# Patient Record
Sex: FEMALE | Race: BLACK OR AFRICAN AMERICAN | NOT HISPANIC OR LATINO | Employment: PART TIME | URBAN - METROPOLITAN AREA
[De-identification: names, ages, dates, MRNs, and addresses within clinical notes are randomized per-mention and may not be internally consistent; named-entity substitution may affect disease eponyms.]

---

## 2021-08-22 ENCOUNTER — HOSPITAL ENCOUNTER (INPATIENT)
Facility: HOSPITAL | Age: 31
LOS: 1 days | Discharge: HOME | End: 2021-08-23
Attending: EMERGENCY MEDICINE | Admitting: INTERNAL MEDICINE
Payer: COMMERCIAL

## 2021-08-22 ENCOUNTER — APPOINTMENT (OUTPATIENT)
Dept: CT IMAGING | Facility: HOSPITAL | Age: 31
End: 2021-08-22
Attending: EMERGENCY MEDICINE
Payer: COMMERCIAL

## 2021-08-22 ENCOUNTER — APPOINTMENT (OUTPATIENT)
Dept: RADIOLOGY | Facility: HOSPITAL | Age: 31
End: 2021-08-22
Attending: EMERGENCY MEDICINE
Payer: COMMERCIAL

## 2021-08-22 DIAGNOSIS — J45.901 SEVERE ASTHMA WITH EXACERBATION, UNSPECIFIED WHETHER PERSISTENT: ICD-10-CM

## 2021-08-22 DIAGNOSIS — R00.0 SINUS TACHYCARDIA: ICD-10-CM

## 2021-08-22 DIAGNOSIS — J96.01 ACUTE RESPIRATORY FAILURE WITH HYPOXIA (H): ICD-10-CM

## 2021-08-22 DIAGNOSIS — Z20.822 SUSPECTED COVID-19 VIRUS INFECTION: ICD-10-CM

## 2021-08-22 DIAGNOSIS — J45.51 SEVERE PERSISTENT ASTHMA WITH ACUTE EXACERBATION (H): Primary | ICD-10-CM

## 2021-08-22 DIAGNOSIS — M89.9 BONE LESION: ICD-10-CM

## 2021-08-22 LAB
ANION GAP SERPL CALCULATED.3IONS-SCNC: 11 MMOL/L (ref 5–18)
BASE EXCESS BLDA CALC-SCNC: -4.8 MMOL/L
BASE EXCESS BLDV CALC-SCNC: -2.3 MMOL/L
BASOPHILS # BLD AUTO: 0.1 10E3/UL (ref 0–0.2)
BASOPHILS NFR BLD AUTO: 1 %
BNP SERPL-MCNC: 24 PG/ML (ref 0–64)
BUN SERPL-MCNC: 9 MG/DL (ref 8–22)
CALCIUM SERPL-MCNC: 9.5 MG/DL (ref 8.5–10.5)
CHLORIDE BLD-SCNC: 108 MMOL/L (ref 98–107)
CO2 SERPL-SCNC: 22 MMOL/L (ref 22–31)
COHGB MFR BLD: 96.3 % (ref 96–97)
CREAT SERPL-MCNC: 1.03 MG/DL (ref 0.6–1.1)
D DIMER PPP FEU-MCNC: 1.66 UG/ML FEU (ref 0–0.5)
EOSINOPHIL # BLD AUTO: 0.7 10E3/UL (ref 0–0.7)
EOSINOPHIL NFR BLD AUTO: 8 %
ERYTHROCYTE [DISTWIDTH] IN BLOOD BY AUTOMATED COUNT: 12.8 % (ref 10–15)
GFR SERPL CREATININE-BSD FRML MDRD: 73 ML/MIN/1.73M2
GLUCOSE BLD-MCNC: 95 MG/DL (ref 70–125)
HCG SERPL QL: NEGATIVE
HCO3 BLD-SCNC: 21 MMOL/L (ref 23–29)
HCO3 BLDV-SCNC: 22 MMOL/L (ref 24–30)
HCT VFR BLD AUTO: 47.4 % (ref 35–47)
HGB BLD-MCNC: 15.7 G/DL (ref 11.7–15.7)
HOLD SPECIMEN: NORMAL
IMM GRANULOCYTES # BLD: 0 10E3/UL
IMM GRANULOCYTES NFR BLD: 0 %
LACTATE SERPL-SCNC: 0.7 MMOL/L (ref 0.7–2)
LYMPHOCYTES # BLD AUTO: 3.4 10E3/UL (ref 0.8–5.3)
LYMPHOCYTES NFR BLD AUTO: 35 %
MCH RBC QN AUTO: 28.2 PG (ref 26.5–33)
MCHC RBC AUTO-ENTMCNC: 33.1 G/DL (ref 31.5–36.5)
MCV RBC AUTO: 85 FL (ref 78–100)
MONOCYTES # BLD AUTO: 0.7 10E3/UL (ref 0–1.3)
MONOCYTES NFR BLD AUTO: 7 %
NEUTROPHILS # BLD AUTO: 4.8 10E3/UL (ref 1.6–8.3)
NEUTROPHILS NFR BLD AUTO: 49 %
NRBC # BLD AUTO: 0 10E3/UL
NRBC BLD AUTO-RTO: 0 /100
O2/TOTAL GAS SETTING VFR VENT: 0.25 %
OXYHGB MFR BLD: 94.4 % (ref 96–97)
OXYHGB MFR BLDV: 96.3 % (ref 70–75)
PCO2 BLD: 44 MM HG (ref 35–45)
PCO2 BLDV: 51 MM HG (ref 35–50)
PEEP: 5 CM H2O
PH BLD: 7.3 [PH] (ref 7.37–7.44)
PH BLDV: 7.29 [PH] (ref 7.35–7.45)
PLATELET # BLD AUTO: 293 10E3/UL (ref 150–450)
PO2 BLD: 87 MM HG (ref 80–90)
PO2 BLDV: 110 MM HG (ref 25–47)
POTASSIUM BLD-SCNC: 3.9 MMOL/L (ref 3.5–5)
PSV (LAB BLOOD GAS): 12 CM H2O
RATE: 20 RR/MIN
RBC # BLD AUTO: 5.56 10E6/UL (ref 3.8–5.2)
SAO2 % BLDV: 98.2 % (ref 70–75)
SARS-COV-2 RNA RESP QL NAA+PROBE: NEGATIVE
SODIUM SERPL-SCNC: 141 MMOL/L (ref 136–145)
TEMPERATURE: 37 DEGREES C
TROPONIN I SERPL-MCNC: <0.01 NG/ML (ref 0–0.29)
VENTILATION MODE: ABNORMAL
WBC # BLD AUTO: 9.6 10E3/UL (ref 4–11)

## 2021-08-22 PROCEDURE — 80048 BASIC METABOLIC PNL TOTAL CA: CPT | Performed by: EMERGENCY MEDICINE

## 2021-08-22 PROCEDURE — 84484 ASSAY OF TROPONIN QUANT: CPT | Performed by: EMERGENCY MEDICINE

## 2021-08-22 PROCEDURE — 999N000157 HC STATISTIC RCP TIME EA 10 MIN

## 2021-08-22 PROCEDURE — 83605 ASSAY OF LACTIC ACID: CPT | Performed by: EMERGENCY MEDICINE

## 2021-08-22 PROCEDURE — 87040 BLOOD CULTURE FOR BACTERIA: CPT | Performed by: EMERGENCY MEDICINE

## 2021-08-22 PROCEDURE — 250N000013 HC RX MED GY IP 250 OP 250 PS 637: Performed by: INTERNAL MEDICINE

## 2021-08-22 PROCEDURE — 5A09357 ASSISTANCE WITH RESPIRATORY VENTILATION, LESS THAN 24 CONSECUTIVE HOURS, CONTINUOUS POSITIVE AIRWAY PRESSURE: ICD-10-PCS | Performed by: EMERGENCY MEDICINE

## 2021-08-22 PROCEDURE — 85025 COMPLETE CBC W/AUTO DIFF WBC: CPT | Performed by: EMERGENCY MEDICINE

## 2021-08-22 PROCEDURE — 71275 CT ANGIOGRAPHY CHEST: CPT

## 2021-08-22 PROCEDURE — 36415 COLL VENOUS BLD VENIPUNCTURE: CPT | Performed by: EMERGENCY MEDICINE

## 2021-08-22 PROCEDURE — 71045 X-RAY EXAM CHEST 1 VIEW: CPT

## 2021-08-22 PROCEDURE — 250N000011 HC RX IP 250 OP 636: Performed by: INTERNAL MEDICINE

## 2021-08-22 PROCEDURE — 94640 AIRWAY INHALATION TREATMENT: CPT

## 2021-08-22 PROCEDURE — 96365 THER/PROPH/DIAG IV INF INIT: CPT | Mod: 59

## 2021-08-22 PROCEDURE — 96375 TX/PRO/DX INJ NEW DRUG ADDON: CPT

## 2021-08-22 PROCEDURE — 84703 CHORIONIC GONADOTROPIN ASSAY: CPT | Performed by: EMERGENCY MEDICINE

## 2021-08-22 PROCEDURE — 82805 BLOOD GASES W/O2 SATURATION: CPT | Performed by: EMERGENCY MEDICINE

## 2021-08-22 PROCEDURE — C9803 HOPD COVID-19 SPEC COLLECT: HCPCS

## 2021-08-22 PROCEDURE — 93005 ELECTROCARDIOGRAM TRACING: CPT | Performed by: EMERGENCY MEDICINE

## 2021-08-22 PROCEDURE — 83880 ASSAY OF NATRIURETIC PEPTIDE: CPT | Performed by: EMERGENCY MEDICINE

## 2021-08-22 PROCEDURE — 250N000009 HC RX 250: Performed by: EMERGENCY MEDICINE

## 2021-08-22 PROCEDURE — 87635 SARS-COV-2 COVID-19 AMP PRB: CPT | Performed by: EMERGENCY MEDICINE

## 2021-08-22 PROCEDURE — 36600 WITHDRAWAL OF ARTERIAL BLOOD: CPT

## 2021-08-22 PROCEDURE — 96366 THER/PROPH/DIAG IV INF ADDON: CPT

## 2021-08-22 PROCEDURE — 99222 1ST HOSP IP/OBS MODERATE 55: CPT | Performed by: INTERNAL MEDICINE

## 2021-08-22 PROCEDURE — 120N000001 HC R&B MED SURG/OB

## 2021-08-22 PROCEDURE — 250N000011 HC RX IP 250 OP 636: Performed by: EMERGENCY MEDICINE

## 2021-08-22 PROCEDURE — 94660 CPAP INITIATION&MGMT: CPT

## 2021-08-22 PROCEDURE — 99285 EMERGENCY DEPT VISIT HI MDM: CPT | Mod: 25

## 2021-08-22 PROCEDURE — 85379 FIBRIN DEGRADATION QUANT: CPT | Performed by: EMERGENCY MEDICINE

## 2021-08-22 RX ORDER — BISACODYL 10 MG
10 SUPPOSITORY, RECTAL RECTAL DAILY PRN
Status: DISCONTINUED | OUTPATIENT
Start: 2021-08-22 | End: 2021-08-23 | Stop reason: HOSPADM

## 2021-08-22 RX ORDER — DIPHENHYDRAMINE HCL 25 MG
25 TABLET ORAL EVERY 6 HOURS PRN
Status: DISCONTINUED | OUTPATIENT
Start: 2021-08-22 | End: 2021-08-23 | Stop reason: HOSPADM

## 2021-08-22 RX ORDER — POLYETHYLENE GLYCOL 3350 17 G/17G
17 POWDER, FOR SOLUTION ORAL DAILY
Status: DISCONTINUED | OUTPATIENT
Start: 2021-08-22 | End: 2021-08-23 | Stop reason: HOSPADM

## 2021-08-22 RX ORDER — ACETAMINOPHEN 325 MG/1
650 TABLET ORAL EVERY 4 HOURS PRN
Status: DISCONTINUED | OUTPATIENT
Start: 2021-08-22 | End: 2021-08-23 | Stop reason: HOSPADM

## 2021-08-22 RX ORDER — MAGNESIUM HYDROXIDE/ALUMINUM HYDROXICE/SIMETHICONE 120; 1200; 1200 MG/30ML; MG/30ML; MG/30ML
30 SUSPENSION ORAL EVERY 4 HOURS PRN
Status: DISCONTINUED | OUTPATIENT
Start: 2021-08-22 | End: 2021-08-23 | Stop reason: HOSPADM

## 2021-08-22 RX ORDER — AMOXICILLIN 250 MG
1 CAPSULE ORAL 2 TIMES DAILY PRN
Status: DISCONTINUED | OUTPATIENT
Start: 2021-08-22 | End: 2021-08-23 | Stop reason: HOSPADM

## 2021-08-22 RX ORDER — MAGNESIUM SULFATE HEPTAHYDRATE 40 MG/ML
2 INJECTION, SOLUTION INTRAVENOUS ONCE
Status: COMPLETED | OUTPATIENT
Start: 2021-08-22 | End: 2021-08-22

## 2021-08-22 RX ORDER — ALBUTEROL SULFATE 90 UG/1
2 AEROSOL, METERED RESPIRATORY (INHALATION) EVERY 4 HOURS PRN
Status: ON HOLD | COMMUNITY
End: 2021-08-23

## 2021-08-22 RX ORDER — ALBUTEROL SULFATE 5 MG/ML
2.5 SOLUTION RESPIRATORY (INHALATION) ONCE
Status: COMPLETED | OUTPATIENT
Start: 2021-08-22 | End: 2021-08-22

## 2021-08-22 RX ORDER — METHYLPREDNISOLONE SODIUM SUCCINATE 40 MG/ML
40 INJECTION, POWDER, LYOPHILIZED, FOR SOLUTION INTRAMUSCULAR; INTRAVENOUS EVERY 8 HOURS
Status: DISCONTINUED | OUTPATIENT
Start: 2021-08-22 | End: 2021-08-23 | Stop reason: HOSPADM

## 2021-08-22 RX ORDER — ALBUTEROL SULFATE 0.83 MG/ML
2.5 SOLUTION RESPIRATORY (INHALATION) ONCE
Status: COMPLETED | OUTPATIENT
Start: 2021-08-22 | End: 2021-08-22

## 2021-08-22 RX ORDER — LANOLIN ALCOHOL/MO/W.PET/CERES
3 CREAM (GRAM) TOPICAL
Status: DISCONTINUED | OUTPATIENT
Start: 2021-08-22 | End: 2021-08-23 | Stop reason: HOSPADM

## 2021-08-22 RX ORDER — IPRATROPIUM BROMIDE AND ALBUTEROL SULFATE 2.5; .5 MG/3ML; MG/3ML
3 SOLUTION RESPIRATORY (INHALATION) ONCE
Status: COMPLETED | OUTPATIENT
Start: 2021-08-22 | End: 2021-08-22

## 2021-08-22 RX ORDER — METHYLPREDNISOLONE SODIUM SUCCINATE 125 MG/2ML
125 INJECTION, POWDER, LYOPHILIZED, FOR SOLUTION INTRAMUSCULAR; INTRAVENOUS ONCE
Status: COMPLETED | OUTPATIENT
Start: 2021-08-22 | End: 2021-08-22

## 2021-08-22 RX ORDER — KETAMINE HCL 50MG/ML(1)
40 SYRINGE (ML) INTRAVENOUS ONCE
Status: COMPLETED | OUTPATIENT
Start: 2021-08-22 | End: 2021-08-22

## 2021-08-22 RX ORDER — ALBUTEROL SULFATE 90 UG/1
2 AEROSOL, METERED RESPIRATORY (INHALATION) EVERY 4 HOURS PRN
Status: DISCONTINUED | OUTPATIENT
Start: 2021-08-22 | End: 2021-08-23 | Stop reason: HOSPADM

## 2021-08-22 RX ORDER — PREDNISONE 20 MG/1
40 TABLET ORAL
Status: DISCONTINUED | OUTPATIENT
Start: 2021-08-24 | End: 2021-08-23 | Stop reason: HOSPADM

## 2021-08-22 RX ORDER — ALBUTEROL SULFATE 0.83 MG/ML
2.5 SOLUTION RESPIRATORY (INHALATION) EVERY 4 HOURS PRN
Status: ON HOLD | COMMUNITY
End: 2021-08-23

## 2021-08-22 RX ORDER — IOPAMIDOL 755 MG/ML
100 INJECTION, SOLUTION INTRAVASCULAR ONCE
Status: COMPLETED | OUTPATIENT
Start: 2021-08-22 | End: 2021-08-22

## 2021-08-22 RX ORDER — IBUPROFEN 200 MG
400-600 TABLET ORAL EVERY 4 HOURS PRN
COMMUNITY
End: 2022-01-22

## 2021-08-22 RX ORDER — BENZONATATE 100 MG/1
100 CAPSULE ORAL 3 TIMES DAILY PRN
Status: DISCONTINUED | OUTPATIENT
Start: 2021-08-22 | End: 2021-08-23 | Stop reason: HOSPADM

## 2021-08-22 RX ORDER — ONDANSETRON 2 MG/ML
4 INJECTION INTRAMUSCULAR; INTRAVENOUS EVERY 6 HOURS PRN
Status: DISCONTINUED | OUTPATIENT
Start: 2021-08-22 | End: 2021-08-23 | Stop reason: HOSPADM

## 2021-08-22 RX ADMIN — MOMETASONE FUROATE AND FORMOTEROL FUMARATE DIHYDRATE 2 PUFF: 100; 5 AEROSOL RESPIRATORY (INHALATION) at 22:38

## 2021-08-22 RX ADMIN — Medication 40 MG: at 15:41

## 2021-08-22 RX ADMIN — ENOXAPARIN SODIUM 40 MG: 100 INJECTION SUBCUTANEOUS at 22:03

## 2021-08-22 RX ADMIN — METHYLPREDNISOLONE SODIUM SUCCINATE 40 MG: 40 INJECTION, POWDER, FOR SOLUTION INTRAMUSCULAR; INTRAVENOUS at 21:28

## 2021-08-22 RX ADMIN — IOPAMIDOL 100 ML: 755 INJECTION, SOLUTION INTRAVENOUS at 17:25

## 2021-08-22 RX ADMIN — MAGNESIUM SULFATE HEPTAHYDRATE 2 G: 40 INJECTION, SOLUTION INTRAVENOUS at 15:51

## 2021-08-22 RX ADMIN — IPRATROPIUM BROMIDE AND ALBUTEROL SULFATE 3 ML: 2.5; .5 SOLUTION RESPIRATORY (INHALATION) at 16:39

## 2021-08-22 RX ADMIN — ALBUTEROL SULFATE 2.5 MG: 2.5 SOLUTION RESPIRATORY (INHALATION) at 16:40

## 2021-08-22 RX ADMIN — METHYLPREDNISOLONE SODIUM SUCCINATE 125 MG: 125 INJECTION, POWDER, FOR SOLUTION INTRAMUSCULAR; INTRAVENOUS at 15:47

## 2021-08-22 RX ADMIN — BENZOCAINE AND MENTHOL 1 LOZENGE: 15; 3.6 LOZENGE ORAL at 22:03

## 2021-08-22 ASSESSMENT — ACTIVITIES OF DAILY LIVING (ADL)
DOING_ERRANDS_INDEPENDENTLY_DIFFICULTY: NO
DIFFICULTY_COMMUNICATING: NO
FALL_HISTORY_WITHIN_LAST_SIX_MONTHS: NO
WALKING_OR_CLIMBING_STAIRS_DIFFICULTY: NO
HEARING_DIFFICULTY_OR_DEAF: NO
CONCENTRATING,_REMEMBERING_OR_MAKING_DECISIONS_DIFFICULTY: NO
WEAR_GLASSES_OR_BLIND: NO
DRESSING/BATHING_DIFFICULTY: NO
TOILETING_ISSUES: NO
DIFFICULTY_EATING/SWALLOWING: NO

## 2021-08-22 NOTE — ED NOTES
Events: 1540 pt arrived to room from ems severe resp distress.  Arrived with sats mid 80's restless and anxious, MD, RT, RN and ERT in room. CPAP place, ketimine given, solumedrol       Neuro: Oriented x4, anxious and restgless  IV/drain: PIV placed in left arm   VSS: resp 38 (currently 20), heart rate 150's (currently 120)  Resp: constricted wheezed, min air movement (currently exp wheezes, good air movement)  Cardio: STach  GI/: Voiding adequately ,   Pain/Discomfort: currently resting comfortable  Activity: Up independently   Skin: CDI .

## 2021-08-22 NOTE — ED TRIAGE NOTES
The pt presents via EMS for evaluation of SOB. Hx of Asthma. Medics found her to be 60% on RA on scene with bilateral wheezes. They began administering a duo

## 2021-08-22 NOTE — ED NOTES
Have room for Pt on P2, pt very concerned with  issues. She is calling friends and family. She is unsure if she will let us admit her.

## 2021-08-22 NOTE — PROGRESS NOTES
Patient arrived via EMS into ED #3. Patient placed on BiPAP after Ketamine given. BiPAP settings of 10/5, 20, 25# FiO2 25%. Patient tolerating well at this time. Duoneb given x 1, Albuterol x 2. BS tight expiratory wheezes throughout before and after. WOB decreased patient resting. Continue to follow closely.

## 2021-08-22 NOTE — PHARMACY-ADMISSION MEDICATION HISTORY
Pharmacy Note - Admission Medication History    Pertinent Provider Information: none     ______________________________________________________________________    Prior To Admission (PTA) med list completed and updated in EMR.       PTA Med List   Medication Sig Last Dose     albuterol (PROAIR HFA/PROVENTIL HFA/VENTOLIN HFA) 108 (90 Base) MCG/ACT inhaler Inhale 2 puffs into the lungs every 4 hours as needed for shortness of breath / dyspnea or wheezing      albuterol (PROVENTIL) (2.5 MG/3ML) 0.083% neb solution Take 2.5 mg by nebulization every 4 hours as needed for shortness of breath / dyspnea or wheezing      ibuprofen (ADVIL/MOTRIN) 200 MG tablet Take 400-600 mg by mouth every 4 hours as needed for mild pain      mometasone-formoterol (DULERA) 100-5 MCG/ACT inhaler Inhale 2 puffs into the lungs 2 times daily 8/21/2021 at Unknown time       Information source(s): Patient  Method of interview communication: phone    Summary of Changes to PTA Med List  New: none  Discontinued: none  Changed: none    Patient was asked about OTC/herbal products specifically.  PTA med list reflects this.    In the past week, patient estimated taking medication this percent of the time:  greater than 90%.    Allergies were reviewed, assessed, and updated with the patient.      Patient did not bring any medications to the hospital and can't retrieve from home. No multi-dose medications are available for use during hospital stay.     The information provided in this note is only as accurate as the sources available at the time of the update(s).    Thank you for the opportunity to participate in the care of this patient.    Luiz Mckenzie RPH  8/22/2021 6:12 PM

## 2021-08-22 NOTE — ED PROVIDER NOTES
EMERGENCY DEPARTMENT ENCOUNTER      NAME: Soren Freitas  AGE: 30 year old female  YOB: 1990  MRN: [id number]  EVALUATION DATE & TIME: 8/22/2021  3:34 PM    PCP: No primary care provider on file.    ED PROVIDER: Fay Jama MD    Chief Complaint   Patient presents with     Shortness of Breath         FINAL IMPRESSION:  1. Severe asthma with exacerbation, unspecified whether persistent    2. Acute respiratory failure with hypoxia (H)    3. Sinus tachycardia    4. Suspected COVID-19 virus infection    5. Bone lesion          ED COURSE & MEDICAL DECISION MAKING:    Pertinent Labs & Imaging studies reviewed. (See chart for details)  30 year old female with history of asthma, obesity, Covid vaccinated who was well yesterday who presents to the Emergency Department for evaluation of woke up feeling ill with cough cold congestion today and then became acutely worse this afternoon not responding to nebs.  Hypoxic in the 60s and respiratory distress with diffuse wheezing upon EMS arrival.  Differential includes viral syndrome, influenza, COVID-19, pneumonia, asthma exacerbation, acute bronchospasm, pneumothorax, acute hypoxic versus hypercapnic respiratory failure, PE.    Patient met by myself upon emergency department arrival, placed on monitor, supplemental oxygen, IV established and blood obtained.  Patient was given dose of 40 mg ketamine for anxiolysis and placed on BiPAP for work of breathing with inline DuoNeb and albuterol neb x2.  Given magnesium, Solu-Medrol.  With the above, patient's work of breathing markedly improved.  Twelve-lead EKG shows sinus tachycardia.  CBC, BMP, BNP, troponin, VBG, ABG, lactate notable for mild respiratory acidosis.  D-dimer elevated at 1.66.  Chest x-ray obtained bibasilar infiltrate suspicious for Covid pneumonia.  With the above again her work of breathing improved and she was able to be taken off of the BiPAP.  She went to CT where CT PE study shows no  evidence of PE, but does show changes of the lungs concerning for pneumonia Covid pneumonia.  She does also have some sclerotic lesions of the bones of the spine suspicious for metastatic bony lesions.  At this time patient is not in any respiratory distress, her lungs are clear, and she is actually on room air oxygen satting at 98 to 100%.  Conversational, making her own appropriate decisions.  Covid swab negative but I am not sure that I believe this test result that she is only been ill since today.  In lieu of her imaging findings we will keep her on isolation precautions.        ED Course as of Aug 22 1855   Sun Aug 22, 2021   1606 Elev dimer   D-Dimer Quantitative(!): 1.66   1606 Mild resp acidosis    Ph Venous(!): 7.29     3:29 PM  I met with the patient for the initial interview and physical examination. Discussed plan for treatment and workup in the ED.    3:40 PM Patient was administered ketamine and is now more relaxed and dissociated. Will begin bipap.   4:56 PM I rechecked and updated the patient, who is no longer in respiratory distress and endorses improvement in her symptoms.   5:44 PM Radiology called with results which were negative for PE but notable for COVID-19 like lungs and bleeding in the spine.   5:49 PM I discussed the patient with respiratory therapist who will come to examine the patient.   6:55 PM admitted to Dr. Ilana Hernandez    At the conclusion of the encounter I discussed the results of all of the tests and the disposition. The questions were answered. The patient or family acknowledged understanding and was agreeable with the care plan.  PPE: Provider wore gloves, N95 mask, eye protection, and paper mask.       CRITICAL CARE:  Critical Care  Performed by: Fay Jama MD  Authorized by: Fay Jama MD     Total critical care time: 53 minutes  Criticalcare time was exclusive of separately billable procedures and treating other patients.  Critical care was necessary to treat or  prevent imminent or life-threatening deterioration of the following conditions: Cardiopulmonary decompensation, death, disability  Critical care was time spent personally by me on the following activities: development of treatment plan with patient or surrogate, discussions with consultants, examination of patient, evaluation of patient's response totreatment, obtaining history from patient or surrogate, ordering and performing treatments and interventions, ordering and review of laboratory studies, ordering and review of radiographic studies and re-evaluation ofpatient's condition, this excludes any separately billable procedures.      MEDICATIONS GIVEN IN THE EMERGENCY:  Medications   ipratropium - albuterol 0.5 mg/2.5 mg/3 mL (DUONEB) neb solution 3 mL (3 mLs Nebulization Given by Other 8/22/21 1639)   albuterol (PROVENTIL) neb solution 2.5 mg (2.5 mg Nebulization Given by Other 8/22/21 1640)   albuterol (PROVENTIL) neb solution 2.5 mg (2.5 mg Nebulization Given by Other 8/22/21 1640)   magnesium sulfate 2 g in water intermittent infusion (0 g Intravenous Stopped 8/22/21 1801)   methylPREDNISolone sodium succinate (solu-MEDROL) injection 125 mg (125 mg Intravenous Given 8/22/21 1547)   ketamine (KETALAR) injection 40 mg (40 mg Intravenous Given 8/22/21 1541)   iopamidol (ISOVUE-370) solution 100 mL (100 mLs Intravenous Given 8/22/21 1725)       NEW PRESCRIPTIONS STARTED AT TODAY'S ER VISIT  New Prescriptions    No medications on file          =================================================================    HPI    Patient information was obtained from: self    Use of Intrepreter: N/A        Soren GRAY Fortino is a 30 year old female with pertinent medical history of asthma who presents via EMS for evaluation of shortness of breath.    Patient reports that she has a history of asthma and developed a cough, cold, and chest congestion this morning. This afternoon her symptoms acutely worsened and reports  wheezing and using her nebulizer with no relief, so she called 911. Patient denies any other medical history or home medications besides inhalers and nebulizer. She does endorse a history of intubation in the ICU due to asthma exacerbation and a history of multiple hospitalizations. Patient denies any chance of pregnancy or known ill contacts. Patient denies any known exposure to COVID-19 and is vaccinated against COVID-19. Patient denies any leg pain, leg swelling, fever, chest pain, and any other symptoms or complaints at this time.     Per EMS, en route patient had oxygen sats in the 60s so they put her on oxygen. They started CPAP but patient became anxious so they put her back on the monitor breather. Highest sat measured was 94%.       REVIEW OF SYSTEMS  Constitutional:  Denies fever, chills, weight loss or weakness  HENT:  Denies sore throat, ear pain, congestion  Respiratory: Positive for SOB, wheeze and cough  Cardiovascular:  No CP, palpitations, leg swelling  GI:  Denies abdominal pain, nausea, vomiting, diarrhea  Musculoskeletal:  Denies any new muscle/joint pain, swelling or loss of function.  Neurologic: Mild headache  All other systems negative unless noted in HPI.      PAST MEDICAL HISTORY:  Past Medical History:   Diagnosis Date     Obesity      Uncomplicated asthma        PAST SURGICAL HISTORY:  History reviewed. No pertinent surgical history.    CURRENT MEDICATIONS:    Prior to Admission Medications   Prescriptions Last Dose Informant Patient Reported? Taking?   albuterol (PROAIR HFA/PROVENTIL HFA/VENTOLIN HFA) 108 (90 Base) MCG/ACT inhaler   Yes Yes   Sig: Inhale 2 puffs into the lungs every 4 hours as needed for shortness of breath / dyspnea or wheezing   albuterol (PROVENTIL) (2.5 MG/3ML) 0.083% neb solution   Yes Yes   Sig: Take 2.5 mg by nebulization every 4 hours as needed for shortness of breath / dyspnea or wheezing   ibuprofen (ADVIL/MOTRIN) 200 MG tablet   Yes Yes   Sig: Take 400-600 mg  by mouth every 4 hours as needed for mild pain   mometasone-formoterol (DULERA) 100-5 MCG/ACT inhaler 8/21/2021 at Unknown time  Yes Yes   Sig: Inhale 2 puffs into the lungs 2 times daily      Facility-Administered Medications: None       ALLERGIES:  No Known Allergies    FAMILY HISTORY:  History reviewed. No pertinent family history.    SOCIAL HISTORY:  Social History     Tobacco Use     Smoking status: None   Substance Use Topics     Alcohol use: None     Drug use: None        VITALS:  Patient Vitals for the past 24 hrs:   BP Temp Temp src Pulse Resp SpO2   08/22/21 1745 -- 98.5  F (36.9  C) Oral -- -- --   08/22/21 1730 (!) 146/86 -- -- 117 24 100 %   08/22/21 1700 (!) 144/89 -- -- (!) 128 25 99 %   08/22/21 1645 -- -- -- 120 26 100 %   08/22/21 1630 136/71 -- -- (!) 121 (!) 33 97 %   08/22/21 1615 (!) 147/86 -- -- (!) 124 25 98 %   08/22/21 1600 (!) 164/79 -- -- (!) 128 22 100 %   08/22/21 1545 (!) 187/104 -- -- (!) 143 24 100 %       PHYSICAL EXAM    General Appearance:  Young female in moderate to severe respiratory distress sitting in the tripod position.   Head:  Normocephalic  Eyes:   conjunctiva/corneas clear  ENT:   membranes are moist without pallor  Neck:  Supple  Cardio:  Regular rhythm, no murmur/gallop/rub, 2+ pulses symmetric in all extremities Tachycardic in the 130s.   Pulm:   In moderate to severe respiratory distress. Diffuse audible wheezing and tachypnea on 15 L. Oxygen saturation at 84%. Speaks in 2-3 word sentences.   Back:  No CVA tenderness, normal ROM  Abdomen:  Soft, non-tender, obese  Extremities: Extremities atraumatic.  No notable peripheral edema  Skin:  Skin warm, dry, no rashes  Neuro:  Alert and oriented ×3, moving all extremities, no gross sensory defects     RADIOLOGY/LABS:  Reviewed all pertinent imaging. Please see official radiology report. All pertinent labs reviewed and interpreted.    Results for orders placed or performed during the hospital encounter of 08/22/21   XR  Chest Port 1 View    Impression    IMPRESSION: Faint interstitial infiltrates in the mid to lower lungs. This is concerning for possible mild COVID pneumonia.   CT Chest Pulmonary Embolism w Contrast    Impression    IMPRESSION:  1.  No pulmonary embolism.    2.  Scattered regions of subpleural groundglass infiltrates in the lungs with regions of intralobular thickening consistent with COVID 19 pneumonia.    3.  There are multiple sclerotic lesions within the visualized spine suspicious for metastatic bone disease.    4.  Results was discussed with Dr Chanell Jama   Basic metabolic panel   Result Value Ref Range    Sodium 141 136 - 145 mmol/L    Potassium 3.9 3.5 - 5.0 mmol/L    Chloride 108 (H) 98 - 107 mmol/L    Carbon Dioxide (CO2) 22 22 - 31 mmol/L    Anion Gap 11 5 - 18 mmol/L    Urea Nitrogen 9 8 - 22 mg/dL    Creatinine 1.03 0.60 - 1.10 mg/dL    Calcium 9.5 8.5 - 10.5 mg/dL    Glucose 95 70 - 125 mg/dL    GFR Estimate 73 >60 mL/min/1.73m2   Blood gas venous   Result Value Ref Range    pH Venous 7.29 (L) 7.35 - 7.45    pCO2 Venous 51 (H) 35 - 50 mm Hg    pO2 Venous 110 (H) 25 - 47 mm Hg    Bicarbonate Venous 22 (L) 24 - 30 mmol/L    Base Excess/Deficit (+/-) -2.3   mmol/L    Oxyhemoglobin Venous 96.3 (H) 70.0 - 75.0 %    O2 Sat, Venous 98.2 (H) 70.0 - 75.0 %   Symptomatic COVID-19 Virus (Coronavirus) by PCR Nasopharyngeal    Specimen: Nasopharyngeal; Swab   Result Value Ref Range    SARS CoV2 PCR Negative Negative   hCG Qualitative Pregnancy   Result Value Ref Range    hCG Serum Qualitative Negative Negative   Troponin I (now)   Result Value Ref Range    Troponin I <0.01 0.00 - 0.29 ng/mL   B-Type Natriuretic Peptide ([facility] Only)   Result Value Ref Range    BNP 24 0 - 64 pg/mL   D dimer quantitative   Result Value Ref Range    D-Dimer Quantitative 1.66 (H) 0.00 - 0.50 ug/mL FEU   Extra Green Top (Lithium Heparin) Tube   Result Value Ref Range    Hold Specimen JIC    Extra Purple Top Tube   Result Value Ref  Range    Hold Specimen JIC    Extra Green Top (Lithium Heparin) ON ICE   Result Value Ref Range    Hold Specimen JIC    CBC with platelets and differential   Result Value Ref Range    WBC Count 9.6 4.0 - 11.0 10e3/uL    RBC Count 5.56 (H) 3.80 - 5.20 10e6/uL    Hemoglobin 15.7 11.7 - 15.7 g/dL    Hematocrit 47.4 (H) 35.0 - 47.0 %    MCV 85 78 - 100 fL    MCH 28.2 26.5 - 33.0 pg    MCHC 33.1 31.5 - 36.5 g/dL    RDW 12.8 10.0 - 15.0 %    Platelet Count 293 150 - 450 10e3/uL    % Neutrophils 49 %    % Lymphocytes 35 %    % Monocytes 7 %    % Eosinophils 8 %    % Basophils 1 %    % Immature Granulocytes 0 %    NRBCs per 100 WBC 0 <1 /100    Absolute Neutrophils 4.8 1.6 - 8.3 10e3/uL    Absolute Lymphocytes 3.4 0.8 - 5.3 10e3/uL    Absolute Monocytes 0.7 0.0 - 1.3 10e3/uL    Absolute Eosinophils 0.7 0.0 - 0.7 10e3/uL    Absolute Basophils 0.1 0.0 - 0.2 10e3/uL    Absolute Immature Granulocytes 0.0 <=0.0 10e3/uL    Absolute NRBCs 0.0 10e3/uL   Blood gas arterial   Result Value Ref Range    pH Arterial 7.30 (L) 7.37 - 7.44    pCO2 Arterial 44 35 - 45 mm Hg    pO2 Arterial 87 80 - 90 mm Hg    Bicarbonate Arterial 21 (L) 23 - 29 mmol/L    O2 Sat, Arterial 96.3 96.0 - 97.0 %    Oxyhemoglobin 94.4 (L) 96.0 - 97.0 %    Base Excess/Deficit (+/-) -4.8   mmol/L    Ventilation Mode BiPap     Rate 20 rr/min    FIO2 0.25     PSV 12 cm H2O    Peep 5 cm H2O    Sample Stabilized Temperature 37.0 degrees C   Lactic acid whole blood   Result Value Ref Range    Lactic Acid 0.7 0.7 - 2.0 mmol/L       EKG:  Performed at: 15:35:29    Impression: Poor data quality, interpretation may be adversely affected. Sinus tachycardia.     Rate: 140 bpm   Rhythm: Sinus tachycardia   Axis: 55  RI Interval: 122 ms   QRS Interval: 76 ms  QTc Interval: 439 ms   ST Changes: None   Comparison: None available.   I have independently reviewed and interpreted the EKG(s) documented above.      The creation of this record is based on the scribe s observations of  the work being performed by Fay Jama MD and the provider s statements to them. It was created on his behalf by Jaylin Waldron, a trained medical scribe. This document has been checked and approved by the attending provider.    Fay Jama MD  Emergency Medicine  [facility] EMERGENCY DEPARTMENT  [address] [place] [id number]  [phone]  Dept: [phone]       Fay Jama MD  08/22/21 7332

## 2021-08-22 NOTE — ED NOTES
Bed: JNED-03  Expected date: 8/22/21  Expected time: 3:23 PM  Means of arrival: Ambulance  Comments:  [facility]

## 2021-08-22 NOTE — ED NOTES
[facility] ED Handoff Report    ED Chief Complaint:  chief complaint    ED Diagnosis:  (J45.901) Severe asthma with exacerbation, unspecified whether persistent  Comment:  On arrival to ED, placed on monitor, supplemental oxygen, IV established and blood obtained.  Patient was given dose of 40 mg ketamine for anxiolysis and placed on BiPAP for work of breathing with inline DuoNeb and albuterol neb x2.  Given magnesium, Solu-Medrol.  With the above, patient's work of breathing markedly improved.  Twelve-lead EKG shows sinus tachycardia.  CBC, BMP, BNP, troponin, VBG, ABG, lactate notable for mild respiratory acidosis.  D-dimer elevated at 1.66.  Chest x-ray obtained bibasilar infiltrate suspicious for Covid pneumonia.  With the above again her work of breathing improved and she was able to be taken off of the BiPAP.  She went to CT where CT PE study shows no evidence of PE, but does show changes of the lungs concerning for pneumonia Covid pneumonia.  She does also have some sclerotic lesions of the bones of the spine suspicious for metastatic bony lesions.  At this time patient is not in any respiratory distress, her lungs are clear, and she is actually on room air oxygen satting at 98 to 100%.  Covid swab pending       (J96.01) Acute respiratory failure with hypoxia (H)  Comment: see above      (R00.0) Sinus tachycardia  Comment:   Plan:     (Z20.822) Suspected COVID-19 virus infection  Swab pending       PMH:    Past Medical History:   Diagnosis Date     Obesity      Uncomplicated asthma         Code Status:  No Order     Falls Risk: No    Current Living Situation/Residence: home    Elimination Status:  indep    Activity Level: Independent    Patients Preferred Language:  Data Unavailable     Needed: No    Infection:  [unfilled]     Vital Signs:  BP (!) 146/86   Pulse 117   Temp 98.5  F (36.9  C) (Oral)   Resp 24   SpO2 100%      Cardiac Rhythm: Sinus Tach    Pain Score:  2/10    Is the  Patient Confused:  No    Last Food or Drink: this am    Focused Assessment:  See chart and not above    Tests Performed:  See labs    Abnormal Results:    Abnormal Labs Reviewed   BASIC METABOLIC PANEL - Abnormal; Notable for the following components:       Result Value    Chloride 108 (*)     All other components within normal limits   BLOOD GAS VENOUS - Abnormal; Notable for the following components:    pH Venous 7.29 (*)     pCO2 Venous 51 (*)     pO2 Venous 110 (*)     Bicarbonate Venous 22 (*)     Oxyhemoglobin Venous 96.3 (*)     O2 Sat, Venous 98.2 (*)     All other components within normal limits   D DIMER QUANTITATIVE - Abnormal; Notable for the following components:    D-Dimer Quantitative 1.66 (*)     All other components within normal limits    Narrative:     This D-dimer assay is intended for use in conjunction with a clinical pretest probability assessment model to exclude pulmonary embolism (PE) and deep venous thrombosis (DVT) in outpatients suspected of PE or DVT. The cut-off value is 0.50 ug/mL FEU.   CBC WITH PLATELETS AND DIFFERENTIAL - Abnormal; Notable for the following components:    RBC Count 5.56 (*)     Hematocrit 47.4 (*)     All other components within normal limits   BLOOD GAS ARTERIAL - Abnormal; Notable for the following components:    pH Arterial 7.30 (*)     Bicarbonate Arterial 21 (*)     Oxyhemoglobin 94.4 (*)     All other components within normal limits       CT Chest Pulmonary Embolism w Contrast   Final Result   IMPRESSION:   1.  No pulmonary embolism.      2.  Scattered regions of subpleural groundglass infiltrates in the lungs with regions of intralobular thickening consistent with COVID 19 pneumonia.      3.  There are multiple sclerotic lesions within the visualized spine suspicious for metastatic bone disease.      4.  Results was discussed with Dr Chanell Jama      XR Chest Port 1 View   Final Result   IMPRESSION: Faint interstitial infiltrates in the mid to lower lungs.  This is concerning for possible mild COVID pneumonia.           Treatments Provided:      Family Dynamics/Concerns: no    Family Updated On Visitor Policy: Yes     Plan of Care Communicated to Family: Yes     friend    Belongings Checklist Done and Signed by Patient: Yes      pending  ED Medications:    Medications   ipratropium - albuterol 0.5 mg/2.5 mg/3 mL (DUONEB) neb solution 3 mL (3 mLs Nebulization Given by Other 8/22/21 1639)   albuterol (PROVENTIL) neb solution 2.5 mg (2.5 mg Nebulization Given by Other 8/22/21 1640)   albuterol (PROVENTIL) neb solution 2.5 mg (2.5 mg Nebulization Given by Other 8/22/21 1640)   magnesium sulfate 2 g in water intermittent infusion (0 g Intravenous Stopped 8/22/21 1801)   methylPREDNISolone sodium succinate (solu-MEDROL) injection 125 mg (125 mg Intravenous Given 8/22/21 1547)   ketamine (KETALAR) injection 40 mg (40 mg Intravenous Given 8/22/21 1541)   iopamidol (ISOVUE-370) solution 100 mL (100 mLs Intravenous Given 8/22/21 1725)        Additional Information:     @ME2@ 8/22/2021 6:14 PM

## 2021-08-22 NOTE — ED NOTES
Mother okay with Vandana Ga taking all five children, including niece ronnie bangura and mother Aga bangura was contacted and agreed with this plan as RN Kevin GROVE heard the conversation. Contact info for vandana ga is [phone] Dr. Jama agrees that patient is able to make her own decisions at this time.

## 2021-08-23 VITALS
TEMPERATURE: 97.9 F | BODY MASS INDEX: 28.56 KG/M2 | WEIGHT: 182 LBS | RESPIRATION RATE: 18 BRPM | SYSTOLIC BLOOD PRESSURE: 103 MMHG | HEIGHT: 67 IN | HEART RATE: 98 BPM | OXYGEN SATURATION: 96 % | DIASTOLIC BLOOD PRESSURE: 64 MMHG

## 2021-08-23 LAB
ANION GAP SERPL CALCULATED.3IONS-SCNC: 9 MMOL/L (ref 5–18)
ATRIAL RATE - MUSE: 140 BPM
BUN SERPL-MCNC: 11 MG/DL (ref 8–22)
C REACTIVE PROTEIN LHE: 4.6 MG/DL (ref 0–0.8)
CALCIUM SERPL-MCNC: 9.3 MG/DL (ref 8.5–10.5)
CHLORIDE BLD-SCNC: 108 MMOL/L (ref 98–107)
CO2 SERPL-SCNC: 20 MMOL/L (ref 22–31)
CREAT SERPL-MCNC: 0.83 MG/DL (ref 0.6–1.1)
DIASTOLIC BLOOD PRESSURE - MUSE: NORMAL MMHG
GFR SERPL CREATININE-BSD FRML MDRD: >90 ML/MIN/1.73M2
GLUCOSE BLD-MCNC: 131 MG/DL (ref 70–125)
HOLD SPECIMEN: NORMAL
INTERPRETATION ECG - MUSE: NORMAL
P AXIS - MUSE: 69 DEGREES
POTASSIUM BLD-SCNC: 4.6 MMOL/L (ref 3.5–5)
PR INTERVAL - MUSE: 122 MS
QRS DURATION - MUSE: 76 MS
QT - MUSE: 288 MS
QTC - MUSE: 439 MS
R AXIS - MUSE: 55 DEGREES
SODIUM SERPL-SCNC: 137 MMOL/L (ref 136–145)
SYSTOLIC BLOOD PRESSURE - MUSE: NORMAL MMHG
T AXIS - MUSE: -28 DEGREES
VENTRICULAR RATE- MUSE: 140 BPM

## 2021-08-23 PROCEDURE — 250N000011 HC RX IP 250 OP 636: Performed by: INTERNAL MEDICINE

## 2021-08-23 PROCEDURE — 80048 BASIC METABOLIC PNL TOTAL CA: CPT | Performed by: INTERNAL MEDICINE

## 2021-08-23 PROCEDURE — 84165 PROTEIN E-PHORESIS SERUM: CPT | Mod: TC | Performed by: INTERNAL MEDICINE

## 2021-08-23 PROCEDURE — 84166 PROTEIN E-PHORESIS/URINE/CSF: CPT | Mod: TC | Performed by: INTERNAL MEDICINE

## 2021-08-23 PROCEDURE — 99222 1ST HOSP IP/OBS MODERATE 55: CPT | Performed by: STUDENT IN AN ORGANIZED HEALTH CARE EDUCATION/TRAINING PROGRAM

## 2021-08-23 PROCEDURE — 999N000126 HC STATISTIC PEAK FLOW MEASUREMENT

## 2021-08-23 PROCEDURE — 86141 C-REACTIVE PROTEIN HS: CPT | Performed by: INTERNAL MEDICINE

## 2021-08-23 PROCEDURE — 36415 COLL VENOUS BLD VENIPUNCTURE: CPT | Performed by: INTERNAL MEDICINE

## 2021-08-23 PROCEDURE — 250N000013 HC RX MED GY IP 250 OP 250 PS 637: Performed by: INTERNAL MEDICINE

## 2021-08-23 RX ORDER — ALBUTEROL SULFATE 0.83 MG/ML
2.5 SOLUTION RESPIRATORY (INHALATION) EVERY 4 HOURS PRN
Qty: 3 ML | Refills: 0 | Status: SHIPPED | OUTPATIENT
Start: 2021-08-23 | End: 2022-05-25

## 2021-08-23 RX ORDER — ALBUTEROL SULFATE 90 UG/1
2 AEROSOL, METERED RESPIRATORY (INHALATION) EVERY 4 HOURS PRN
Qty: 18 G | Refills: 0 | Status: SHIPPED | OUTPATIENT
Start: 2021-08-23 | End: 2022-05-25

## 2021-08-23 RX ORDER — METHYLPREDNISOLONE 4 MG
TABLET, DOSE PACK ORAL
Qty: 21 TABLET | Refills: 0 | Status: SHIPPED | OUTPATIENT
Start: 2021-08-23 | End: 2022-01-22

## 2021-08-23 RX ADMIN — METHYLPREDNISOLONE SODIUM SUCCINATE 40 MG: 40 INJECTION, POWDER, FOR SOLUTION INTRAMUSCULAR; INTRAVENOUS at 04:53

## 2021-08-23 RX ADMIN — METHYLPREDNISOLONE SODIUM SUCCINATE 40 MG: 40 INJECTION, POWDER, FOR SOLUTION INTRAMUSCULAR; INTRAVENOUS at 13:49

## 2021-08-23 RX ADMIN — OMEPRAZOLE 20 MG: 20 CAPSULE, DELAYED RELEASE ORAL at 06:35

## 2021-08-23 ASSESSMENT — MIFFLIN-ST. JEOR: SCORE: 1578.18

## 2021-08-23 NOTE — H&P
Admission History and Physical   Soren Freitas, 1990, [id number]  [facility]  Acute respiratory failure with hypoxia (H) [J96.01]  PCP:Elvia Bahena, None   Admitting provider: Saloni Putnam MD.    Code status:  Full Code          Extended Emergency Contact Information  Primary Emergency Contact: LUBNA MARTINEZ  Mobile Phone: [phone]  Relation: Sister       Assessment and Plan  Active Problems:    Acute respiratory failure with hypoxia (H)    Severe asthma with exacerbation, unspecified whether persistent    Bone lesion    Suspected COVID-19 virus infection    Soren Freitas is a 30 year old old female presenting with SOB     Scattered regions of subpleural groundglass infiltrates in the lungs with regions of intralobular thickening consistent with COVID 19 pneumonia.     3.  There are multiple sclerotic lesions within the visualized spine suspicious for metastatic bone disease.    Acute respiratory Hypoxia   - was on high Oxygen need in Ed and bipap and now is completely off oxygen   - continuous pulse ox  - treating asthma   - question of possible COVID PNA even though covid test negative CTA findigns suspicious for covid and keeping on isolation, performed with elevated ddimer    - Supp O2     Severe asthma with exacerbation   - IV soluemdrol   - alb MDI  - RT to assess   - avoid further nebs at this time     Suspected COVID PNA   - keeping on isolation for now   - ID to see and assist in further management   - getting IV solumedrol holding on dex and remdesivir     Bone lesions   - checking SPEP and UPEP for now pending these results for further work-up and imaging   - calcium normal     COVID STATUS: negative but CT findings consistent with COVID keeping on isolation for now     VTE prophylaxis:  Enoxaparin (Lovenox) SQ  DIET: Orders Placed This Encounter      Combination Diet Regular Diet Adult    Drains/Lines: none  Weight bearing status: WBAT  Disposition/Barriers  to discharge: pending improvement in breathing and ID recs   Code Status:Full Code    HPI: Soren Freitas is a 30 year old old female with h/o asthma who presents via EMS for evaluation of shortness of breath.     Patient reports that she has a history of asthma and developed a cough, cold, and chest congestion this morning. This afternoon her symptoms acutely worsened and reports wheezing and using her nebulizer with no relief, so she called 911. Patient denies any other medical history or home medications besides inhalers and nebulizer. She does endorse a history of intubation in the ICU due to asthma exacerbation and a history of multiple hospitalizations. Patient denies any chance of pregnancy or known ill contacts. Patient denies any known exposure to COVID-19 and is vaccinated against COVID-19. Patient denies any leg pain, leg swelling, fever, chest pain, and any other symptoms or complaints at this time.      Per EMS, en route patient had oxygen sats in the 60s so they put her on oxygen. They started CPAP but patient became anxious so they put her back on the monitor breather. Highest sat measured was 94%.     Past Medical History:   Diagnosis Date     Obesity      Severe persistent asthma with acute exacerbation 1/1/2015    Formatting of this note might be different from the original. 12/2014 Admit for exacerbation: Spirometry FEV1/FVC 68%, D/C w/dulera, albuterol, Spiriva.  Started singulair 10mg QD, Given NRT.  Dx as infant, has been on albuterol lifelong.    Last Assessment & Plan:  Formatting of this note might be different from the original. Stable on Singulair, cetirizine, albuterol, and dulera. Discussed COVID     Uncomplicated asthma      History reviewed. No pertinent surgical history.  History reviewed. No pertinent family history.  Social History     Socioeconomic History     Marital status: Single     Spouse name: Not on file     Number of children: Not on file     Years of education: Not on  file     Highest education level: Not on file   Occupational History     Not on file   Tobacco Use     Smoking status: Not on file   Substance and Sexual Activity     Alcohol use: Not on file     Drug use: Not on file     Sexual activity: Not on file   Other Topics Concern     Not on file   Social History Narrative     Not on file     Social Determinants of Health     Financial Resource Strain:      Difficulty of Paying Living Expenses:    Food Insecurity:      Worried About Running Out of Food in the Last Year:      Ran Out of Food in the Last Year:    Transportation Needs:      Lack of Transportation (Medical):      Lack of Transportation (Non-Medical):    Physical Activity:      Days of Exercise per Week:      Minutes of Exercise per Session:    Stress:      Feeling of Stress :    Social Connections:      Frequency of Communication with Friends and Family:      Frequency of Social Gatherings with Friends and Family:      Attends [employer] Services:      Active Member of Clubs or Organizations:      Attends Club or Organization Meetings:      Marital Status:    Intimate Partner Violence:      Fear of Current or Ex-Partner:      Emotionally Abused:      Physically Abused:      Sexually Abused:      No Known Allergies    PRIOR TO ADMISSION MEDICATIONS   Medications Prior to Admission   Medication Sig Dispense Refill Last Dose     albuterol (PROAIR HFA/PROVENTIL HFA/VENTOLIN HFA) 108 (90 Base) MCG/ACT inhaler Inhale 2 puffs into the lungs every 4 hours as needed for shortness of breath / dyspnea or wheezing        albuterol (PROVENTIL) (2.5 MG/3ML) 0.083% neb solution Take 2.5 mg by nebulization every 4 hours as needed for shortness of breath / dyspnea or wheezing        ibuprofen (ADVIL/MOTRIN) 200 MG tablet Take 400-600 mg by mouth every 4 hours as needed for mild pain        mometasone-formoterol (DULERA) 100-5 MCG/ACT inhaler Inhale 2 puffs into the lungs 2 times daily   8/21/2021 at Unknown time        REVIEW OF  SYSTEMS:  12 point reviewed pertinent negatives and positives in HPI all others negative     PHYSICAL EXAM  B/P:146/86 T:98.5 P:117 R: 24     Phone patient: nonlabored breathing able to complete full sentences, harsh raspy voice   ED provider exam     General Appearance:  Young female in moderate to severe respiratory distress sitting in the tripod position.   Head:  Normocephalic  Eyes:   conjunctiva/corneas clear  ENT:   membranes are moist without pallor  Neck:  Supple  Cardio:  Regular rhythm, no murmur/gallop/rub, 2+ pulses symmetric in all extremities Tachycardic in the 130s.   Pulm:   In moderate to severe respiratory distress. Diffuse audible wheezing and tachypnea on 15 L. Oxygen saturation at 84%. Speaks in 2-3 word sentences.   Back:  No CVA tenderness, normal ROM  Abdomen:  Soft, non-tender, obese  Extremities: Extremities atraumatic.  No notable peripheral edema  Skin:  Skin warm, dry, no rashes  Neuro:  Alert and oriented ×3, moving all extremities, no gross sensory defects  PERTINENT LABS and RADIOLOGY   Results for orders placed or performed during the hospital encounter of 08/22/21   XR Chest Port 1 View    Impression    IMPRESSION: Faint interstitial infiltrates in the mid to lower lungs. This is concerning for possible mild COVID pneumonia.   CT Chest Pulmonary Embolism w Contrast    Impression    IMPRESSION:  1.  No pulmonary embolism.    2.  Scattered regions of subpleural groundglass infiltrates in the lungs with regions of intralobular thickening consistent with COVID 19 pneumonia.    3.  There are multiple sclerotic lesions within the visualized spine suspicious for metastatic bone disease.    4.  Results was discussed with Dr Chanell Jama     Most Recent 3 CBC's:Recent Labs   Lab Test 08/22/21  1547   WBC 9.6   HGB 15.7   MCV 85        Most Recent 3 BMP's:Recent Labs   Lab Test 08/22/21  1547      POTASSIUM 3.9   CHLORIDE 108*   CO2 22   BUN 9   CR 1.03   ANIONGAP 11   CARYN 9.5    GLC 95     Most Recent 2 LFT's:No lab results found.  Most Recent 3 INR's:No lab results found.  Most Recent 3 BNP's:No lab results found.  Most Recent D-dimer:Recent Labs   Lab Test 08/22/21  1547   DD 1.66*     Most Recent ESR & CRP:No lab results found.  EKG:Sinus tachycardia       Saloni Putnam MD  [facility] Medicine Service  [phone]

## 2021-08-23 NOTE — PLAN OF CARE
Problem: Adult Inpatient Plan of Care  Goal: Optimal Comfort and Wellbeing  Outcome: Improving     Problem: Adult Inpatient Plan of Care  Goal: Readiness for Transition of Care  Outcome: Improving  Intervention: Mutually Develop Transition Plan  Recent Flowsheet Documentation  Taken 8/22/2021 1958 by Molina Song, RN  Equipment Currently Used at Home: none       Pt denies pain.  Pt is on RA with sats in mid 90's.   Pt has inspiratory and expiratory wheezing.   Pt denies nausea and is independent in room.   Pt is on COVID precautions with HEPA air filter in room.   Pt states she has 5 kids at home and has no childcare.  Pt states she has to take care of them alone.

## 2021-08-23 NOTE — DISCHARGE SUMMARY
[facility]  Hospitalist Discharge Summary      Date of Admission:  8/22/2021  Date of Discharge:  8/23/2021  5:09 PM  Discharging Provider: Samreen Garcia MD     Discharge Diagnoses     Acute respiratory failure with hypoxia  Severe persistent asthma with exacerbation  Multiple sclerotic lesions in the visualized spine suspicious for metastatic lesions  Over 19 was ruled out    Follow-ups Needed After Discharge   Follow-up Appointments     Follow-up and recommended labs and tests       Follow up with primary care provider, DEZ Potter, CNP, within   7 days for hospital follow- up.  No follow up labs or test are needed.             Unresulted Labs Ordered in the Past 30 Days of this Admission     Date and Time Order Name Status Description    8/23/2021  6:48 AM Protein Electrophoresis, Serum In process     8/22/2021  9:50 PM Protein electrophoresis random urine In process     8/22/2021  4:06 PM Blood Culture Peripheral Blood Preliminary     8/22/2021  4:06 PM Blood Culture Peripheral Blood Preliminary       These results will be followed up by PCP    Discharge Disposition   Discharged to home  Condition at discharge: Stable    Hospital Course   Soren Freitas is a 30 year old female with a history of PMH of severe persistent asthma with frequent exacerbations in the past.  She is vaccinated for COVID-19 in June 2021.  She has no sick contact.  She was doing well up until the day of her admission when in the setting of preparation for her daughter's birthday, she became progressively short of breath and started to wheeze a lot without fever, chills, night sweats.  She was severely hypoxic when she was brought to the ER and has received aggressive asthma exacerbation therapy and is currently on room air. She feels back to her normal self.  She tested negative for COVID-19 on 8/22/2021.  CTA chest was negative for PE, showed scattered regions of subpleural groundglass  infiltrates with intralobular thickening, consistent with COVID-19 pneumonia.  With treatment of asthma exacerbation, hypoxia rapidly resolved.  Patient was seen by ID, including no evidence of Covid infection.     Consultations This Hospital Stay   RESPIRATORY CARE IP CONSULT  INFECTIOUS DISEASES IP CONSULT    Code Status   Full Code    Time Spent on this Encounter   I, Samreen Garcia MD, personally saw the patient today and spent greater than 30 minutes discharging this patient.       Samreen Garcia MD  [address] [address]  Phone: [phone]  Fax: [phone]  ______________________________________________________________________    Physical Exam   Vital Signs: Temp: 97.9  F (36.6  C) Temp src: Axillary BP: 103/64 Pulse: 98   Resp: 18 SpO2: 96 % O2 Device: None (Room air)    Weight: 182 lbs 0 oz  Gen: Pleasant in no acute distress.  HEENT: NCAT. EOMI. PERRL.  Neck: No bruit, JVD or thyromegaly.  Lungs: Clear to ascultation bilat with no crackles or wheezes.  Card: RRR. NSR. No RMG. Peripheral pulses present and symmetric. No edema.  Abd: Soft NT ND. No mass. Normal bowel sounds.  Skin: No rash.  Extr: No edema.  Neuro: Alert and oriented to place time and person. Cranial nerves II to XII intact. Motor and sensory intact. Normal gait.       Primary Care Physician   DEZ Potter, CNP    Discharge Orders      Reason for your hospital stay    Asthma exacerbation     Follow-up and recommended labs and tests     Follow up with primary care provider, DEZ Potter CNP, within 7 days for hospital follow- up.  No follow up labs or test are needed.     Activity    Your activity upon discharge: activity as tolerated     Diet    Follow this diet upon discharge: Orders Placed This Encounter      Combination Diet Regular Diet Adult     Significant Results and Procedures   Results for orders placed or performed during the hospital encounter  of 08/22/21   XR Chest Port 1 View    Narrative    EXAM: XR CHEST PORT 1 VIEW  LOCATION: [facility]  DATE/TIME: 8/22/2021 3:47 PM    INDICATION: sob, Covid suspect.  COMPARISON: None.      Impression    IMPRESSION: Faint interstitial infiltrates in the mid to lower lungs. This is concerning for possible mild COVID pneumonia.   CT Chest Pulmonary Embolism w Contrast    Narrative    EXAM: CT CHEST PULMONARY EMBOLISM W CONTRAST  LOCATION: [facility]  DATE/TIME: 8/22/2021 5:25 PM    INDICATION: Shortness of breath  COMPARISON: None.  TECHNIQUE: CT chest pulmonary angiogram during arterial phase injection of IV contrast. Multiplanar reformats and MIP reconstructions were performed. Dose reduction techniques were used.   CONTRAST: Isovue 370 100ml    FINDINGS:  ANGIOGRAM CHEST: Pulmonary arteries are normal caliber and negative for pulmonary emboli. Thoracic aorta is negative for dissection. No CT evidence of right heart strain.    LUNGS AND PLEURA: There are scattered regions of peripheral groundglass infiltrates with regions of intralobular thickening typical of COVID 19 pneumonia.    MEDIASTINUM/AXILLAE: Normal.    CORONARY ARTERY CALCIFICATION: None.    UPPER ABDOMEN: Normal.    MUSCULOSKELETAL: Multiple sclerotic lesions in the visualized spine suspicious for sclerotic metastatic lesions      Impression    IMPRESSION:  1.  No pulmonary embolism.    2.  Scattered regions of subpleural groundglass infiltrates in the lungs with regions of intralobular thickening consistent with COVID 19 pneumonia.    3.  There are multiple sclerotic lesions within the visualized spine suspicious for metastatic bone disease.    4.  Results was discussed with Dr Chanell Jama       Discharge Medications   Current Discharge Medication List      START taking these medications    Details   methylPREDNISolone (MEDROL DOSEPAK) 4 MG tablet therapy pack Follow Package Directions  Qty: 21 tablet,  Refills: 0    Associated Diagnoses: Severe persistent asthma with acute exacerbation         CONTINUE these medications which have NOT CHANGED    Details   albuterol (PROAIR HFA/PROVENTIL HFA/VENTOLIN HFA) 108 (90 Base) MCG/ACT inhaler Inhale 2 puffs into the lungs every 4 hours as needed for shortness of breath / dyspnea or wheezing      albuterol (PROVENTIL) (2.5 MG/3ML) 0.083% neb solution Take 2.5 mg by nebulization every 4 hours as needed for shortness of breath / dyspnea or wheezing      ibuprofen (ADVIL/MOTRIN) 200 MG tablet Take 400-600 mg by mouth every 4 hours as needed for mild pain      mometasone-formoterol (DULERA) 100-5 MCG/ACT inhaler Inhale 2 puffs into the lungs 2 times daily           Allergies   No Known Allergies

## 2021-08-23 NOTE — PLAN OF CARE
RN discussed discharge instructions with patient.  Patient provided AVS and work note.  Waiting medications to be filled through [place] pharmacy.  No significant changes in presentation.  Patient verbalized understanding and voiced no concerns at this time.  Patient to discharge per cab when able.

## 2021-08-23 NOTE — CONSULTS
[facility] Initial ID Consult Note    Soren Freitas                                                                                    MRN: [id number]   : 1990                                                                                       30 year old female                                                                                                                                                                                Primary care provider:Elvia Bahena     Reason for consult     Concern for COVID-19     HPI  Soren Freitas is a 30 year old female with a history of PMH of severe persistent asthma with frequent exacerbations in the past.  She is vaccinated for COVID-19 in 2021.  She has no sick contact.  She was doing well up until the day of her admission when in the setting of preparation for her daughter's birthday, she became progressively short of breath and started to wheeze a lot without fever, chills, night sweats.  She told me the current episode felt similar to her episodes of asthma exacerbations in the past.  She was severely hypoxic when she was brought to the ER and has received aggressive asthma exacerbation therapy and is currently on room air.  She feels back to her normal self.  She tested negative for COVID-19 on 2021.         Past Medical History:     Past Medical History:   Diagnosis Date     Obesity      Severe persistent asthma with acute exacerbation 2015    Formatting of this note might be different from the original. 2014 Admit for exacerbation: Spirometry FEV1/FVC 68%, D/C w/dulera, albuterol, Spiriva.  Started singulair 10mg QD, Given NRT.  Dx as infant, has been on albuterol lifelong.    Last Assessment & Plan:  Formatting of this note might be different from the original. Stable on Singulair, cetirizine, albuterol, and dulera. Discussed COVID     Uncomplicated asthma        Past Surgical History:   History  reviewed. No pertinent surgical history.    Social History:     Social History     Tobacco Use     Smoking status: None   Substance Use Topics     Alcohol use: None     Drug use: None       Family History:   History reviewed. No pertinent family history.    Allergies:   No Known Allergies    Medications:     No current outpatient medications on file.       Review of Systems:     Negative except for findings in the HPI.     Exam:  Gen: Pleasant in no acute distress.  HEENT: NCAT. EOMI. PERRL.  Neck: No bruit, JVD or thyromegaly.  Lungs: Clear to ascultation bilat with no crackles or wheezes.  Card: RRR. NSR. No RMG. Peripheral pulses present and symmetric. No edema.  Abd: Soft NT ND. No mass. Normal bowel sounds.  Skin: No rash.  Extr: No edema.  Neuro: Alert and oriented to place time and person. Cranial nerves II to XII intact. Motor and sensory intact. Normal gait.    Data:   Labs:  ROUTINE ICU LABS (Last four results)  CMP  Recent Labs   Lab 08/23/21  0650 08/22/21  1547    141   POTASSIUM 4.6 3.9   CHLORIDE 108* 108*   CO2 20* 22   ANIONGAP 9 11   * 95   BUN 11 9   CR 0.83 1.03   GFRESTIMATED >90 73   CARYN 9.3 9.5     CBC  Recent Labs   Lab 08/22/21  1547   WBC 9.6   RBC 5.56*   HGB 15.7   HCT 47.4*   MCV 85   MCH 28.2   MCHC 33.1   RDW 12.8        INRNo lab results found in last 7 days.  Arterial Blood Gas  Recent Labs   Lab 08/22/21  1614   PH 7.30*   PCO2 44   PO2 87   HCO3 21*   O2PER 0.25       ID specific labs  WBC Count   Date Value Ref Range Status   08/22/2021 9.6 4.0 - 11.0 10e3/uL Final   ] with %PMN  No components found for: URINE      Imaging:    Study Result    Narrative & Impression   EXAM: CT CHEST PULMONARY EMBOLISM W CONTRAST  LOCATION: [facility]  DATE/TIME: 8/22/2021 5:25 PM     INDICATION: Shortness of breath  COMPARISON: None.  TECHNIQUE: CT chest pulmonary angiogram during arterial phase injection of IV contrast. Multiplanar reformats and MIP  reconstructions were performed. Dose reduction techniques were used.   CONTRAST: Isovue 370 100ml     FINDINGS:  ANGIOGRAM CHEST: Pulmonary arteries are normal caliber and negative for pulmonary emboli. Thoracic aorta is negative for dissection. No CT evidence of right heart strain.     LUNGS AND PLEURA: There are scattered regions of peripheral groundglass infiltrates with regions of intralobular thickening typical of COVID 19 pneumonia.     MEDIASTINUM/AXILLAE: Normal.     CORONARY ARTERY CALCIFICATION: None.     UPPER ABDOMEN: Normal.     MUSCULOSKELETAL: Multiple sclerotic lesions in the visualized spine suspicious for sclerotic metastatic lesions                                                                      IMPRESSION:  1.  No pulmonary embolism.     2.  Scattered regions of subpleural groundglass infiltrates in the lungs with regions of intralobular thickening consistent with COVID 19 pneumonia.     3.  There are multiple sclerotic lesions within the visualized spine suspicious for metastatic bone disease.     4.  Results was discussed with Dr Chanell Jama          ASSESSMENT/PLAN:  Soren Freitas is a 30 year old female admitted with numbness of breath in a patient with asthma. ID is consulted for concern for COVID-19 given the CT scan finding.  The patient is fully vaccinated for COVID-19.  She has no other signs of Covid infection.  Severe hypoxic respiratory failure on admission with rapid response to asthma exacerbation treatment.  Now on room air.  Tested negative for COVID-19.  I see no evidence of Covid infection in this patient.  We will treat asthma exacerbation as you are doing and discontinue enhanced respiratory isolation precautions.  Okay to discharge from the ID perspective.    Recommendations:  No concern for COVID-19 infection.  Most likely asthma exacerbation.  Treat asthma exacerbation.  Discontinue enhanced respiratory isolation precautions.  Okay to discharge once medically  stable.    Thank you for the very interesting consult. Will continue following with you. Please do not hesitate to call with questions.      Discussed with the patient, and nursing staff.    ID will sign off.    Samra Charles MD.  [facility].   [facility]  Office Telephone [phone].  Fax [phone]  [facility] paging

## 2021-08-26 LAB
ALBUMIN PERCENT: 58 % (ref 51–67)
ALBUMIN SERPL ELPH-MCNC: 4.4 G/DL (ref 3.2–4.7)
ALPHA 1 PERCENT: 2.5 % (ref 2–4)
ALPHA 2 PERCENT: 9.6 % (ref 5–13)
ALPHA1 GLOB SERPL ELPH-MCNC: 0.2 G/DL (ref 0.1–0.3)
ALPHA2 GLOB SERPL ELPH-MCNC: 0.7 G/DL (ref 0.4–0.9)
B-GLOBULIN SERPL ELPH-MCNC: 0.9 G/DL (ref 0.7–1.2)
BETA PERCENT: 11.6 % (ref 10–17)
GAMMA GLOB SERPL ELPH-MCNC: 1.4 G/DL (ref 0.6–1.4)
GAMMA GLOBULIN PERCENT: 18.3 % (ref 9–20)
PATH ICD:: NORMAL
PATH ICD:: NORMAL
PROT PATTERN SERPL ELPH-IMP: NORMAL
PROT PATTERN UR ELPH-IMP: NORMAL
REVIEWING PATHOLOGIST: NORMAL
REVIEWING PATHOLOGIST: NORMAL
TOTAL PROTEIN RANDOM URINE MG/DL: 10 MG/DL
TOTAL PROTEIN SERUM FOR ELP: 7.5 G/DL (ref 6–8)

## 2021-08-27 LAB
BACTERIA BLD CULT: NO GROWTH
BACTERIA BLD CULT: NO GROWTH

## 2022-01-22 ENCOUNTER — APPOINTMENT (OUTPATIENT)
Dept: RADIOLOGY | Facility: HOSPITAL | Age: 32
End: 2022-01-22
Attending: EMERGENCY MEDICINE
Payer: COMMERCIAL

## 2022-01-22 ENCOUNTER — HOSPITAL ENCOUNTER (INPATIENT)
Facility: HOSPITAL | Age: 32
LOS: 1 days | Discharge: HOME | End: 2022-01-24
Attending: EMERGENCY MEDICINE | Admitting: STUDENT IN AN ORGANIZED HEALTH CARE EDUCATION/TRAINING PROGRAM
Payer: COMMERCIAL

## 2022-01-22 DIAGNOSIS — J96.01 ACUTE RESPIRATORY FAILURE WITH HYPOXIA AND HYPERCAPNIA (H): ICD-10-CM

## 2022-01-22 DIAGNOSIS — J45.901 SEVERE ASTHMA WITH ACUTE EXACERBATION, UNSPECIFIED WHETHER PERSISTENT: ICD-10-CM

## 2022-01-22 DIAGNOSIS — J96.02 ACUTE RESPIRATORY FAILURE WITH HYPOXIA AND HYPERCAPNIA (H): ICD-10-CM

## 2022-01-22 LAB
ANION GAP SERPL CALCULATED.3IONS-SCNC: 11 MMOL/L (ref 5–18)
BASE EXCESS BLDV CALC-SCNC: -3.7 MMOL/L
BASE EXCESS BLDV CALC-SCNC: -5.8 MMOL/L
BASOPHILS # BLD AUTO: 0 10E3/UL (ref 0–0.2)
BASOPHILS NFR BLD AUTO: 0 %
BUN SERPL-MCNC: 8 MG/DL (ref 8–22)
CALCIUM SERPL-MCNC: 9.5 MG/DL (ref 8.5–10.5)
CHLORIDE BLD-SCNC: 107 MMOL/L (ref 98–107)
CO2 SERPL-SCNC: 21 MMOL/L (ref 22–31)
CREAT SERPL-MCNC: 0.88 MG/DL (ref 0.6–1.1)
EOSINOPHIL # BLD AUTO: 0 10E3/UL (ref 0–0.7)
EOSINOPHIL NFR BLD AUTO: 0 %
ERYTHROCYTE [DISTWIDTH] IN BLOOD BY AUTOMATED COUNT: 12.4 % (ref 10–15)
GFR SERPL CREATININE-BSD FRML MDRD: 90 ML/MIN/1.73M2
GLUCOSE BLD-MCNC: 178 MG/DL (ref 70–125)
HCO3 BLDV-SCNC: 19 MMOL/L (ref 24–30)
HCO3 BLDV-SCNC: 19 MMOL/L (ref 24–30)
HCT VFR BLD AUTO: 44.9 % (ref 35–47)
HGB BLD-MCNC: 15.1 G/DL (ref 11.7–15.7)
HOLD SPECIMEN: NORMAL
IMM GRANULOCYTES # BLD: 0.1 10E3/UL
IMM GRANULOCYTES NFR BLD: 1 %
LYMPHOCYTES # BLD AUTO: 0.9 10E3/UL (ref 0.8–5.3)
LYMPHOCYTES NFR BLD AUTO: 8 %
MCH RBC QN AUTO: 28.8 PG (ref 26.5–33)
MCHC RBC AUTO-ENTMCNC: 33.6 G/DL (ref 31.5–36.5)
MCV RBC AUTO: 86 FL (ref 78–100)
MONOCYTES # BLD AUTO: 0.3 10E3/UL (ref 0–1.3)
MONOCYTES NFR BLD AUTO: 3 %
NEUTROPHILS # BLD AUTO: 10.4 10E3/UL (ref 1.6–8.3)
NEUTROPHILS NFR BLD AUTO: 88 %
NRBC # BLD AUTO: 0 10E3/UL
NRBC BLD AUTO-RTO: 0 /100
OXYHGB MFR BLDV: 50.6 % (ref 70–75)
OXYHGB MFR BLDV: 58.9 % (ref 70–75)
PCO2 BLDV: 47 MM HG (ref 35–50)
PCO2 BLDV: 71 MM HG (ref 35–50)
PH BLDV: 7.15 [PH] (ref 7.35–7.45)
PH BLDV: 7.26 [PH] (ref 7.35–7.45)
PLATELET # BLD AUTO: 260 10E3/UL (ref 150–450)
PO2 BLDV: 31 MM HG (ref 25–47)
PO2 BLDV: 41 MM HG (ref 25–47)
POTASSIUM BLD-SCNC: 4.3 MMOL/L (ref 3.5–5)
RBC # BLD AUTO: 5.25 10E6/UL (ref 3.8–5.2)
SAO2 % BLDV: 51.2 % (ref 70–75)
SAO2 % BLDV: 59.4 % (ref 70–75)
SODIUM SERPL-SCNC: 139 MMOL/L (ref 136–145)
WBC # BLD AUTO: 11.6 10E3/UL (ref 4–11)

## 2022-01-22 PROCEDURE — 82805 BLOOD GASES W/O2 SATURATION: CPT | Performed by: EMERGENCY MEDICINE

## 2022-01-22 PROCEDURE — 85025 COMPLETE CBC W/AUTO DIFF WBC: CPT | Performed by: EMERGENCY MEDICINE

## 2022-01-22 PROCEDURE — 94660 CPAP INITIATION&MGMT: CPT

## 2022-01-22 PROCEDURE — 94640 AIRWAY INHALATION TREATMENT: CPT

## 2022-01-22 PROCEDURE — 99285 EMERGENCY DEPT VISIT HI MDM: CPT | Mod: 25

## 2022-01-22 PROCEDURE — 36415 COLL VENOUS BLD VENIPUNCTURE: CPT | Performed by: EMERGENCY MEDICINE

## 2022-01-22 PROCEDURE — 96365 THER/PROPH/DIAG IV INF INIT: CPT

## 2022-01-22 PROCEDURE — 5A09357 ASSISTANCE WITH RESPIRATORY VENTILATION, LESS THAN 24 CONSECUTIVE HOURS, CONTINUOUS POSITIVE AIRWAY PRESSURE: ICD-10-PCS | Performed by: EMERGENCY MEDICINE

## 2022-01-22 PROCEDURE — 96375 TX/PRO/DX INJ NEW DRUG ADDON: CPT

## 2022-01-22 PROCEDURE — 250N000011 HC RX IP 250 OP 636: Performed by: EMERGENCY MEDICINE

## 2022-01-22 PROCEDURE — 82310 ASSAY OF CALCIUM: CPT | Performed by: EMERGENCY MEDICINE

## 2022-01-22 PROCEDURE — 250N000009 HC RX 250

## 2022-01-22 PROCEDURE — 999N000157 HC STATISTIC RCP TIME EA 10 MIN

## 2022-01-22 PROCEDURE — 83735 ASSAY OF MAGNESIUM: CPT | Performed by: INTERNAL MEDICINE

## 2022-01-22 PROCEDURE — 99222 1ST HOSP IP/OBS MODERATE 55: CPT | Mod: AI | Performed by: STUDENT IN AN ORGANIZED HEALTH CARE EDUCATION/TRAINING PROGRAM

## 2022-01-22 PROCEDURE — 71045 X-RAY EXAM CHEST 1 VIEW: CPT

## 2022-01-22 PROCEDURE — 93005 ELECTROCARDIOGRAM TRACING: CPT | Performed by: EMERGENCY MEDICINE

## 2022-01-22 PROCEDURE — 250N000009 HC RX 250: Performed by: EMERGENCY MEDICINE

## 2022-01-22 RX ORDER — MAGNESIUM SULFATE HEPTAHYDRATE 500 MG/ML
2 INJECTION, SOLUTION INTRAMUSCULAR; INTRAVENOUS ONCE
Status: DISCONTINUED | OUTPATIENT
Start: 2022-01-22 | End: 2022-01-22

## 2022-01-22 RX ORDER — PRENATAL VIT/IRON FUM/FOLIC AC 27MG-0.8MG
1 TABLET ORAL DAILY
COMMUNITY
End: 2024-03-19

## 2022-01-22 RX ORDER — PREDNISONE 50 MG/1
50 TABLET ORAL DAILY
Status: ON HOLD | COMMUNITY
Start: 2022-01-22 | End: 2022-01-24

## 2022-01-22 RX ORDER — ALBUTEROL SULFATE 5 MG/ML
2.5 SOLUTION RESPIRATORY (INHALATION) EVERY 6 HOURS PRN
Status: DISCONTINUED | OUTPATIENT
Start: 2022-01-22 | End: 2022-01-23

## 2022-01-22 RX ORDER — IPRATROPIUM BROMIDE AND ALBUTEROL SULFATE 2.5; .5 MG/3ML; MG/3ML
3 SOLUTION RESPIRATORY (INHALATION) ONCE
Status: COMPLETED | OUTPATIENT
Start: 2022-01-22 | End: 2022-01-22

## 2022-01-22 RX ORDER — MONTELUKAST SODIUM 10 MG/1
10 TABLET ORAL EVERY MORNING
COMMUNITY
End: 2023-02-03

## 2022-01-22 RX ORDER — MAGNESIUM SULFATE HEPTAHYDRATE 40 MG/ML
2 INJECTION, SOLUTION INTRAVENOUS ONCE
Status: COMPLETED | OUTPATIENT
Start: 2022-01-22 | End: 2022-01-23

## 2022-01-22 RX ORDER — METHYLPREDNISOLONE SODIUM SUCCINATE 125 MG/2ML
125 INJECTION, POWDER, LYOPHILIZED, FOR SOLUTION INTRAMUSCULAR; INTRAVENOUS ONCE
Status: COMPLETED | OUTPATIENT
Start: 2022-01-22 | End: 2022-01-22

## 2022-01-22 RX ORDER — IPRATROPIUM BROMIDE AND ALBUTEROL SULFATE 2.5; .5 MG/3ML; MG/3ML
SOLUTION RESPIRATORY (INHALATION)
Status: DISCONTINUED
Start: 2022-01-22 | End: 2022-01-22 | Stop reason: HOSPADM

## 2022-01-22 RX ADMIN — MAGNESIUM SULFATE HEPTAHYDRATE 2 G: 40 INJECTION, SOLUTION INTRAVENOUS at 23:22

## 2022-01-22 RX ADMIN — IPRATROPIUM BROMIDE AND ALBUTEROL SULFATE 3 ML: 2.5; .5 SOLUTION RESPIRATORY (INHALATION) at 22:00

## 2022-01-22 RX ADMIN — IPRATROPIUM BROMIDE AND ALBUTEROL SULFATE 3 ML: .5; 3 SOLUTION RESPIRATORY (INHALATION) at 22:00

## 2022-01-22 RX ADMIN — METHYLPREDNISOLONE SODIUM SUCCINATE 125 MG: 125 INJECTION, POWDER, FOR SOLUTION INTRAMUSCULAR; INTRAVENOUS at 22:07

## 2022-01-22 RX ADMIN — ALBUTEROL SULFATE 2.5 MG: 2.5 SOLUTION RESPIRATORY (INHALATION) at 22:21

## 2022-01-22 NOTE — LETTER
[facility]  [address] [place] [address]  Phone: [phone]  Fax: [phone]    January 24, 2022        Narcisaasdenia Freitas  [address] [place] [address]          To whom it may concern:    RE: Soren Freitas    Patient was hospitalized at [facility] from 1/23 through 1/24.  She may return to work if she is feeling better on 1/27      Sincerely,    Dr. Ruben Rodriguez

## 2022-01-23 PROBLEM — J45.901 SEVERE ASTHMA WITH ACUTE EXACERBATION, UNSPECIFIED WHETHER PERSISTENT: Status: ACTIVE | Noted: 2022-01-23

## 2022-01-23 PROBLEM — J96.02 ACUTE RESPIRATORY FAILURE WITH HYPOXIA AND HYPERCAPNIA (H): Status: ACTIVE | Noted: 2022-01-23

## 2022-01-23 PROBLEM — J96.01 ACUTE RESPIRATORY FAILURE WITH HYPOXIA AND HYPERCAPNIA (H): Status: ACTIVE | Noted: 2022-01-23

## 2022-01-23 LAB
ATRIAL RATE - MUSE: 130 BPM
BASE EXCESS BLDV CALC-SCNC: -5.9 MMOL/L
BASOPHILS # BLD AUTO: 0 10E3/UL (ref 0–0.2)
BASOPHILS NFR BLD AUTO: 0 %
DIASTOLIC BLOOD PRESSURE - MUSE: 87 MMHG
EOSINOPHIL # BLD AUTO: 0 10E3/UL (ref 0–0.7)
EOSINOPHIL NFR BLD AUTO: 0 %
ERYTHROCYTE [DISTWIDTH] IN BLOOD BY AUTOMATED COUNT: 12.3 % (ref 10–15)
HCO3 BLDV-SCNC: 21 MMOL/L (ref 24–30)
HCT VFR BLD AUTO: 39.6 % (ref 35–47)
HGB BLD-MCNC: 13.7 G/DL (ref 11.7–15.7)
IMM GRANULOCYTES # BLD: 0.1 10E3/UL
IMM GRANULOCYTES NFR BLD: 1 %
INTERPRETATION ECG - MUSE: NORMAL
LYMPHOCYTES # BLD AUTO: 1.1 10E3/UL (ref 0.8–5.3)
LYMPHOCYTES NFR BLD AUTO: 6 %
MAGNESIUM SERPL-MCNC: 2 MG/DL (ref 1.8–2.6)
MCH RBC QN AUTO: 28.8 PG (ref 26.5–33)
MCHC RBC AUTO-ENTMCNC: 34.6 G/DL (ref 31.5–36.5)
MCV RBC AUTO: 83 FL (ref 78–100)
MONOCYTES # BLD AUTO: 0.5 10E3/UL (ref 0–1.3)
MONOCYTES NFR BLD AUTO: 3 %
NEUTROPHILS # BLD AUTO: 15.2 10E3/UL (ref 1.6–8.3)
NEUTROPHILS NFR BLD AUTO: 90 %
NRBC # BLD AUTO: 0 10E3/UL
NRBC BLD AUTO-RTO: 0 /100
OXYHGB MFR BLDV: 93.3 % (ref 70–75)
P AXIS - MUSE: 70 DEGREES
PCO2 BLDV: 30 MM HG (ref 35–50)
PH BLDV: 7.4 [PH] (ref 7.35–7.45)
PLATELET # BLD AUTO: 230 10E3/UL (ref 150–450)
PO2 BLDV: 67 MM HG (ref 25–47)
PR INTERVAL - MUSE: 122 MS
QRS DURATION - MUSE: 72 MS
QT - MUSE: 308 MS
QTC - MUSE: 453 MS
R AXIS - MUSE: 86 DEGREES
RBC # BLD AUTO: 4.75 10E6/UL (ref 3.8–5.2)
SAO2 % BLDV: 94.1 % (ref 70–75)
SYSTOLIC BLOOD PRESSURE - MUSE: 142 MMHG
T AXIS - MUSE: 56 DEGREES
VENTRICULAR RATE- MUSE: 130 BPM
WBC # BLD AUTO: 16.8 10E3/UL (ref 4–11)

## 2022-01-23 PROCEDURE — 99207 PR CDG-CODE INCORRECT PER BILLING BASED ON TIME: CPT | Performed by: FAMILY MEDICINE

## 2022-01-23 PROCEDURE — 250N000013 HC RX MED GY IP 250 OP 250 PS 637: Performed by: STUDENT IN AN ORGANIZED HEALTH CARE EDUCATION/TRAINING PROGRAM

## 2022-01-23 PROCEDURE — 82805 BLOOD GASES W/O2 SATURATION: CPT | Performed by: STUDENT IN AN ORGANIZED HEALTH CARE EDUCATION/TRAINING PROGRAM

## 2022-01-23 PROCEDURE — 250N000009 HC RX 250: Performed by: STUDENT IN AN ORGANIZED HEALTH CARE EDUCATION/TRAINING PROGRAM

## 2022-01-23 PROCEDURE — 99232 SBSQ HOSP IP/OBS MODERATE 35: CPT | Performed by: FAMILY MEDICINE

## 2022-01-23 PROCEDURE — 36415 COLL VENOUS BLD VENIPUNCTURE: CPT | Performed by: STUDENT IN AN ORGANIZED HEALTH CARE EDUCATION/TRAINING PROGRAM

## 2022-01-23 PROCEDURE — 94660 CPAP INITIATION&MGMT: CPT

## 2022-01-23 PROCEDURE — 85014 HEMATOCRIT: CPT | Performed by: STUDENT IN AN ORGANIZED HEALTH CARE EDUCATION/TRAINING PROGRAM

## 2022-01-23 PROCEDURE — 96376 TX/PRO/DX INJ SAME DRUG ADON: CPT

## 2022-01-23 PROCEDURE — 99223 1ST HOSP IP/OBS HIGH 75: CPT | Performed by: INTERNAL MEDICINE

## 2022-01-23 PROCEDURE — 99207 PR CONSULT E&M CHANGED TO INITIAL LEVEL: CPT | Performed by: INTERNAL MEDICINE

## 2022-01-23 PROCEDURE — 210N000001 HC R&B IMCU HEART CARE

## 2022-01-23 PROCEDURE — 250N000011 HC RX IP 250 OP 636: Performed by: STUDENT IN AN ORGANIZED HEALTH CARE EDUCATION/TRAINING PROGRAM

## 2022-01-23 RX ORDER — LIDOCAINE 40 MG/G
CREAM TOPICAL
Status: DISCONTINUED | OUTPATIENT
Start: 2022-01-23 | End: 2022-01-24 | Stop reason: HOSPADM

## 2022-01-23 RX ORDER — CALCIUM CARBONATE 500 MG/1
1000 TABLET, CHEWABLE ORAL 3 TIMES DAILY PRN
Status: DISCONTINUED | OUTPATIENT
Start: 2022-01-23 | End: 2022-01-24 | Stop reason: HOSPADM

## 2022-01-23 RX ORDER — ACETAMINOPHEN 325 MG/1
650 TABLET ORAL EVERY 6 HOURS PRN
Status: DISCONTINUED | OUTPATIENT
Start: 2022-01-23 | End: 2022-01-24 | Stop reason: HOSPADM

## 2022-01-23 RX ORDER — IPRATROPIUM BROMIDE AND ALBUTEROL SULFATE 2.5; .5 MG/3ML; MG/3ML
3 SOLUTION RESPIRATORY (INHALATION) EVERY 4 HOURS
Status: DISCONTINUED | OUTPATIENT
Start: 2022-01-23 | End: 2022-01-24 | Stop reason: HOSPADM

## 2022-01-23 RX ORDER — NITROGLYCERIN 0.4 MG/1
0.4 TABLET SUBLINGUAL EVERY 5 MIN PRN
Status: DISCONTINUED | OUTPATIENT
Start: 2022-01-23 | End: 2022-01-23

## 2022-01-23 RX ORDER — METHYLPREDNISOLONE SODIUM SUCCINATE 40 MG/ML
40 INJECTION, POWDER, LYOPHILIZED, FOR SOLUTION INTRAMUSCULAR; INTRAVENOUS EVERY 8 HOURS
Status: DISCONTINUED | OUTPATIENT
Start: 2022-01-23 | End: 2022-01-24

## 2022-01-23 RX ORDER — MONTELUKAST SODIUM 10 MG/1
10 TABLET ORAL EVERY MORNING
Status: DISCONTINUED | OUTPATIENT
Start: 2022-01-23 | End: 2022-01-24 | Stop reason: HOSPADM

## 2022-01-23 RX ORDER — ACETAMINOPHEN 650 MG/1
650 SUPPOSITORY RECTAL EVERY 6 HOURS PRN
Status: DISCONTINUED | OUTPATIENT
Start: 2022-01-23 | End: 2022-01-24 | Stop reason: HOSPADM

## 2022-01-23 RX ORDER — PRENATAL VIT/IRON FUM/FOLIC AC 27MG-0.8MG
1 TABLET ORAL DAILY
Status: DISCONTINUED | OUTPATIENT
Start: 2022-01-23 | End: 2022-01-24 | Stop reason: HOSPADM

## 2022-01-23 RX ORDER — BISACODYL 5 MG
10 TABLET, DELAYED RELEASE (ENTERIC COATED) ORAL DAILY PRN
Status: DISCONTINUED | OUTPATIENT
Start: 2022-01-23 | End: 2022-01-24 | Stop reason: HOSPADM

## 2022-01-23 RX ORDER — BISACODYL 5 MG
5 TABLET, DELAYED RELEASE (ENTERIC COATED) ORAL DAILY PRN
Status: DISCONTINUED | OUTPATIENT
Start: 2022-01-23 | End: 2022-01-24 | Stop reason: HOSPADM

## 2022-01-23 RX ADMIN — IPRATROPIUM BROMIDE AND ALBUTEROL SULFATE 3 ML: .5; 2.5 SOLUTION RESPIRATORY (INHALATION) at 09:06

## 2022-01-23 RX ADMIN — IPRATROPIUM BROMIDE AND ALBUTEROL SULFATE 3 ML: .5; 2.5 SOLUTION RESPIRATORY (INHALATION) at 02:34

## 2022-01-23 RX ADMIN — IPRATROPIUM BROMIDE AND ALBUTEROL SULFATE 3 ML: .5; 2.5 SOLUTION RESPIRATORY (INHALATION) at 13:51

## 2022-01-23 RX ADMIN — PRENATAL VIT W/ FE FUMARATE-FA TAB 27-0.8 MG 1 TABLET: 27-0.8 TAB at 08:07

## 2022-01-23 RX ADMIN — IPRATROPIUM BROMIDE AND ALBUTEROL SULFATE 3 ML: .5; 2.5 SOLUTION RESPIRATORY (INHALATION) at 06:04

## 2022-01-23 RX ADMIN — METHYLPREDNISOLONE SODIUM SUCCINATE 40 MG: 40 INJECTION, POWDER, FOR SOLUTION INTRAMUSCULAR; INTRAVENOUS at 13:54

## 2022-01-23 RX ADMIN — MONTELUKAST 10 MG: 10 TABLET, FILM COATED ORAL at 08:07

## 2022-01-23 RX ADMIN — FLUTICASONE FUROATE 1 PUFF: 100 POWDER RESPIRATORY (INHALATION) at 08:07

## 2022-01-23 RX ADMIN — METHYLPREDNISOLONE SODIUM SUCCINATE 40 MG: 40 INJECTION, POWDER, FOR SOLUTION INTRAMUSCULAR; INTRAVENOUS at 06:05

## 2022-01-23 RX ADMIN — IPRATROPIUM BROMIDE AND ALBUTEROL SULFATE 3 ML: .5; 2.5 SOLUTION RESPIRATORY (INHALATION) at 22:17

## 2022-01-23 RX ADMIN — IPRATROPIUM BROMIDE AND ALBUTEROL SULFATE 3 ML: .5; 2.5 SOLUTION RESPIRATORY (INHALATION) at 18:15

## 2022-01-23 RX ADMIN — METHYLPREDNISOLONE SODIUM SUCCINATE 40 MG: 40 INJECTION, POWDER, FOR SOLUTION INTRAMUSCULAR; INTRAVENOUS at 22:17

## 2022-01-23 ASSESSMENT — ACTIVITIES OF DAILY LIVING (ADL)
WEAR_GLASSES_OR_BLIND: NO
ADLS_ACUITY_SCORE: 7
ADLS_ACUITY_SCORE: 3
DIFFICULTY_EATING/SWALLOWING: NO
FALL_HISTORY_WITHIN_LAST_SIX_MONTHS: NO
ADLS_ACUITY_SCORE: 7
HEARING_DIFFICULTY_OR_DEAF: NO
DRESSING/BATHING_DIFFICULTY: NO
CONCENTRATING,_REMEMBERING_OR_MAKING_DECISIONS_DIFFICULTY: NO
DOING_ERRANDS_INDEPENDENTLY_DIFFICULTY: NO
TOILETING_ISSUES: NO
DIFFICULTY_COMMUNICATING: NO
WALKING_OR_CLIMBING_STAIRS_DIFFICULTY: NO

## 2022-01-23 ASSESSMENT — MIFFLIN-ST. JEOR: SCORE: 1564.1

## 2022-01-23 NOTE — CONSULTS
[facility]:  PULMONARY CONSULT NOTE     Date / Time of Admission:  2022  9:48 PM    ID: Soren Freitas is a 31 year old female, vaccinated against COVID19,  (currently 13-weeks pregnant), with longstanding history of moderate persistent asthma who presented to [facility] ED on 2022 with a severe asthma exacerbation and acute respiratory failure with hypoxia/hypercapnia requiring NIPPV, now being admitted for asthma exacerbation.       Reason for Consult:  Asthma exacerbation         Assessment: 1.   Acute respiratory failure with hypoxia/hypercapnia, improved.  PH 7.15, improved to 7.26 and then 7.40 with NIPPV therapy and nebulizer/Mg for asthma.  2. Moderate persistent asthma with acute exacerbation.  Patient with longstanding asthma, reports sudden onset of sx in evening of  and then progressive worsening on  despite ED visit during the day.  Unknown trigger event, no prior sx.  Pt denies any changes in medications and reports compliance with inhl therapy.  Unclear if this trigger related to pregnancy, but patient reports no issues with prior pregnancies.  No viral prodrome or sick contacts, COVID-19 PCR was negative.  No evidence of eosinophilia at time of presentation.  3. Pregnancy at 13-weeks.    4. Vaccinated against COVID-19.         Plan:     Wean supplemental O2 as tolerates, goal O2 sat > 90%    NIPPV on stand-by.  Hopefully will not require again.      Continue Solumedrol 40 mg IV Q8H    Anticipate transition to prednisone tomorrow pending improvement in wheezing severity    Holding home Dulera (mometasone/formoterol)    Continue fluticasone per primary team - can d/c this on discharge and use home inhaler.    DuoNebs Q4H around-the-clock.      Continue singulair 10 mg daily    Patient already received Mg IV x 2 (once in [facility] ED, once in [facility] ED).  Mg level 2.0 last night.  I will hold off on any additional Mg therapy.      Patient  and/or family were educated on the above recommendations.   Thank you for allowing our service to participate in the care of this patient.  Our service will follow-up tomorrow.  In the interim, please call with any questions/concerns.    Total patient care time: 70 minutes, with over 50% spent in counseling/coordination of care.      Antionette Olivier MD, MPH  Pulmonary/Critical Care Medicine  2022  12:41 PM       Chief Complaint Chief Complaint   Patient presents with     Shortness of Breath     Asthma               HPI:   Soren Freitas is a 31 year old female, vaccinated against COVID19,  (currently 13-weeks pregnant), with longstanding history of moderate persistent asthma who presented to [facility] ED on 2022 with a severe asthma exacerbation and acute respiratory failure with hypoxia/hypercapnia requiring NIPPV, now being admitted for asthma exacerbation.  History is provided by Dr. Rodriguez (Hospitalist), patient, and review of medical records.    Patient states that she was in her normal state of health all week.  On evening of , she developed sudden onset of wheezing and mild shortness of breath.  She used her home nebulizer with some improvement, able to sleep.  But on morning of , she had worsening symptoms that were not relieved by multiple neb treatments and prednisone tablet; went to [facility] ED for evaluation.  Received Mg therapy, duoneb, and decadron.  COVID PCR negative.  Her wheezing significantly improved, discharged home.  VBG initially pH 7.15, pCO2 71 => 7.26/47 => 7.40/30.    States her sx were back when she was at home.  Then struggling and getting worse, though she was going to die, called 911 multiple times.  Unable to ambulate.  O2 sat 73% on EMS arrival.  She was given neb txt in her home, brought to [facility] by EMS.  Severe respiratory distress on arrival, prolonged expiratory phase, diffuse inspiratory and expiratory wheezes; tachycardia;  diaphoretic.  Placed on NIPPV.  CXR clear.  Received DuoNeb, Mg 2 gm, Solumedrol 125 mg IV x 1.  Ultimately placed on 4 L/min NC.      Pt reports is about 50% back to baseline.      Non-smoker.  Unknown trigger event.  Has been compliant with home meds.  Has not run out of inhalers recently.  Has been intubated once before - in 2014.  Visits the ED about 2-3 times in the last year, but can also go years without any issues.  Feels her asthma was worse as a child than as an adult, but also feels that her lungs have harder times with recovery.  States she follows with a provider already for her asthma management.  No viral sx.  COVID19 PCR negative.  Lives with children (2 daughters), not ill.          Review of Systems:   Review of Systems:  Pertinent items are noted in HPI.  The Review of Systems is negative other than noted in the HPI        Medical/Surgical History:     Past Medical History:   Diagnosis Date     Obesity      Severe persistent asthma with acute exacerbation 1/1/2015    Formatting of this note might be different from the original. 12/2014 Admit for exacerbation: Spirometry FEV1/FVC 68%, D/C w/dulera, albuterol, Spiriva.  Started singulair 10mg QD, Given NRT.  Dx as infant, has been on albuterol lifelong.    Last Assessment & Plan:  Formatting of this note might be different from the original. Stable on Singulair, cetirizine, albuterol, and dulera. Discussed COVID     Uncomplicated asthma       History reviewed. No pertinent surgical history.         Allergies/Medications:   Allergies:   No Known Allergies    OUTPATIENT Medications:  (Not in a hospital admission)      INPATIENT Medications: Continuous Infusions:    INPATIENT Medications: Scheduled Medications:    fluticasone  1 puff Inhalation Daily     ipratropium - albuterol 0.5 mg/2.5 mg/3 mL  3 mL Nebulization Q4H     methylPREDNISolone  40 mg Intravenous Q8H     montelukast  10 mg Oral QAM     prenatal multivitamin w/iron  1 tablet Oral Daily      sodium chloride (PF)  3 mL Intracatheter Q8H             Family History:        Social History:      Reviewed:    Other: Asthma in her family.  Children don't have asthma.  History reviewed. No pertinent family history. Reviewed:    Tobacco: Not smoker.  Social History     Socioeconomic History     Marital status: Single     Spouse name: Not on file     Number of children: Not on file     Years of education: Not on file     Highest education level: Not on file   Occupational History     Not on file   Tobacco Use     Smoking status: Not on file     Smokeless tobacco: Not on file   Substance and Sexual Activity     Alcohol use: Not on file     Drug use: Not on file     Sexual activity: Not on file   Other Topics Concern     Not on file   Social History Narrative     Not on file     Social Determinants of Health     Financial Resource Strain: Not on file   Food Insecurity: Not on file   Transportation Needs: Not on file   Physical Activity: Not on file   Stress: Not on file   Social Connections: Not on file   Intimate Partner Violence: Not on file   Housing Stability: Not on file              Objective:   Vitals:  /72   Pulse 107   Temp 97.3  F (36.3  C) (Axillary)   Resp 23   Wt 82.6 kg (182 lb)   SpO2 98%   BMI 28.51 kg/m    GEN: Pleasant female, HOB up in the bed, no acute distress, slightly anxious  HEENT: Normocephalic, atraumatic.  Extraoccular eye movements intact, anicteric sclera. No sinus tenderness to palpation.  Moist mucous membranes.   NECK: Supple.    PULM: Non-labored breathing.  No use of accessory muscles.  Expiratory wheezing ausculated throughout all lung fields.   CVS: Regular rate and rhythm.  Normal S1, S2.  No rubs, murmurs, or gallops.    ABDOMEN: Normoactive bowel sounds.  Non-tender to palpation.  Non-distended.    EXTREMITES:  No clubbing, cyanosis, or edema.    NEURO:  Awake.  Oriented to person, place, time and situation.  Cranial nerves 2-12 grossly intact.  Moving all  extremities.      Intake/Output:  I/O last 3 completed shifts:  In: 50 [IV Piggyback:50]  Out: -         Pertinent Studies:   All laboratory data reviewed  Serum Glucose range:   Recent Labs   Lab 01/22/22 2159   *     ABG: No lab results found in last 7 days.  CBC:   Recent Labs   Lab 01/23/22  0515 01/22/22 2159   WBC 16.8* 11.6*   HGB 13.7 15.1   HCT 39.6 44.9   MCV 83 86    260   NEUTROPHIL 90 88   LYMPH 6 8   MONOCYTE 3 3   EOSINOPHIL 0 0     Chemistry:   Recent Labs   Lab 01/22/22 2159      POTASSIUM 4.3   CHLORIDE 107   CO2 21*   BUN 8   CR 0.88   GFRESTIMATED 90   CARYN 9.5     Coags:  No results for input(s): INR, PROTIME, PTT in the last 168 hours.    Invalid input(s): APTT  Cardiac Markers:  No results for input(s): CKTOTAL, TROPONINI in the last 168 hours.     Microbiology:  COVID PCR pending.         Cardiology/Radiology:   Cardiac: All cardiac studies reviewed by me.    EKG:  Reviewed    TTE:  None    Radiology: All imaging studies reviewed by me.    Chest X-Ray, 1/22/22:  Heart size and pulmonary vascularity normal. The lungs are clear.

## 2022-01-23 NOTE — ED NOTES
Pt oxygen 100% on BIPAP, given duoneb via neb setup without BIPAP.  Pt tolerated well, feeling breathing is better.  Pt sats staying 97% during neb.  Pt placed on oxygen 4L via NC with sats of 98%.  Pt tolerating this well.

## 2022-01-23 NOTE — ED TRIAGE NOTES
Pt arrives via [facility] for asthma.  [facility], did not get relief.  Has been using nebs all day.  Pt very sob.  O2 sats at home 73%.  Given duoneb and albuterol neb enroute.  Pt placed on bipap upon arrival.  Dr. Mercado and Dr. Clifford in room

## 2022-01-23 NOTE — CONSULTS
"SW consult to assist with care for children with pt.   Pt has 2 kids with her in room 8,9 yr old. Pt stated she has no one to take care of them, she has tried calling. SW contacted [facility] and spoke with Chanelle in intake. Chanelle stated she will have someone pick kids up within 40 minutes- this was was 20 minutes ago. CM updated RAMESH AMARO in ER of plan, hospitalist and then called pt back. SW discussed plan with pt, who is agreeable but appropriately concerned. Pt was tearful and stated \"it's just me and my girls, we've never been apart.\" SW offered support and empathetic listening. Pt understands kids cannot stay here. SW advised pt to call me with any questions or concerns. Pt agreed to reach out.   SW also reiterated this was not a child protection referral. Pt understood.      DANIEL Payne  1/23/2022  11:45 AM      CPS, hospitalist and SW met to discuss plan for respite care for pt's children. Pt became very upset after speaking with CPS on the phone. When CPS arrived at the hospital, CPS and hospitalist talked to pt about plan and pt stated her mom will be picking the kids up today. Updated ER RN and advised if children are still here late this evening, CPS will come out again and work on placement.       DANIEL Payne  1/23/2022  2:20 PM    "

## 2022-01-23 NOTE — ED NOTES
Bed: JNED-15  Expected date:   Expected time:   Means of arrival:   Comments:  [place] - 31 year F Asthma

## 2022-01-23 NOTE — ED PROVIDER NOTES
EMERGENCY DEPARTMENT ENCOUNTER      NAME: Soren Freitas  AGE: 31 year old female  YOB: 1990  MRN: [id number]  EVALUATION DATE & TIME: 2022  9:48 PM    PCP: Elvia Bahena    ED PROVIDER: Gabriela Mercado      Chief Complaint   Patient presents with     Shortness of Breath     Asthma         FINAL IMPRESSION:  1. Acute respiratory failure with hypoxia and hypercapnia (H)    2. Severe asthma with acute exacerbation, unspecified whether persistent          ED COURSE & MEDICAL DECISION MAKING:    Pertinent Labs & Imaging studies reviewed. (See chart for details)    31 year old female,  at approximately 13 weeks gestation with history of severe asthma (one previous intubation), who presents for evaluation of respiratory distress.     Patient felt ok yesterday and then awoke in the middle of the night last night with shortness of breath and wheezing, which was constant all day and worse over the past few hours. She was seen at [facility] earlier today and treated with nebs, given 6mg IV Decadron and discharged. She has been using her MDI and nebs tonight without improvement. Denies recent cough, fevers or other URI illness.    On presentation, patient with severe respiratory distress with increased work of breathing, prolonged expiratory phase and diffuse inspiratory and expiratory wheezes. She is diaphoretic with tachycardic rate.    Patient placed on BiPAP.  RT paged and a DuoNeb and Albuterol neb were administered.    Patient placed on cardiac monitor, IV access established and blood sent for labs.    Patient given 125 mg IV Solumedrol.    Labs remarkable for respiratory acidosis with pH 7.15 and pCO2 71.    Per chart review, her COVID, influenza and RSV tests performed at [facility] today were negative.     EKG with sinus tachycardia and no ST-T wave changes consistent with ACS or pericarditis.    She has hyperglycemia (glucose 178) with normal renal function and no significant electrolyte  derangements.    CXR ordered, however not yet performed.  I do not suspect PE; patient has wheezes with clinical history and VBG consistent with asthma exacerbation.  Risks of CTA chest felt to outweigh benefit.     Patient admitted to Hospitalist; Intensivist aware.  Unfortunately there are no beds available at this time and patient will be boarding in the ED. Patient hemodynamically stable thus far in ED course and in improved condition now.       9:44 PM I met with the patient, obtained an initial history, performed an examination and discussed the plan. PPE worn throughout all interactions with the patient, including surgical cap, N95 mask, safety glasses, gown, and gloves.  10:09 PM I rechecked the patient.  10:34 PM I rechecked the patient - she is doing better  10:51 PM I rechecked the patient, she is breathing more comfortably, but still very wheezy - another Albuterol neb ordered as well as repeat VBG  11:01 PM I spoke with the Dr. Wong (intensivist) - ok for tele and they can consult  11:14 PM I spoke with Dr. Smith (hospitalist)        At the conclusion of the encounter I discussed the results of all of the tests and the disposition. The questions were answered. The patient or family acknowledged understanding and was agreeable with the care plan.     45 minutes of critical care time     MEDICATIONS GIVEN IN THE EMERGENCY:  Medications   albuterol (PROVENTIL) neb solution 2.5 mg (2.5 mg Nebulization Given 1/22/22 2221)   albuterol (PROVENTIL) neb solution 2.5 mg (has no administration in time range)   magnesium sulfate 2 g in water intermittent infusion (has no administration in time range)   ipratropium - albuterol 0.5 mg/2.5 mg/3 mL (DUONEB) neb solution 3 mL (3 mLs Nebulization Given 1/22/22 2200)   methylPREDNISolone sodium succinate (solu-MEDROL) injection 125 mg (125 mg Intravenous Given 1/22/22 2207)       NEW PRESCRIPTIONS STARTED AT TODAY'S ER VISIT  New Prescriptions    No medications on file  "         =================================================================    HPI    Patient information was obtained from: patient and EMS    Use of : N/A         Soren ISAAC Freitas is a 31 year old female, approximately 13 weeks pregnant with a pertinent history of severe asthma, who presents to this ED via EMS from home for evaluation of shortness of breath.    Patient felt ok yesterday and then awoke in the middle of the night last night with shortness of breath and wheezing. This has been constant all day and worsening in the last hours prompting her to call EMS. She states that \"it felt like I was going to die\". She has had no recent cough or illness. The patient was seen at [facility] earlier today for these symptoms, given steroids there and discharged home around 1 pm. She does not feel like her symptoms improved after returning home from [facility]. Patient has an at home nebulizer which she has used at least 4 times along with an inhaler with no improvement to her breathing.     Patient is 13 weeks pregnant. She has had no recent covid contacts and was tested for covid at [facility] today, but has not gotten the results back yet. Patient has had a similar episode previously and was intubated once.     Per EMS, patient was given 1 DuoNeb treatment and 1 Albuterol neb en route.     Per chart review, patient was seen at [facility] ED today (1/22/22) for evaluation of shortness of breath. Patient with audible wheezing on physical exam, maintaining oxygen saturations greater than 96% on room air. Given no complaints of fever, productive cough or known sick contacts in the setting of 2nd trimester pregnancy with physical exam consistent with asthma exacerbation x-ray not pursued given risks vs benefits. Workup initiated with COVID test, DuoNeb, IV magnesium and p.o. Decadron given symptoms refractory to maintenance medications and short prednisone burst. Covid-19 resulted as negative. Ultrasound showed " IUP- Fetus, Cardiac Activity and FHR: 167.       REVIEW OF SYSTEMS   Unable to obtain a full ROS secondary to medical condition.    PAST MEDICAL HISTORY:  Past Medical History:   Diagnosis Date     Obesity      Severe persistent asthma with acute exacerbation 1/1/2015    Formatting of this note might be different from the original. 12/2014 Admit for exacerbation: Spirometry FEV1/FVC 68%, D/C w/dulera, albuterol, Spiriva.  Started singulair 10mg QD, Given NRT.  Dx as infant, has been on albuterol lifelong.    Last Assessment & Plan:  Formatting of this note might be different from the original. Stable on Singulair, cetirizine, albuterol, and dulera. Discussed COVID     Uncomplicated asthma        PAST SURGICAL HISTORY:  History reviewed. No pertinent surgical history.        CURRENT MEDICATIONS:    albuterol (PROAIR HFA/PROVENTIL HFA/VENTOLIN HFA) 108 (90 Base) MCG/ACT inhaler  albuterol (PROVENTIL) (2.5 MG/3ML) 0.083% neb solution  ibuprofen (ADVIL/MOTRIN) 200 MG tablet  methylPREDNISolone (MEDROL DOSEPAK) 4 MG tablet therapy pack  mometasone-formoterol (DULERA) 100-5 MCG/ACT inhaler        ALLERGIES:  No Known Allergies    FAMILY HISTORY:  History reviewed. No pertinent family history.    SOCIAL HISTORY:   Social History     Socioeconomic History     Marital status: Single     Spouse name: Not on file     Number of children: Not on file     Years of education: Not on file     Highest education level: Not on file   Occupational History     Not on file   Tobacco Use     Smoking status: Not on file     Smokeless tobacco: Not on file   Substance and Sexual Activity     Alcohol use: Not on file     Drug use: Not on file     Sexual activity: Not on file   Other Topics Concern     Not on file   Social History Narrative     Not on file     Social Determinants of Health     Financial Resource Strain: Not on file   Food Insecurity: Not on file   Transportation Needs: Not on file   Physical Activity: Not on file   Stress: Not  on file   Social Connections: Not on file   Intimate Partner Violence: Not on file   Housing Stability: Not on file       VITALS:  /82   Pulse (!) 125   Temp 97.3  F (36.3  C) (Axillary)   Resp 27   Wt 82.6 kg (182 lb)   SpO2 100%   BMI 28.51 kg/m      PHYSICAL EXAM    GENERAL: Awake, alert.  In severe acute distress. Patient is diaphoretic, increased work of breathing with prolonged expiratory phase.   HEENT: Normocephalic, atraumatic. Pupils equal, round and reactive. Conjunctiva normal.   NECK: No stridor.  PULMONARY: Increased work of breathing with severe respiratory distress. She has prolonged expiratory phase.  There are diffuse inspiratory and expiratory wheezes; no audible rhonchi or rales.   CARDIO: Tachycardic rate with regular rhythm.  No significant murmur, rub or gallop.  Radial pulses strong and symmetrical.  ABDOMINAL: Abdomen soft, non-distended and non-tender to palpation.    EXTREMITIES: No lower extremity swelling or edema.      NEURO: Alert and oriented to person, place and time.  Cranial nerves grossly intact.  No focal motor deficit.  PSYCH: Normal mood and affect.  SKIN: Diaphoretic.     LAB:  All pertinent labs reviewed and interpreted.  Results for orders placed or performed during the hospital encounter of 01/22/22   Basic metabolic panel   Result Value Ref Range    Sodium 139 136 - 145 mmol/L    Potassium 4.3 3.5 - 5.0 mmol/L    Chloride 107 98 - 107 mmol/L    Carbon Dioxide (CO2) 21 (L) 22 - 31 mmol/L    Anion Gap 11 5 - 18 mmol/L    Urea Nitrogen 8 8 - 22 mg/dL    Creatinine 0.88 0.60 - 1.10 mg/dL    Calcium 9.5 8.5 - 10.5 mg/dL    Glucose 178 (H) 70 - 125 mg/dL    GFR Estimate 90 >60 mL/min/1.73m2   Blood gas venous   Result Value Ref Range    pH Venous 7.15 (LL) 7.35 - 7.45    pCO2 Venous 71 (H) 35 - 50 mm Hg    pO2 Venous 41 25 - 47 mm Hg    Bicarbonate Venous 19 (L) 24 - 30 mmol/L    Base Excess/Deficit (+/-) -3.7   mmol/L    Oxyhemoglobin Venous 58.9 (L) 70.0 - 75.0 %     O2 Sat, Venous 59.4 (L) 70.0 - 75.0 %   CBC with platelets and differential   Result Value Ref Range    WBC Count 11.6 (H) 4.0 - 11.0 10e3/uL    RBC Count 5.25 (H) 3.80 - 5.20 10e6/uL    Hemoglobin 15.1 11.7 - 15.7 g/dL    Hematocrit 44.9 35.0 - 47.0 %    MCV 86 78 - 100 fL    MCH 28.8 26.5 - 33.0 pg    MCHC 33.6 31.5 - 36.5 g/dL    RDW 12.4 10.0 - 15.0 %    Platelet Count 260 150 - 450 10e3/uL    % Neutrophils 88 %    % Lymphocytes 8 %    % Monocytes 3 %    % Eosinophils 0 %    % Basophils 0 %    % Immature Granulocytes 1 %    NRBCs per 100 WBC 0 <1 /100    Absolute Neutrophils 10.4 (H) 1.6 - 8.3 10e3/uL    Absolute Lymphocytes 0.9 0.8 - 5.3 10e3/uL    Absolute Monocytes 0.3 0.0 - 1.3 10e3/uL    Absolute Eosinophils 0.0 0.0 - 0.7 10e3/uL    Absolute Basophils 0.0 0.0 - 0.2 10e3/uL    Absolute Immature Granulocytes 0.1 <=0.4 10e3/uL    Absolute NRBCs 0.0 10e3/uL       RADIOLOGY:  Reviewed all pertinent imaging. Please see official radiology report.  XR Chest Port 1 View    (Results Pending)       EK2022, 22:32; sinus tachycardia with rate of 130 bpm; normal intervals; normal conduction; no ST-T wave elevation consistent with ACS or pericarditis; compared to previous EKG dated 2021, inferior T waves no longer inverted    I have independently reviewed and interpreted the EKG(s) documented above.    PROCEDURES:     Critical Care     Performed by: Dr Gabriela Mercado  Authorized by: Dr Gabriela Mercado  Total critical care time: 45 minutes  Critical care was necessary to treat or prevent imminent or life-threatening deterioration of the following conditions: acute respiratory failure; severe asthma exacerbation    Critical care was time spent personally by me on the following activities: development of treatment plan with patient or surrogate, discussions with consultants, examination of patient, evaluation of patient's response to treatment, obtaining history from patient or surrogate, ordering and  performing treatments and interventions, ordering and review of laboratory studies, ordering and review of radiographic studies, re-evaluation of patient's condition and monitoring for potential decompensation.  Critical care time was exclusive of separately billable procedures and treating other patients.      I, Dina Waldron, am serving as a scribe to document services personally performed by Gabriela Mercado MD, based on my observations and the provider's statements to me.  I, Gabriela Mercado MD, attest that Dina Waldron is acting in a scribe capacity, has observed my performance of the services and has documented them in accordance with my direction.       Gabriela Mercado MD  Emergency Medicine  [facility] EMERGENCY DEPARTMENT  [facility] [facility] [id number] [phone]       Gabriela Mercado MD  01/22/22 4605

## 2022-01-23 NOTE — ED NOTES
Pt states she can not get a hold of her friend now, she is not answering the phone.  Pt upset about children, stating she will have to leave if she can not find someone to care for children.  This nurse brought children into room with pt, brought in computer to watch movies, given food and drinks and warm blankets.  Pt will continue to attempt to find someone to care for children

## 2022-01-23 NOTE — PROGRESS NOTES
[facility]    Medicine Progress Note - Hospitalist Service       Date of Admission:  2022  Active Problems:    Acute respiratory failure with hypoxia and hypercapnia (H)    Severe asthma with acute exacerbation, unspecified whether persistent     Assessment & Plan          Soren GRAY Fortino is a 31 year old female -0-1-2 at 13 weeks gestational age with a past history of severe asthma admitted for acute hypoxic hypercarbic respiratory failure secondary to severe asthma exacerbation asthma admitted on 2022.      Acute hypoxic hypercarbic respiratory failure  Secondary to acute severe asthma exacerbation  Started on BiPAP, discontinued, now on 4 L, VBG's improved  Continue asthma treatment, start I-S    Severe asthma exacerbation  Started DuoNebs every 4 hours, Solu-Medrol 40 mg every 8 hours, magnesium sulfate given in ER  Continue montelukast, Pulmicort added though may not need as she is on Solu-Medrol  Improved but still wheezy, on oxygen  At I-S  No signs of pneumonia  Pulmonology to see    13-week pregnancy-  Patient has established care at [facility].  Magnesium sulfate given.  No role for fetal monitoring at this age, treat asthma and hypoxia.  Discussed with on-call OB.  Follow-up with her outpatient OB as planned    Sclerotic lesions on previous CT-suspicious of metastatic bone disease.  Needs follow-up with primary care for this on discharge.     Social: Patient has her 2 children here, patient's mother will take care of her her children.     Diet: Regular Diet Adult    DVT Prophylaxis: Pneumatic Compression Devices  Hare Catheter: Not present  Central Lines: None  Code Status: Full Code      Disposition Plan   Expected Discharge:  1 to 2 days depending on recovery  Anticipated discharge location:  Awaiting care coordination huddle  Delays:            The patient's care was discussed with the Bedside Nurse, Care Coordinator/, Patient and Pulmonology  Consultant. for total time 38 minutes with greater than 50% of total time spent in counseling and coordination of care.    DANITA PARRA MD  Hospitalist Service  [facility]  Securely message with the Vocera Web Console (learn more here)  Text page via [employer] Paging/Directory        Clinically Significant Risk Factors Present on Admission                    ______________________________________________________________________    Interval History   Remainder of 12 point review of systems negative except as noted below    Subjective:  Patient feeling better.  Has been off of BiPAP since 2 AM.  Wants to eat.  No chest pain or cough.  Still feels a little wheezy.  This is her third exacerbation since becoming pregnant.  No vaginal bleeding or discharge, no abdominal pain.  Non-smoker.    Data reviewed today: I reviewed all medications, new labs and imaging results over the last 24 hours.     Physical Exam   Vital Signs: Temp: 97.3  F (36.3  C) Temp src: Axillary BP: 130/72 Pulse: 107   Resp: 23 SpO2: 98 % O2 Device: Nasal cannula Oxygen Delivery: 4 LPM  Weight: 182 lbs 0 oz  Physical Exam:  Temp:  [97.3  F (36.3  C)] 97.3  F (36.3  C)  Pulse:  [] 107  Resp:  [18-47] 23  BP: (112-158)/(57-97) 130/72  SpO2:  [93 %-100 %] 98 %    /72   Pulse 107   Temp 97.3  F (36.3  C) (Axillary)   Resp 23   Wt 82.6 kg (182 lb)   SpO2 98%   BMI 28.51 kg/m    General appearance: alert, appears stated age and cooperative  Head: Normocephalic, without obvious abnormality, atraumatic  Eyes: Clear conjuctiva  Neck: no JVD and supple, symmetrical, trachea midline  Lungs: wheezes bibasilar  Heart: regular rate and rhythm, S1, S2 normal, no murmur, click, rub or gallop  Abdomen: soft, non-tender; bowel sounds normal; no masses,  no organomegaly  Extremities: Cj's sign is negative, no sign of DVT  Skin: Skin color, texture, turgor normal. No rashes or lesions  Neurologic: Grossly normal          Data    Recent Labs   Lab 01/23/22  0515 01/22/22  2159   WBC 16.8* 11.6*   HGB 13.7 15.1   MCV 83 86    260   NA  --  139   POTASSIUM  --  4.3   CHLORIDE  --  107   CO2  --  21*   BUN  --  8   CR  --  0.88   ANIONGAP  --  11   CARYN  --  9.5   GLC  --  178*     Recent Results (from the past 24 hour(s))   XR Chest Port 1 View    Narrative    EXAM: XR CHEST PORT 1 VIEW  LOCATION: [facility]  DATE/TIME: 1/22/2022 10:59 PM    INDICATION: sob  COMPARISON: 08/22/2021      Impression    IMPRESSION: Heart size and pulmonary vascularity normal. The lungs are clear.

## 2022-01-23 NOTE — PHARMACY-ADMISSION MEDICATION HISTORY
Pharmacy Note - Admission Medication History    Pertinent Provider Information: None     ______________________________________________________________________    Prior To Admission (PTA) med list completed and updated in EMR.       PTA Med List   Medication Sig Last Dose     albuterol (PROAIR HFA/PROVENTIL HFA/VENTOLIN HFA) 108 (90 Base) MCG/ACT inhaler Inhale 2 puffs into the lungs every 4 hours as needed for shortness of breath / dyspnea or wheezing 1/22/2022 at Unknown time     albuterol (PROVENTIL) (2.5 MG/3ML) 0.083% neb solution Take 1 vial (2.5 mg) by nebulization every 4 hours as needed for shortness of breath / dyspnea or wheezing 1/22/2022     mometasone-formoterol (DULERA) 200-5 MCG/ACT inhaler Inhale 2 puffs into the lungs 2 times daily 1/21/2022     montelukast (SINGULAIR) 10 MG tablet Take 10 mg by mouth every morning 1/22/2022     predniSONE (DELTASONE) 50 MG tablet Take 50 mg by mouth daily 4 days prescribed at [facility] ED 1/22/2022     Prenatal Vit-Fe Fumarate-FA (PRENATAL MULTIVITAMIN W/IRON) 27-0.8 MG tablet Take 1 tablet by mouth daily 1/22/2022       Information source(s): Patient and CareEverywhere/[facility]  Method of interview communication: in-person    Summary of Changes to PTA Med List  New: Montelukast, prednisone, prenatal vitamin  Discontinued: ibuprofen, Methylprednisolone  Changed: Dulera dose    Patient was asked about OTC/herbal products specifically.  PTA med list reflects this.    Allergies were reviewed, assessed, and updated with the patient.      Patient did not bring any medications to the hospital and can't retrieve from home. No multi-dose medications are available for use during hospital stay.     The information provided in this note is only as accurate as the sources available at the time of the update(s).    Thank you for the opportunity to participate in the care of this patient.    Brigid Mays [facility]  1/22/2022 11:22 PM

## 2022-01-23 NOTE — H&P
[facility]    History and Physical - Hospitalist Service       Date of Admission:  2022    Assessment & Plan      Soren Freitas is a 31 year old female admitted on 2022.     31-year-old female  3 para 2 at 13 weeks GA with history of Severe persistent asthma with frequent exacerbations in the past presented with worsening shortness of breath and admitted for      Acute hypoxic hypercapnic respiratory failure   Severe asthma exacerbation  -Initial VBG 7.1   respiratory acidosis, lungs are clear on chest x-ray   -Started onduo nebs, methylprednisolone, BiPAP now on Fio2 40 % sating 100%, also received magnesium sulfate at ED, VBG improved to 7.  -Will Continue methylprednisolone, DuoNebs, resume home   Montelukast, add pulmicort  -Consider pulmonary consult if no clinical improvt    Sclerotic lesions on previous CT-suspicious of metastatic bone disease.  Needs follow-up with primary care for this on discharge.       Diet:    DVT Prophylaxis: Pneumatic Compression Devices  Hare Catheter: Not present  Central Lines: None  Cardiac Monitoring: None  Code Status:  Full     Clinically Significant Risk Factors Present on Admission                    Disposition Plan   Expected Discharge: 2-3 days      Ayan Smith MD  Hospitalist Service  [facility]  Securely message with the Vocera Web Console (learn more here)  Text page via [employer] Paging/Directory         ______________________________________________________________________    Chief Complaint   SOB    History is obtained from the patient    History of Present Illness   Soren Freitas is a 31-year-old female  3 para 2 at 13 weeks GA with history of Severe persistent asthma with frequent exacerbations in the past presented with worsening shortness of breath.  She woke up from sleep with dyspnea on the night of , continued to have worsening shortness of breath the  whole day and [facility]  Per chart review she was satting 96% on room air, x-ray was not done due to pregnancy, other work-up including COVID test was negative.  She was discharged after receiving DuoNeb's, IV magnesium and oral Decadron.  After she went back home continued to have worsening symptoms and called EMS, when paramedics arrived O2 sat was noted to to be 73% on room air.  She was given DuoNeb and albuterol in route and brought to ED. denies any fever, chills, chest pain. Reports she has been compliant with her medications and fully vaccinated for COVID. Work-up here VBG 7.15/71/41, chest x-ray negative, WBC 11 K started on BiPAP and admitted for further evaluation.    Review of Systems    The 10 point Review of Systems is negative other than noted in the HPI or here.     Past Medical History    I have reviewed this patient's medical history and updated it with pertinent information if needed.   Past Medical History:   Diagnosis Date     Obesity      Severe persistent asthma with acute exacerbation 1/1/2015    Formatting of this note might be different from the original. 12/2014 Admit for exacerbation: Spirometry FEV1/FVC 68%, D/C w/dulera, albuterol, Spiriva.  Started singulair 10mg QD, Given NRT.  Dx as infant, has been on albuterol lifelong.    Last Assessment & Plan:  Formatting of this note might be different from the original. Stable on Singulair, cetirizine, albuterol, and dulera. Discussed COVID     Uncomplicated asthma        Past Surgical History   I have reviewed this patient's surgical history and updated it with pertinent information if needed.  History reviewed. No pertinent surgical history.    Social History   I have reviewed this patient's social history and updated it with pertinent information if needed.  Social History     Tobacco Use     Smoking status: None     Smokeless tobacco: None   Substance Use Topics     Alcohol use: None     Drug use: None        Family History   Family history noncontributory to current complaint    Prior to Admission Medications   Prior to Admission Medications   Prescriptions Last Dose Informant Patient Reported? Taking?   Prenatal Vit-Fe Fumarate-FA (PRENATAL MULTIVITAMIN W/IRON) 27-0.8 MG tablet 1/22/2022  Yes Yes   Sig: Take 1 tablet by mouth daily   albuterol (PROAIR HFA/PROVENTIL HFA/VENTOLIN HFA) 108 (90 Base) MCG/ACT inhaler 1/22/2022 at Unknown time  No Yes   Sig: Inhale 2 puffs into the lungs every 4 hours as needed for shortness of breath / dyspnea or wheezing   albuterol (PROVENTIL) (2.5 MG/3ML) 0.083% neb solution 1/22/2022  No Yes   Sig: Take 1 vial (2.5 mg) by nebulization every 4 hours as needed for shortness of breath / dyspnea or wheezing   mometasone-formoterol (DULERA) 200-5 MCG/ACT inhaler 1/21/2022  Yes Yes   Sig: Inhale 2 puffs into the lungs 2 times daily   montelukast (SINGULAIR) 10 MG tablet 1/22/2022  Yes Yes   Sig: Take 10 mg by mouth every morning   predniSONE (DELTASONE) 50 MG tablet 1/22/2022  Yes Yes   Sig: Take 50 mg by mouth daily 4 days prescribed at [facility]      Facility-Administered Medications: None     Allergies   No Known Allergies    Physical Exam   Vital Signs: Temp: 97.3  F (36.3  C) Temp src: Axillary BP: (!) 126/97 Pulse: 112   Resp: (!) 43 SpO2: 100 % O2 Device: BiPAP/CPAP    Weight: 182 lbs 0 oz    General Appearance: On BiPAP, tachypneic ,reports she feels better since arrival  Eyes: Pink conjunctive a, NIS  HEENT: PERRLA  Respiratory: Bilateral scattered wheezes, decreased air movement bilateral lung fields.  Cardiovascular: S1-S2, tachycardic, regular rhythm  GI: Soft nontender  Genitourinary: No SP tenderness  Skin: No rashes  Musculoskeletal: No peripheral edema  Neurologic: AOx3      Data   Data reviewed today: I reviewed all medications, new labs and imaging results over the last 24 hours. I personally reviewed               Recent Labs   Lab 01/22/22  2159   WBC 11.6*   HGB  15.1   MCV 86         POTASSIUM 4.3   CHLORIDE 107   CO2 21*   BUN 8   CR 0.88   ANIONGAP 11   CARYN 9.5   *     Recent Results (from the past 24 hour(s))   XR Chest Port 1 View    Narrative    EXAM: XR CHEST PORT 1 VIEW  LOCATION: [facility]  DATE/TIME: 1/22/2022 10:59 PM    INDICATION: sob  COMPARISON: 08/22/2021      Impression    IMPRESSION: Heart size and pulmonary vascularity normal. The lungs are clear.

## 2022-01-23 NOTE — ED NOTES
Pt has her 2 young children with her, attempting to find someone to come get them.  Jillian, a friend will pick them up

## 2022-01-24 VITALS
OXYGEN SATURATION: 94 % | WEIGHT: 181.66 LBS | BODY MASS INDEX: 28.51 KG/M2 | RESPIRATION RATE: 18 BRPM | HEIGHT: 67 IN | DIASTOLIC BLOOD PRESSURE: 53 MMHG | SYSTOLIC BLOOD PRESSURE: 113 MMHG | TEMPERATURE: 98.6 F | HEART RATE: 95 BPM

## 2022-01-24 LAB
ANION GAP SERPL CALCULATED.3IONS-SCNC: 8 MMOL/L (ref 5–18)
BUN SERPL-MCNC: 9 MG/DL (ref 8–22)
CALCIUM SERPL-MCNC: 9.1 MG/DL (ref 8.5–10.5)
CHLORIDE BLD-SCNC: 108 MMOL/L (ref 98–107)
CO2 SERPL-SCNC: 20 MMOL/L (ref 22–31)
CREAT SERPL-MCNC: 0.73 MG/DL (ref 0.6–1.1)
ERYTHROCYTE [DISTWIDTH] IN BLOOD BY AUTOMATED COUNT: 12.9 % (ref 10–15)
GFR SERPL CREATININE-BSD FRML MDRD: >90 ML/MIN/1.73M2
GLUCOSE BLD-MCNC: 120 MG/DL (ref 70–125)
HCT VFR BLD AUTO: 39.1 % (ref 35–47)
HGB BLD-MCNC: 13.2 G/DL (ref 11.7–15.7)
MCH RBC QN AUTO: 28.6 PG (ref 26.5–33)
MCHC RBC AUTO-ENTMCNC: 33.8 G/DL (ref 31.5–36.5)
MCV RBC AUTO: 85 FL (ref 78–100)
PLATELET # BLD AUTO: 248 10E3/UL (ref 150–450)
POTASSIUM BLD-SCNC: 4.4 MMOL/L (ref 3.5–5)
RBC # BLD AUTO: 4.61 10E6/UL (ref 3.8–5.2)
SODIUM SERPL-SCNC: 136 MMOL/L (ref 136–145)
WBC # BLD AUTO: 21.2 10E3/UL (ref 4–11)

## 2022-01-24 PROCEDURE — 94640 AIRWAY INHALATION TREATMENT: CPT

## 2022-01-24 PROCEDURE — 80048 BASIC METABOLIC PNL TOTAL CA: CPT | Performed by: STUDENT IN AN ORGANIZED HEALTH CARE EDUCATION/TRAINING PROGRAM

## 2022-01-24 PROCEDURE — 250N000011 HC RX IP 250 OP 636: Performed by: STUDENT IN AN ORGANIZED HEALTH CARE EDUCATION/TRAINING PROGRAM

## 2022-01-24 PROCEDURE — 85014 HEMATOCRIT: CPT | Performed by: FAMILY MEDICINE

## 2022-01-24 PROCEDURE — 250N000013 HC RX MED GY IP 250 OP 250 PS 637: Performed by: STUDENT IN AN ORGANIZED HEALTH CARE EDUCATION/TRAINING PROGRAM

## 2022-01-24 PROCEDURE — 36415 COLL VENOUS BLD VENIPUNCTURE: CPT | Performed by: STUDENT IN AN ORGANIZED HEALTH CARE EDUCATION/TRAINING PROGRAM

## 2022-01-24 PROCEDURE — 250N000009 HC RX 250: Performed by: STUDENT IN AN ORGANIZED HEALTH CARE EDUCATION/TRAINING PROGRAM

## 2022-01-24 PROCEDURE — 99232 SBSQ HOSP IP/OBS MODERATE 35: CPT | Performed by: INTERNAL MEDICINE

## 2022-01-24 PROCEDURE — 99239 HOSP IP/OBS DSCHRG MGMT >30: CPT | Performed by: FAMILY MEDICINE

## 2022-01-24 PROCEDURE — 94640 AIRWAY INHALATION TREATMENT: CPT | Mod: 76

## 2022-01-24 PROCEDURE — 999N000157 HC STATISTIC RCP TIME EA 10 MIN

## 2022-01-24 RX ORDER — IPRATROPIUM BROMIDE AND ALBUTEROL SULFATE 2.5; .5 MG/3ML; MG/3ML
3 SOLUTION RESPIRATORY (INHALATION) EVERY 4 HOURS
Qty: 220 ML | Refills: 0 | OUTPATIENT
Start: 2022-01-24 | End: 2022-05-26

## 2022-01-24 RX ORDER — PREDNISONE 20 MG/1
TABLET ORAL
Qty: 24 TABLET | Refills: 0 | Status: SHIPPED | OUTPATIENT
Start: 2022-01-25 | End: 2022-02-02

## 2022-01-24 RX ORDER — PREDNISONE 20 MG/1
20 TABLET ORAL DAILY
Status: DISCONTINUED | OUTPATIENT
Start: 2022-02-02 | End: 2022-01-24 | Stop reason: HOSPADM

## 2022-01-24 RX ORDER — PREDNISONE 20 MG/1
60 TABLET ORAL DAILY
Status: DISCONTINUED | OUTPATIENT
Start: 2022-01-25 | End: 2022-01-24 | Stop reason: HOSPADM

## 2022-01-24 RX ORDER — PREDNISONE 20 MG/1
40 TABLET ORAL DAILY
Status: DISCONTINUED | OUTPATIENT
Start: 2022-01-28 | End: 2022-01-24 | Stop reason: HOSPADM

## 2022-01-24 RX ADMIN — MONTELUKAST 10 MG: 10 TABLET, FILM COATED ORAL at 08:43

## 2022-01-24 RX ADMIN — METHYLPREDNISOLONE SODIUM SUCCINATE 40 MG: 40 INJECTION, POWDER, FOR SOLUTION INTRAMUSCULAR; INTRAVENOUS at 06:26

## 2022-01-24 RX ADMIN — IPRATROPIUM BROMIDE AND ALBUTEROL SULFATE 3 ML: .5; 2.5 SOLUTION RESPIRATORY (INHALATION) at 11:43

## 2022-01-24 RX ADMIN — IPRATROPIUM BROMIDE AND ALBUTEROL SULFATE 3 ML: .5; 2.5 SOLUTION RESPIRATORY (INHALATION) at 15:25

## 2022-01-24 RX ADMIN — PRENATAL VIT W/ FE FUMARATE-FA TAB 27-0.8 MG 1 TABLET: 27-0.8 TAB at 08:43

## 2022-01-24 RX ADMIN — CALCIUM CARBONATE (ANTACID) CHEW TAB 500 MG 1000 MG: 500 CHEW TAB at 00:00

## 2022-01-24 RX ADMIN — FLUTICASONE FUROATE 1 PUFF: 100 POWDER RESPIRATORY (INHALATION) at 08:44

## 2022-01-24 RX ADMIN — IPRATROPIUM BROMIDE AND ALBUTEROL SULFATE 3 ML: .5; 2.5 SOLUTION RESPIRATORY (INHALATION) at 08:14

## 2022-01-24 RX ADMIN — METHYLPREDNISOLONE SODIUM SUCCINATE 40 MG: 40 INJECTION, POWDER, FOR SOLUTION INTRAMUSCULAR; INTRAVENOUS at 13:24

## 2022-01-24 ASSESSMENT — ACTIVITIES OF DAILY LIVING (ADL)
ADLS_ACUITY_SCORE: 3

## 2022-01-24 ASSESSMENT — MIFFLIN-ST. JEOR: SCORE: 1571.63

## 2022-01-24 NOTE — ED NOTES
[facility] ED Handoff Report    ED Chief Complaint:  Shortness of breath and asthma.    ED Diagnosis:  (J96.01,  J96.02) Acute respiratory failure with hypoxia and hypercapnia (H)  Comment:   Plan:     (J45.901) Severe asthma with acute exacerbation, unspecified whether persistent  Comment:   Plan:        PMH:    Past Medical History:   Diagnosis Date     Obesity      Severe persistent asthma with acute exacerbation 1/1/2015    Formatting of this note might be different from the original. 12/2014 Admit for exacerbation: Spirometry FEV1/FVC 68%, D/C w/dulera, albuterol, Spiriva.  Started singulair 10mg QD, Given NRT.  Dx as infant, has been on albuterol lifelong.    Last Assessment & Plan:  Formatting of this note might be different from the original. Stable on Singulair, cetirizine, albuterol, and dulera. Discussed COVID     Uncomplicated asthma         Code Status:  Full Code     Falls Risk: No Band: Not applicable    Current Living Situation/Residence: lives alone     Elimination Status: Continent: Yes     Activity Level: SBA    Patients Preferred Language:  English     Needed: No    Vital Signs:  /66   Pulse 94   Temp 97.3  F (36.3  C) (Axillary)   Resp 23   Wt 82.6 kg (182 lb)   SpO2 99%   BMI 28.51 kg/m       Cardiac Rhythm: Sinus tachycardia    Pain Score: 0/10    Is the Patient Confused:  No    Last Food or Drink: 01/23/22 at 1900.    Focused Assessment:  Patient arrived yesterday by EMS due to asthma exacerbation and shortness of breath.  Patient was placed on BiPap yesterday, but is now on 1 liter of O2 via NC.  Patient's sats are now 97% on 1 liter.  Patient is alert and oriented x4.  Lungs are wheezy throughout.     Tests Performed: Done: Labs and Imaging    Treatments Provided:  See MAR.    Family Dynamics/Concerns: No    Family Updated On Visitor Policy: Yes    Plan of Care Communicated to Family: Yes    Who Was Updated about Plan of Care: Patient's god-mother and mother  are aware.  Patient's mother has her kids.    Belongings Checklist Done and Signed by Patient: Yes    Medications sent with patient:     Covid: symptomatic, negative    Additional Information:     RN: Juany Elizabeth 1/23/2022 7:59 PM

## 2022-01-24 NOTE — PLAN OF CARE
Problem: Asthma Exacerbation  Goal: Asthma Symptom Relief  Outcome: Improving     Pt continues to have wheezing throughout lungs, maintaining oxygen saturation on 1LNC.  Pt does endorse improvement with breathing.  Pt continues on IV Solumedrol.

## 2022-01-24 NOTE — PLAN OF CARE
Problem: Asthma Exacerbation  Goal: Asthma Symptom Relief  Outcome: Adequate for Discharge   Vs stable, she denies any SOB and chest pain.  Oxygen saturation maintain 95% on RA.  Pt  discharged to home.  Discharge paper and instruction done  medication send with hr. He denies any concerns about mediation an discharge. All belongs send with her. She transported  by w/c.

## 2022-01-24 NOTE — PLAN OF CARE
1900 to 2330  Problem: Asthma Exacerbation  Goal: Asthma Symptom Relief  Outcome: No Change    Pt was a Border in ER and came to the floor around 2000.  LS inspiratory and expiratory wheezes. Schedule NEBs, Inhaler and IV solumedrol. NC 1L  o2 sats@94%

## 2022-01-24 NOTE — PROGRESS NOTES
PULMONARY MEDICINE CONSULT FOLLOW-UP  1/24/2022    Assessment/Plan:   31 year old woman with asthma currently at 13 weeks of pregnancy, admitted 1/22/2022 and acute asthma exacerbation.    #. Asthma, moderate persistent, admitted with exacerbation without viral prodrome, no pathogens identified.  Similar to prior exacerbations.  She has been on systemic IV corticosteroids and has clinically improved, although continues to have symptoms.  She is unable to remain in the hospital for the next 24 to 48 hours to be observed on oral steroids, but is able to have close follow-up with her PCP.  #. Acute hypercapnic respiratory failure in setting of an acute asthma exacerbation.  RESOLVED.    Recommendations:    Goal SpO2 >/= 92%, wean off supplemental oxygen as able, avoid hyperoxia    Transition to a prolonged steroid taper (ordered)    Continue with PTA Dulera upon discharge    Continue with PTA DuoNeb at increased frequency while still acutely ill    Recommend close follow-up with her PCP (within 1 to 2 weeks, or earlier if she has worsened respiratory symptoms)    Pulmonary will sign off at this time.  Discussed with Dr. Gallego in person.    Brittany Slater MD  Pulmonary and Critical Care     SUBJECTIVE/OBJECTIVE   Interval history: RIOS overnight.  Feels that her breathing has improved significantly even though she continues to have wheezing.  States that this is not her first asthma exacerbation and that her current exacerbation is very similar to what she has had in the past.  She tells me that in the past her symptoms tend to last for several days before she goes back to normal.  Tells me that it is impossible for her to stay for 1 more day to be observed on oral steroids because she has issues with childcare.  She has a PCP at [facility] who manages her asthma and she is willing to follow-up with her as soon as tomorrow if this is what is needed to get her out of the hospital today. Multidisciplinary notes reviewed.  ROS: 10  "point ROS obtained, pertinant positives alluded to in the interval history.    /53 (BP Location: Left arm)   Pulse 88   Temp 98.6  F (37  C) (Oral)   Resp 20   Ht 1.702 m (5' 7\")   Wt 82.4 kg (181 lb 10.5 oz)   SpO2 95%   BMI 28.45 kg/m      EXAM:  Gen: Well-appearing young woman, scrolling on her iPhone  HEENT: MMM, EOMI, normal voice  CV: RRR, normal S1/S2  Resp: Equal bilateral breath sounds with expiratory wheezing most pronounced on the left; good air movement throughout; able to converse in full sentences without any visible respiratory distress or accessory muscle use, no cough  Abdomen: Soft, nontender, normal active bowel sounds  Skin: no rashes or lesions on limited exam  Ext: No peripheral edema  Neuro: Awake, alert, attentive  Psych: Calm and engaged    Labs: personally reviewed & interpreted in EMR.   Imaging: personally reviewed & interpreted, including formal Radiology report in the EMR.    Total time of 25 minutes was devoted to the consult with at least 50% of the time spent in direct patient interaction, education, and coordination of patient's care.    This report was prepared using speech recognition software.  Any typographical errors are unintentional.             "

## 2022-01-24 NOTE — CONSULTS
"SW met with pt as this writer had spoken with pt yesterday while in ED. SW wanted to offer support to pt as yesterday was very stressful for her. Pt stated she is very glad she stayed. She is not happy that her daughters godmother called pt's mom to  pt's children. Pt is very concerned that the kids are not in clean clothes, are with her mother (tumultuous relationship) and they are missing school. SW asked pt about support system when she delivers her baby in July. Pt stated she will have plenty of time to get something in order for her kids. SW offered resources, pt declined. Pt is getting \"tired of being here\" stated she feels fine much better than yesterday, if the \"lung doctor\" doesn't come soon, pt stated she is leaving, because she wants to  her kids.       Amaya Welch, DANIEL  1/24/2022  1:22 PM    "

## 2022-01-25 ENCOUNTER — PATIENT OUTREACH (OUTPATIENT)
Dept: CARE COORDINATION | Facility: CLINIC | Age: 32
End: 2022-01-25
Payer: COMMERCIAL

## 2022-01-25 DIAGNOSIS — Z71.89 OTHER SPECIFIED COUNSELING: ICD-10-CM

## 2022-01-25 NOTE — PROGRESS NOTES
Clinic Care Coordination Contact  [facility]: Post-Discharge Note  SITUATION                                                      Admission:    Admission Date: 22   Reason for Admission: Asthma  Discharge:   Discharge Date: 22  Discharge Diagnosis: Acute respiratory failure with hypoxia and hypercapnia (H) , Severe asthma with acute exacerbation, unspecified whether persistent    BACKGROUND                                                      Soren Freitas is a 31 year old female -0-1-2 at 13 weeks gestational age with a past history of moderate persistent asthma admitted for acute hypoxic hypercarbic respiratory failure secondary to severe asthma exacerbation.  Patient was seen by pulmonology     Acute hypoxic hypercarbic respiratory failure  Secondary to acute severe asthma exacerbation  Started on BiPAP initially, weaned quickly.  VBG is improved.  Able to be weaned completely off of oxygen today, satting 95% at room air,   Continue asthma treatment     Severe asthma exacerbation  Started DuoNebs every 4 hours, Solu-Medrol, magnesium sulfate given in ER  Continue montelukast,   Symptoms much improved, hypoxia resolved  Transition to prednisone taper today by pulmonology  No distress, slight wheezes  Continue DuoNebs, home inhalers  Pulmonology prefered that she would stay another day but patient needed to leave to take care of her children.      13-week pregnancy-  Patient has established care at [facility].  Magnesium sulfate given.  No role for fetal monitoring at this age, treat asthma and hypoxia.  Discussed with on-call OB.  Follow-up with her outpatient OB this week, discussed with patient     Sclerotic lesions on previous CT from  on spine -suspicious of metastatic bone disease.  Needs follow-up with primary care for this after discharge.    ASSESSMENT      Enrollment  Primary Care Care Coordination Status: Not a Candidate    Discharge Assessment  How are you doing now that you  "are home?: \"I'm fine\"  How are your symptoms? (Red Flag symptoms escalate to triage hotline per guidelines): Improved  Do you feel your condition is stable enough to be safe at home until your provider visit?: Yes  Does the patient have their discharge instructions? : Yes  Does the patient have questions regarding their discharge instructions? : No  Were you started on any new medications or were there changes to any of your previous medications? : Yes  Does the patient have all of their medications?: Yes  Do you have questions regarding any of your medications? : No  Do you have all of your needed medical supplies or equipment (DME)?  (i.e. oxygen tank, CPAP, cane, etc.): Yes  Discharge follow-up appointment scheduled within 14 calendar days? : No (Patient plans to call their PCP this afternoon)  Is patient agreeable to assistance with scheduling? : Yes    Post-op (ISAK CTA Only)  If the patient had a surgery or procedure, do they have any questions for a nurse?: No    PLAN                                                      Outpatient Plan:      Follow-up with obstetrician this week.  Follow-up with pulmonology as soon as possible      Follow up with primary care provider, DEZ Potter, CNP, within 7 days to evaluate medication change and for hospital follow- up.  Discuss follow-up for sclerotic spine lesion seen on CT of chest done on 8/21     For any urgent concerns, please contact our 24 hour nurse triage line: [phone])       ISAK Hanson  [phone]  [facility]                "

## 2022-01-25 NOTE — DISCHARGE SUMMARY
[facility]  Hospitalist Discharge Summary      Date of Admission:  2022  Date of Discharge:  2022  5:11 PM  Discharging Provider: DANITA PARRA MD      Discharge Diagnoses   Active Problems:    Acute respiratory failure with hypoxia and hypercapnia (H)    Severe asthma with acute exacerbation, unspecified whether persistent       Discharge Procedure Orders   Reason for your hospital stay   Order Comments: asthma     Activity   Order Comments: Your activity upon discharge: activity as tolerated     Order Specific Question Answer Comments   Is discharge order? Yes      When to contact your care team   Order Comments: Call your primary doctor if you have any of the following: temperature greater than 100.5,  increased shortness of breath or increased pain.     Discharge Instructions   Order Comments: Follow-up with obstetrician this week.  Follow-up with pulmonology as soon as possible     Follow-up and recommended labs and tests   Order Comments: Follow up with primary care provider, DEZ Potter, CNP, within 7 days to evaluate medication change and for hospital follow- up.  Discuss follow-up for sclerotic spine lesion seen on CT of chest done on      Diet   Order Comments: Follow this diet upon discharge: Orders Placed This Encounter      Regular Diet Adult       Order Specific Question Answer Comments   Is discharge order? Yes           Hospital Course   Soren Freitas is a 31 year old female -0-1-2 at 13 weeks gestational age with a past history of moderate persistent asthma admitted for acute hypoxic hypercarbic respiratory failure secondary to severe asthma exacerbation.  Patient was seen by pulmonology     Acute hypoxic hypercarbic respiratory failure  Secondary to acute severe asthma exacerbation  Started on BiPAP initially, weaned quickly.  VBG is improved.  Able to be weaned completely off of oxygen today, satting 95% at room air,   Continue asthma  treatment     Severe asthma exacerbation  Started DuoNebs every 4 hours, Solu-Medrol, magnesium sulfate given in ER  Continue montelukast,   Symptoms much improved, hypoxia resolved  Transition to prednisone taper today by pulmonology  No distress, slight wheezes  Continue DuoNebs, home inhalers  Pulmonology prefered that she would stay another day but patient needed to leave to take care of her children.      13-week pregnancy-  Patient has established care at [facility].  Magnesium sulfate given.  No role for fetal monitoring at this age, treat asthma and hypoxia.  Discussed with on-call OB.  Follow-up with her outpatient OB this week, discussed with patient     Sclerotic lesions on previous CT from 8/21 on spine -suspicious of metastatic bone disease.  Needs follow-up with primary care for this after discharge.      Consultations This Hospital Stay   OB GYN IP CONSULT  PULMONARY IP CONSULT  SOCIAL WORK IP CONSULT  SOCIAL WORK IP CONSULT    Code Status   Full Code         Greater than 35 minutes spent on coordinating discharge, evaluating labs, studies, evaluating patient, evaluating specialist notes    DANITA PARRA MD  [facility] HEART [address] [address]  Phone: [phone]  Fax: [phone]  ______________________________________________________________________         Primary Care Physician   DEZ Potter, CNP    Discharge Orders      Reason for your hospital stay    asthma     Activity    Your activity upon discharge: activity as tolerated     When to contact your care team    Call your primary doctor if you have any of the following: temperature greater than 100.5,  increased shortness of breath or increased pain.     Discharge Instructions    Follow-up with obstetrician this week.  Follow-up with pulmonology as soon as possible     Follow-up and recommended labs and tests    Follow up with primary care provider, DEZ Potter, CNP, within  7 days to evaluate medication change and for hospital follow- up.  Discuss follow-up for sclerotic spine lesion seen on CT of chest done on 8/21     Diet    Follow this diet upon discharge: Orders Placed This Encounter      Regular Diet Adult         Significant Results and Procedures   Most Recent 3 CBC's:  Recent Labs   Lab Test 01/24/22  0534 01/23/22  0515 01/22/22  2159   WBC 21.2* 16.8* 11.6*   HGB 13.2 13.7 15.1   MCV 85 83 86    230 260     Most Recent 3 BMP's:  Recent Labs   Lab Test 01/24/22  0534 01/22/22  2159 08/23/21  0650    139 137   POTASSIUM 4.4 4.3 4.6   CHLORIDE 108* 107 108*   CO2 20* 21* 20*   BUN 9 8 11   CR 0.73 0.88 0.83   ANIONGAP 8 11 9   CARYN 9.1 9.5 9.3    178* 131*   ,   Results for orders placed or performed during the hospital encounter of 01/22/22   XR Chest Port 1 View    Narrative    EXAM: XR CHEST PORT 1 VIEW  LOCATION: [facility]  DATE/TIME: 1/22/2022 10:59 PM    INDICATION: sob  COMPARISON: 08/22/2021      Impression    IMPRESSION: Heart size and pulmonary vascularity normal. The lungs are clear.       Discharge Medications   Discharge Medication List as of 1/24/2022  2:29 PM      START taking these medications    Details   ipratropium - albuterol 0.5 mg/2.5 mg/3 mL (DUONEB) 0.5-2.5 (3) MG/3ML neb solution Take 1 vial (3 mLs) by nebulization every 4 hours, Disp-220 mL, R-0, E-Prescribe         CONTINUE these medications which have CHANGED    Details   predniSONE (DELTASONE) 20 MG tablet Take 3 tablets (60 mg) by mouth daily for 3 days, THEN 2 tablets (40 mg) daily for 5 days, THEN 1 tablet (20 mg) daily for 5 days., Disp-24 tablet, R-0, E-Prescribe         CONTINUE these medications which have NOT CHANGED    Details   albuterol (PROAIR HFA/PROVENTIL HFA/VENTOLIN HFA) 108 (90 Base) MCG/ACT inhaler Inhale 2 puffs into the lungs every 4 hours as needed for shortness of breath / dyspnea or wheezing, Disp-18 g, R-0, E-PrescribePharmacy may  "dispense brand covered by insurance (Proair, or proventil or ventolin or generic albuterol inhaler)      albuterol (PROVENTIL) (2.5 MG/3ML) 0.083% neb solution Take 1 vial (2.5 mg) by nebulization every 4 hours as needed for shortness of breath / dyspnea or wheezing, Disp-3 mL, R-0, E-Prescribe      mometasone-formoterol (DULERA) 200-5 MCG/ACT inhaler Inhale 2 puffs into the lungs 2 times daily, Historical      montelukast (SINGULAIR) 10 MG tablet Take 10 mg by mouth every morning, Historical      Prenatal Vit-Fe Fumarate-FA (PRENATAL MULTIVITAMIN W/IRON) 27-0.8 MG tablet Take 1 tablet by mouth daily, Historical           Allergies   No Known Allergies    Physical Exam:  Temp:  [97.9  F (36.6  C)-98.6  F (37  C)] 98.6  F (37  C)  Pulse:  [] 95  Resp:  [15-22] 18  BP: (113-133)/(53-75) 113/53  SpO2:  [94 %-99 %] 94 %    /53 (BP Location: Left arm)   Pulse 95   Temp 98.6  F (37  C) (Oral)   Resp 18   Ht 1.702 m (5' 7\")   Wt 82.4 kg (181 lb 10.5 oz)   SpO2 94%   BMI 28.45 kg/m    General appearance: alert, appears stated age and cooperative  Head: Normocephalic, without obvious abnormality, atraumatic  Eyes: Clear conjuctiva  Neck: no JVD and supple, symmetrical, trachea midline  Lungs: No tachypnea, mild distant expiratory wheezes bilaterally  Heart: regular rate and rhythm, S1, S2 normal, no murmur, click, rub or gallop  Abdomen: soft, non-tender; bowel sounds normal; no masses,  no organomegaly  Extremities: Negative homans, edema 0  Skin: Skin color, texture, turgor normal. No rashes or lesions  Neurologic: Grossly normal        "

## 2022-02-02 ENCOUNTER — ANCILLARY PROCEDURE (OUTPATIENT)
Dept: ULTRASOUND IMAGING | Facility: HOSPITAL | Age: 32
End: 2022-02-02
Attending: EMERGENCY MEDICINE
Payer: COMMERCIAL

## 2022-02-02 ENCOUNTER — APPOINTMENT (OUTPATIENT)
Dept: RADIOLOGY | Facility: HOSPITAL | Age: 32
End: 2022-02-02
Attending: EMERGENCY MEDICINE
Payer: COMMERCIAL

## 2022-02-02 ENCOUNTER — HOSPITAL ENCOUNTER (EMERGENCY)
Facility: HOSPITAL | Age: 32
Discharge: LEFT BEFORE DISCHARGE | End: 2022-02-02
Attending: EMERGENCY MEDICINE | Admitting: INTERNAL MEDICINE
Payer: COMMERCIAL

## 2022-02-02 VITALS
OXYGEN SATURATION: 97 % | RESPIRATION RATE: 21 BRPM | BODY MASS INDEX: 28.25 KG/M2 | WEIGHT: 180 LBS | HEART RATE: 84 BPM | SYSTOLIC BLOOD PRESSURE: 106 MMHG | TEMPERATURE: 97 F | HEIGHT: 67 IN | DIASTOLIC BLOOD PRESSURE: 55 MMHG

## 2022-02-02 DIAGNOSIS — J96.01 ACUTE RESPIRATORY FAILURE WITH HYPOXIA (H): ICD-10-CM

## 2022-02-02 DIAGNOSIS — J45.901 SEVERE ASTHMA WITH ACUTE EXACERBATION, UNSPECIFIED WHETHER PERSISTENT: ICD-10-CM

## 2022-02-02 DIAGNOSIS — Z3A.14 14 WEEKS GESTATION OF PREGNANCY: ICD-10-CM

## 2022-02-02 DIAGNOSIS — J45.51 SEVERE PERSISTENT ASTHMA WITH ACUTE EXACERBATION (H): ICD-10-CM

## 2022-02-02 LAB
ANION GAP SERPL CALCULATED.3IONS-SCNC: 8 MMOL/L (ref 5–18)
ATRIAL RATE - MUSE: 105 BPM
BASE EXCESS BLDV CALC-SCNC: -1.7 MMOL/L
BASE EXCESS BLDV CALC-SCNC: -3.1 MMOL/L
BASOPHILS # BLD MANUAL: 0 10E3/UL (ref 0–0.2)
BASOPHILS NFR BLD MANUAL: 0 %
BUN SERPL-MCNC: 8 MG/DL (ref 8–22)
CALCIUM SERPL-MCNC: 8.4 MG/DL (ref 8.5–10.5)
CHLORIDE BLD-SCNC: 108 MMOL/L (ref 98–107)
CO2 SERPL-SCNC: 23 MMOL/L (ref 22–31)
CREAT SERPL-MCNC: 0.83 MG/DL (ref 0.6–1.1)
DIASTOLIC BLOOD PRESSURE - MUSE: 87 MMHG
EOSINOPHIL # BLD MANUAL: 0.5 10E3/UL (ref 0–0.7)
EOSINOPHIL NFR BLD MANUAL: 4 %
ERYTHROCYTE [DISTWIDTH] IN BLOOD BY AUTOMATED COUNT: 12.8 % (ref 10–15)
GFR SERPL CREATININE-BSD FRML MDRD: >90 ML/MIN/1.73M2
GLUCOSE BLD-MCNC: 157 MG/DL (ref 70–125)
HCO3 BLDV-SCNC: 20 MMOL/L (ref 24–30)
HCO3 BLDV-SCNC: 23 MMOL/L (ref 24–30)
HCT VFR BLD AUTO: 41.7 % (ref 35–47)
HGB BLD-MCNC: 13.9 G/DL (ref 11.7–15.7)
HOLD SPECIMEN: NORMAL
HOLD SPECIMEN: NORMAL
INTERPRETATION ECG - MUSE: NORMAL
LYMPHOCYTES # BLD MANUAL: 6.6 10E3/UL (ref 0.8–5.3)
LYMPHOCYTES NFR BLD MANUAL: 55 %
MCH RBC QN AUTO: 29 PG (ref 26.5–33)
MCHC RBC AUTO-ENTMCNC: 33.3 G/DL (ref 31.5–36.5)
MCV RBC AUTO: 87 FL (ref 78–100)
MONOCYTES # BLD MANUAL: 0.7 10E3/UL (ref 0–1.3)
MONOCYTES NFR BLD MANUAL: 6 %
NEUTROPHILS # BLD MANUAL: 4.2 10E3/UL (ref 1.6–8.3)
NEUTROPHILS NFR BLD MANUAL: 35 %
OXYHGB MFR BLDV: 38 % (ref 70–75)
OXYHGB MFR BLDV: 76.3 % (ref 70–75)
P AXIS - MUSE: 67 DEGREES
PCO2 BLDV: 41 MM HG (ref 35–50)
PCO2 BLDV: 70 MM HG (ref 35–50)
PH BLDV: 7.17 [PH] (ref 7.35–7.45)
PH BLDV: 7.37 [PH] (ref 7.35–7.45)
PLAT MORPH BLD: ABNORMAL
PLATELET # BLD AUTO: 270 10E3/UL (ref 150–450)
PO2 BLDV: 29 MM HG (ref 25–47)
PO2 BLDV: 41 MM HG (ref 25–47)
POTASSIUM BLD-SCNC: 3.7 MMOL/L (ref 3.5–5)
PR INTERVAL - MUSE: 132 MS
QRS DURATION - MUSE: 80 MS
QT - MUSE: 350 MS
QTC - MUSE: 462 MS
R AXIS - MUSE: 79 DEGREES
RBC # BLD AUTO: 4.8 10E6/UL (ref 3.8–5.2)
RBC MORPH BLD: ABNORMAL
SAO2 % BLDV: 38.9 % (ref 70–75)
SAO2 % BLDV: 77.2 % (ref 70–75)
SARS-COV-2 RNA RESP QL NAA+PROBE: NEGATIVE
SODIUM SERPL-SCNC: 139 MMOL/L (ref 136–145)
SYSTOLIC BLOOD PRESSURE - MUSE: 127 MMHG
T AXIS - MUSE: 45 DEGREES
VENTRICULAR RATE- MUSE: 105 BPM
WBC # BLD AUTO: 12 10E3/UL (ref 4–11)

## 2022-02-02 PROCEDURE — 94660 CPAP INITIATION&MGMT: CPT

## 2022-02-02 PROCEDURE — 71045 X-RAY EXAM CHEST 1 VIEW: CPT

## 2022-02-02 PROCEDURE — 250N000011 HC RX IP 250 OP 636: Performed by: EMERGENCY MEDICINE

## 2022-02-02 PROCEDURE — 250N000012 HC RX MED GY IP 250 OP 636 PS 637: Performed by: INTERNAL MEDICINE

## 2022-02-02 PROCEDURE — 93005 ELECTROCARDIOGRAM TRACING: CPT | Performed by: EMERGENCY MEDICINE

## 2022-02-02 PROCEDURE — 82310 ASSAY OF CALCIUM: CPT | Performed by: EMERGENCY MEDICINE

## 2022-02-02 PROCEDURE — 85027 COMPLETE CBC AUTOMATED: CPT | Performed by: EMERGENCY MEDICINE

## 2022-02-02 PROCEDURE — 250N000009 HC RX 250: Performed by: EMERGENCY MEDICINE

## 2022-02-02 PROCEDURE — 94640 AIRWAY INHALATION TREATMENT: CPT

## 2022-02-02 PROCEDURE — 99285 EMERGENCY DEPT VISIT HI MDM: CPT | Mod: 25

## 2022-02-02 PROCEDURE — 99223 1ST HOSP IP/OBS HIGH 75: CPT | Mod: AI | Performed by: INTERNAL MEDICINE

## 2022-02-02 PROCEDURE — 82805 BLOOD GASES W/O2 SATURATION: CPT | Performed by: EMERGENCY MEDICINE

## 2022-02-02 PROCEDURE — 96366 THER/PROPH/DIAG IV INF ADDON: CPT

## 2022-02-02 PROCEDURE — 250N000013 HC RX MED GY IP 250 OP 250 PS 637: Performed by: INTERNAL MEDICINE

## 2022-02-02 PROCEDURE — 36415 COLL VENOUS BLD VENIPUNCTURE: CPT | Performed by: EMERGENCY MEDICINE

## 2022-02-02 PROCEDURE — 96375 TX/PRO/DX INJ NEW DRUG ADDON: CPT

## 2022-02-02 PROCEDURE — 87635 SARS-COV-2 COVID-19 AMP PRB: CPT | Performed by: EMERGENCY MEDICINE

## 2022-02-02 PROCEDURE — 96365 THER/PROPH/DIAG IV INF INIT: CPT

## 2022-02-02 PROCEDURE — C9803 HOPD COVID-19 SPEC COLLECT: HCPCS

## 2022-02-02 PROCEDURE — 999N000157 HC STATISTIC RCP TIME EA 10 MIN

## 2022-02-02 PROCEDURE — 76815 OB US LIMITED FETUS(S): CPT

## 2022-02-02 RX ORDER — PREDNISONE 20 MG/1
40 TABLET ORAL DAILY
Status: DISCONTINUED | OUTPATIENT
Start: 2022-02-02 | End: 2022-02-02 | Stop reason: HOSPADM

## 2022-02-02 RX ORDER — ALBUTEROL SULFATE 5 MG/ML
2.5 SOLUTION RESPIRATORY (INHALATION) EVERY 6 HOURS PRN
Status: DISCONTINUED | OUTPATIENT
Start: 2022-02-02 | End: 2022-02-02 | Stop reason: HOSPADM

## 2022-02-02 RX ORDER — PRENATAL VIT/IRON FUM/FOLIC AC 27MG-0.8MG
1 TABLET ORAL DAILY
Status: DISCONTINUED | OUTPATIENT
Start: 2022-02-02 | End: 2022-02-02 | Stop reason: HOSPADM

## 2022-02-02 RX ORDER — MAGNESIUM SULFATE HEPTAHYDRATE 40 MG/ML
2 INJECTION, SOLUTION INTRAVENOUS ONCE
Status: COMPLETED | OUTPATIENT
Start: 2022-02-02 | End: 2022-02-02

## 2022-02-02 RX ORDER — METHYLPREDNISOLONE SODIUM SUCCINATE 125 MG/2ML
125 INJECTION, POWDER, LYOPHILIZED, FOR SOLUTION INTRAMUSCULAR; INTRAVENOUS ONCE
Status: COMPLETED | OUTPATIENT
Start: 2022-02-02 | End: 2022-02-02

## 2022-02-02 RX ORDER — PREDNISONE 20 MG/1
TABLET ORAL
Qty: 24 TABLET | Refills: 0 | Status: SHIPPED | OUTPATIENT
Start: 2022-02-02 | End: 2022-02-15

## 2022-02-02 RX ORDER — IPRATROPIUM BROMIDE AND ALBUTEROL SULFATE 2.5; .5 MG/3ML; MG/3ML
3 SOLUTION RESPIRATORY (INHALATION) EVERY 4 HOURS
Status: DISCONTINUED | OUTPATIENT
Start: 2022-02-02 | End: 2022-02-02 | Stop reason: HOSPADM

## 2022-02-02 RX ORDER — IPRATROPIUM BROMIDE AND ALBUTEROL SULFATE 2.5; .5 MG/3ML; MG/3ML
3 SOLUTION RESPIRATORY (INHALATION) ONCE
Status: COMPLETED | OUTPATIENT
Start: 2022-02-02 | End: 2022-02-02

## 2022-02-02 RX ORDER — MONTELUKAST SODIUM 10 MG/1
10 TABLET ORAL EVERY MORNING
Status: DISCONTINUED | OUTPATIENT
Start: 2022-02-03 | End: 2022-02-02 | Stop reason: HOSPADM

## 2022-02-02 RX ORDER — ALBUTEROL SULFATE 90 UG/1
2 AEROSOL, METERED RESPIRATORY (INHALATION) EVERY 4 HOURS PRN
Status: DISCONTINUED | OUTPATIENT
Start: 2022-02-02 | End: 2022-02-02 | Stop reason: HOSPADM

## 2022-02-02 RX ADMIN — IPRATROPIUM BROMIDE AND ALBUTEROL SULFATE 3 ML: .5; 3 SOLUTION RESPIRATORY (INHALATION) at 07:40

## 2022-02-02 RX ADMIN — PREDNISONE 40 MG: 20 TABLET ORAL at 14:22

## 2022-02-02 RX ADMIN — FLUTICASONE FUROATE AND VILANTEROL TRIFENATATE 1 PUFF: 200; 25 POWDER RESPIRATORY (INHALATION) at 14:22

## 2022-02-02 RX ADMIN — METHYLPREDNISOLONE SODIUM SUCCINATE 125 MG: 125 INJECTION, POWDER, FOR SOLUTION INTRAMUSCULAR; INTRAVENOUS at 07:37

## 2022-02-02 RX ADMIN — MAGNESIUM SULFATE HEPTAHYDRATE 2 G: 40 INJECTION, SOLUTION INTRAVENOUS at 07:37

## 2022-02-02 RX ADMIN — ALBUTEROL SULFATE 2.5 MG: 2.5 SOLUTION RESPIRATORY (INHALATION) at 09:31

## 2022-02-02 RX ADMIN — IPRATROPIUM BROMIDE AND ALBUTEROL SULFATE 3 ML: .5; 3 SOLUTION RESPIRATORY (INHALATION) at 07:30

## 2022-02-02 RX ADMIN — PRENATAL VIT W/ FE FUMARATE-FA TAB 27-0.8 MG 1 TABLET: 27-0.8 TAB at 14:22

## 2022-02-02 ASSESSMENT — ENCOUNTER SYMPTOMS
COUGH: 0
WHEEZING: 1
NERVOUS/ANXIOUS: 1
ABDOMINAL PAIN: 0
DIAPHORESIS: 1
SHORTNESS OF BREATH: 1

## 2022-02-02 ASSESSMENT — ACTIVITIES OF DAILY LIVING (ADL)
ADLS_ACUITY_SCORE: 7
DEPENDENT_IADLS:: INDEPENDENT

## 2022-02-02 ASSESSMENT — MIFFLIN-ST. JEOR: SCORE: 1564.1

## 2022-02-02 NOTE — H&P
Admission History and Physical   Soren Freitas,    1990,   [id number]    [facility]   Acute respiratory failure with hypoxia (H) [J96.01]    PCP: Elvia Bahena,    Code status:  Prior       Extended Emergency Contact Information  Primary Emergency Contact: LUBNA MARTINEZ  Mobile Phone: [phone]  Relation: Sister       Active Problems:    Acute respiratory failure with hypoxia (H)    Severe persistent asthma with acute exacerbation       ASSESSMENT AND PLAN:  Acute asthma exacerbation  Recently discharged for similar issues - was in icu   Schedule nebs   begin daily prednisone   Consult pulm for further care [ recurrent admit]     Acute resp failure   Due to above   Better with bipap   Rt to follow     16 wks pregnant   No abdo pain or vaginal bleeding   Consult ob for further eval     Full code     DVT PPX:  scd     Barriers to discharge resp failure     Anticipated Discharge date  > 2 mn         Chief Complaint:  Sob for 2 days     HPI:  patient had an asthma attack this morning and called 911. Upon EMS arrival, patient's O2 sats were in the 60s, she had mild wheezing, and was diaphoretic. Patient was given one duo neb and one albuterol neb with mild relief. Sats increased to 90%.she reports shortness of breath and chest pain this morning, Her current symptoms are consistent with her usual asthma attacks. Patient has been intubated in the past. She denies any possibility of an allergic reaction. She denies any other medical problems. Patient is vaccinated against COVID x 2. Denies cough, abdominal pain, vaginal bleeding, or any other complaints at this time.       Medical History  Past Medical History:   Diagnosis Date     Obesity      Severe persistent asthma with acute exacerbation 2015    Formatting of this note might be different from the original. 2014 Admit for exacerbation: Spirometry FEV1/FVC 68%, D/C w/dulera, albuterol, Spiriva.  Started singulair 10mg QD,  "Given NRT.  Dx as infant, has been on albuterol lifelong.    Last Assessment & Plan:  Formatting of this note might be different from the original. Stable on Singulair, cetirizine, albuterol, and dulera. Discussed COVID     Uncomplicated asthma           Surgical History  She  has no past surgical history on file.      SOCIAL HISTORY:  Social History     Socioeconomic History     Marital status: Single     Spouse name: Not on file     Number of children: Not on file     Years of education: Not on file     Highest education level: Not on file   Occupational History     Not on file   Tobacco Use     Smoking status: Former Smoker     Smokeless tobacco: Not on file   Substance and Sexual Activity     Alcohol use: Not on file     Drug use: Not on file     Sexual activity: Not on file   Other Topics Concern     Not on file   Social History Narrative     Not on file     Social Determinants of Health     Financial Resource Strain: Not on file   Food Insecurity: Not on file   Transportation Needs: Not on file   Physical Activity: Not on file   Stress: Not on file   Social Connections: Not on file   Intimate Partner Violence: Not on file   Housing Stability: Not on file       FAMILY HISTORY:  No family hx of asthma     ALLERGIES:  No Known Allergies    MEDICATIONS:  Per pharm       ROS:  12 point review of systems reviewed and is negative except as stated above      PHYSICAL EXAM:  /72   Pulse 98   Temp 97  F (36.1  C) (Axillary)   Resp (!) 33   Ht 1.702 m (5' 7\")   Wt 81.6 kg (180 lb)   SpO2 99%   BMI 28.19 kg/m      General: Alert and oriented x 3. Not in obvious distress.  HEENT: Pupils equal and reactive,ENT WNL   Neck- supple, No JVP elevation, lymphadenopathy or thyromegaly. Trachea-central.  Chest: Clear to auscultation bilaterally.  Heart: S1S2 regular. No M/R/G.  Abdomen: Soft. NT, ND. No organomegaly. Bowel sounds- active.  Back: No spine tenderness. No CVA tenderness.  Extremities: No leg swelling. " Peripheral pulses 2+ bilaterally.  Neuro: No focal neurological deficit  Skin: no skin rashes     DIAGNOSTIC DATA:        EKG Results: Personally reviewed        Advanced Care Planning       Rubén Pineda MD

## 2022-02-02 NOTE — ED PROVIDER NOTES
EMERGENCY DEPARTMENT ENCOUNTER      NAME: Soren Freitas  AGE: 31 year old female  YOB: 1990  MRN: [id number]  EVALUATION DATE & TIME: No admission date for patient encounter.    PCP: Elvia Bahena    ED PROVIDER: Imelda Hurd M.D.      CHIEF COMPLAINT     Chief Complaint   Patient presents with     Shortness of Breath         FINAL IMPRESSION:     1. Severe persistent asthma with acute exacerbation    2. Acute respiratory failure with hypoxia (H)    3. 14 weeks gestation of pregnancy    4. Severe asthma with acute exacerbation, unspecified whether persistent      LEAVING AGAINST MEDICAL ADVICE    MEDICAL DECISION MAKING:       Pertinent Labs & Imaging studies reviewed. (See chart for details)    31 year old female presents to the Emergency Department for evaluation of shortness of breath    ED Course as of 02/02/22 1434   Wed Feb 02, 2022   0732 31-year-old female presents via EMS for hypoxia and asthma. Patient found slumped over hypoxic with diffuse expiratory wheezes and diaphoresis. Per EMS and IV was established she was given 2 duo nebs with significant improvement and was given supplemental oxygen.   0732 Patient has had a known history of asthma. Last seen here on the 22nd for similar symptoms.   0741 Discussed with patient possibility of allergy reaction she says no disease her regular asthma. She admits to chest pain denies any cough. She is pregnant denies any vaginal bleeding. No leg swelling rashes. She been vaccinated against Covid.   0741 Differential diagnoses include but not limited to acute asthma saucerization pneumothorax Covid PE pneumonia congestive heart failure anaphylaxis among others.   0741 IV already started. Second IV placed. Patient placed in postop cardiac and blood pressure monitors. Patient is in respiratory distress but she is able to speak in short sentences. She is here with her 2 young children.   0742 She has diffuse expiratory wheezes. She is  "tachycardic. No lower extremity edema. Abdomen is soft.   0742 Patient given Solu-Medrol 125 mg. Given magnesium. She was given 2 DuoNeb's in emergency department.   0744 Reevaluated. Still tachypneic but appears more comfortable. No longer diaphoretic. On BiPAP. Fair inspiratory wheezes.   0800 ABG pH of 7.17 PCO2 70 PO2 29.  Current BiPAP settings she is on 30% 10/5.   0801 Reevaluated.  She appears more comfortable.  No longer diaphoresis for respiratory wheezes.   0821 Evaluated.  I appears more comfortable.  Updated.   0824 Patient reevaluated.  Discussed admission.  She said \"is too early to tell\".  We will continue to observe she is currently on BiPAP.  Given her initial presentation I immediately anticipated admission but patient wants to see how she does.   0847 bedside ultrasound reveals an IUP with fetal movement heart rate 160s.  Patient still with her daughters.  She appears more comfortable.  Awaiting family members and reevaluation.   0906 Elevated white blood cell count.  Clinically no pneumonia.  Chest x-ray pending.   0909 Chest x-ray no pneumothorax no consolidation.  Requested house supervisor help with right for the young children.  Grandma currently does not have a car therefore she will need to be taxi to the emergency department and back to her home with her grandkids.   0946 Initial VBG at 735.  Will repeat.   1144 Repeat VBG pH 7.37 PCO2 41 patient now on nasal cannula.   1148 Patient reevaluated.  Watching TV.  No longer expiratory wheezes.  Discussed with her admission for observation.  She says \"I do not know I will make my decision soon\"   1210 Covid negative.   1237 Patient reevaluated.  Appears quite comfortable watching TV.  Discussed with her admission.  States she needs to have a snack and walk around to see how she feels.   1430 Patient removed her oxygen.  Maintaining saturations are 98%.  I went with the nurse spoke with her at length discussed with her that given her initial " presentation I feel strongly that she should be admitted.   1431 Patient refused.  She shows capacity to make her decisions.  She is not intoxicated.  States she is not being forced to stay.   1431 I asked if there was anything that I could do so patient will stay and patient stated no.  She states she needs to go home and get her  talk to her kids I try to tell her we could charge her phone or she could use her phone but she refused.  She left AGAINST MEDICAL ADVICE.   1431 Unclear whether patient feel her prednisone prescription from discharge.  I repeated the.  She states she can follow her primary care doctor.  States she goes back to work on  to problem her work excuse for a week so she could at least follow-up.  She was instructed to return for any concerns or call 911.  Discharge AGAINST MEDICAL ADVICE.       Vital Signs: Tachycardic  EKG:  Imaging: Chest x-ray no acute  Home Meds: Reviewed  ED meds/abx: Solu-Medrol magnesium albuterol Atrovent  Fluids: TKO    Labs  K 3.7  Cr 0.83  Wbc 12  Hgb 13.9  platelets 2 7        Review of Previous Records    Discharge summary 2022  Soren Freitas is a 31 year old female -0-1-2 at 13 weeks gestational age with a past history of moderate persistent asthma admitted for acute hypoxic hypercarbic respiratory failure secondary to severe asthma exacerbation.  Patient was seen by pulmonology.     Acute hypoxic hypercarbic respiratory failure  Secondary to acute severe asthma exacerbation  Started on BiPAP initially, weaned quickly.  VBG is improved.  Able to be weaned completely off of oxygen today, satting 95% at room air,   Continue asthma treatment     Severe asthma exacerbation  Started DuoNebs every 4 hours, Solu-Medrol, magnesium sulfate given in ER  Continue montelukast,   Symptoms much improved, hypoxia resolved  Transition to prednisone taper today by pulmonology  No distress, slight wheezes  Continue DuoNebs, home inhalers  Pulmonology  prefered that she would stay another day but patient needed to leave to take care of her children.      13-week pregnancy:  Patient has established care at [facility]. Magnesium sulfate given. No role for fetal monitoring at this age, treat asthma and hypoxia. Discussed with on-call OB. Follow-up with her outpatient OB this week, discussed with patient.     Sclerotic lesions on previous CT from 8/21 on spine -suspicious of metastatic bone disease. Needs follow-up with primary care for this after discharge.       Consults  Hospitalist, Dr. Pineda    ED COURSE     7:20 AM I met the patient and performed my initial interview and exam. I saw the patient in PPE including a face shield, N95 mask, surgical mask, and gloves.   7:30 AM I rechecked and updated the patient.   7:59 AM I rechecked and updated the patient.   8:19 AM I rechecked and updated the patient.  8:41 AM Point of care ultrasound was preformed.   9:11 AM I rechecked and updated the patient.   9:54 AM I rechecked and updated the patient.   10:04 AM I spoke with Dr. Pineda, hospitalist, regarding admission for patient. Accepted for med [facility] inpatient.   1:40 PM Patient is refusing admission and is leaving AMA.    2:33 PM compliant with patient and nurse present refuses to stay understands risk of leaving she is leaving AGAINST MEDICAL ADVICE.    At the conclusion of the encounter I discussed the results of all of the tests and the disposition. The questions were answered. The patient acknowledged understanding and was agreeable with the care plan.       30 minutes of critical care time     MEDICATIONS GIVEN IN THE EMERGENCY:     Medications   albuterol (PROVENTIL) neb solution 2.5 mg (2.5 mg Nebulization Given 2/2/22 9532)   albuterol (PROVENTIL HFA/VENTOLIN HFA) inhaler (has no administration in time range)   ipratropium - albuterol 0.5 mg/2.5 mg/3 mL (DUONEB) neb solution 3 mL (3 mLs Nebulization Not Given 2/2/22 7898)   fluticasone-vilanterol (BREO ELLIPTA)  200-25 MCG/INH inhaler 1 puff (1 puff Inhalation Given 2/2/22 1422)   montelukast (SINGULAIR) tablet 10 mg (has no administration in time range)   prenatal multivitamin w/iron per tablet 1 tablet (1 tablet Oral Given 2/2/22 1422)   predniSONE (DELTASONE) tablet 40 mg (40 mg Oral Given 2/2/22 1422)   methylPREDNISolone sodium succinate (solu-MEDROL) injection 125 mg (125 mg Intravenous Given 2/2/22 0737)   ipratropium - albuterol 0.5 mg/2.5 mg/3 mL (DUONEB) neb solution 3 mL (3 mLs Nebulization Given 2/2/22 0730)   magnesium sulfate 2 g in water intermittent infusion (0 g Intravenous Stopped 2/2/22 1418)   ipratropium - albuterol 0.5 mg/2.5 mg/3 mL (DUONEB) neb solution 3 mL (3 mLs Nebulization Given 2/2/22 0740)       NEW PRESCRIPTIONS STARTED AT TODAY'S ER VISIT     Current Discharge Medication List             =================================================================    HPI     Patient information was obtained from: Patient and EMS personnel    Use of : N/A        Soren GRAY Fortino is a 31 year old female with history of severe asthma with acute exacerbation who presents to the ED via EMS for evaluation of shortness of breath.    Per EMS personnel, patient had an asthma attack this morning and therefore called 911. Upon EMS arrival, patient's O2 sats were in the 60s, she had mild wheezing, and was diaphoretic. Patient was given one duo neb and one albuterol neb with mild relief. Sats increased to 90%. On the way to the ED, the patient had a panic attack and started screaming in the ambulance. Of note, she is 14 weeks pregnant.     Per patient, she reports shortness of breath and chest pain. Her current symptoms are consistent with her usual asthma attacks. Patient has been intubated in the past. She denies any possibility of an allergic reaction. She denies any other medical problems. Patient is vaccinated against COVID x 2. Denies cough, abdominal pain, vaginal bleeding, or any other  complaints at this time.     REVIEW OF SYSTEMS   Review of Systems   Constitutional: Positive for diaphoresis.   Respiratory: Positive for shortness of breath and wheezing (mild). Negative for cough.    Cardiovascular: Positive for chest pain.   Gastrointestinal: Negative for abdominal pain.   Genitourinary: Negative for vaginal bleeding.   Psychiatric/Behavioral: The patient is nervous/anxious (resolved).    All other systems reviewed and are negative.       PAST MEDICAL HISTORY:     Past Medical History:   Diagnosis Date     Obesity      Severe persistent asthma with acute exacerbation 1/1/2015    Formatting of this note might be different from the original. 12/2014 Admit for exacerbation: Spirometry FEV1/FVC 68%, D/C w/dulera, albuterol, Spiriva.  Started singulair 10mg QD, Given NRT.  Dx as infant, has been on albuterol lifelong.    Last Assessment & Plan:  Formatting of this note might be different from the original. Stable on Singulair, cetirizine, albuterol, and dulera. Discussed COVID     Uncomplicated asthma        PAST SURGICAL HISTORY:   History reviewed. No pertinent surgical history.      CURRENT MEDICATIONS:   albuterol (PROAIR HFA/PROVENTIL HFA/VENTOLIN HFA) 108 (90 Base) MCG/ACT inhaler  albuterol (PROVENTIL) (2.5 MG/3ML) 0.083% neb solution  ipratropium - albuterol 0.5 mg/2.5 mg/3 mL (DUONEB) 0.5-2.5 (3) MG/3ML neb solution  mometasone-formoterol (DULERA) 200-5 MCG/ACT inhaler  montelukast (SINGULAIR) 10 MG tablet  predniSONE (DELTASONE) 20 MG tablet  Prenatal Vit-Fe Fumarate-FA (PRENATAL MULTIVITAMIN W/IRON) 27-0.8 MG tablet         ALLERGIES:   No Known Allergies    FAMILY HISTORY:   History reviewed. No pertinent family history.    SOCIAL HISTORY:     Social History     Socioeconomic History     Marital status: Single     Spouse name: Not on file     Number of children: Not on file     Years of education: Not on file     Highest education level: Not on file   Occupational History     Not on file  "  Tobacco Use     Smoking status: Former Smoker     Smokeless tobacco: Not on file   Substance and Sexual Activity     Alcohol use: Not on file     Drug use: Not on file     Sexual activity: Not on file   Other Topics Concern     Not on file   Social History Narrative     Not on file     Social Determinants of Health     Financial Resource Strain: Not on file   Food Insecurity: Not on file   Transportation Needs: Not on file   Physical Activity: Not on file   Stress: Not on file   Social Connections: Not on file   Intimate Partner Violence: Not on file   Housing Stability: Not on file       VITALS:   /55   Pulse 84   Temp 97  F (36.1  C) (Axillary)   Resp 21   Ht 1.702 m (5' 7\")   Wt 81.6 kg (180 lb)   SpO2 97%   BMI 28.19 kg/m      PHYSICAL EXAM     Physical Exam  Vitals and nursing note reviewed.   Constitutional:       Appearance: She is well-developed. She is ill-appearing and diaphoretic.   Cardiovascular:      Rate and Rhythm: Tachycardia present.   Pulmonary:      Effort: Tachypnea present.      Breath sounds: Examination of the right-upper field reveals wheezing. Examination of the left-upper field reveals wheezing. Examination of the right-middle field reveals wheezing. Examination of the left-middle field reveals wheezing. Examination of the right-lower field reveals wheezing. Examination of the left-lower field reveals wheezing. Wheezing present.   Musculoskeletal:         General: Normal range of motion.   Skin:     General: Skin is warm.      Capillary Refill: Capillary refill takes less than 2 seconds.   Neurological:      General: No focal deficit present.      Mental Status: She is alert and oriented to person, place, and time.         Physical Exam   Constitutional: Diaphoretic, respiratory distress    Head: Atraumatic.     Nose: Nose normal.     Mouth/Throat: Oropharynx is clear and moist.     Eyes: EOM are normal. Pupils are equal, round, and reactive to light.     Ears: Bilaterally " pearly white TM    Neck: Normal range of motion. Neck supple.     Cardiovascular: Tachycardic      Pulmonary/Chest: Bilateral expiratory wheezes     Abdominal: Soft. Bowel sounds are normal. Gravid.    Musculoskeletal: Normal range of motion.     Neurological: No focal deficits     Lymphatics: No edema    Skin: Skin is warm and dry.     Psychiatric: Normal mood and affect. Behavior is normal.       LAB:     All pertinent labs reviewed and interpreted.  Labs Ordered and Resulted from Time of ED Arrival to Time of ED Departure   BASIC METABOLIC PANEL - Abnormal       Result Value    Sodium 139      Potassium 3.7      Chloride 108 (*)     Carbon Dioxide (CO2) 23      Anion Gap 8      Urea Nitrogen 8      Creatinine 0.83      Calcium 8.4 (*)     Glucose 157 (*)     GFR Estimate >90     BLOOD GAS VENOUS - Abnormal    pH Venous 7.17 (*)     pCO2 Venous 70 (*)     pO2 Venous 29      Bicarbonate Venous 20 (*)     Base Excess/Deficit (+/-) -3.1      Oxyhemoglobin Venous 38.0 (*)     O2 Sat, Venous 38.9 (*)    CBC WITH PLATELETS AND DIFFERENTIAL - Abnormal    WBC Count 12.0 (*)     RBC Count 4.80      Hemoglobin 13.9      Hematocrit 41.7      MCV 87      MCH 29.0      MCHC 33.3      RDW 12.8      Platelet Count 270     DIFFERENTIAL - Abnormal    % Neutrophils 35      % Lymphocytes 55      % Monocytes 6      % Eosinophils 4      % Basophils 0      Absolute Neutrophils 4.2      Absolute Lymphocytes 6.6 (*)     Absolute Monocytes 0.7      Absolute Eosinophils 0.5      Absolute Basophils 0.0      RBC Morphology Confirmed RBC Indices      Platelet Assessment        Value: Automated Count Confirmed. Platelet morphology is normal.   BLOOD GAS VENOUS - Abnormal    pH Venous 7.37      pCO2 Venous 41      pO2 Venous 41      Bicarbonate Venous 23 (*)     Base Excess/Deficit (+/-) -1.7      Oxyhemoglobin Venous 76.3 (*)     O2 Sat, Venous 77.2 (*)    COVID-19 VIRUS (CORONAVIRUS) BY PCR - Normal    SARS CoV2 PCR Negative           RADIOLOGY:     Reviewed all pertinent imaging. Please see official radiology report.  XR Chest Port 1 View   Final Result   IMPRESSION: Negative chest.      POC US ABDOMEN LIMITED    (Results Pending)        EKG:     EKG #1  Sinus tachycardia poor anterior progression normal axis ST segment depression in V3.    Time:[id number]    Ventricular rate 105 bmp  Axis normal  VT interval 132  ms  QRS duration 80 ms  QT//462 ms    Compared to previous EKG on 1/23/2022 rate was 84 then sinus arrhythmia.  I have independently reviewed and interpreted the EKG(s) documented above.      PROCEDURES:     Procedures    PROCEDURE: Emergency Department Limited Bedside Screening Ultrasound   ANATOMICAL WINDOW: PELVIS   INDICATIONS: PREGANCY   PROCEDURE PROVIDER: Dr. Hurd    FINDINGS:  Positive fetal heart tones and movement.   IMAGES PRINTED & SCANNED OR SAVED TO MEMORY: YES       I, Natalie Anguiano, am serving as a scribe to document services personally performed by Dr. Hurd based on my observation and the provider's statements to me. I, Imelda Hurd MD attest that Natalie Anguiano is acting in a scribe capacity, has observed my performance of the services and has documented them in accordance with my direction.    Imelda Hurd M.D.  Emergency Medicine  [facility] EMERGENCY DEPARTMENT  [facility] [facility] [address] [phone]  Dept: [phone]     Imelda Hurd MD  02/02/22 6395

## 2022-02-02 NOTE — SIGNIFICANT EVENT
Significant Event Note    Time of event: 11:04 AM February 2, 2022    Description of event:  Pt seen in ed   Pt undecided about admission   Wants to go home from er     Plan:  ER to page hospitalist if pt wants to be admitted     Discussed with: bedside nurse    Rubén Pineda MD

## 2022-02-02 NOTE — DISCHARGE INSTRUCTIONS
Read and follow the discharge instructions.    I am giving you the same prescription drug you received before.    You are leaving AGAINST MEDICAL ADVICE I think this is very dangerous you were very sick when you first came here.    Continue using your inhaler every 4 hours as needed.    Restart the prednisone.    Call your primary care doctor today to make a follow-up appointment as soon as possible    Return immediately or call 911 if you having chest pain shortness of breath or any other concerns.

## 2022-02-02 NOTE — PROGRESS NOTES
Placed on BIPAP with the settings of S/T 10/5 R14 30% with an SpO2 of 100%.  BS exp wheezes.  RR 19 VT 800s MV 16/4.  RT will continue to monitor.    Dustin Rogers, RT  2/2/2022

## 2022-02-02 NOTE — PHARMACY-ADMISSION MEDICATION HISTORY
"Pharmacy Note - Admission Medication History    Pertinent Provider Information: Unclear if patient took the prednisone course that was prescribed from [facility] at discharge on 1/24. Pt says she only had 5-days worth which matches her prescription from 1/22.      ______________________________________________________________________    Prior To Admission (PTA) med list completed and updated in EMR.       PTA Med List   Medication Sig Note Last Dose     albuterol (PROAIR HFA/PROVENTIL HFA/VENTOLIN HFA) 108 (90 Base) MCG/ACT inhaler Inhale 2 puffs into the lungs every 4 hours as needed for shortness of breath / dyspnea or wheezing  Unknown at Unknown time     albuterol (PROVENTIL) (2.5 MG/3ML) 0.083% neb solution Take 1 vial (2.5 mg) by nebulization every 4 hours as needed for shortness of breath / dyspnea or wheezing  Unknown at Unknown time     ipratropium - albuterol 0.5 mg/2.5 mg/3 mL (DUONEB) 0.5-2.5 (3) MG/3ML neb solution Take 1 vial (3 mLs) by nebulization every 4 hours  2/2/2022 at AM     mometasone-formoterol (DULERA) 200-5 MCG/ACT inhaler Inhale 2 puffs into the lungs 2 times daily  2/1/2022 at not bringing     montelukast (SINGULAIR) 10 MG tablet Take 10 mg by mouth every morning  2/1/2022 at AM     predniSONE (DELTASONE) 20 MG tablet Take 3 tablets (60 mg) by mouth daily for 3 days, THEN 2 tablets (40 mg) daily for 5 days, THEN 1 tablet (20 mg) daily for 5 days. 2/2/2022: Unclear from pt if she took this course of prednisone. Pt states: \"I was only given 5-days worth.\" Unknown at Unknown time     Prenatal Vit-Fe Fumarate-FA (PRENATAL MULTIVITAMIN W/IRON) 27-0.8 MG tablet Take 1 tablet by mouth daily  2/1/2022 at AM       Information source(s): Patient and Hospital records  Method of interview communication: in-person    Summary of Changes to PTA Med List  New: N/A  Discontinued: N/A  Changed: N/A    Patient was asked about OTC/herbal products specifically.  PTA med list reflects this.    In the past " week, patient estimated taking medication this percent of the time:  greater than 90%.    Allergies were reviewed, assessed, and updated with the patient.      Patient did not bring any medications to the hospital and can't retrieve from home. No multi-dose medications are available for use during hospital stay.     The information provided in this note is only as accurate as the sources available at the time of the update(s).    Thank you for the opportunity to participate in the care of this patient.    Lars Mcknight [facility]  2/2/2022 10:39 AM

## 2022-02-02 NOTE — CONSULTS
"Care Management Initial Consult    General Information  Assessment completed with: Soren Patricia  Type of CM/SW Visit: Initial Assessment    Primary Care Provider verified and updated as needed: Yes   Readmission within the last 30 days: no previous admission in last 30 days      Reason for Consult: discharge planning  Advance Care Planning: Advance Care Planning Reviewed: other (comment) (\"I don't have one\")          Communication Assessment  Patient's communication style: spoken language (English or Bilingual)    Hearing Difficulty or Deaf: no   Wear Glasses or Blind: no    Cognitive  Cognitive/Neuro/Behavioral: WDL                      Living Environment:   People in home: child(pierre), dependent     Current living Arrangements: apartment      Able to return to prior arrangements: yes       Family/Social Support:  Care provided by: self  Provides care for: child(pierre) (\"kids age 9 and 5 - they are staying with my mother\")     Parent(s)          Description of Support System: Supportive,Involved    Support Assessment: Adequate family and caregiver support,Adequate social supports,Patient communicates needs well met    Current Resources:   Patient receiving home care services: No     Community Resources: None  Equipment currently used at home: none  Supplies currently used at home: Nebulizer tubing    Employment/Financial:  Employment Status: employed full-time     Employment/ Comments: \"no  benefits\"  Financial Concerns:     Referral to Financial Counselor: No       Lifestyle & Psychosocial Needs:  Social Determinants of Health     Tobacco Use: Medium Risk     Smoking Tobacco Use: Former Smoker     Smokeless Tobacco Use: Unknown   Alcohol Use: Not on file   Financial Resource Strain: Not on file   Food Insecurity: Not on file   Transportation Needs: Not on file   Physical Activity: Not on file   Stress: Not on file   Social Connections: Not on file   Intimate Partner Violence: Not on file "   Depression: Not on file   Housing Stability: Not on file       Functional Status:  Prior to admission patient needed assistance:   Dependent ADLs:: Independent,Ambulation-no assistive device  Dependent IADLs:: Independent  Assesssment of Functional Status: At functional baseline    Mental Health Status:          Chemical Dependency Status:                Values/Beliefs:  Spiritual, Cultural Beliefs, Rastafari Practices, Values that affect care:                 Additional Information:  Soren lives in an apartment with her kids age 9 and 5.    She is independent with ADLs at baseline and drives and works full time.    Likely no discharge needs at this time.    Family to transport at discharge.    Flaca Koch RN

## 2022-02-02 NOTE — Clinical Note
Soren Freitas was seen and treated in our emergency department on 2/2/2022.  She may return to work on 02/09/2022.  Please excuse patient from work on Sunday.     If you have any questions or concerns, please don't hesitate to call.      Imelda Hurd MD

## 2022-02-03 NOTE — SIGNIFICANT EVENT
Significant Event Note    Time of event:  February 2, 2022    Description of event:  Informed by er that pt decided to leave AMA   This was handled by the er [ see er notes for further details]     Plan:  Above     Discussed with: bedside nurse    Rubén Pineda MD

## 2022-05-25 ENCOUNTER — APPOINTMENT (OUTPATIENT)
Dept: RADIOLOGY | Facility: HOSPITAL | Age: 32
End: 2022-05-25
Attending: EMERGENCY MEDICINE
Payer: COMMERCIAL

## 2022-05-25 ENCOUNTER — APPOINTMENT (OUTPATIENT)
Dept: CT IMAGING | Facility: HOSPITAL | Age: 32
End: 2022-05-25
Attending: EMERGENCY MEDICINE
Payer: COMMERCIAL

## 2022-05-25 ENCOUNTER — HOSPITAL ENCOUNTER (OUTPATIENT)
Facility: HOSPITAL | Age: 32
Setting detail: OBSERVATION
Discharge: HOME | End: 2022-05-26
Attending: EMERGENCY MEDICINE | Admitting: FAMILY MEDICINE
Payer: COMMERCIAL

## 2022-05-25 DIAGNOSIS — J45.40 MODERATE PERSISTENT ASTHMA, UNSPECIFIED WHETHER COMPLICATED: ICD-10-CM

## 2022-05-25 DIAGNOSIS — J45.51 SEVERE PERSISTENT ASTHMA WITH ACUTE EXACERBATION (H): ICD-10-CM

## 2022-05-25 DIAGNOSIS — J18.9 COMMUNITY ACQUIRED BILATERAL LOWER LOBE PNEUMONIA: ICD-10-CM

## 2022-05-25 DIAGNOSIS — J45.901 SEVERE ASTHMA WITH ACUTE EXACERBATION, UNSPECIFIED WHETHER PERSISTENT: Primary | ICD-10-CM

## 2022-05-25 LAB
ANION GAP SERPL CALCULATED.3IONS-SCNC: 8 MMOL/L (ref 5–18)
BASE EXCESS BLDV CALC-SCNC: -2.7 MMOL/L
BASOPHILS # BLD AUTO: 0 10E3/UL (ref 0–0.2)
BASOPHILS NFR BLD AUTO: 0 %
BUN SERPL-MCNC: 6 MG/DL (ref 8–22)
CALCIUM SERPL-MCNC: 8.3 MG/DL (ref 8.5–10.5)
CHLORIDE BLD-SCNC: 106 MMOL/L (ref 98–107)
CO2 SERPL-SCNC: 23 MMOL/L (ref 22–31)
CREAT SERPL-MCNC: 0.68 MG/DL (ref 0.6–1.1)
EOSINOPHIL # BLD AUTO: 0.5 10E3/UL (ref 0–0.7)
EOSINOPHIL NFR BLD AUTO: 4 %
ERYTHROCYTE [DISTWIDTH] IN BLOOD BY AUTOMATED COUNT: 13.2 % (ref 10–15)
FLUAV RNA SPEC QL NAA+PROBE: NEGATIVE
FLUBV RNA RESP QL NAA+PROBE: NEGATIVE
GFR SERPL CREATININE-BSD FRML MDRD: >90 ML/MIN/1.73M2
GLUCOSE BLD-MCNC: 93 MG/DL (ref 70–125)
HCO3 BLDV-SCNC: 21 MMOL/L (ref 24–30)
HCT VFR BLD AUTO: 35.5 % (ref 35–47)
HGB BLD-MCNC: 12 G/DL (ref 11.7–15.7)
HOLD SPECIMEN: NORMAL
HOLD SPECIMEN: NORMAL
IMM GRANULOCYTES # BLD: 0.1 10E3/UL
IMM GRANULOCYTES NFR BLD: 1 %
LYMPHOCYTES # BLD AUTO: 2.7 10E3/UL (ref 0.8–5.3)
LYMPHOCYTES NFR BLD AUTO: 21 %
MCH RBC QN AUTO: 28.8 PG (ref 26.5–33)
MCHC RBC AUTO-ENTMCNC: 33.8 G/DL (ref 31.5–36.5)
MCV RBC AUTO: 85 FL (ref 78–100)
MONOCYTES # BLD AUTO: 0.8 10E3/UL (ref 0–1.3)
MONOCYTES NFR BLD AUTO: 6 %
NEUTROPHILS # BLD AUTO: 8.6 10E3/UL (ref 1.6–8.3)
NEUTROPHILS NFR BLD AUTO: 68 %
NRBC # BLD AUTO: 0 10E3/UL
NRBC BLD AUTO-RTO: 0 /100
OXYHGB MFR BLDV: 60.3 % (ref 70–75)
PCO2 BLDV: 46 MM HG (ref 35–50)
PH BLDV: 7.32 [PH] (ref 7.35–7.45)
PLATELET # BLD AUTO: 251 10E3/UL (ref 150–450)
PO2 BLDV: 36 MM HG (ref 25–47)
POTASSIUM BLD-SCNC: 3.5 MMOL/L (ref 3.5–5)
RBC # BLD AUTO: 4.17 10E6/UL (ref 3.8–5.2)
SAO2 % BLDV: 61.8 % (ref 70–75)
SARS-COV-2 RNA RESP QL NAA+PROBE: NEGATIVE
SODIUM SERPL-SCNC: 137 MMOL/L (ref 136–145)
WBC # BLD AUTO: 12.7 10E3/UL (ref 4–11)

## 2022-05-25 PROCEDURE — 250N000012 HC RX MED GY IP 250 OP 636 PS 637: Performed by: FAMILY MEDICINE

## 2022-05-25 PROCEDURE — 96367 TX/PROPH/DG ADDL SEQ IV INF: CPT

## 2022-05-25 PROCEDURE — 80048 BASIC METABOLIC PNL TOTAL CA: CPT | Performed by: EMERGENCY MEDICINE

## 2022-05-25 PROCEDURE — 94640 AIRWAY INHALATION TREATMENT: CPT

## 2022-05-25 PROCEDURE — G0378 HOSPITAL OBSERVATION PER HR: HCPCS

## 2022-05-25 PROCEDURE — C9803 HOPD COVID-19 SPEC COLLECT: HCPCS

## 2022-05-25 PROCEDURE — 96361 HYDRATE IV INFUSION ADD-ON: CPT

## 2022-05-25 PROCEDURE — 96366 THER/PROPH/DIAG IV INF ADDON: CPT

## 2022-05-25 PROCEDURE — 250N000009 HC RX 250: Performed by: EMERGENCY MEDICINE

## 2022-05-25 PROCEDURE — 250N000009 HC RX 250: Performed by: FAMILY MEDICINE

## 2022-05-25 PROCEDURE — 99285 EMERGENCY DEPT VISIT HI MDM: CPT | Mod: 25

## 2022-05-25 PROCEDURE — 96375 TX/PRO/DX INJ NEW DRUG ADDON: CPT

## 2022-05-25 PROCEDURE — 82565 ASSAY OF CREATININE: CPT | Performed by: EMERGENCY MEDICINE

## 2022-05-25 PROCEDURE — 85025 COMPLETE CBC W/AUTO DIFF WBC: CPT | Performed by: EMERGENCY MEDICINE

## 2022-05-25 PROCEDURE — 87636 SARSCOV2 & INF A&B AMP PRB: CPT | Performed by: EMERGENCY MEDICINE

## 2022-05-25 PROCEDURE — 71275 CT ANGIOGRAPHY CHEST: CPT

## 2022-05-25 PROCEDURE — 250N000012 HC RX MED GY IP 250 OP 636 PS 637: Performed by: EMERGENCY MEDICINE

## 2022-05-25 PROCEDURE — 96365 THER/PROPH/DIAG IV INF INIT: CPT | Mod: 59

## 2022-05-25 PROCEDURE — 258N000003 HC RX IP 258 OP 636: Performed by: EMERGENCY MEDICINE

## 2022-05-25 PROCEDURE — 82805 BLOOD GASES W/O2 SATURATION: CPT | Performed by: EMERGENCY MEDICINE

## 2022-05-25 PROCEDURE — 250N000011 HC RX IP 250 OP 636: Performed by: FAMILY MEDICINE

## 2022-05-25 PROCEDURE — 250N000011 HC RX IP 250 OP 636: Performed by: EMERGENCY MEDICINE

## 2022-05-25 PROCEDURE — 99220 PR INITIAL OBSERVATION CARE,LEVEL III: CPT | Performed by: FAMILY MEDICINE

## 2022-05-25 PROCEDURE — 250N000013 HC RX MED GY IP 250 OP 250 PS 637: Performed by: EMERGENCY MEDICINE

## 2022-05-25 PROCEDURE — 94640 AIRWAY INHALATION TREATMENT: CPT | Mod: 76

## 2022-05-25 PROCEDURE — 71046 X-RAY EXAM CHEST 2 VIEWS: CPT

## 2022-05-25 PROCEDURE — 999N000157 HC STATISTIC RCP TIME EA 10 MIN

## 2022-05-25 PROCEDURE — 36415 COLL VENOUS BLD VENIPUNCTURE: CPT | Performed by: EMERGENCY MEDICINE

## 2022-05-25 PROCEDURE — 250N000013 HC RX MED GY IP 250 OP 250 PS 637: Performed by: FAMILY MEDICINE

## 2022-05-25 RX ORDER — IOPAMIDOL 755 MG/ML
100 INJECTION, SOLUTION INTRAVASCULAR ONCE
Status: COMPLETED | OUTPATIENT
Start: 2022-05-25 | End: 2022-05-25

## 2022-05-25 RX ORDER — MAGNESIUM SULFATE HEPTAHYDRATE 40 MG/ML
2 INJECTION, SOLUTION INTRAVENOUS ONCE
Status: COMPLETED | OUTPATIENT
Start: 2022-05-25 | End: 2022-05-25

## 2022-05-25 RX ORDER — MONTELUKAST SODIUM 10 MG/1
10 TABLET ORAL EVERY MORNING
Status: DISCONTINUED | OUTPATIENT
Start: 2022-05-25 | End: 2022-05-26 | Stop reason: HOSPADM

## 2022-05-25 RX ORDER — ALBUTEROL SULFATE 0.83 MG/ML
2.5 SOLUTION RESPIRATORY (INHALATION) EVERY 6 HOURS PRN
Qty: 12 ML | Refills: 0 | Status: SHIPPED | OUTPATIENT
Start: 2022-05-25 | End: 2022-05-26

## 2022-05-25 RX ORDER — ALBUTEROL SULFATE 0.83 MG/ML
2.5 SOLUTION RESPIRATORY (INHALATION)
Status: COMPLETED | OUTPATIENT
Start: 2022-05-25 | End: 2022-05-25

## 2022-05-25 RX ORDER — IPRATROPIUM BROMIDE AND ALBUTEROL SULFATE 2.5; .5 MG/3ML; MG/3ML
3 SOLUTION RESPIRATORY (INHALATION) EVERY 4 HOURS
Status: CANCELLED | OUTPATIENT
Start: 2022-05-25

## 2022-05-25 RX ORDER — METHYLPREDNISOLONE SODIUM SUCCINATE 125 MG/2ML
125 INJECTION, POWDER, LYOPHILIZED, FOR SOLUTION INTRAMUSCULAR; INTRAVENOUS ONCE
Status: COMPLETED | OUTPATIENT
Start: 2022-05-25 | End: 2022-05-25

## 2022-05-25 RX ORDER — PREDNISONE 20 MG/1
40 TABLET ORAL DAILY
Status: DISCONTINUED | OUTPATIENT
Start: 2022-05-25 | End: 2022-05-26 | Stop reason: HOSPADM

## 2022-05-25 RX ORDER — ACETAMINOPHEN 650 MG/1
650 SUPPOSITORY RECTAL EVERY 4 HOURS PRN
Status: DISCONTINUED | OUTPATIENT
Start: 2022-05-25 | End: 2022-05-26 | Stop reason: HOSPADM

## 2022-05-25 RX ORDER — SODIUM CHLORIDE 9 MG/ML
INJECTION, SOLUTION INTRAVENOUS CONTINUOUS
Status: DISCONTINUED | OUTPATIENT
Start: 2022-05-25 | End: 2022-05-25

## 2022-05-25 RX ORDER — AMOXICILLIN 500 MG/1
1000 CAPSULE ORAL 2 TIMES DAILY
Qty: 40 CAPSULE | Refills: 0 | Status: SHIPPED | OUTPATIENT
Start: 2022-05-25 | End: 2022-05-25

## 2022-05-25 RX ORDER — ALBUTEROL SULFATE 0.83 MG/ML
2.5 SOLUTION RESPIRATORY (INHALATION)
Status: DISCONTINUED | OUTPATIENT
Start: 2022-05-25 | End: 2022-05-25

## 2022-05-25 RX ORDER — DIPHENHYDRAMINE HCL 25 MG
25 CAPSULE ORAL EVERY 6 HOURS PRN
Status: DISCONTINUED | OUTPATIENT
Start: 2022-05-25 | End: 2022-05-26 | Stop reason: HOSPADM

## 2022-05-25 RX ORDER — IPRATROPIUM BROMIDE AND ALBUTEROL SULFATE 2.5; .5 MG/3ML; MG/3ML
3 SOLUTION RESPIRATORY (INHALATION)
Status: DISCONTINUED | OUTPATIENT
Start: 2022-05-25 | End: 2022-05-26 | Stop reason: HOSPADM

## 2022-05-25 RX ORDER — ALBUTEROL SULFATE 0.83 MG/ML
2.5 SOLUTION RESPIRATORY (INHALATION) EVERY 6 HOURS PRN
Qty: 360 ML | Refills: 0 | Status: SHIPPED | OUTPATIENT
Start: 2022-05-25 | End: 2022-05-26

## 2022-05-25 RX ORDER — IPRATROPIUM BROMIDE AND ALBUTEROL SULFATE 2.5; .5 MG/3ML; MG/3ML
3 SOLUTION RESPIRATORY (INHALATION) ONCE
Status: COMPLETED | OUTPATIENT
Start: 2022-05-25 | End: 2022-05-25

## 2022-05-25 RX ORDER — ACETAMINOPHEN 325 MG/1
650 TABLET ORAL EVERY 4 HOURS PRN
Status: DISCONTINUED | OUTPATIENT
Start: 2022-05-25 | End: 2022-05-26 | Stop reason: HOSPADM

## 2022-05-25 RX ORDER — PREDNISONE 20 MG/1
40 TABLET ORAL DAILY
Qty: 10 TABLET | Refills: 0 | Status: SHIPPED | OUTPATIENT
Start: 2022-05-25 | End: 2022-05-25

## 2022-05-25 RX ORDER — PREDNISONE 20 MG/1
40 TABLET ORAL DAILY
Qty: 10 TABLET | Refills: 0 | Status: SHIPPED | OUTPATIENT
Start: 2022-05-25 | End: 2022-05-26

## 2022-05-25 RX ORDER — CEFTRIAXONE 1 G/1
1 INJECTION, POWDER, FOR SOLUTION INTRAMUSCULAR; INTRAVENOUS ONCE
Status: COMPLETED | OUTPATIENT
Start: 2022-05-25 | End: 2022-05-25

## 2022-05-25 RX ORDER — AZITHROMYCIN 250 MG/1
TABLET, FILM COATED ORAL
Qty: 6 TABLET | Refills: 0 | Status: SHIPPED | OUTPATIENT
Start: 2022-05-25 | End: 2022-05-26

## 2022-05-25 RX ORDER — ALBUTEROL SULFATE 0.83 MG/ML
2.5 SOLUTION RESPIRATORY (INHALATION) EVERY 6 HOURS PRN
Status: CANCELLED | OUTPATIENT
Start: 2022-05-25

## 2022-05-25 RX ORDER — PREDNISONE 20 MG/1
40 TABLET ORAL ONCE
Status: COMPLETED | OUTPATIENT
Start: 2022-05-25 | End: 2022-05-25

## 2022-05-25 RX ORDER — PRENATAL VIT/IRON FUM/FOLIC AC 27MG-0.8MG
1 TABLET ORAL DAILY
Status: DISCONTINUED | OUTPATIENT
Start: 2022-05-25 | End: 2022-05-26 | Stop reason: HOSPADM

## 2022-05-25 RX ORDER — ALBUTEROL SULFATE 90 UG/1
2 AEROSOL, METERED RESPIRATORY (INHALATION) EVERY 4 HOURS PRN
Qty: 18 G | Refills: 1 | Status: SHIPPED | OUTPATIENT
Start: 2022-05-25 | End: 2022-05-26

## 2022-05-25 RX ORDER — AZITHROMYCIN 250 MG/1
TABLET, FILM COATED ORAL
Qty: 6 TABLET | Refills: 0 | Status: SHIPPED | OUTPATIENT
Start: 2022-05-25 | End: 2022-05-25

## 2022-05-25 RX ORDER — ALBUTEROL SULFATE 90 UG/1
2 AEROSOL, METERED RESPIRATORY (INHALATION)
Status: DISCONTINUED | OUTPATIENT
Start: 2022-05-25 | End: 2022-05-25

## 2022-05-25 RX ORDER — CEFTRIAXONE 2 G/1
2 INJECTION, POWDER, FOR SOLUTION INTRAMUSCULAR; INTRAVENOUS EVERY 24 HOURS
Status: DISCONTINUED | OUTPATIENT
Start: 2022-05-26 | End: 2022-05-26 | Stop reason: HOSPADM

## 2022-05-25 RX ORDER — ONDANSETRON 2 MG/ML
4 INJECTION INTRAMUSCULAR; INTRAVENOUS EVERY 30 MIN PRN
Status: DISCONTINUED | OUTPATIENT
Start: 2022-05-25 | End: 2022-05-26 | Stop reason: HOSPADM

## 2022-05-25 RX ORDER — LIDOCAINE 40 MG/G
CREAM TOPICAL
Status: DISCONTINUED | OUTPATIENT
Start: 2022-05-25 | End: 2022-05-26 | Stop reason: HOSPADM

## 2022-05-25 RX ORDER — ALBUTEROL SULFATE 0.83 MG/ML
2.5 SOLUTION RESPIRATORY (INHALATION)
Status: DISCONTINUED | OUTPATIENT
Start: 2022-05-25 | End: 2022-05-26 | Stop reason: HOSPADM

## 2022-05-25 RX ORDER — ALBUTEROL SULFATE 0.83 MG/ML
2.5 SOLUTION RESPIRATORY (INHALATION) EVERY 6 HOURS PRN
COMMUNITY
End: 2022-07-01

## 2022-05-25 RX ADMIN — IPRATROPIUM BROMIDE AND ALBUTEROL SULFATE 3 ML: 2.5; .5 SOLUTION RESPIRATORY (INHALATION) at 19:15

## 2022-05-25 RX ADMIN — ALBUTEROL SULFATE 2.5 MG: 2.5 SOLUTION RESPIRATORY (INHALATION) at 05:45

## 2022-05-25 RX ADMIN — DIPHENHYDRAMINE HYDROCHLORIDE 25 MG: 25 CAPSULE ORAL at 13:49

## 2022-05-25 RX ADMIN — PREDNISONE 40 MG: 20 TABLET ORAL at 01:22

## 2022-05-25 RX ADMIN — ALBUTEROL SULFATE 2.5 MG: 2.5 SOLUTION RESPIRATORY (INHALATION) at 11:17

## 2022-05-25 RX ADMIN — ALBUTEROL SULFATE 2 PUFF: 90 AEROSOL, METERED RESPIRATORY (INHALATION) at 05:31

## 2022-05-25 RX ADMIN — ALBUTEROL SULFATE 2.5 MG: 2.5 SOLUTION RESPIRATORY (INHALATION) at 04:17

## 2022-05-25 RX ADMIN — SODIUM CHLORIDE: 9 INJECTION, SOLUTION INTRAVENOUS at 05:18

## 2022-05-25 RX ADMIN — METHYLPREDNISOLONE SODIUM SUCCINATE 125 MG: 125 INJECTION, POWDER, FOR SOLUTION INTRAMUSCULAR; INTRAVENOUS at 02:11

## 2022-05-25 RX ADMIN — MAGNESIUM SULFATE IN WATER 2 G: 40 INJECTION, SOLUTION INTRAVENOUS at 02:11

## 2022-05-25 RX ADMIN — IPRATROPIUM BROMIDE AND ALBUTEROL SULFATE 3 ML: 2.5; .5 SOLUTION RESPIRATORY (INHALATION) at 23:32

## 2022-05-25 RX ADMIN — ALBUTEROL SULFATE 2.5 MG: 2.5 SOLUTION RESPIRATORY (INHALATION) at 09:31

## 2022-05-25 RX ADMIN — AZITHROMYCIN MONOHYDRATE 500 MG: 500 INJECTION, POWDER, LYOPHILIZED, FOR SOLUTION INTRAVENOUS at 06:55

## 2022-05-25 RX ADMIN — IPRATROPIUM BROMIDE AND ALBUTEROL SULFATE 3 ML: 2.5; .5 SOLUTION RESPIRATORY (INHALATION) at 13:35

## 2022-05-25 RX ADMIN — ALBUTEROL SULFATE 2.5 MG: 2.5 SOLUTION RESPIRATORY (INHALATION) at 03:49

## 2022-05-25 RX ADMIN — IPRATROPIUM BROMIDE AND ALBUTEROL SULFATE 3 ML: 2.5; .5 SOLUTION RESPIRATORY (INHALATION) at 15:47

## 2022-05-25 RX ADMIN — PREDNISONE 40 MG: 20 TABLET ORAL at 18:41

## 2022-05-25 RX ADMIN — ALBUTEROL SULFATE 2.5 MG: 2.5 SOLUTION RESPIRATORY (INHALATION) at 03:05

## 2022-05-25 RX ADMIN — IPRATROPIUM BROMIDE AND ALBUTEROL SULFATE 3 ML: 2.5; .5 SOLUTION RESPIRATORY (INHALATION) at 00:45

## 2022-05-25 RX ADMIN — ALBUTEROL SULFATE 2.5 MG: 2.5 SOLUTION RESPIRATORY (INHALATION) at 07:16

## 2022-05-25 RX ADMIN — FLUTICASONE FUROATE AND VILANTEROL TRIFENATATE 1 PUFF: 200; 25 POWDER RESPIRATORY (INHALATION) at 13:05

## 2022-05-25 RX ADMIN — CEFTRIAXONE SODIUM 2 G: 2 INJECTION, POWDER, FOR SOLUTION INTRAMUSCULAR; INTRAVENOUS at 23:50

## 2022-05-25 RX ADMIN — PRENATAL VIT W/ FE FUMARATE-FA TAB 27-0.8 MG 1 TABLET: 27-0.8 TAB at 13:03

## 2022-05-25 RX ADMIN — IPRATROPIUM BROMIDE AND ALBUTEROL SULFATE 3 ML: 2.5; .5 SOLUTION RESPIRATORY (INHALATION) at 01:58

## 2022-05-25 RX ADMIN — IOPAMIDOL 100 ML: 755 INJECTION, SOLUTION INTRAVENOUS at 04:57

## 2022-05-25 RX ADMIN — CEFTRIAXONE SODIUM 1 G: 1 INJECTION, POWDER, FOR SOLUTION INTRAMUSCULAR; INTRAVENOUS at 03:17

## 2022-05-25 RX ADMIN — MONTELUKAST 10 MG: 10 TABLET, FILM COATED ORAL at 13:05

## 2022-05-25 RX ADMIN — ALBUTEROL SULFATE 2 PUFF: 90 AEROSOL, METERED RESPIRATORY (INHALATION) at 06:55

## 2022-05-25 ASSESSMENT — ENCOUNTER SYMPTOMS
CHILLS: 0
FEVER: 0
SHORTNESS OF BREATH: 1
COUGH: 0
WHEEZING: 1

## 2022-05-25 ASSESSMENT — ACTIVITIES OF DAILY LIVING (ADL): DEPENDENT_IADLS:: INDEPENDENT

## 2022-05-25 NOTE — ED TRIAGE NOTES
PT EXP WHEEZING; NO RELIEF AT HOME; PT IS 31 WEEKS PREGNANT; NO PAIN AT THIS TIME.     Triage Assessment     Row Name 05/25/22 0031       Triage Assessment (Adult)    Airway WDL X    Additional Documentation Breath Sounds (Group)       Respiratory WDL    Respiratory WDL X       Breath Sounds    Breath Sounds All Fields    All Lung Fields Breath Sounds wheezes, expiratory       Cardiac WDL    Cardiac WDL WDL;X;rhythm    Cardiac Rhythm tachycardic       Peripheral/Neurovascular WDL    Peripheral Neurovascular WDL WDL       Cognitive/Neuro/Behavioral WDL    Cognitive/Neuro/Behavioral WDL WDL

## 2022-05-25 NOTE — ED NOTES
Pt is in ER overnight with asthma exacerbation with no relief; possible pneumonia in right lower lobe; pt has two children with her in the room ages 5 and approximately 10 and stated she has no family or friends to help her out with the kids; pt is 31 weeks gestation;

## 2022-05-25 NOTE — PHARMACY-ADMISSION MEDICATION HISTORY
Pharmacy Note - Admission Medication History    Pertinent Provider Information: none     ______________________________________________________________________    Prior To Admission (PTA) med list completed and updated in EMR.       PTA Med List   Medication Sig Last Dose     albuterol (PROAIR HFA/PROVENTIL HFA/VENTOLIN HFA) 108 (90 Base) MCG/ACT inhaler Inhale 2 puffs into the lungs every 4 hours as needed for shortness of breath / dyspnea or wheezing      albuterol (PROVENTIL) (2.5 MG/3ML) 0.083% neb solution Take 1 vial (2.5 mg) by nebulization every 6 hours as needed for shortness of breath / dyspnea or wheezing      albuterol (PROVENTIL) (2.5 MG/3ML) 0.083% neb solution Take 2.5 mg by nebulization every 6 hours as needed for shortness of breath / dyspnea or wheezing 5/25/2022 at Unknown time     amoxicillin (AMOXIL) 500 MG capsule Take 2 capsules (1,000 mg) by mouth 2 times daily for 10 days      azithromycin (ZITHROMAX Z-GIO) 250 MG tablet Two tablets on the first day, then one tablet daily for the next 4 days      mometasone-formoterol (DULERA) 200-5 MCG/ACT inhaler Inhale 2 puffs into the lungs 2 times daily      mometasone-formoterol (DULERA) 200-5 MCG/ACT inhaler Inhale 2 puffs into the lungs 2 times daily Past Week at Unknown time     montelukast (SINGULAIR) 10 MG tablet Take 10 mg by mouth every morning Past Week at Unknown time     predniSONE (DELTASONE) 20 MG tablet Take 2 tablets (40 mg) by mouth daily for 5 days      Prenatal Vit-Fe Fumarate-FA (PRENATAL MULTIVITAMIN W/IRON) 27-0.8 MG tablet Take 1 tablet by mouth daily Past Week at Unknown time       Information source(s): Patient and CareEverywhere/SureScripts  Method of interview communication: in-person    Summary of Changes to PTA Med List  New: albuterol nebs  Discontinued: albuterol inhaler, duonebs  Changed: none    Patient was asked about OTC/herbal products specifically.  PTA med list reflects this.    In the past week, patient estimated  taking medication this percent of the time:  greater than 90%.    Allergies were reviewed, assessed, and updated with the patient.      Patient did not bring any medications to the hospital and can't retrieve from home. No multi-dose medications are available for use during hospital stay.     The information provided in this note is only as accurate as the sources available at the time of the update(s).    Thank you for the opportunity to participate in the care of this patient.    Serena Vann [facility]  5/25/2022 10:44 AM

## 2022-05-25 NOTE — PROGRESS NOTES
RESPIRATORY CARE NOTE     Patient Name: Soren Freitas  Today's Date: 5/25/2022       Pt is found on RA and saturating adequately. BS: exp. Wheezes auscultated upper airways and decreased @ bases. Pt did receive Albuterol neb as ordered. Post neb tx, breath sounds remain wheezy pre-and post treatment with improved aerations slightly. Will continue to monitor closely and assess as needed.     Charles Toledo RRT

## 2022-05-25 NOTE — ED PROVIDER NOTES
NAME: Soren Freitas  AGE: 31 year old female  YOB: 1990  MRN: [id number]  EVALUATION DATE & TIME: 2022 12:18 AM    PCP: Elvia Bahena    ED PROVIDER: Agus Ralph M.D.      Chief Complaint   Patient presents with     Shortness of Breath     PT HAS HX OF ASTHSMA; DID 2 TREATMENTS AT HOME WITH NO RELIEF     FINAL IMPRESSION:  1. Moderate persistent asthma, unspecified whether complicated    2. Community acquired bilateral lower lobe pneumonia      MEDICAL DECISION MAKIN:21 AM I met with the patient, obtained history, performed an initial exam, and discussed options and plan for diagnostics and treatment here in the ED.   Patient was clinically assessed and consented to treatment. After assessment, medical decision making and workup were discussed with the patient. The patient was agreeable to plan for testing, workup, and treatment.  PPE: Provider wore gloves, N95 mask, eye protection, and paper mask.  1:52 AM Rechecked patient. She is having another exacerbation and with shortness of breath  3:28 AM Rechecked patient. Her breathing is somewhat improved however is endorsing wheezing.   4:22 AM Patient feeling improved however is experiencing worsening wheezing.   4:23 AM Rechecked patient. Discussed plan for CT and potential plans for admission. Patient notes she is still wheezing and does not feel any different but also not 100% better. Patient is okay with CT however if need to be admitted patient has no one to watch kids and is unable to leave them. Patient does not know what she will do if she is admitted. Discussed with her continuing treatments here and seeing how she feels.   4:47 AM Met with patient at CT as she is having doubts regarding radiation and her pregnancy.   5:51 AM Rechecked patient.   7:00 AM Patient signed out to oncoming provider at end of shift.     Pertinent Labs & Imaging studies reviewed. (See chart for details)     Soren Freitas is a 31  year old female who presents with shortness of breath.   Differential diagnosis includes but not limited to asthma exacerbation, pulmonary embolism, pneumonia, pulmonary edema.  Patient with pregnancy at 31 weeks and would be high risk for pulm embolism.  Patient however reports mainly concerned about her asthma.  She does state that she has medication at home however per EMS she frequently runs out and she did not try any treatments at home before coming in.  Per with EMS his oxygen saturations went from low 90s on room air to 100%.  Patient appears comfortable but now having some wheezing.  Further DuoNeb given and prednisone ordered which did improve however open up her lungs more now with wheezing.  She did have some diminished sounds on the right lower lobe and will be sent for x-ray.  X-ray did reveal a possible pneumonia right lower lobe and will be started with ceftriaxone and azithromycin coverage for community-acquired.  Patient however now having some increased recruitment and with this more wheezing.  Further nebs were given and we discussed possible admission however patient is here with her children she does not have any childcare coverage for them.  She does not wish them to go into foster or childcare and would sign out rather than be admitted.  Patient was agreeable to staying in the ER and continuing treatment here which we given along with Solu-Medrol, magnesium, and further nebulized treatment.  Given the possibility of pulm embolism as she is pregnant at 31 weeks I did discuss possibility of CT scan and she was agreeable to this after discussion about radiation.  CT was negative for any pulmonary embolism but did show bilateral pneumonia.  Patient will be treated for the bilateral pneumonia and continued with prescriptions for home possibly if she is able to turn around and improve the wheezing.  She is continuing to improve however slowly with some intermittent rebound.  Agreeable to plan at  this time and given that there are no beds in the hospital and patient will be boarding in the ER we will continue treatment here in the ER and patient will be signed out to my physician.    0 minutes of critical care time    MEDICATIONS GIVEN IN THE EMERGENCY:  Medications   ondansetron (ZOFRAN) injection 4 mg (has no administration in time range)   albuterol (PROVENTIL) neb solution 2.5 mg (2.5 mg Nebulization Given 5/25/22 0417)   albuterol (PROVENTIL) neb solution 2.5 mg (2.5 mg Nebulization Given 5/25/22 0349)   sodium chloride 0.9% infusion ( Intravenous New Bag 5/25/22 0518)   albuterol (PROVENTIL) neb solution 2.5 mg (2.5 mg Nebulization Given 5/25/22 0545)   albuterol (PROVENTIL HFA/VENTOLIN HFA) inhaler (2 puffs Inhalation Given 5/25/22 0531)   azithromycin (ZITHROMAX) 500 mg in sodium chloride 0.9 % 250 mL intermittent infusion (has no administration in time range)   ipratropium - albuterol 0.5 mg/2.5 mg/3 mL (DUONEB) neb solution 3 mL (3 mLs Nebulization Given 5/25/22 0045)   predniSONE (DELTASONE) tablet 40 mg (40 mg Oral Given 5/25/22 0122)   methylPREDNISolone sodium succinate (solu-MEDROL) injection 125 mg (125 mg Intravenous Given 5/25/22 0211)   ipratropium - albuterol 0.5 mg/2.5 mg/3 mL (DUONEB) neb solution 3 mL (3 mLs Nebulization Given 5/25/22 0158)   magnesium sulfate 2 g in water intermittent infusion (0 g Intravenous Stopped 5/25/22 0316)   cefTRIAXone (ROCEPHIN) 1 g vial to attach to  mL bag for ADULTS or NS 50 mL bag for PEDS (0 g Intravenous Stopped 5/25/22 0403)   iopamidol (ISOVUE-370) solution 100 mL (100 mLs Intravenous Given 5/25/22 0457)       NEW PRESCRIPTIONS STARTED AT TODAY'S ER VISIT:  New Prescriptions    ALBUTEROL (PROAIR HFA/PROVENTIL HFA/VENTOLIN HFA) 108 (90 BASE) MCG/ACT INHALER    Inhale 2 puffs into the lungs every 4 hours as needed for shortness of breath / dyspnea or wheezing    ALBUTEROL (PROVENTIL) (2.5 MG/3ML) 0.083% NEB SOLUTION    Take 1 vial (2.5 mg) by  nebulization every 6 hours as needed for shortness of breath / dyspnea or wheezing    AMOXICILLIN (AMOXIL) 500 MG CAPSULE    Take 2 capsules (1,000 mg) by mouth 2 times daily for 10 days    AZITHROMYCIN (ZITHROMAX Z-GIO) 250 MG TABLET    Two tablets on the first day, then one tablet daily for the next 4 days    MOMETASONE-FORMOTEROL (DULERA) 200-5 MCG/ACT INHALER    Inhale 2 puffs into the lungs 2 times daily    PREDNISONE (DELTASONE) 20 MG TABLET    Take 2 tablets (40 mg) by mouth daily for 5 days          =================================================================    HPI    Patient information was obtained from: Patient    Use of : N/A        Soren GRAY Fortino is a 31 year old female with a past medical history of  at 31w1d, acute respiratory failure, and severe persistent asthma, who presents via EMS for the evaluation of shortness of breath.     Patient reports having an asthma exacerbation ~1 hour prior to arrival with shortness of breath and wheezing. Patient has her asthma medications at home but did not take any and instead called EMS. Patient reports taking her Dulera inhaler one a day but denies the use of her rescue inhaler or breathing treatment. Upon arrival EMS found patient to be in the low 90s on room air with couple worded sentences. EMS reports being familiar with patient as she calls frequently for asthma exacerbations. They note that the patient's apartment has no elevator and that they had to walk the patient, further exacerbating the asthma. Patient given a duoneb en route which improved sats to 100. Patient appearing to breathing better however still sounding coarse prompting EMS to bring patient here. Patient denies any known exposures to COVID-19 as well as denies any recent treatments for asthma during her pregnancy. Patient denies any fever, chills, cough, chest pain, as well as any other complaints at the time.     REVIEW OF SYSTEMS   Review of Systems    Constitutional: Negative for chills and fever.   Respiratory: Positive for shortness of breath and wheezing. Negative for cough.    Cardiovascular: Negative for chest pain.   All other systems reviewed and are negative.     PAST MEDICAL HISTORY:  Past Medical History:   Diagnosis Date     Obesity      Severe persistent asthma with acute exacerbation 1/1/2015    Formatting of this note might be different from the original. 12/2014 Admit for exacerbation: Spirometry FEV1/FVC 68%, D/C w/dulera, albuterol, Spiriva.  Started singulair 10mg QD, Given NRT.  Dx as infant, has been on albuterol lifelong.    Last Assessment & Plan:  Formatting of this note might be different from the original. Stable on Singulair, cetirizine, albuterol, and dulera. Discussed COVID     Uncomplicated asthma        PAST SURGICAL HISTORY:  History reviewed. No pertinent surgical history.    CURRENT MEDICATIONS:      Current Facility-Administered Medications:      albuterol (PROVENTIL) neb solution 2.5 mg, 2.5 mg, Nebulization, Q20 Min PRN, Agus Ralph MD, 2.5 mg at 05/25/22 0417     albuterol (PROVENTIL) neb solution 2.5 mg, 2.5 mg, Nebulization, Q20 Min PRN, Agus Ralph MD, 2.5 mg at 05/25/22 0349     albuterol (PROVENTIL) neb solution 2.5 mg, 2.5 mg, Nebulization, Q1H PRN, Agus Ralph MD, 2.5 mg at 05/25/22 0545     azithromycin (ZITHROMAX) 500 mg in sodium chloride 0.9 % 250 mL intermittent infusion, 500 mg, Intravenous, Once, Agus Ralph MD     ondansetron (ZOFRAN) injection 4 mg, 4 mg, Intravenous, Q30 Min PRN, Agus Ralph MD     sodium chloride 0.9% infusion, , Intravenous, Continuous, Agus Ralph MD, Last Rate: 100 mL/hr at 05/25/22 0518, New Bag at 05/25/22 0518    Current Outpatient Medications:      albuterol (PROAIR HFA/PROVENTIL HFA/VENTOLIN HFA) 108 (90 Base) MCG/ACT inhaler, Inhale 2 puffs into the lungs every 4 hours as needed for  shortness of breath / dyspnea or wheezing, Disp: 18 g, Rfl: 1     albuterol (PROVENTIL) (2.5 MG/3ML) 0.083% neb solution, Take 1 vial (2.5 mg) by nebulization every 6 hours as needed for shortness of breath / dyspnea or wheezing, Disp: 360 mL, Rfl: 0     amoxicillin (AMOXIL) 500 MG capsule, Take 2 capsules (1,000 mg) by mouth 2 times daily for 10 days, Disp: 40 capsule, Rfl: 0     azithromycin (ZITHROMAX Z-GIO) 250 MG tablet, Two tablets on the first day, then one tablet daily for the next 4 days, Disp: 6 tablet, Rfl: 0     mometasone-formoterol (DULERA) 200-5 MCG/ACT inhaler, Inhale 2 puffs into the lungs 2 times daily, Disp: 13 g, Rfl: 1     predniSONE (DELTASONE) 20 MG tablet, Take 2 tablets (40 mg) by mouth daily for 5 days, Disp: 10 tablet, Rfl: 0     albuterol (PROAIR HFA/PROVENTIL HFA/VENTOLIN HFA) 108 (90 Base) MCG/ACT inhaler, Inhale 2 puffs into the lungs every 4 hours as needed for shortness of breath / dyspnea or wheezing, Disp: 18 g, Rfl: 0     albuterol (PROVENTIL) (2.5 MG/3ML) 0.083% neb solution, Take 1 vial (2.5 mg) by nebulization every 4 hours as needed for shortness of breath / dyspnea or wheezing, Disp: 3 mL, Rfl: 0     ipratropium - albuterol 0.5 mg/2.5 mg/3 mL (DUONEB) 0.5-2.5 (3) MG/3ML neb solution, Take 1 vial (3 mLs) by nebulization every 4 hours, Disp: 220 mL, Rfl: 0     mometasone-formoterol (DULERA) 200-5 MCG/ACT inhaler, Inhale 2 puffs into the lungs 2 times daily, Disp: , Rfl:      montelukast (SINGULAIR) 10 MG tablet, Take 10 mg by mouth every morning, Disp: , Rfl:      Prenatal Vit-Fe Fumarate-FA (PRENATAL MULTIVITAMIN W/IRON) 27-0.8 MG tablet, Take 1 tablet by mouth daily, Disp: , Rfl:     ALLERGIES:  No Known Allergies    FAMILY HISTORY:  No family history on file.    SOCIAL HISTORY:   Social History     Socioeconomic History     Marital status: Single   Tobacco Use     Smoking status: Former Smoker       PHYSICAL EXAM:    Vitals: /62   Pulse 120   Temp 97.9  F (36.6  C)  "(Oral)   Resp 26   Ht 1.702 m (5' 7\")   Wt 81.6 kg (180 lb)   LMP 10/12/2021 (Approximate)   SpO2 96%   BMI 28.19 kg/m     Physical Exam  Vitals and nursing note reviewed.   Constitutional:       General: She is not in acute distress.     Appearance: She is well-developed and normal weight. She is not ill-appearing or toxic-appearing.      Interventions: She is not intubated.  HENT:      Head: Normocephalic.      Mouth/Throat:      Mouth: Mucous membranes are moist.   Cardiovascular:      Rate and Rhythm: Normal rate and regular rhythm.      Pulses: Normal pulses.      Heart sounds: Normal heart sounds.   Pulmonary:      Effort: Tachypnea and accessory muscle usage present. No bradypnea or respiratory distress. She is not intubated.      Breath sounds: No stridor. Examination of the right-middle field reveals wheezing. Examination of the left-middle field reveals wheezing. Examination of the right-lower field reveals decreased breath sounds and rhonchi. Examination of the left-lower field reveals decreased breath sounds. Decreased breath sounds, wheezing and rhonchi present. No rales.   Chest:      Chest wall: No tenderness.   Abdominal:      Tenderness: There is no abdominal tenderness.   Musculoskeletal:      Cervical back: Normal range of motion.      Right lower leg: No tenderness. No edema.      Left lower leg: No tenderness. No edema.   Skin:     General: Skin is warm and dry.      Coloration: Skin is not pale.   Neurological:      General: No focal deficit present.      Mental Status: She is alert.   Psychiatric:         Behavior: Behavior normal.       LAB:  All pertinent labs reviewed and interpreted.  Labs Ordered and Resulted from Time of ED Arrival to Time of ED Departure   BASIC METABOLIC PANEL - Abnormal       Result Value    Sodium 137      Potassium 3.5      Chloride 106      Carbon Dioxide (CO2) 23      Anion Gap 8      Urea Nitrogen 6 (*)     Creatinine 0.68      Calcium 8.3 (*)     Glucose " 93      GFR Estimate >90     CBC WITH PLATELETS AND DIFFERENTIAL - Abnormal    WBC Count 12.7 (*)     RBC Count 4.17      Hemoglobin 12.0      Hematocrit 35.5      MCV 85      MCH 28.8      MCHC 33.8      RDW 13.2      Platelet Count 251      % Neutrophils 68      % Lymphocytes 21      % Monocytes 6      % Eosinophils 4      % Basophils 0      % Immature Granulocytes 1      NRBCs per 100 WBC 0      Absolute Neutrophils 8.6 (*)     Absolute Lymphocytes 2.7      Absolute Monocytes 0.8      Absolute Eosinophils 0.5      Absolute Basophils 0.0      Absolute Immature Granulocytes 0.1      Absolute NRBCs 0.0     BLOOD GAS VENOUS - Abnormal    pH Venous 7.32 (*)     pCO2 Venous 46      pO2 Venous 36      Bicarbonate Venous 21 (*)     Base Excess/Deficit (+/-) -2.7      Oxyhemoglobin Venous 60.3 (*)     O2 Sat, Venous 61.8 (*)      RADIOLOGY:  CT Chest Pulmonary Embolism w Contrast   Final Result   IMPRESSION:   1.  There is no pulmonary embolus, aortic aneurysm or dissection.   2.  Bilateral multifocal pneumonia.   3.  Multiple sclerotic bone lesions suspicious for metastases.      XR Chest 2 Views   Final Result   IMPRESSION:       Patchy right basilar opacity, suspicious for pneumonia.      No focal airspace disease in the left lung. No pleural effusion or pneumothorax.      Cardiomediastinal silhouette is unremarkable.        PROCEDURES:   Procedures     I, Lita Walker, am serving as a scribe to document services personally performed by Dr. Agus Ralph  based on my observation and the provider's statements to me. I, Agus Ralph MD attest that Lita Walker is acting in a scribe capacity, has observed my performance of the services and has documented them in accordance with my direction.      Agus Ralph M.D.  Emergency Medicine  [facility]     Agus Ralph MD  05/25/22 4132

## 2022-05-25 NOTE — ED NOTES
EMERGENCY DEPARTMENT SIGN OUT NOTE        ED COURSE AND MEDICAL DECISION MAKING  7:00 AM Patient was signed out to me by Dr Juan Luis Ralph at 0700  7:56 AM I met with the patient and she is not feeling much better concerned about pneumonia agreeable to admit as long as she stays here in the ED. Patient gets her OB care at [place] and denies any issues with her pregnancy. Patient indicates that she lives in an apartment with her children in [facility] but has no one that can care for them.  9:24 AM I spoke with the hospitalist, Dr. Coreas, who agrees to admit the patient for observation    In brief, Soren Freitas is a 31 year old female who initially presented via EMS for the evaluation of shortness of breath. Patient reports having an asthma exacerbation ~1 hour prior to arrival with shortness of breath and wheezing. Patient has her asthma medications at home but did not take any and instead called EMS    At time of sign out, disposition was pending on clinical improvement vs admission.  Patient has indicated she will not stay to be admitted and sign out AMA.  Given this, trying to symptomatically manage in the ED.    Patient remains with rebounding symptoms while in the ED.  Treatments with increased work of breathing.  She is agreeable to be admitted for observation as she has been in the emergency department after 9 hours without rapid significant improvement.  Bedside OB ultrasound obtained with normal-appearing heart rate and good fetal movement.  Patient herself is feeling baby move and denies any contractions or bleeding.    FINAL IMPRESSION    1. Moderate persistent asthma, unspecified whether complicated    2. Community acquired bilateral lower lobe pneumonia        ED MEDS  Medications   ondansetron (ZOFRAN) injection 4 mg (has no administration in time range)   albuterol (PROVENTIL) neb solution 2.5 mg (2.5 mg Nebulization Given 5/25/22 0417)   albuterol (PROVENTIL) neb solution 2.5 mg (2.5 mg  Nebulization Given 5/25/22 0716)   sodium chloride 0.9% infusion ( Intravenous Rate/Dose Verify 5/25/22 0816)   albuterol (PROVENTIL) neb solution 2.5 mg (2.5 mg Nebulization Given 5/25/22 0545)   albuterol (PROVENTIL HFA/VENTOLIN HFA) inhaler (2 puffs Inhalation Not Given 5/25/22 0816)   ipratropium - albuterol 0.5 mg/2.5 mg/3 mL (DUONEB) neb solution 3 mL (3 mLs Nebulization Given 5/25/22 0045)   predniSONE (DELTASONE) tablet 40 mg (40 mg Oral Given 5/25/22 0122)   methylPREDNISolone sodium succinate (solu-MEDROL) injection 125 mg (125 mg Intravenous Given 5/25/22 0211)   ipratropium - albuterol 0.5 mg/2.5 mg/3 mL (DUONEB) neb solution 3 mL (3 mLs Nebulization Given 5/25/22 0158)   magnesium sulfate 2 g in water intermittent infusion (0 g Intravenous Stopped 5/25/22 0316)   cefTRIAXone (ROCEPHIN) 1 g vial to attach to  mL bag for ADULTS or NS 50 mL bag for PEDS (0 g Intravenous Stopped 5/25/22 0403)   iopamidol (ISOVUE-370) solution 100 mL (100 mLs Intravenous Given 5/25/22 0457)   azithromycin (ZITHROMAX) 500 mg in sodium chloride 0.9 % 250 mL intermittent infusion (500 mg Intravenous New Bag 5/25/22 0655)       LAB  Labs Ordered and Resulted from Time of ED Arrival to Time of ED Departure   BASIC METABOLIC PANEL - Abnormal       Result Value    Sodium 137      Potassium 3.5      Chloride 106      Carbon Dioxide (CO2) 23      Anion Gap 8      Urea Nitrogen 6 (*)     Creatinine 0.68      Calcium 8.3 (*)     Glucose 93      GFR Estimate >90     CBC WITH PLATELETS AND DIFFERENTIAL - Abnormal    WBC Count 12.7 (*)     RBC Count 4.17      Hemoglobin 12.0      Hematocrit 35.5      MCV 85      MCH 28.8      MCHC 33.8      RDW 13.2      Platelet Count 251      % Neutrophils 68      % Lymphocytes 21      % Monocytes 6      % Eosinophils 4      % Basophils 0      % Immature Granulocytes 1      NRBCs per 100 WBC 0      Absolute Neutrophils 8.6 (*)     Absolute Lymphocytes 2.7      Absolute Monocytes 0.8      Absolute  Eosinophils 0.5      Absolute Basophils 0.0      Absolute Immature Granulocytes 0.1      Absolute NRBCs 0.0     BLOOD GAS VENOUS - Abnormal    pH Venous 7.32 (*)     pCO2 Venous 46      pO2 Venous 36      Bicarbonate Venous 21 (*)     Base Excess/Deficit (+/-) -2.7      Oxyhemoglobin Venous 60.3 (*)     O2 Sat, Venous 61.8 (*)    INFLUENZA A/B & SARS-COV2 PCR MULTIPLEX - Normal    Influenza A PCR Negative      Influenza B PCR Negative      SARS CoV2 PCR Negative           RADIOLOGY    CT Chest Pulmonary Embolism w Contrast   Final Result   IMPRESSION:   1.  There is no pulmonary embolus, aortic aneurysm or dissection.   2.  Bilateral multifocal pneumonia.   3.  Multiple sclerotic bone lesions suspicious for metastases.      XR Chest 2 Views   Final Result   IMPRESSION:       Patchy right basilar opacity, suspicious for pneumonia.      No focal airspace disease in the left lung. No pleural effusion or pneumothorax.      Cardiomediastinal silhouette is unremarkable.        Procedures  PROCEDURE: Emergency Department Limited Bedside Screening Ultrasound   ANATOMICAL WINDOW: OB   INDICATIONS: Evaluate gestation   PROCEDURE PROVIDER:   Jillian Nieto MD   FINDINGS: Normal appearing fetus with normal appearing HR, normal spontaneous movement   IMAGES SAVED AND STORED FOR ARCHIVE AND REVIEW: No        DISCHARGE MEDS  New Prescriptions    ALBUTEROL (PROAIR HFA/PROVENTIL HFA/VENTOLIN HFA) 108 (90 BASE) MCG/ACT INHALER    Inhale 2 puffs into the lungs every 4 hours as needed for shortness of breath / dyspnea or wheezing    ALBUTEROL (PROVENTIL) (2.5 MG/3ML) 0.083% NEB SOLUTION    Take 1 vial (2.5 mg) by nebulization every 6 hours as needed for shortness of breath / dyspnea or wheezing    AMOXICILLIN (AMOXIL) 500 MG CAPSULE    Take 2 capsules (1,000 mg) by mouth 2 times daily for 10 days    AZITHROMYCIN (ZITHROMAX Z-GIO) 250 MG TABLET    Two tablets on the first day, then one tablet daily for the next 4 days     MOMETASONE-FORMOTEROL (DULERA) 200-5 MCG/ACT INHALER    Inhale 2 puffs into the lungs 2 times daily    PREDNISONE (DELTASONE) 20 MG TABLET    Take 2 tablets (40 mg) by mouth daily for 5 days     Jillian Nieto DO  [facility] EMERGENCY DEPARTMENT  [address] [place] [id number]  [phone]     Jillian Nieto DO  05/25/22 0988

## 2022-05-25 NOTE — H&P
"[facility]    History and Physical - Hospitalist Service       Date of Admission:  5/25/2022    Assessment & Plan      Soren Freitas is a 31 year old female who is 31 weeks pregnant admitted on 5/25/2022. She presents with chronic productive cough and rapid onset of shortness of breath.      Acute hypoxic respiratory failure due to Asthma and bilateral CAP  - she is back on RA, but remains tachypnic  - she notes little benefit from q20 mn nebs so will back off to q2h and I would expect her tachycardia to improve  - agree with 40 mg PO daily prednisone  - Azith/Ceftriaxone  - she is very anxious and frustrated that she has pneumonia and hasn't slept.  We will decrease nebs, prn benadryl for anxiety/allergies/sleep and get her kids to a friend's house so she can rest.    31 week pregnancy  - discussed risk/benifit of all meds that are new  - patient should have OBGYN follow up after discharge she is high enough risk to not only have a midwife.     Stable Lytic Bone Lesions on CT - these are benign, she had a bone bx in 2016 at [facility] [place].  Reviewed with Heme/Onc and they recommend no further evaluation.       Diet: Regular Diet Adult    DVT Prophylaxis: Pneumatic Compression Devices  Hare Catheter: Not present  Central Lines: None  Cardiac Monitoring: None  Code Status: Full Code      Clinically Significant Risk Factors Present on Admission                  # Overweight: Estimated body mass index is 28.19 kg/m  as calculated from the following:    Height as of this encounter: 1.702 m (5' 7\").    Weight as of this encounter: 81.6 kg (180 lb).      Disposition Plan   Expected Discharge: 05/26/2022   Anticipated discharge location:  Awaiting care coordination huddle  Delays: Tachypnea, Tachycardia       The patient's care was discussed with the Bedside Nurse, Patient and Patient's Family.    Den Coreas MD  Hospitalist Service  [facility]  Securely message with " the Vocera Web Console (learn more here)  Text page via [facility] Paging/Directory         ______________________________________________________________________    Chief Complaint   Dyspnea    History is obtained from the patient    History of Present Illness   Soren Freitas is a 31 year old female with a past medical history of  at 31w1d, acute respiratory failure, and severe persistent asthma, who presents via EMS for the evaluation of shortness of breath.      Patient reports having an asthma exacerbation ~1 hour prior to arrival with shortness of breath and wheezing. Patient has her asthma medications at home but did not take any and instead called EMS. Patient reports taking her Dulera inhaler one a day but denies the use of her rescue inhaler or breathing treatment. Upon arrival EMS found patient to be in the low 90s on room air with couple worded sentences. EMS reports being familiar with patient as she calls frequently for asthma exacerbations. They note that the patient's apartment has no elevator and that they had to walk the patient, further exacerbating the asthma. Patient given a duoneb en route which improved sats to 100. Patient appearing to breathing better however still sounding coarse prompting EMS to bring patient here. Patient denies any known exposures to COVID-19 as well as denies any recent treatments for asthma during her pregnancy. Patient denies any fever, chills, cough, chest pain, as well as any other complaints at the time.     Upon admission she was given albuterol nebs and solumedrol, her sats remianed >90% but she continued to complain of persistent SOB not improved by nebs.  Her Hrs were in the 110 -120 range.    Review of Systems    The 5 point Review of Systems is negative other than noted in the HPI or here. \    Past Medical History    I have reviewed this patient's medical history and updated it with pertinent information if needed.   Past Medical History:   Diagnosis  Date     Obesity      Severe persistent asthma with acute exacerbation 1/1/2015    Formatting of this note might be different from the original. 12/2014 Admit for exacerbation: Spirometry FEV1/FVC 68%, D/C w/dulera, albuterol, Spiriva.  Started singulair 10mg QD, Given NRT.  Dx as infant, has been on albuterol lifelong.    Last Assessment & Plan:  Formatting of this note might be different from the original. Stable on Singulair, cetirizine, albuterol, and dulera. Discussed COVID     Uncomplicated asthma        Past Surgical History   I have reviewed this patient's surgical history and updated it with pertinent information if needed.  History reviewed. No pertinent surgical history.    Social History   I have reviewed this patient's social history and updated it with pertinent information if needed.  Social History     Tobacco Use     Smoking status: Former Smoker       Family History   No significant family history, including no history of:simialr disease    Prior to Admission Medications   Prior to Admission Medications   Prescriptions Last Dose Informant Patient Reported? Taking?   Prenatal Vit-Fe Fumarate-FA (PRENATAL MULTIVITAMIN W/IRON) 27-0.8 MG tablet Past Week at Unknown time  Yes Yes   Sig: Take 1 tablet by mouth daily   albuterol (PROVENTIL) (2.5 MG/3ML) 0.083% neb solution 5/25/2022 at Unknown time  Yes Yes   Sig: Take 2.5 mg by nebulization every 6 hours as needed for shortness of breath / dyspnea or wheezing   ipratropium - albuterol 0.5 mg/2.5 mg/3 mL (DUONEB) 0.5-2.5 (3) MG/3ML neb solution   No No   Sig: Take 1 vial (3 mLs) by nebulization every 4 hours   mometasone-formoterol (DULERA) 200-5 MCG/ACT inhaler Past Week at Unknown time  Yes Yes   Sig: Inhale 2 puffs into the lungs 2 times daily   montelukast (SINGULAIR) 10 MG tablet Past Week at Unknown time  Yes Yes   Sig: Take 10 mg by mouth every morning      Facility-Administered Medications: None     Allergies   No Known Allergies    Physical Exam    Vital Signs: Temp: 97.8  F (36.6  C) Temp src: Oral BP: 132/71 Pulse: (!) 122   Resp: 25 SpO2: 98 % O2 Device: None (Room air) Oxygen Delivery: 1 LPM  Weight: 180 lbs 0 oz    Constitutional: awake, fatigued, alert, cooperative and mild distress  Eyes: Lids and lashes normal, extra ocular muscles intact, sclera clear, conjunctiva normal   Respiratory: Mild respiratory distress, no retractions and wheeze diffuse  Cardiovascular: normal S1 and S2, tachy  GI: normal bowel sounds  Skin: no bruising or bleeding  Neurologic: Awake, alert, oriented to name, place and time.  Cranial nerves II-XII are grossly intact.     Data   Data reviewed today: I reviewed all medications, new labs and imaging results over the last 24 hours. I personally reviewed the abdominal CT image(s) showing bilateral pneumonia and stable lytic lesions.    Recent Labs   Lab 05/25/22  0157   WBC 12.7*   HGB 12.0   MCV 85         POTASSIUM 3.5   CHLORIDE 106   CO2 23   BUN 6*   CR 0.68   ANIONGAP 8   CARYN 8.3*   GLC 93     Recent Results (from the past 24 hour(s))   XR Chest 2 Views    Narrative    EXAM: XR CHEST 2 VW  LOCATION: [facility]  DATE/TIME: 5/25/2022 1:09 AM    INDICATION: Coarse breath sounds in the right lower lobe.  COMPARISON: 02/02/2022      Impression    IMPRESSION:     Patchy right basilar opacity, suspicious for pneumonia.    No focal airspace disease in the left lung. No pleural effusion or pneumothorax.    Cardiomediastinal silhouette is unremarkable.   CT Chest Pulmonary Embolism w Contrast    Narrative    EXAM: CT CHEST PULMONARY EMBOLISM W CONTRAST  LOCATION: [facility]  DATE/TIME: 5/25/2022 4:37 AM    INDICATION: Shortness of breath.  COMPARISON: 8/22/2021.  TECHNIQUE: CT chest pulmonary angiogram during arterial phase injection of IV contrast. Multiplanar reformats and MIP reconstructions were performed. Dose reduction techniques were used.   CONTRAST: isovue 370  100ml    FINDINGS:  ANGIOGRAM CHEST: Pulmonary arteries are normal caliber and negative for pulmonary emboli. Thoracic aorta is negative for dissection. No CT evidence of right heart strain.    LUNGS AND PLEURA: There are bilateral multifocal pulmonary infiltrates, worst in the left lower lobe. No pneumothorax or pleural effusion.    MEDIASTINUM/AXILLAE: Normal.    CORONARY ARTERY CALCIFICATION: None.    UPPER ABDOMEN: Normal.    MUSCULOSKELETAL: There are multiple sclerotic bone lesions as seen previously.      Impression    IMPRESSION:  1.  There is no pulmonary embolus, aortic aneurysm or dissection.  2.  Bilateral multifocal pneumonia.  3.  Multiple sclerotic bone lesions suspicious for metastases.

## 2022-05-25 NOTE — PROGRESS NOTES
Duoneb neb X 2/Albuterol X 4 given. BS expiratory wheezing/inspiratory wheezing. Increased work of breath which was noted after 2 neb treatment improved. Oxygen titrated to room air; pt is saturating mid 90s. CO2 < 35 mmHg. RT following.    Jay Jay Chavez, RT

## 2022-05-25 NOTE — PROGRESS NOTES
Patient is seen for a neb treatment/assessment. Dyspnea/desaturations are not noted. On room air, patient saturating high 90s. Peak flow pre tx 160 L/min and post tx 180 L/min; poor effort noted. BS expiratory wheezing pre/post treatment. MD notified. RT monitoring.    Jay Jay Chavez, RT

## 2022-05-25 NOTE — ED NOTES
Bed: JNED-16  Expected date: 5/25/22  Expected time: 12:08 AM  Means of arrival: Ambulance  Comments:  [place]  31 F, SOB, hx asthma, 90% on RA, getting neb

## 2022-05-25 NOTE — CONSULTS
Care Management Initial Consult    General Information  Assessment completed with: Patient,    Type of CM/SW Visit: Initial Assessment    Primary Care Provider verified and updated as needed: Yes   Readmission within the last 30 days: no previous admission in last 30 days      Reason for Consult: discharge planning  Advance Care Planning:            Communication Assessment  Patient's communication style: spoken language (English or Bilingual)             Cognitive  Cognitive/Neuro/Behavioral: WDL                      Living Environment:   People in home: child(pierre), dependent     Current living Arrangements: house, apartment      Able to return to prior arrangements: yes       Family/Social Support:  Care provided by: self  Provides care for: no one  Marital Status:   Sibling(s)          Description of Support System: Supportive         Current Resources:   Patient receiving home care services: No     Community Resources: None  Equipment currently used at home: none  Supplies currently used at home: None    Employment/Financial:  Employment Status:          Financial Concerns:             Lifestyle & Psychosocial Needs:  Social Determinants of Health     Tobacco Use: Medium Risk     Smoking Tobacco Use: Former Smoker     Smokeless Tobacco Use: Unknown   Alcohol Use: Not on file   Financial Resource Strain: Not on file   Food Insecurity: Not on file   Transportation Needs: Not on file   Physical Activity: Not on file   Stress: Not on file   Social Connections: Not on file   Intimate Partner Violence: Not on file   Depression: Not on file   Housing Stability: Not on file       Functional Status:  Prior to admission patient needed assistance:   Dependent ADLs:: Independent  Dependent IADLs:: Independent  Assesssment of Functional Status: At functional baseline, Not at baseline with ADL Functioning    Mental Health Status:  Mental Health Status: Current Concern  Mental Health Management: Other (see  comment)    Chemical Dependency Status:                Values/Beliefs:  Spiritual, Cultural Beliefs, Hindu Practices, Values that affect care:                 Additional Information:  AIDET completed. Writer met with Pt and Pts two children; 9 and 5 year old daughters. Pt also 8 months pregnant. She is very anxious, saying she isn't getting any better. Very agitated. SOB. Pt calmed down after listening to her and addressing her kids. Pt/kids mentioned a sister had called and would come get the kids. Writer spoke with staff RN and then called sister Flynn identified on face sheet: [phone]. Rina said her other sister must have called. Rina is main contact on face sheet. She said she would leave work and be at the hospital in 1-2 hours to  kids. The kids didn't want to leave at first but were more agreeable when writer told them their mom could get more rest and the care she needed here. Also kids said they are missing school. Otherwise patient is independent. She found out she has pneumonia and she said she had concerns for her baby.     Anticipate discharge home without needs vs HC depending on progression of care. Sister to transport. Informed CM will follow.     Paula Contreras, RN, CM, PREM

## 2022-05-25 NOTE — PROGRESS NOTES
Respiratory Therapy Note    Patient is receiving Q4 HR Duoneb. Patient is on 1 L NC, SpO2 98%. Pre-treatment , RR 20, Breath sounds expiratory wheezes. Post-treatment , RR 22 and breath sounds unchanged. Cough is strong and NP. Continue to encourage deep breathing and coughing techniques.     Leia Loberg, RT

## 2022-05-26 VITALS
DIASTOLIC BLOOD PRESSURE: 60 MMHG | WEIGHT: 180 LBS | HEIGHT: 67 IN | OXYGEN SATURATION: 100 % | SYSTOLIC BLOOD PRESSURE: 110 MMHG | TEMPERATURE: 98 F | RESPIRATION RATE: 18 BRPM | BODY MASS INDEX: 28.25 KG/M2 | HEART RATE: 109 BPM

## 2022-05-26 LAB
ANION GAP SERPL CALCULATED.3IONS-SCNC: 6 MMOL/L (ref 5–18)
BASOPHILS # BLD AUTO: 0 10E3/UL (ref 0–0.2)
BASOPHILS NFR BLD AUTO: 0 %
BUN SERPL-MCNC: 5 MG/DL (ref 8–22)
CALCIUM SERPL-MCNC: 8.3 MG/DL (ref 8.5–10.5)
CHLORIDE BLD-SCNC: 111 MMOL/L (ref 98–107)
CO2 SERPL-SCNC: 20 MMOL/L (ref 22–31)
CREAT SERPL-MCNC: 0.63 MG/DL (ref 0.6–1.1)
EOSINOPHIL # BLD AUTO: 0.1 10E3/UL (ref 0–0.7)
EOSINOPHIL NFR BLD AUTO: 1 %
ERYTHROCYTE [DISTWIDTH] IN BLOOD BY AUTOMATED COUNT: 13.2 % (ref 10–15)
GFR SERPL CREATININE-BSD FRML MDRD: >90 ML/MIN/1.73M2
GLUCOSE BLD-MCNC: 139 MG/DL (ref 70–125)
HCT VFR BLD AUTO: 32.5 % (ref 35–47)
HGB BLD-MCNC: 11 G/DL (ref 11.7–15.7)
IMM GRANULOCYTES # BLD: 0.2 10E3/UL
IMM GRANULOCYTES NFR BLD: 1 %
LYMPHOCYTES # BLD AUTO: 2.1 10E3/UL (ref 0.8–5.3)
LYMPHOCYTES NFR BLD AUTO: 13 %
MCH RBC QN AUTO: 28.4 PG (ref 26.5–33)
MCHC RBC AUTO-ENTMCNC: 33.8 G/DL (ref 31.5–36.5)
MCV RBC AUTO: 84 FL (ref 78–100)
MONOCYTES # BLD AUTO: 0.7 10E3/UL (ref 0–1.3)
MONOCYTES NFR BLD AUTO: 4 %
NEUTROPHILS # BLD AUTO: 13.2 10E3/UL (ref 1.6–8.3)
NEUTROPHILS NFR BLD AUTO: 81 %
NRBC # BLD AUTO: 0 10E3/UL
NRBC BLD AUTO-RTO: 0 /100
PLATELET # BLD AUTO: 214 10E3/UL (ref 150–450)
POTASSIUM BLD-SCNC: 3.6 MMOL/L (ref 3.5–5)
RBC # BLD AUTO: 3.88 10E6/UL (ref 3.8–5.2)
SODIUM SERPL-SCNC: 137 MMOL/L (ref 136–145)
WBC # BLD AUTO: 16.1 10E3/UL (ref 4–11)

## 2022-05-26 PROCEDURE — 96376 TX/PRO/DX INJ SAME DRUG ADON: CPT

## 2022-05-26 PROCEDURE — 96366 THER/PROPH/DIAG IV INF ADDON: CPT

## 2022-05-26 PROCEDURE — 96367 TX/PROPH/DG ADDL SEQ IV INF: CPT

## 2022-05-26 PROCEDURE — 85025 COMPLETE CBC W/AUTO DIFF WBC: CPT | Performed by: FAMILY MEDICINE

## 2022-05-26 PROCEDURE — 999N000157 HC STATISTIC RCP TIME EA 10 MIN

## 2022-05-26 PROCEDURE — 99217 PR OBSERVATION CARE DISCHARGE: CPT | Performed by: FAMILY MEDICINE

## 2022-05-26 PROCEDURE — 250N000011 HC RX IP 250 OP 636: Performed by: FAMILY MEDICINE

## 2022-05-26 PROCEDURE — 250N000009 HC RX 250: Performed by: FAMILY MEDICINE

## 2022-05-26 PROCEDURE — 250N000012 HC RX MED GY IP 250 OP 636 PS 637: Performed by: FAMILY MEDICINE

## 2022-05-26 PROCEDURE — 80048 BASIC METABOLIC PNL TOTAL CA: CPT | Performed by: FAMILY MEDICINE

## 2022-05-26 PROCEDURE — 94640 AIRWAY INHALATION TREATMENT: CPT | Mod: 76

## 2022-05-26 PROCEDURE — 258N000003 HC RX IP 258 OP 636: Performed by: FAMILY MEDICINE

## 2022-05-26 PROCEDURE — 36415 COLL VENOUS BLD VENIPUNCTURE: CPT | Performed by: FAMILY MEDICINE

## 2022-05-26 PROCEDURE — G0378 HOSPITAL OBSERVATION PER HR: HCPCS

## 2022-05-26 RX ORDER — AZITHROMYCIN 250 MG/1
250 TABLET, FILM COATED ORAL DAILY
Qty: 3 TABLET | Refills: 0 | Status: SHIPPED | OUTPATIENT
Start: 2022-05-26 | End: 2022-05-29

## 2022-05-26 RX ORDER — ALBUTEROL SULFATE 90 UG/1
2 AEROSOL, METERED RESPIRATORY (INHALATION) EVERY 4 HOURS PRN
Qty: 18 G | Refills: 1 | Status: SHIPPED | OUTPATIENT
Start: 2022-05-26 | End: 2022-07-01

## 2022-05-26 RX ORDER — ALBUTEROL SULFATE 0.83 MG/ML
2.5 SOLUTION RESPIRATORY (INHALATION) EVERY 6 HOURS PRN
Qty: 12 ML | Refills: 0
Start: 2022-05-26

## 2022-05-26 RX ORDER — PREDNISONE 20 MG/1
40 TABLET ORAL DAILY
Qty: 6 TABLET | Refills: 0 | Status: SHIPPED | OUTPATIENT
Start: 2022-05-26 | End: 2022-05-29

## 2022-05-26 RX ADMIN — FLUTICASONE FUROATE AND VILANTEROL TRIFENATATE 1 PUFF: 200; 25 POWDER RESPIRATORY (INHALATION) at 07:48

## 2022-05-26 RX ADMIN — IPRATROPIUM BROMIDE AND ALBUTEROL SULFATE 3 ML: 2.5; .5 SOLUTION RESPIRATORY (INHALATION) at 03:45

## 2022-05-26 RX ADMIN — PREDNISONE 40 MG: 20 TABLET ORAL at 07:48

## 2022-05-26 RX ADMIN — IPRATROPIUM BROMIDE AND ALBUTEROL SULFATE 3 ML: 2.5; .5 SOLUTION RESPIRATORY (INHALATION) at 07:24

## 2022-05-26 RX ADMIN — IPRATROPIUM BROMIDE AND ALBUTEROL SULFATE 3 ML: 2.5; .5 SOLUTION RESPIRATORY (INHALATION) at 11:16

## 2022-05-26 RX ADMIN — AZITHROMYCIN MONOHYDRATE 250 MG: 500 INJECTION, POWDER, LYOPHILIZED, FOR SOLUTION INTRAVENOUS at 05:53

## 2022-05-26 NOTE — PROGRESS NOTES
Room air SpO2 97 %, BS scattered I/E wheezes, RR 20, Duoneb tx given, BS after unchanged, Pt appears dyspneic at rest.  RT to monitor.

## 2022-05-26 NOTE — PLAN OF CARE
Goal Outcome Evaluation:    Plan of Care Reviewed With: patient     Overall Patient Progress: no change

## 2022-05-26 NOTE — PLAN OF CARE
"Goal Outcome Evaluation:    Plan of Care Reviewed With: patient     Overall Patient Progress: no change     Patient respiratory status improving.  Patient is off of oxygen and oxygen saturations are in the high 90\"s.  However, she is still tachycardic.        "

## 2022-05-26 NOTE — PROGRESS NOTES
Oxygen titrated to room air; sats high 90s. Increased work of breathing and coughing not noted. Wheezing remains persistent pre/post treatment. Albuterol Q4H given. Pt reports improve aeration and is able to sleep. RT following.    Jay Jay Chavez, RT

## 2022-05-26 NOTE — ED NOTES
Pt became upset around 0130 due to being uncomfortable in bed. Pt stated that she could not be in this bed any longer, demanding an inpatient bed. Explained the situation to Pt, that there are no inpatient beds available at this time. Pt very irritated. Stated she wanted to leave AMA. Called S.O. and then decided to stay. Pt has been resting comfortably in between cares. VSS on RA. Will continue to monitor and assess.

## 2022-05-26 NOTE — SIGNIFICANT EVENT
Significant Event Note    Time of event: 8:53 PM May 25, 2022    Description of event:  Notified patient was planning to leave AMA.    Patient was admitted early this morning for asthma exacerbation and community-acquired pneumonia.  She has her 2 children with her, and was initially planning to have her mother picked him up and bring him home.  This plan however fell through.  There was also a plan for her sister to  her children today, she however does not have any gas in her vehicle and was unable to drive to the hospital.  Since her children were unable to remain with her in the hospital, she was planning to leave.    I set up outpatient prednisone, azithromycin, albuterol nebulizers in preparation for discharge AGAINST MEDICAL ADVICE.    Later in the evening, she did find childcare and was able to remain in the hospital    Discussed with cross cover physician    Agus Angel MD PGY-1  [facility]   House Officer

## 2022-05-26 NOTE — PROGRESS NOTES
Brief discharge instructions  --- Follow-up with primary care within 1 week  --- Follow with both OB per your usual schedule  --- In addition to the antibiotic and steroid, take albuterol neb or albuterol inhaler 4 times a day for the next 2 days, Thursday and Friday.  Then take albuterol neb or albuterol inhaler 3 times a day for the next 2 days, Saturday Sunday.  On Monday you can go back to just taking it as needed.    Betzy Huggins MD

## 2022-05-26 NOTE — DISCHARGE SUMMARY
[facility]  Hospitalist Discharge Summary      Date of Admission:  2022  Date of Discharge:  2022 12:40 PM  Discharging Provider: Betzy Huggins MD  Discharge Service: Hospitalist Service    Discharge Diagnoses       Acute asthma exacerbation    Multifocal pneumonia, procalcitonin low.  Suspect viral etiology    Acute hypoxic respiratory failure, secondary to above.  Resolved    31-week intrauterine pregnancy    Stable lytic bone lesions on CT.  Previous negative work-up.    Follow-ups Needed After Discharge   Follow-up Appointments     Follow-up and recommended labs and tests       --- Follow-up with your primary care physician, Dr. Bahena, next week.  --- Follow-up with OB as routinely scheduled             Discharge Disposition   Discharged to home  Condition at discharge: Stable      Hospital Course      31-year-old  4 para 2 female came into the emergency room department at 31 weeks 1 day gestation with worsening shortness of breath.  Has a history of moderate persistent asthma for her entire life.  Despite use of her maintenance inhaler as well as increasing use of rescue inhaler she had had worsening shortness of breath rapidly.  EMS was called and they found patient to be in the low 90s with significant work of breathing.  In the emergency room department she was found to be narc markedly tachypneic and had significant increased work of breathing.  She was treated initially with IV Solu-Medrol, frequent nebulizers with resultant tachycardia.  CT scan showed no PE but did show multifocal pneumonia.  She was started on azithromycin and ceftriaxone.  Patient had her children with her.  It was significantly stressful to be in the hospital and eventually she was able to find alternative childcare for her children.  She stayed overnight.  She was only borderline hypoxic the entire time she was here but by the morning she was breathing significantly better with no tachypnea.   Slight wheezing still present but improved from before.  Patient has asthma action plan.  Was explained to her to continue her steroid and antibiotic upon discharge and to use regular nebs versus inhalers on a 4 times daily and 3 times daily basis over the next 4 days until she goes back to as needed basis.  She will follow-up with her primary next week as well as OB as scheduled.  Discharged in good condition    Consultations This Hospital Stay   CARE MANAGEMENT / SOCIAL WORK IP CONSULT  SOCIAL WORK IP CONSULT  SOCIAL WORK IP CONSULT  CARE MANAGEMENT / SOCIAL WORK IP CONSULT    Code Status   Full Code    Time Spent on this Encounter   I, Betzy Huggins MD, personally saw the patient today and spent greater than 30 minutes discharging this patient.       Betzy Huggins MD  [facility] EMERGENCY DEPARTMENT  [facility] [facility] [address]  Phone: [phone]  ______________________________________________________________________    Physical Exam   Vital Signs: Temp: 98  F (36.7  C) Temp src: Oral BP: 110/60 Pulse: 109   Resp: 18 SpO2: 100 % O2 Device: None (Room air) Oxygen Delivery: 1 LPM  Weight: 180 lbs 0 oz  General Appearance: Pleasant female, no audible wheezing.  No increased work of breathing  Respiratory: Mild wheezing present throughout  Cardiovascular: Tachycardic, borderline.  No significant murmurs  GI: Patient gravid uterus.  Nontender  Skin: Soft, no significant lower extremity edema  Other: Neurological gross intact without focal deficits patient       Primary Care Physician   Elvia Bahena, APRN, CNP    Discharge Orders      Reason for your hospital stay    Multifocal pneumonia, asthma exacerbation     Follow-up and recommended labs and tests     --- Follow-up with your primary care physician, Dr. Bahena, next week.  --- Follow-up with OB as routinely scheduled     Activity    Your activity upon discharge: activity as tolerated     Discharge Instructions    --- Take  albuterol neb or albuterol inhaler 4 times a day for the next 2 days, Thursday and Friday.  Then take albuterol neb or albuterol inhaler 3 times a day for the next 2 days, Saturday Sunday.  On Monday you can go back to just taking it as needed     Diet    Follow this diet upon discharge: Orders Placed This Encounter      Regular Diet Adult       Significant Results and Procedures      Recent Results (from the past 24 hour(s))   Basic metabolic panel    Collection Time: 05/26/22  8:41 AM   Result Value Ref Range    Sodium 137 136 - 145 mmol/L    Potassium 3.6 3.5 - 5.0 mmol/L    Chloride 111 (H) 98 - 107 mmol/L    Carbon Dioxide (CO2) 20 (L) 22 - 31 mmol/L    Anion Gap 6 5 - 18 mmol/L    Urea Nitrogen 5 (L) 8 - 22 mg/dL    Creatinine 0.63 0.60 - 1.10 mg/dL    Calcium 8.3 (L) 8.5 - 10.5 mg/dL    Glucose 139 (H) 70 - 125 mg/dL    GFR Estimate >90 >60 mL/min/1.73m2   CBC with platelets and differential    Collection Time: 05/26/22  8:41 AM   Result Value Ref Range    WBC Count 16.1 (H) 4.0 - 11.0 10e3/uL    RBC Count 3.88 3.80 - 5.20 10e6/uL    Hemoglobin 11.0 (L) 11.7 - 15.7 g/dL    Hematocrit 32.5 (L) 35.0 - 47.0 %    MCV 84 78 - 100 fL    MCH 28.4 26.5 - 33.0 pg    MCHC 33.8 31.5 - 36.5 g/dL    RDW 13.2 10.0 - 15.0 %    Platelet Count 214 150 - 450 10e3/uL    % Neutrophils 81 %    % Lymphocytes 13 %    % Monocytes 4 %    % Eosinophils 1 %    % Basophils 0 %    % Immature Granulocytes 1 %    NRBCs per 100 WBC 0 <1 /100    Absolute Neutrophils 13.2 (H) 1.6 - 8.3 10e3/uL    Absolute Lymphocytes 2.1 0.8 - 5.3 10e3/uL    Absolute Monocytes 0.7 0.0 - 1.3 10e3/uL    Absolute Eosinophils 0.1 0.0 - 0.7 10e3/uL    Absolute Basophils 0.0 0.0 - 0.2 10e3/uL    Absolute Immature Granulocytes 0.2 <=0.4 10e3/uL    Absolute NRBCs 0.0 10e3/uL       Results for orders placed or performed during the hospital encounter of 05/25/22   XR Chest 2 Views    Narrative    EXAM: XR CHEST 2 VW  LOCATION: [facility]  DATE/TIME: 5/25/2022 1:09 AM    INDICATION: Coarse breath sounds in the right lower lobe.  COMPARISON: 02/02/2022      Impression    IMPRESSION:     Patchy right basilar opacity, suspicious for pneumonia.    No focal airspace disease in the left lung. No pleural effusion or pneumothorax.    Cardiomediastinal silhouette is unremarkable.   CT Chest Pulmonary Embolism w Contrast    Narrative    EXAM: CT CHEST PULMONARY EMBOLISM W CONTRAST  LOCATION: [facility]  DATE/TIME: 5/25/2022 4:37 AM    INDICATION: Shortness of breath.  COMPARISON: 8/22/2021.  TECHNIQUE: CT chest pulmonary angiogram during arterial phase injection of IV contrast. Multiplanar reformats and MIP reconstructions were performed. Dose reduction techniques were used.   CONTRAST: isovue 370 100ml    FINDINGS:  ANGIOGRAM CHEST: Pulmonary arteries are normal caliber and negative for pulmonary emboli. Thoracic aorta is negative for dissection. No CT evidence of right heart strain.    LUNGS AND PLEURA: There are bilateral multifocal pulmonary infiltrates, worst in the left lower lobe. No pneumothorax or pleural effusion.    MEDIASTINUM/AXILLAE: Normal.    CORONARY ARTERY CALCIFICATION: None.    UPPER ABDOMEN: Normal.    MUSCULOSKELETAL: There are multiple sclerotic bone lesions as seen previously.      Impression    IMPRESSION:  1.  There is no pulmonary embolus, aortic aneurysm or dissection.  2.  Bilateral multifocal pneumonia.  3.  Multiple sclerotic bone lesions suspicious for metastases.       Discharge Medications   Discharge Medication List as of 5/26/2022 10:52 AM      START taking these medications    Details   azithromycin (ZITHROMAX) 250 MG tablet Take 1 tablet (250 mg) by mouth daily for 3 days, Disp-3 tablet, R-0, E-Prescribe      !! mometasone-formoterol (DULERA) 200-5 MCG/ACT inhaler Inhale 2 puffs into the lungs 2 times daily, Disp-13 g, R-1, Local Print       !! - Potential duplicate medications found.  Please discuss with provider.      CONTINUE these medications which have CHANGED    Details   albuterol (PROAIR HFA/PROVENTIL HFA/VENTOLIN HFA) 108 (90 Base) MCG/ACT inhaler Inhale 2 puffs into the lungs every 4 hours as needed for shortness of breath / dyspnea or wheezing, Disp-18 g, R-1, E-PrescribePharmacy may dispense brand covered by insurance (Proair, or proventil or ventolin or generic albuterol inhaler)      !! albuterol (PROVENTIL) (2.5 MG/3ML) 0.083% neb solution Take 1 vial (2.5 mg) by nebulization every 6 hours as needed for shortness of breath / dyspnea or wheezing, Disp-12 mL, R-0, No Print Out      predniSONE (DELTASONE) 20 MG tablet Take 2 tablets (40 mg) by mouth daily for 3 days, Disp-6 tablet, R-0, E-Prescribe       !! - Potential duplicate medications found. Please discuss with provider.      CONTINUE these medications which have NOT CHANGED    Details   !! albuterol (PROVENTIL) (2.5 MG/3ML) 0.083% neb solution Take 2.5 mg by nebulization every 6 hours as needed for shortness of breath / dyspnea or wheezing, Historical      !! mometasone-formoterol (DULERA) 200-5 MCG/ACT inhaler Inhale 2 puffs into the lungs 2 times daily, Historical      montelukast (SINGULAIR) 10 MG tablet Take 10 mg by mouth every morning, Historical      Prenatal Vit-Fe Fumarate-FA (PRENATAL MULTIVITAMIN W/IRON) 27-0.8 MG tablet Take 1 tablet by mouth daily, Historical       !! - Potential duplicate medications found. Please discuss with provider.      STOP taking these medications       ipratropium - albuterol 0.5 mg/2.5 mg/3 mL (DUONEB) 0.5-2.5 (3) MG/3ML neb solution Comments:   Reason for Stopping:             Allergies   No Known Allergies

## 2022-05-26 NOTE — ED NOTES
I had been told at shift change that the patients children were going home at 16:30.  However, upon talking to the patient she did not have a ride for the children.  I let the charge nurse know of this and I call the ANS and I called care manager and left a message.  I also talked to the charge nurse and he stated he talked to the Care Manager face to face and they stated they would see the patient.  Looking at the notes it does not appear that this happened.  The patient said nobody has talked to her about her kids.

## 2022-06-30 ENCOUNTER — HOSPITAL ENCOUNTER (INPATIENT)
Facility: HOSPITAL | Age: 32
LOS: 1 days | Discharge: HOME | End: 2022-07-01
Attending: EMERGENCY MEDICINE | Admitting: STUDENT IN AN ORGANIZED HEALTH CARE EDUCATION/TRAINING PROGRAM
Payer: COMMERCIAL

## 2022-06-30 ENCOUNTER — APPOINTMENT (OUTPATIENT)
Dept: RADIOLOGY | Facility: HOSPITAL | Age: 32
End: 2022-06-30
Attending: EMERGENCY MEDICINE
Payer: COMMERCIAL

## 2022-06-30 DIAGNOSIS — J45.51 SEVERE PERSISTENT ASTHMA WITH ACUTE EXACERBATION (H): ICD-10-CM

## 2022-06-30 DIAGNOSIS — J45.52 SEVERE PERSISTENT ASTHMA WITH STATUS ASTHMATICUS (H): ICD-10-CM

## 2022-06-30 DIAGNOSIS — J45.40 MODERATE PERSISTENT ASTHMA, UNSPECIFIED WHETHER COMPLICATED: ICD-10-CM

## 2022-06-30 DIAGNOSIS — J18.9 PNEUMONIA OF BOTH LUNGS DUE TO INFECTIOUS ORGANISM, UNSPECIFIED PART OF LUNG: ICD-10-CM

## 2022-06-30 DIAGNOSIS — J45.41 MODERATE PERSISTENT ASTHMA WITH ACUTE EXACERBATION: Primary | ICD-10-CM

## 2022-06-30 LAB
ALBUMIN SERPL-MCNC: 2.6 G/DL (ref 3.5–5)
ALP SERPL-CCNC: 175 U/L (ref 45–120)
ALT SERPL W P-5'-P-CCNC: 28 U/L (ref 0–45)
ANION GAP SERPL CALCULATED.3IONS-SCNC: 9 MMOL/L (ref 5–18)
AST SERPL W P-5'-P-CCNC: 28 U/L (ref 0–40)
BASOPHILS # BLD AUTO: 0 10E3/UL (ref 0–0.2)
BASOPHILS NFR BLD AUTO: 0 %
BILIRUB SERPL-MCNC: 0.8 MG/DL (ref 0–1)
BUN SERPL-MCNC: 4 MG/DL (ref 8–22)
CALCIUM SERPL-MCNC: 8.7 MG/DL (ref 8.5–10.5)
CHLORIDE BLD-SCNC: 108 MMOL/L (ref 98–107)
CO2 SERPL-SCNC: 19 MMOL/L (ref 22–31)
CREAT SERPL-MCNC: 0.68 MG/DL (ref 0.6–1.1)
EOSINOPHIL # BLD AUTO: 0.5 10E3/UL (ref 0–0.7)
EOSINOPHIL NFR BLD AUTO: 4 %
ERYTHROCYTE [DISTWIDTH] IN BLOOD BY AUTOMATED COUNT: 13.6 % (ref 10–15)
GFR SERPL CREATININE-BSD FRML MDRD: >90 ML/MIN/1.73M2
GLUCOSE BLD-MCNC: 107 MG/DL (ref 70–125)
HCT VFR BLD AUTO: 33.3 % (ref 35–47)
HGB BLD-MCNC: 11.3 G/DL (ref 11.7–15.7)
IMM GRANULOCYTES # BLD: 0.2 10E3/UL
IMM GRANULOCYTES NFR BLD: 1 %
LYMPHOCYTES # BLD AUTO: 1.6 10E3/UL (ref 0.8–5.3)
LYMPHOCYTES NFR BLD AUTO: 10 %
MCH RBC QN AUTO: 28.8 PG (ref 26.5–33)
MCHC RBC AUTO-ENTMCNC: 33.9 G/DL (ref 31.5–36.5)
MCV RBC AUTO: 85 FL (ref 78–100)
MONOCYTES # BLD AUTO: 1.3 10E3/UL (ref 0–1.3)
MONOCYTES NFR BLD AUTO: 9 %
NEUTROPHILS # BLD AUTO: 11.7 10E3/UL (ref 1.6–8.3)
NEUTROPHILS NFR BLD AUTO: 76 %
NRBC # BLD AUTO: 0 10E3/UL
NRBC BLD AUTO-RTO: 0 /100
PLATELET # BLD AUTO: 217 10E3/UL (ref 150–450)
POTASSIUM BLD-SCNC: 3.8 MMOL/L (ref 3.5–5)
PROT SERPL-MCNC: 6.5 G/DL (ref 6–8)
RBC # BLD AUTO: 3.93 10E6/UL (ref 3.8–5.2)
SODIUM SERPL-SCNC: 136 MMOL/L (ref 136–145)
WBC # BLD AUTO: 15.2 10E3/UL (ref 4–11)

## 2022-06-30 PROCEDURE — 258N000003 HC RX IP 258 OP 636: Performed by: EMERGENCY MEDICINE

## 2022-06-30 PROCEDURE — 250N000011 HC RX IP 250 OP 636: Performed by: EMERGENCY MEDICINE

## 2022-06-30 PROCEDURE — C9803 HOPD COVID-19 SPEC COLLECT: HCPCS

## 2022-06-30 PROCEDURE — 36415 COLL VENOUS BLD VENIPUNCTURE: CPT | Performed by: EMERGENCY MEDICINE

## 2022-06-30 PROCEDURE — 5A09357 ASSISTANCE WITH RESPIRATORY VENTILATION, LESS THAN 24 CONSECUTIVE HOURS, CONTINUOUS POSITIVE AIRWAY PRESSURE: ICD-10-PCS | Performed by: EMERGENCY MEDICINE

## 2022-06-30 PROCEDURE — 82040 ASSAY OF SERUM ALBUMIN: CPT | Performed by: EMERGENCY MEDICINE

## 2022-06-30 PROCEDURE — 83880 ASSAY OF NATRIURETIC PEPTIDE: CPT | Performed by: EMERGENCY MEDICINE

## 2022-06-30 PROCEDURE — 99285 EMERGENCY DEPT VISIT HI MDM: CPT

## 2022-06-30 PROCEDURE — 80053 COMPREHEN METABOLIC PANEL: CPT | Performed by: EMERGENCY MEDICINE

## 2022-06-30 PROCEDURE — 87636 SARSCOV2 & INF A&B AMP PRB: CPT | Performed by: EMERGENCY MEDICINE

## 2022-06-30 PROCEDURE — 84145 PROCALCITONIN (PCT): CPT | Performed by: STUDENT IN AN ORGANIZED HEALTH CARE EDUCATION/TRAINING PROGRAM

## 2022-06-30 PROCEDURE — 94640 AIRWAY INHALATION TREATMENT: CPT

## 2022-06-30 PROCEDURE — 71045 X-RAY EXAM CHEST 1 VIEW: CPT

## 2022-06-30 PROCEDURE — 93005 ELECTROCARDIOGRAM TRACING: CPT | Performed by: EMERGENCY MEDICINE

## 2022-06-30 PROCEDURE — 85025 COMPLETE CBC W/AUTO DIFF WBC: CPT | Performed by: EMERGENCY MEDICINE

## 2022-06-30 PROCEDURE — 84484 ASSAY OF TROPONIN QUANT: CPT | Performed by: STUDENT IN AN ORGANIZED HEALTH CARE EDUCATION/TRAINING PROGRAM

## 2022-06-30 PROCEDURE — 250N000009 HC RX 250: Performed by: EMERGENCY MEDICINE

## 2022-06-30 RX ORDER — CEFTRIAXONE 1 G/1
1 INJECTION, POWDER, FOR SOLUTION INTRAMUSCULAR; INTRAVENOUS ONCE
Status: COMPLETED | OUTPATIENT
Start: 2022-06-30 | End: 2022-07-01

## 2022-06-30 RX ORDER — IPRATROPIUM BROMIDE AND ALBUTEROL SULFATE 2.5; .5 MG/3ML; MG/3ML
3 SOLUTION RESPIRATORY (INHALATION) ONCE
Status: COMPLETED | OUTPATIENT
Start: 2022-06-30 | End: 2022-06-30

## 2022-06-30 RX ORDER — SODIUM CHLORIDE 9 MG/ML
INJECTION, SOLUTION INTRAVENOUS CONTINUOUS
Status: DISCONTINUED | OUTPATIENT
Start: 2022-07-01 | End: 2022-07-01

## 2022-06-30 RX ORDER — MAGNESIUM SULFATE HEPTAHYDRATE 40 MG/ML
2 INJECTION, SOLUTION INTRAVENOUS ONCE
Status: COMPLETED | OUTPATIENT
Start: 2022-06-30 | End: 2022-07-01

## 2022-06-30 RX ORDER — METHYLPREDNISOLONE SODIUM SUCCINATE 125 MG/2ML
125 INJECTION, POWDER, LYOPHILIZED, FOR SOLUTION INTRAMUSCULAR; INTRAVENOUS ONCE
Status: COMPLETED | OUTPATIENT
Start: 2022-06-30 | End: 2022-06-30

## 2022-06-30 RX ADMIN — IPRATROPIUM BROMIDE AND ALBUTEROL SULFATE 3 ML: 2.5; .5 SOLUTION RESPIRATORY (INHALATION) at 21:46

## 2022-06-30 RX ADMIN — CEFTRIAXONE SODIUM 1 G: 1 INJECTION, POWDER, FOR SOLUTION INTRAMUSCULAR; INTRAVENOUS at 23:24

## 2022-06-30 RX ADMIN — METHYLPREDNISOLONE SODIUM SUCCINATE 125 MG: 125 INJECTION, POWDER, FOR SOLUTION INTRAMUSCULAR; INTRAVENOUS at 21:44

## 2022-06-30 RX ADMIN — SODIUM CHLORIDE 1000 ML: 9 INJECTION, SOLUTION INTRAVENOUS at 23:16

## 2022-06-30 RX ADMIN — MAGNESIUM SULFATE IN WATER 2 G: 40 INJECTION, SOLUTION INTRAVENOUS at 23:51

## 2022-07-01 ENCOUNTER — APPOINTMENT (OUTPATIENT)
Dept: ULTRASOUND IMAGING | Facility: HOSPITAL | Age: 32
End: 2022-07-01
Attending: OBSTETRICS & GYNECOLOGY
Payer: COMMERCIAL

## 2022-07-01 VITALS
HEART RATE: 112 BPM | DIASTOLIC BLOOD PRESSURE: 59 MMHG | SYSTOLIC BLOOD PRESSURE: 109 MMHG | TEMPERATURE: 98.8 F | HEIGHT: 67 IN | BODY MASS INDEX: 31.08 KG/M2 | RESPIRATION RATE: 20 BRPM | WEIGHT: 198 LBS | OXYGEN SATURATION: 100 %

## 2022-07-01 PROBLEM — J18.9 PNEUMONIA OF BOTH LUNGS DUE TO INFECTIOUS ORGANISM, UNSPECIFIED PART OF LUNG: Status: ACTIVE | Noted: 2022-07-01

## 2022-07-01 PROBLEM — E66.9 OBESITY: Status: ACTIVE | Noted: 2022-07-01

## 2022-07-01 PROBLEM — B37.31 YEAST INFECTION OF THE VAGINA: Status: ACTIVE | Noted: 2022-07-01

## 2022-07-01 PROBLEM — E88.09 HYPOALBUMINEMIA: Status: ACTIVE | Noted: 2022-07-01

## 2022-07-01 PROBLEM — J45.52 SEVERE PERSISTENT ASTHMA WITH STATUS ASTHMATICUS (H): Status: ACTIVE | Noted: 2022-07-01

## 2022-07-01 PROBLEM — J45.901 ACUTE ASTHMA EXACERBATION: Status: ACTIVE | Noted: 2022-07-01

## 2022-07-01 PROBLEM — O47.00 PRETERM CONTRACTIONS: Status: ACTIVE | Noted: 2022-07-01

## 2022-07-01 PROBLEM — J15.9 COMMUNITY ACQUIRED BACTERIAL PNEUMONIA: Status: ACTIVE | Noted: 2022-07-01

## 2022-07-01 LAB
BASE EXCESS BLDV CALC-SCNC: -6.1 MMOL/L
BNP SERPL-MCNC: 17 PG/ML (ref 0–64)
FLUAV RNA SPEC QL NAA+PROBE: NEGATIVE
FLUBV RNA RESP QL NAA+PROBE: NEGATIVE
HCO3 BLDV-SCNC: 20 MMOL/L (ref 24–30)
OSMOLALITY SERPL: 284 MMOL/KG (ref 275–295)
OSMOLALITY UR: 482 MMOL/KG (ref 100–1200)
OXYHGB MFR BLDV: 92.6 % (ref 70–75)
PCO2 BLDV: 32 MM HG (ref 35–50)
PH BLDV: 7.38 [PH] (ref 7.35–7.45)
PO2 BLDV: 70 MM HG (ref 25–47)
PROCALCITONIN SERPL IA-MCNC: 0.16 NG/ML
SAO2 % BLDV: 94.1 % (ref 70–75)
SARS-COV-2 RNA RESP QL NAA+PROBE: NEGATIVE
SODIUM UR-SCNC: 108 MMOL/L
TROPONIN I SERPL-MCNC: <0.01 NG/ML (ref 0–0.29)

## 2022-07-01 PROCEDURE — 120N000001 HC R&B MED SURG/OB

## 2022-07-01 PROCEDURE — 250N000011 HC RX IP 250 OP 636: Performed by: OBSTETRICS & GYNECOLOGY

## 2022-07-01 PROCEDURE — 94644 CONT INHLJ TX 1ST HOUR: CPT

## 2022-07-01 PROCEDURE — 250N000012 HC RX MED GY IP 250 OP 636 PS 637: Performed by: STUDENT IN AN ORGANIZED HEALTH CARE EDUCATION/TRAINING PROGRAM

## 2022-07-01 PROCEDURE — 87040 BLOOD CULTURE FOR BACTERIA: CPT | Performed by: STUDENT IN AN ORGANIZED HEALTH CARE EDUCATION/TRAINING PROGRAM

## 2022-07-01 PROCEDURE — 999N000215 HC STATISTIC HFNC ADULT NON-CPAP

## 2022-07-01 PROCEDURE — 250N000011 HC RX IP 250 OP 636: Performed by: STUDENT IN AN ORGANIZED HEALTH CARE EDUCATION/TRAINING PROGRAM

## 2022-07-01 PROCEDURE — 83930 ASSAY OF BLOOD OSMOLALITY: CPT | Performed by: INTERNAL MEDICINE

## 2022-07-01 PROCEDURE — 36415 COLL VENOUS BLD VENIPUNCTURE: CPT | Performed by: INTERNAL MEDICINE

## 2022-07-01 PROCEDURE — 94640 AIRWAY INHALATION TREATMENT: CPT | Mod: 76

## 2022-07-01 PROCEDURE — 94660 CPAP INITIATION&MGMT: CPT

## 2022-07-01 PROCEDURE — 99255 IP/OBS CONSLTJ NEW/EST HI 80: CPT | Performed by: INTERNAL MEDICINE

## 2022-07-01 PROCEDURE — 84300 ASSAY OF URINE SODIUM: CPT | Performed by: INTERNAL MEDICINE

## 2022-07-01 PROCEDURE — 76819 FETAL BIOPHYS PROFIL W/O NST: CPT

## 2022-07-01 PROCEDURE — 999N000157 HC STATISTIC RCP TIME EA 10 MIN

## 2022-07-01 PROCEDURE — 258N000003 HC RX IP 258 OP 636: Performed by: EMERGENCY MEDICINE

## 2022-07-01 PROCEDURE — 250N000009 HC RX 250: Performed by: STUDENT IN AN ORGANIZED HEALTH CARE EDUCATION/TRAINING PROGRAM

## 2022-07-01 PROCEDURE — 250N000013 HC RX MED GY IP 250 OP 250 PS 637: Performed by: STUDENT IN AN ORGANIZED HEALTH CARE EDUCATION/TRAINING PROGRAM

## 2022-07-01 PROCEDURE — 272N000272 HC CONTINUOUS NEBULIZER MICRO PUMP

## 2022-07-01 PROCEDURE — 36415 COLL VENOUS BLD VENIPUNCTURE: CPT | Performed by: STUDENT IN AN ORGANIZED HEALTH CARE EDUCATION/TRAINING PROGRAM

## 2022-07-01 PROCEDURE — 250N000011 HC RX IP 250 OP 636: Performed by: EMERGENCY MEDICINE

## 2022-07-01 PROCEDURE — 36415 COLL VENOUS BLD VENIPUNCTURE: CPT | Performed by: EMERGENCY MEDICINE

## 2022-07-01 PROCEDURE — 99236 HOSP IP/OBS SAME DATE HI 85: CPT | Mod: AI | Performed by: STUDENT IN AN ORGANIZED HEALTH CARE EDUCATION/TRAINING PROGRAM

## 2022-07-01 PROCEDURE — 250N000009 HC RX 250: Performed by: EMERGENCY MEDICINE

## 2022-07-01 PROCEDURE — 83935 ASSAY OF URINE OSMOLALITY: CPT | Performed by: INTERNAL MEDICINE

## 2022-07-01 PROCEDURE — 82805 BLOOD GASES W/O2 SATURATION: CPT | Performed by: EMERGENCY MEDICINE

## 2022-07-01 PROCEDURE — 250N000009 HC RX 250: Performed by: INTERNAL MEDICINE

## 2022-07-01 RX ORDER — ACETAMINOPHEN 650 MG/1
650 SUPPOSITORY RECTAL EVERY 6 HOURS PRN
Status: DISCONTINUED | OUTPATIENT
Start: 2022-07-01 | End: 2022-07-01 | Stop reason: HOSPADM

## 2022-07-01 RX ORDER — ACETAMINOPHEN 325 MG/1
325-650 TABLET ORAL EVERY 6 HOURS PRN
COMMUNITY

## 2022-07-01 RX ORDER — HEPARIN SODIUM 5000 [USP'U]/.5ML
5000 INJECTION, SOLUTION INTRAVENOUS; SUBCUTANEOUS EVERY 12 HOURS
Status: DISCONTINUED | OUTPATIENT
Start: 2022-07-01 | End: 2022-07-01 | Stop reason: HOSPADM

## 2022-07-01 RX ORDER — PRENATAL VIT/IRON FUM/FOLIC AC 27MG-0.8MG
1 TABLET ORAL DAILY
Status: DISCONTINUED | OUTPATIENT
Start: 2022-07-01 | End: 2022-07-01 | Stop reason: HOSPADM

## 2022-07-01 RX ORDER — PREDNISONE 20 MG/1
TABLET ORAL
Qty: 8 TABLET | Refills: 0 | Status: SHIPPED | OUTPATIENT
Start: 2022-07-01 | End: 2022-07-01

## 2022-07-01 RX ORDER — PREDNISONE 20 MG/1
TABLET ORAL
Qty: 8 TABLET | Refills: 0 | Status: SHIPPED | OUTPATIENT
Start: 2022-07-01 | End: 2022-07-11

## 2022-07-01 RX ORDER — CEFTRIAXONE 1 G/1
1 INJECTION, POWDER, FOR SOLUTION INTRAMUSCULAR; INTRAVENOUS EVERY 24 HOURS
Status: DISCONTINUED | OUTPATIENT
Start: 2022-07-01 | End: 2022-07-01

## 2022-07-01 RX ORDER — TERBUTALINE SULFATE 1 MG/ML
0.25 INJECTION, SOLUTION SUBCUTANEOUS ONCE
Status: COMPLETED | OUTPATIENT
Start: 2022-07-01 | End: 2022-07-01

## 2022-07-01 RX ORDER — ALBUTEROL SULFATE 0.83 MG/ML
5 SOLUTION RESPIRATORY (INHALATION) ONCE
Status: COMPLETED | OUTPATIENT
Start: 2022-07-01 | End: 2022-07-01

## 2022-07-01 RX ORDER — LIDOCAINE 40 MG/G
CREAM TOPICAL
Status: DISCONTINUED | OUTPATIENT
Start: 2022-07-01 | End: 2022-07-01 | Stop reason: HOSPADM

## 2022-07-01 RX ORDER — ALBUTEROL SULFATE 0.83 MG/ML
2.5 SOLUTION RESPIRATORY (INHALATION)
Status: DISCONTINUED | OUTPATIENT
Start: 2022-07-01 | End: 2022-07-01

## 2022-07-01 RX ORDER — IPRATROPIUM BROMIDE AND ALBUTEROL SULFATE 2.5; .5 MG/3ML; MG/3ML
3 SOLUTION RESPIRATORY (INHALATION)
Status: DISCONTINUED | OUTPATIENT
Start: 2022-07-01 | End: 2022-07-01 | Stop reason: HOSPADM

## 2022-07-01 RX ORDER — ALBUTEROL SULFATE 90 UG/1
2 AEROSOL, METERED RESPIRATORY (INHALATION) EVERY 4 HOURS PRN
Qty: 18 G | Refills: 4 | Status: SHIPPED | OUTPATIENT
Start: 2022-07-01 | End: 2023-02-06

## 2022-07-01 RX ORDER — ACETAMINOPHEN 325 MG/1
650 TABLET ORAL EVERY 6 HOURS PRN
Status: DISCONTINUED | OUTPATIENT
Start: 2022-07-01 | End: 2022-07-01 | Stop reason: HOSPADM

## 2022-07-01 RX ORDER — MAGNESIUM SULFATE HEPTAHYDRATE 40 MG/ML
2 INJECTION, SOLUTION INTRAVENOUS ONCE
Status: COMPLETED | OUTPATIENT
Start: 2022-07-01 | End: 2022-07-01

## 2022-07-01 RX ORDER — ALBUTEROL SULFATE 90 UG/1
2 AEROSOL, METERED RESPIRATORY (INHALATION) EVERY 4 HOURS PRN
Qty: 18 G | Refills: 4 | Status: SHIPPED | OUTPATIENT
Start: 2022-07-01 | End: 2022-07-01

## 2022-07-01 RX ORDER — PREDNISONE 20 MG/1
60 TABLET ORAL DAILY
Status: DISCONTINUED | OUTPATIENT
Start: 2022-07-01 | End: 2022-07-01 | Stop reason: HOSPADM

## 2022-07-01 RX ADMIN — TERBUTALINE SULFATE 0.25 MG: 1 INJECTION SUBCUTANEOUS at 01:51

## 2022-07-01 RX ADMIN — PREDNISONE 60 MG: 20 TABLET ORAL at 08:11

## 2022-07-01 RX ADMIN — HEPARIN SODIUM 5000 UNITS: 10000 INJECTION, SOLUTION INTRAVENOUS; SUBCUTANEOUS at 09:46

## 2022-07-01 RX ADMIN — ALBUTEROL SULFATE 5 MG: 2.5 SOLUTION RESPIRATORY (INHALATION) at 00:35

## 2022-07-01 RX ADMIN — ALBUTEROL SULFATE 2.5 MG: 2.5 SOLUTION RESPIRATORY (INHALATION) at 07:24

## 2022-07-01 RX ADMIN — SODIUM CHLORIDE: 9 INJECTION, SOLUTION INTRAVENOUS at 00:45

## 2022-07-01 RX ADMIN — MAGNESIUM SULFATE IN WATER 2 G: 40 INJECTION, SOLUTION INTRAVENOUS at 01:50

## 2022-07-01 RX ADMIN — ALBUTEROL SULFATE 5 MG/HR: 5 SOLUTION RESPIRATORY (INHALATION) at 01:11

## 2022-07-01 RX ADMIN — IPRATROPIUM BROMIDE AND ALBUTEROL SULFATE 3 ML: 2.5; .5 SOLUTION RESPIRATORY (INHALATION) at 17:01

## 2022-07-01 RX ADMIN — IPRATROPIUM BROMIDE AND ALBUTEROL SULFATE 3 ML: 2.5; .5 SOLUTION RESPIRATORY (INHALATION) at 11:30

## 2022-07-01 RX ADMIN — PRENATAL VIT W/ FE FUMARATE-FA TAB 27-0.8 MG 1 TABLET: 27-0.8 TAB at 08:11

## 2022-07-01 ASSESSMENT — ACTIVITIES OF DAILY LIVING (ADL)
ADLS_ACUITY_SCORE: 35
DEPENDENT_IADLS:: INDEPENDENT
ADLS_ACUITY_SCORE: 35

## 2022-07-01 NOTE — ED NOTES
"Pt stating she has lower abd cramping that just started. Feeling some pain in lower back. Pt states \"I hope labor isn't starting.\" Stating the cramping does not last long, but if frequent. Palpated abd, unable to feel tightening. Pt denies tightening at the top of fundus. Pt denies any fluids leaking or abnormal discharge. Educated pt on [facility]- and instructed her to report any changes.   "

## 2022-07-01 NOTE — CONSULTS
LUNG NODULE & INTERVENTIONAL PULMONARY CLINIC  [facility], [facility]     Soren Freitas MRN# [id number]   Age: 31 year old YOB: 1990       Requesting Physician: No referring provider defined for this encounter.       Assessment and Plan:    1.  Severe asthma exacerbation in pregnant woman at 36 weeks.  Overall her story is 1 of having too low steroid tone.  She is on a high-dose inhaled steroid at home and reportedly has been using it once daily.  This needs to be increased to twice daily.  Continue systemic steroids for now and she should probably go home with a taper.    Consider prednisone 20 mg a day for 5 days then 10 mg for 5 days then stop for her discharge plan.    She should see a provider comfortable with asthma management early next week.    The decision for discharge should be based on patient comfort with ambulation.    We will continue to follow.    High risk clinical picture.  Very high risk of worsening asthma if she is not able to stay on these treatments.  Very low threshold to monitor in the hospital pending how she is feeling.  She needs to have a clear-cut and dependable follow-up plan.           History:     Soren Freitas is a G3, P2 31 year old female with sig h/o for severe asthma with history of intubation and hospitalization.  She takes Dulera once a day.  Over the last 24 hours or so she had escalating albuterol use-she ran out of her albuterol inhaler without relief.  She came into the emergency department and since getting treated with mag, IV steroids and nebs she is doing better.  She denies any known triggers.  Overall her asthma has been much more difficult to control at this pregnancy than previous.    She reports using her Dulera once a day.    No new allergy exposures.  Denies any change in reflux.    - My interpretation of the images relevant for this visit includes: Chest x-ray without obvious  infiltrates           Past Medical History:      Past Medical History:   Diagnosis Date     Obesity      Severe persistent asthma with acute exacerbation 1/1/2015    Formatting of this note might be different from the original. 12/2014 Admit for exacerbation: Spirometry FEV1/FVC 68%, D/C w/dulera, albuterol, Spiriva.  Started singulair 10mg QD, Given NRT.  Dx as infant, has been on albuterol lifelong.    Last Assessment & Plan:  Formatting of this note might be different from the original. Stable on Singulair, cetirizine, albuterol, and dulera. Discussed COVID     Uncomplicated asthma            Past Surgical History:    History reviewed. No pertinent surgical history.       Social History:     Social History     Tobacco Use     Smoking status: Former Smoker     Smokeless tobacco: Not on file   Substance Use Topics     Alcohol use: Not on file          Family History:   No family history on file.        Allergies:      Allergies   Allergen Reactions     Cashew Nut Oil Anaphylaxis and Angioedema     Fish Allergy Itching     Sea Food causes Throat swelling     Peanut-Derived Angioedema and Swelling     Throat swelling.       Shellfish-Derived Products Angioedema          Medications:     Current Facility-Administered Medications   Medication     acetaminophen (TYLENOL) tablet 650 mg    Or     acetaminophen (TYLENOL) Suppository 650 mg     cefTRIAXone (ROCEPHIN) 1 g vial to attach to  mL bag for ADULTS or NS 50 mL bag for PEDS     heparin ANTICOAGULANT injection 5,000 Units     ipratropium - albuterol 0.5 mg/2.5 mg/3 mL (DUONEB) neb solution 3 mL     lidocaine (LMX4) cream     lidocaine 1 % 0.1-1 mL     predniSONE (DELTASONE) tablet 60 mg     prenatal multivitamin w/iron per tablet 1 tablet     sodium chloride (PF) 0.9% PF flush 3 mL     sodium chloride (PF) 0.9% PF flush 3 mL     sodium chloride 0.9% infusion     Current Outpatient Medications   Medication Sig     acetaminophen (TYLENOL) 325 MG tablet Take  "325-650 mg by mouth every 6 hours as needed for mild pain     albuterol (PROAIR HFA/PROVENTIL HFA/VENTOLIN HFA) 108 (90 Base) MCG/ACT inhaler Inhale 2 puffs into the lungs every 4 hours as needed for shortness of breath / dyspnea or wheezing     albuterol (PROVENTIL) (2.5 MG/3ML) 0.083% neb solution Take 1 vial (2.5 mg) by nebulization every 6 hours as needed for shortness of breath / dyspnea or wheezing     mometasone-formoterol (DULERA) 200-5 MCG/ACT inhaler Inhale 2 puffs into the lungs 2 times daily     montelukast (SINGULAIR) 10 MG tablet Take 10 mg by mouth every morning     Prenatal Vit-Fe Fumarate-FA (PRENATAL MULTIVITAMIN W/IRON) 27-0.8 MG tablet Take 1 tablet by mouth daily          Review of Systems:     Comprehensive review of systems negative except as above         Physical Exam:   /57   Pulse 109   Temp 98.8  F (37.1  C) (Axillary)   Resp 19   Ht 1.702 m (5' 7\")   Wt 89.8 kg (198 lb)   LMP 10/12/2021 (Approximate)   SpO2 98%   BMI 31.01 kg/m      Constitutional - fatigue but breathing comfortably  Neck supple without palpable thyroid  Eyes - no redness or discharge  Respiratory -breathing appears comfortable.  Significant bilateral wheezes  Cardiac -- Normal rate, rhythm.  No murmurs  Abdomen: Gravid consistent with reported dates, no other palpable masses  Extremities: No clubbing or cyanosis  Skin - No appreciable discoloration or lesions (very limited exam)  Neurological - No apparent tremors. Speech fluent and articlate  Psychiatric - no signs of delirium or anxiety          Current Laboratory Data:   All laboratory and imaging data reviewed.    Results for orders placed or performed during the hospital encounter of 06/30/22 (from the past 24 hour(s))   CBC with platelets differential    Narrative    The following orders were created for panel order CBC with platelets differential.  Procedure                               Abnormality         Status                     ---------       "                         -----------         ------                     CBC with platelets and d...[[id number]]  Abnormal            Final result                 Please view results for these tests on the individual orders.   Comprehensive metabolic panel   Result Value Ref Range    Sodium 136 136 - 145 mmol/L    Potassium 3.8 3.5 - 5.0 mmol/L    Chloride 108 (H) 98 - 107 mmol/L    Carbon Dioxide (CO2) 19 (L) 22 - 31 mmol/L    Anion Gap 9 5 - 18 mmol/L    Urea Nitrogen 4 (L) 8 - 22 mg/dL    Creatinine 0.68 0.60 - 1.10 mg/dL    Calcium 8.7 8.5 - 10.5 mg/dL    Glucose 107 70 - 125 mg/dL    Alkaline Phosphatase 175 (H) 45 - 120 U/L    AST 28 0 - 40 U/L    ALT 28 0 - 45 U/L    Protein Total 6.5 6.0 - 8.0 g/dL    Albumin 2.6 (L) 3.5 - 5.0 g/dL    Bilirubin Total 0.8 0.0 - 1.0 mg/dL    GFR Estimate >90 >60 mL/min/1.73m2   CBC with platelets and differential   Result Value Ref Range    WBC Count 15.2 (H) 4.0 - 11.0 10e3/uL    RBC Count 3.93 3.80 - 5.20 10e6/uL    Hemoglobin 11.3 (L) 11.7 - 15.7 g/dL    Hematocrit 33.3 (L) 35.0 - 47.0 %    MCV 85 78 - 100 fL    MCH 28.8 26.5 - 33.0 pg    MCHC 33.9 31.5 - 36.5 g/dL    RDW 13.6 10.0 - 15.0 %    Platelet Count 217 150 - 450 10e3/uL    % Neutrophils 76 %    % Lymphocytes 10 %    % Monocytes 9 %    % Eosinophils 4 %    % Basophils 0 %    % Immature Granulocytes 1 %    NRBCs per 100 WBC 0 <1 /100    Absolute Neutrophils 11.7 (H) 1.6 - 8.3 10e3/uL    Absolute Lymphocytes 1.6 0.8 - 5.3 10e3/uL    Absolute Monocytes 1.3 0.0 - 1.3 10e3/uL    Absolute Eosinophils 0.5 0.0 - 0.7 10e3/uL    Absolute Basophils 0.0 0.0 - 0.2 10e3/uL    Absolute Immature Granulocytes 0.2 <=0.4 10e3/uL    Absolute NRBCs 0.0 10e3/uL   B-Type Natriuretic Peptide ([facility] Only)   Result Value Ref Range    BNP 17 0 - 64 pg/mL   Troponin I   Result Value Ref Range    Troponin I <0.01 0.00 - 0.29 ng/mL   Symptomatic; Yes; 6/29/2022 Influenza A/B & SARS-CoV2 (COVID-19) Virus PCR Multiplex Nasopharyngeal    Specimen:  Nasopharyngeal; Swab   Result Value Ref Range    Influenza A PCR Negative Negative    Influenza B PCR Negative Negative    SARS CoV2 PCR Negative Negative    Narrative    Testing was performed using the kota SARS-CoV-2 & Influenza A/B Assay on the kota Alexandra System. This test should be ordered for the detection of SARS-CoV-2 and influenza viruses in individuals who meet clinical and/or epidemiological criteria. Test performance is unknown in asymptomatic patients. This test is for in vitro diagnostic use under the FDA EUA for laboratories certified under CLIA to perform moderate and/or high complexity testing. This test has not been FDA cleared or approved. A negative result does not rule out the presence of PCR inhibitors in the specimen or target RNA in concentration below the limit of detection for the assay. If only one viral target is positive but coinfection with multiple targets is suspected, the sample should be re-tested with another FDA cleared, approved or authorized test, if coinfection would change clinical management. [facility] are certified under the Clinical Laboratory Improvement Amendments of 1988 (CLIA-88) as  qualified to perform moderate and/or high complexity laboratory testing.   XR Chest Port 1 View    Narrative    EXAM: XR CHEST PORT 1 VIEW  LOCATION: [facility]  DATE/TIME: 6/30/2022 10:00 PM    INDICATION: Dyspnea  COMPARISON: 05/25/2022      Impression    IMPRESSION: Persistent right basilar infiltrate related to pneumonia. New hazy ill-defined left perihilar infiltrate extending the left lower lung. This can also be seen with pneumonia. Upper lungs clear. Normal heart size and pulmonary vascularity.   Blood gas venous   Result Value Ref Range    pH Venous 7.38 7.35 - 7.45    pCO2 Venous 32 (L) 35 - 50 mm Hg    pO2 Venous 70 (H) 25 - 47 mm Hg    Bicarbonate Venous 20 (L) 24 - 30 mmol/L    Base Excess/Deficit (+/-) -6.1   mmol/L     Oxyhemoglobin Venous 92.6 (H) 70.0 - 75.0 %    O2 Sat, Venous 94.1 (H) 70.0 - 75.0 %   US OB Fetal Biophys Prf wo NonStrs Singls Sgl    Narrative    US OB FETAL BIOPHY PROFILE W/O NON STRESS SINGLE  7/1/2022 2:02 AM    INDICATION: Pregnant. Asthma exacerbation.    COMPARISON: None.    FINDINGS:     Fetal breathing movements:  2 out of 2.  Gross body movement:   2 out of 2.  Fetal tone:        2 out of 2.  Amniotic fluid volume:    2 out of 2.    Presentation: Cephalic.  Fetal heart rate: 139 bpm.  Placenta: Anterior.  MAN: 21 cm. The amniotic fluid maximum vertical pocket measures 8.1 cm.      Impression    IMPRESSION:   1.  Single live intrauterine pregnancy in cephalic presentation.  2.  Normal biophysical profile score of 8 out of 8.    Sodium random urine   Result Value Ref Range    Sodium Urine mmol/L 108 mmol/L

## 2022-07-01 NOTE — ED NOTES
Patient walked fairly well without , maintained Oxygen of 98% while ambulating.   RN is been notified.

## 2022-07-01 NOTE — PROGRESS NOTES
RESPIRATORY CARE NOTE     Patient Name: Soren Freitas  Today's Date: 7/1/2022       Pt continues on RA and stable respiratory wise, no acute events requiring RT interventions. BS: fainted exp wheezes auscultated at upper airways and decreased @ bases slightly. Pt did receive all her scheduled nebs as ordered. RT will continue to follow.     Charles Toledo RRT

## 2022-07-01 NOTE — ED NOTES
Dr Walter called, IV fluids cancelled. Order to ambulate patient is halls and check sats. EDT ambulating  Patient.

## 2022-07-01 NOTE — PLAN OF CARE
Occupational Therapy     Orders received. Chart reviewed and discussed with care team.? Occupational Therapy not indicated due to pt's current high oxygen needs, having uterine contractions, and multiple medical considerations needing to be resolved prior to therapy evaluation including the consideration for an emergent .    Defer discharge recommendations to medical team.? Will complete orders at this time. Please re-order when pt is able to safely participate in occupational therapy evaluation.    Claudine Larry OTR/L

## 2022-07-01 NOTE — H&P
[facility]    History and Physical - Hospitalist Service       Date of Admission:  2022    Assessment & Plan      Soren Freitas is a 31 year old  (currently 36w pregnant) female hx of asthma, obesity who presents with dyspnea and wheezing.  Symptoms consistent with acute asthma exacerbation; imaging notable for persistent / possibly increased pneumonia.  Admitted for further evaluation and management.    Respiratory distress 2/2 to acute asthma exacerbation / CAP   Acute sx (dyspnea, wheezing, cough) starting earlier this evening. VS notable for hypoxia PTA but not in the ED. Admission exam notable for signs of respiratory distress (neck accessory muscle, paradoxical breathing, speaking in 1-2 word sentences); diffuse tight, wheezy / coarse lung sounds -- no overt crackles in one particular area. Does have leukocytosis to 15 with left shift (though has had that several recent lab checks). Negative for COVID / flu; BNP 17. CXR notable for persistent right basilar pneumonia, as well as possible new ill-defined left perihilar infiltrate (possibly pneumonia) -- though this is subtle on my read.  Clinically consistent with a severe asthma exacerbation with likely concurrent CAP. May have degree of obesity / pregnancy-induced hypoventilation. Considered peripartum CM/HF, though doubt based on clinical exam / BNP. Still at increased risk PE but feel risk of radiation with CT does not outweigh benefit of uncovering PE at this time. Mild anemia may be contributing. Management: Received multiple nebs, IV methylpred, IV mag, IV ceftriaxone, 1 L NS with what was thought to be improvement but worsened with my repeat exam (confirmed by ED provider).   PLAN:  - Admit (plan for ICU-level care initially, though no ICU beds available)   - Continuous pulse ox, tele    - Pulm, RT consult - appreciate    - Anesthesia has been notified as well, appreciate   - Careful monitoring of  respiratory status with serial exams   - Currently not meeting criteria for intubation    If continues to fatigue / GCS decreases, consider moving in that direction    Intubation also a consideration if moving toward delivery   - Goal O2 >90%, defer to pulm   - Hold PTA asthma meds   - Nebs q 2-4h   - Oral pred 60 mg daily if tolerating oral    IV methylpred if not tolerating oral   - BC x 2 (unfortunatley not drawn prior to abx), daily CBC    - Hold lactate check given several nebs   - Add trop x 1 minimum  - Continue IV abx for now    Ceftriaxone empirically    Did consider broadening given recent IV abx and possible re-worsening of CXR -- holding for now  - Subcu heparin for now for DVT prophylaxis     Sinus Tachycardia   HR in 150s initially -- settling in 120-130 range. Following Nebs; feel this is likely etiology. EKG is sinus tach -- no evidence of afib vs other dysrhythmia. No overt evidence of ischemia. Need to consider pneumonia vs possible PE as differentials for sinus tach as well.   - Monitor      Current pregnancy at 36w    contractions without cervical change   Follows with [facility] midwives. Has been having contractions PTA; cervical checks with 1 cm max dilation. OB consulted by ED, appreciate. NST was nonreactive so need to increase monitoring.   - OB consult, appreciate   - BPP   - Mindful of pregnancy with above medication decisions   - Possibility of delivery () has been discussed   - Prenatal vit      Yeast on wet prep   Uncertain significance. Wet prep done at clinic today.   -  Consider antifungal when respiratory status more stable     Hypoalbuminemia   Albumin 2.6. likely negative acute phase reactant in setting of acute inflammation with resp presentation.     Normocytic anemia   Hgb 11.3 with MCV in 80s. Similar to recent checks but this appears down from most prior values. Unclear etiology   - CBC   - Workup more formally when not in acute crisis     Obesity   Noted.  "Confounds respiratory picture  - Contributes to complexity of care         Diet: NPO   DVT Prophylaxis: Subcu heparin for now for DVT prophylaxis   Hare Catheter: Not present  Fluids: mIVF given NPO and no HF evidence   Central Lines: None  Cardiac Monitoring: tele   Code Status: Full     Clinically Significant Risk Factors Present on Admission             # Hypoalbuminemia: Albumin = 2.6 g/dL (Ref range: 3.5 - 5.0 g/dL) on admission, will monitor as appropriate        # Obesity: Estimated body mass index is 31.01 kg/m  as calculated from the following:    Height as of this encounter: 1.702 m (5' 7\").    Weight as of this encounter: 89.8 kg (198 lb).        Disposition Plan      The patient's care was discussed with the attending physicians in a.m. report    Husam Tian DO   [facility] Resident   Pager#[phone]    Hospitalist Service  [facility]  Securely message with the Vocera Web Console (learn more here)  Text page via [employer] Paging/Directory       ______________________________________________________________________    Chief Complaint   Wheezing and shortness of breath     History is obtained from the patient    History of Present Illness   Michaelarubi Freitas is a 31 year old  (currently 36w pregnant) female hx of asthma, obesity who presents with dyspnea and wheezing.     Symptoms started earlier this evening.  Does have a tight cough.  Sx are consistent with her usual asthma exacerbation.  No clear trigger.  She has been struggling with moving around more recently, as the pregnancy progresses. Of note, did have an OB appointment earlier today at which point her breathing was nml. Called EMS at home and she was noted to be working hard with breathing and hypoxic to high 80s.     Has had several ER visits for similar sx recently   - : Asthma. Improved with nebs and steroids. CT PE run negative for PE. Had reported bilateral pneumonia - given IV " Ceftrixone and azithro.  Discharged with course for amoxicillin and azithromycin, as well as inhalers and prednisone burst.  Did improve following this per clinic notes.  -  for asthma exacerbation - improved with duonebs     Has known severe persistent asthma   Last pure asthma exacerbation prior to above in 2022  Follows with pulm for her asthma --prescribed Dulera and montelukast.  Had a prescription for prednisone but did not take it prior to coming in.  Remote history of intubation for asthma many years ago  Does not smoke  Is COVID vaccinated x2, no booster    For pregnancy care, follows at [facility] --has been seen midwives regularly  Has had some  uterine contractions but no overt labor / concerns on monitoring   Desires vaginal delivery (no prior C sections)   Plans for Nexplanon after delivery    Review of Systems    The 10 point Review of Systems is negative other than noted in the HPI or here.     Past Medical History    I have reviewed this patient's medical history and updated it with pertinent information if needed.   Past Medical History:   Diagnosis Date     Obesity      Severe persistent asthma with acute exacerbation 2015    Formatting of this note might be different from the original. 2014 Admit for exacerbation: Spirometry FEV1/FVC 68%, D/C w/dulera, albuterol, Spiriva.  Started singulair 10mg QD, Given NRT.  Dx as infant, has been on albuterol lifelong.    Last Assessment & Plan:  Formatting of this note might be different from the original. Stable on Singulair, cetirizine, albuterol, and dulera. Discussed COVID     Uncomplicated asthma         Past Surgical History   I have reviewed this patient's surgical history and updated it with pertinent information if needed.  History reviewed. No pertinent surgical history.     Social History   Has 2 kids at home --lives in independent home  Works as a  at a [employer]  Denies any tobacco, alcohol, illicit drug  use    Family History   No significant family history, including no history of: asthma     Prior to Admission Medications   Prior to Admission Medications   Prescriptions Last Dose Informant Patient Reported? Taking?   Prenatal Vit-Fe Fumarate-FA (PRENATAL MULTIVITAMIN W/IRON) 27-0.8 MG tablet   Yes No   Sig: Take 1 tablet by mouth daily   albuterol (PROAIR HFA/PROVENTIL HFA/VENTOLIN HFA) 108 (90 Base) MCG/ACT inhaler   No No   Sig: Inhale 2 puffs into the lungs every 4 hours as needed for shortness of breath / dyspnea or wheezing   albuterol (PROVENTIL) (2.5 MG/3ML) 0.083% neb solution   Yes No   Sig: Take 2.5 mg by nebulization every 6 hours as needed for shortness of breath / dyspnea or wheezing   albuterol (PROVENTIL) (2.5 MG/3ML) 0.083% neb solution   No No   Sig: Take 1 vial (2.5 mg) by nebulization every 6 hours as needed for shortness of breath / dyspnea or wheezing   mometasone-formoterol (DULERA) 200-5 MCG/ACT inhaler   Yes No   Sig: Inhale 2 puffs into the lungs 2 times daily   mometasone-formoterol (DULERA) 200-5 MCG/ACT inhaler   No No   Sig: Inhale 2 puffs into the lungs 2 times daily   montelukast (SINGULAIR) 10 MG tablet   Yes No   Sig: Take 10 mg by mouth every morning      Facility-Administered Medications: None     Allergies   No Known Allergies    Physical Exam   Vital Signs: Temp: 98.8  F (37.1  C) Temp src: Axillary BP: 121/60 Pulse: (!) 128   Resp: 25 SpO2: 96 % O2 Device: None (Room air)    Weight: 198 lbs 0 oz    Initial exam in the ED    General: Moderate respiratory distress.  Audible wheezing  GCS: 14-15  HEENT: MMM   Neck: Using accessory muscles of respiration.  No JVD  CV tachycardic rate regular rhythm.  No murmur.  Distal pulses symmetric  Respiratory: Tachypneic, using accessory muscles of respiration, paradoxical breathing, lung sounds are coarse/tight/wheezy throughout.  Abd: Gravid   Ext: No edema or overt asymmetry/deformity. Cap refill <2   Skin: No overt rash on easily  visualized skin.   Neuro: Non-focal.   Psych: anxious mood and affect.     Of note, her 2 children are sleeping in the ED bed with her     Please see progress note for updated exam following initial     Data   Data reviewed today: I reviewed all medications, new labs and imaging results over the last 24 hours. I personally reviewed:    Recent Labs   Lab 06/30/22  2143   WBC 15.2*   HGB 11.3*   MCV 85         POTASSIUM 3.8   CHLORIDE 108*   CO2 19*   BUN 4*   CR 0.68   ANIONGAP 9   CARYN 8.7      ALBUMIN 2.6*   PROTTOTAL 6.5   BILITOTAL 0.8   ALKPHOS 175*   ALT 28   AST 28     Recent Results (from the past 24 hour(s))   XR Chest Port 1 View    Narrative    EXAM: XR CHEST PORT 1 VIEW  LOCATION: [facility]  DATE/TIME: 6/30/2022 10:00 PM    INDICATION: Dyspnea  COMPARISON: 05/25/2022      Impression    IMPRESSION: Persistent right basilar infiltrate related to pneumonia. New hazy ill-defined left perihilar infiltrate extending the left lower lung. This can also be seen with pneumonia. Upper lungs clear. Normal heart size and pulmonary vascularity.   US OB Fetal Biophys Prf wo NonStrs Singls Sgl    Narrative    US OB FETAL BIOPHY PROFILE W/O NON STRESS SINGLE  7/1/2022 2:02 AM    INDICATION: Pregnant. Asthma exacerbation.    COMPARISON: None.    FINDINGS:     Fetal breathing movements:  2 out of 2.  Gross body movement:   2 out of 2.  Fetal tone:        2 out of 2.  Amniotic fluid volume:    2 out of 2.    Presentation: Cephalic.  Fetal heart rate: 139 bpm.  Placenta: Anterior.  MAN: 21 cm. The amniotic fluid maximum vertical pocket measures 8.1 cm.      Impression    IMPRESSION:   1.  Single live intrauterine pregnancy in cephalic presentation.  2.  Normal biophysical profile score of 8 out of 8.

## 2022-07-01 NOTE — PROGRESS NOTES
Brief pulmonary note-full note to follow    31-year-old woman with history of severe asthma including intubation here with exacerbation in the setting of 36 weeks pregnancy.    She is very wheezy.  Fortunately normal oxygen saturations on room air.    Her story of emptying her albuterol inhaler is highly concerning for severe exacerbation.  It implies that she is not getting enough steroid.    I agree with continued systemic steroids for now.    Her home Dulera is high-dose and supposed to be taken 2 puffs twice daily.  It sounds like she was using it once daily.    Fine to treat for community-acquired pneumonia but it seems unlikely that she has any typical bacterial infection.    Recommendations  Oral prednisone  Treat for atypicals  Stop continuous nebs  Stop albuterol-changed to scheduled daytime duo nebs  No further magnesium from a pulmonary standpoint    Discharge plan-when ready  Dulera 2 inhalations twice daily-gargle with use  She should probably go home on low to moderate dose steroid with a slow taper and close follow-up.    Vidal Mccartney MD    Discussed with bedside RN and primary team.

## 2022-07-01 NOTE — PROGRESS NOTES
"12:28 AM    See signout on patient.  Saw patient as quickly as possible.  She said her breathing was worse  He is feeling wheezy and short of breath - similar to how she felt before she came into the ER  Says she is fatigued from her breathing    /60   Pulse (!) 128   Temp 98.8  F (37.1  C) (Axillary)   Resp 25   Ht 1.702 m (5' 7\")   Wt 89.8 kg (198 lb)   LMP 10/12/2021 (Approximate)   SpO2 96%   BMI 31.01 kg/m      General: Moderate respiratory distress.  Audible wheezing  GCS: 14-15  Neck: Using accessory muscles of respiration.  No JVD  CV tachycardic rate regular rhythm.  No murmur.  Distal pulses symmetric  Respiratory: Tachypneic, using accessory muscles of respiration, paradoxical breathing, lung sounds are coarse/tight/wheezy throughout.    Assessment: Respiratory distress in setting of likely asthma exacerbation with possible increasing pneumonia    Plan:  Saw patient with Dr. Doe bedside.  Appreciate.  Fine for another neb now.  VBG/ABG --consideration of BiPAP  He will discussed with Dr. Slater of ICU and notify me of status following that discussion.     Husam Tian DO   [facility] Resident   Pager#[phone]    Notified Dr. Sheth, attending on call as well.           Addendum   2:51 AM    Recheck patient at bedside  Status post BiPAP and additional neb.  Felt improved with this    /60   Pulse (!) 124   Temp 98.8  F (37.1  C) (Axillary)   Resp (!) 31   Ht 1.702 m (5' 7\")   Wt 89.8 kg (198 lb)   LMP 10/12/2021 (Approximate)   SpO2 91%   BMI 31.01 kg/m    Exam: Much more comfortable, no overt respiratory distress  Neck: No accessory muscle use.  No JVD  CV: Tachycardic rate, regular rhythm.  No murmur.  Appears well perfused  Respiratory: Speaking in full sentences without increased effort.  No accessory muscle use.  No paradoxical breathing.  Lung sounds improved, though still coarse and wheezy.  Extremities: No peripheral edema    VBG: pH normal.  " "CO2 mildly low.  Bicarb low.    A:  Asthma exacerbation, improving with interventions    P:   Continue current measures  Low threshold to reach out to attending/pulm.  Appreciate.          Addendum   4:21 AM  Patient feeling better  Breathing much better  She is wanting real food and is discussing with RN that she will leave if she does not get it    Transition from BiPAP to high flow nasal cannula --last I can see this was set at 35 L at 21%    /60   Pulse (!) 124   Temp 98.8  F (37.1  C) (Axillary)   Resp (!) 31   Ht 1.702 m (5' 7\")   Wt 89.8 kg (198 lb)   LMP 10/12/2021 (Approximate)   SpO2 91%   BMI 31.01 kg/m      Discussed with patient over the phone -- she does not sound to be in distress   Highly recommend she stay given concern of respiratory status  Would highly recommend trending more reassuring respiratory status and gradually weaning supplemental O2 over the course of hours to possibly a day  There is still a question about delivery per OB  I also do not feel patient has complete decision-making capacity given her respiratory distress, possible mild encephalopathy in setting of relative hypoxia    Discussed that she will stay if we take off the n.p.o. order  I discussed that the reason we placed this was to avoid aspiration and possible procedure later today per OB  She said she would leave if she is not able to eat  I took off the n.p.o. order and put in diet order  Feel benefit of her staying with diet order diet order outweighs risk of her leaving  Can discuss with OB/other providers when able    Husam Tian DO   [facility] Medicine Resident   Pager#[phone]            "

## 2022-07-01 NOTE — CONSULTS
Care Management Initial Consult    General Information  Assessment completed with: Patient,    Type of CM/SW Visit: Initial Assessment    Primary Care Provider verified and updated as needed: Yes   Readmission within the last 30 days: no previous admission in last 30 days         Advance Care Planning: Advance Care Planning Reviewed:  (Does not have HCD)          Communication Assessment  Patient's communication style: spoken language (English or Bilingual)             Cognitive  Cognitive/Neuro/Behavioral: WDL                      Living Environment:   People in home: child(pierre), dependent     Current living Arrangements: apartment      Able to return to prior arrangements: yes       Family/Social Support:  Care provided by: self  Provides care for: child(pierre)  Marital Status: Single   (neighbor)          Description of Support System:           Current Resources:   Patient receiving home care services: No     Community Resources: None  Equipment currently used at home: none  Supplies currently used at home: None    Employment/Financial:  Employment Status: employed part-time        Financial Concerns:             Lifestyle & Psychosocial Needs:  Social Determinants of Health     Tobacco Use: Medium Risk     Smoking Tobacco Use: Former Smoker     Smokeless Tobacco Use: Unknown   Alcohol Use: Not on file   Financial Resource Strain: Not on file   Food Insecurity: Not on file   Transportation Needs: Not on file   Physical Activity: Not on file   Stress: Not on file   Social Connections: Not on file   Intimate Partner Violence: Not on file   Depression: Not on file   Housing Stability: Not on file       Functional Status:  Prior to admission patient needed assistance:   Dependent ADLs:: Independent  Dependent IADLs:: Independent                      Additional Information:    Assessment completed with patient. Patient states she is independently living in her apartment with her two children.  Both kids are with a  neighbor. She ambulates without devices, and works part-time. She states she will take a cab home at discharge.    Patient to return home at discharge.    Rachelle Joaquin RN

## 2022-07-01 NOTE — ED NOTES
Patient in bed, denies SOB. Respiratory just left room from giving nebulizer. Lungs auscultated wheezing throughout all lobes bilaterally. 97% O2 sats room air. Continuous nebulizer not running when in room, per night report patient had taken mask off no longer using and remained on RA with sats upper 90's.   Patient denied pain, no contractions.   Breakfast was ordered for patient. Left patient resting comfortably in bed.

## 2022-07-01 NOTE — PROGRESS NOTES
Physical Therapy: Orders received. Chart reviewed and discussed with care team.? Physical Therapy not indicated due to respiratory and medical status.? Defer discharge recommendations to care team.? Will complete orders.     Tami Humphreys, PT  7/1/2022

## 2022-07-01 NOTE — ED NOTES
Evaluated 36WGA (per patient) in ED for SOB/Asthma attack.  States that upon arrival she began to feel intermittent tightening in her lower abd that radiates to her low back.  EMX2 applied.  , maternal HR tachycardic also, state she has taken some medication for her labored breathing.  FHR non-reactive.  Uctx q 2-4 min, mod to abd palpation lasting 50-60 sec.  Denies any LOF, vaginal bleeding or tightening earlier today.  Soren states she had an OB appt this am and her cervix at that time was 1 cm.  SVE ext os 1cm/25% effaced, internal os not disrupted, no notable bloody show on glove. Dr Wolfe, ED informed of writers assessment. Will contact in house OB for further POC.

## 2022-07-01 NOTE — ED NOTES
Bed: JNED-04  Expected date:   Expected time:   Means of arrival:   Comments:   Bk Fire - 31 y F Asthma

## 2022-07-01 NOTE — ED PROVIDER NOTES
EMERGENCY DEPARTMENT SIGN OUT NOTE        ED COURSE AND MEDICAL DECISION MAKING  1:45 AM Patient was signed out to me by Dr Bk Doe.     In brief, Soren Freitas is a 31 year old female who initially presented with shortness of breath and wheezing after waking up from a nap at 1800 this evening. Patient is 36 weeks pregnant, endorses lower abdominal cramping every couple minutes. States her asthma has been worse during the pregnancy. Patient has been hospitalized for asthma and was intubated in 2014. Patient is a non smoker.    At time of sign out, patient is admitted to hospitalist. Will continue patient care.  Patient rechecked multiple times with improvement in saturations with high flow nasal cannula as she did not tolerate the BiPAP.  Patient also not tolerate BiPAP secondary to contractions which after discussion with OB she did receive terbutaline which seemed to help.  Patient now calm her and contractions decreasing.  We will be here continuously monitoring NST and doing well.  Patient will watch closely for critical changes and continue treatment for asthma with monitor for any signs of progressing labor.      Critical Care     Performed by: Dr Juan Luis Ralhp  Authorized by: Dr Juan Luis Ralph  Total critical care time: 65 minutes  Critical care was necessary to treat or prevent imminent or life-threatening deterioration of the following conditions:  Continue close monitoring of oxygen saturations, fetal heart rate and contractions, and respiratory status for acute respiratory failure decompensation.  Critical care was time spent personally by me on the following activities: development of treatment plan with patient or surrogate, discussions with consultants, examination of patient, evaluation of patient's response to treatment, obtaining history from patient or surrogate, ordering and performing treatments and interventions, ordering and review of laboratory studies, ordering and review of  radiographic studies, re-evaluation of patient's condition and monitoring for potential decompensation.  Critical care time was exclusive of separately billable procedures and treating other patients.      FINAL IMPRESSION    1. Severe persistent asthma with status asthmaticus    2. Pneumonia of both lungs due to infectious organism, unspecified part of lung      ED MEDS  Medications   0.9% sodium chloride BOLUS (1,000 mLs Intravenous New Bag 6/30/22 2316)     Followed by   sodium chloride 0.9% infusion ( Intravenous New Bag 7/1/22 0045)   albuterol (PROVENTIL) 100 mg in 20 mL inhalation solution (5 mg/hr Nebulization New Bag 7/1/22 0111)   lidocaine 1 % 0.1-1 mL (has no administration in time range)   lidocaine (LMX4) cream (has no administration in time range)   sodium chloride (PF) 0.9% PF flush 3 mL (has no administration in time range)   sodium chloride (PF) 0.9% PF flush 3 mL (has no administration in time range)   acetaminophen (TYLENOL) tablet 650 mg (has no administration in time range)     Or   acetaminophen (TYLENOL) Suppository 650 mg (has no administration in time range)   cefTRIAXone (ROCEPHIN) 1 g vial to attach to  mL bag for ADULTS or NS 50 mL bag for PEDS (has no administration in time range)   albuterol (PROVENTIL) neb solution 2.5 mg (has no administration in time range)   predniSONE (DELTASONE) tablet 60 mg (has no administration in time range)   heparin ANTICOAGULANT injection 5,000 Units (has no administration in time range)   prenatal multivitamin w/iron per tablet 1 tablet (has no administration in time range)   methylPREDNISolone sodium succinate (solu-MEDROL) injection 125 mg (125 mg Intravenous Given 6/30/22 2144)   ipratropium - albuterol 0.5 mg/2.5 mg/3 mL (DUONEB) neb solution 3 mL (3 mLs Nebulization Given 6/30/22 2146)   magnesium sulfate 2 g in water intermittent infusion (2 g Intravenous New Bag 6/30/22 2351)   cefTRIAXone (ROCEPHIN) 1 g vial to attach to  mL bag for  ADULTS or NS 50 mL bag for PEDS (1 g Intravenous New Bag 6/30/22 2010)   albuterol (PROVENTIL) neb solution 5 mg (5 mg Nebulization Given 7/1/22 0035)   terbutaline (BRETHINE) injection 0.25 mg (0.25 mg Subcutaneous Given 7/1/22 0151)   magnesium sulfate 2 g in water intermittent infusion (2 g Intravenous New Bag 7/1/22 0150)       LAB  Labs Ordered and Resulted from Time of ED Arrival to Time of ED Departure   COMPREHENSIVE METABOLIC PANEL - Abnormal       Result Value    Sodium 136      Potassium 3.8      Chloride 108 (*)     Carbon Dioxide (CO2) 19 (*)     Anion Gap 9      Urea Nitrogen 4 (*)     Creatinine 0.68      Calcium 8.7      Glucose 107      Alkaline Phosphatase 175 (*)     AST 28      ALT 28      Protein Total 6.5      Albumin 2.6 (*)     Bilirubin Total 0.8      GFR Estimate >90     CBC WITH PLATELETS AND DIFFERENTIAL - Abnormal    WBC Count 15.2 (*)     RBC Count 3.93      Hemoglobin 11.3 (*)     Hematocrit 33.3 (*)     MCV 85      MCH 28.8      MCHC 33.9      RDW 13.6      Platelet Count 217      % Neutrophils 76      % Lymphocytes 10      % Monocytes 9      % Eosinophils 4      % Basophils 0      % Immature Granulocytes 1      NRBCs per 100 WBC 0      Absolute Neutrophils 11.7 (*)     Absolute Lymphocytes 1.6      Absolute Monocytes 1.3      Absolute Eosinophils 0.5      Absolute Basophils 0.0      Absolute Immature Granulocytes 0.2      Absolute NRBCs 0.0     BLOOD GAS VENOUS - Abnormal    pH Venous 7.38      pCO2 Venous 32 (*)     pO2 Venous 70 (*)     Bicarbonate Venous 20 (*)     Base Excess/Deficit (+/-) -6.1      Oxyhemoglobin Venous 92.6 (*)     O2 Sat, Venous 94.1 (*)    INFLUENZA A/B & SARS-COV2 PCR MULTIPLEX - Normal    Influenza A PCR Negative      Influenza B PCR Negative      SARS CoV2 PCR Negative     B-TYPE NATRIURETIC PEPTIDE ( EAST ONLY) - Normal    BNP 17     TROPONIN I - Normal    Troponin I <0.01     SODIUM RANDOM URINE   OSMOLALITY, RANDOM URINE   OSMOLALITY   BLOOD CULTURE    BLOOD CULTURE     RADIOLOGY    US OB Fetal Biophys Prf wo NonStrs Singls Sgl   Final Result   IMPRESSION:    1.  Single live intrauterine pregnancy in cephalic presentation.   2.  Normal biophysical profile score of 8 out of 8.       XR Chest Port 1 View   Final Result   IMPRESSION: Persistent right basilar infiltrate related to pneumonia. New hazy ill-defined left perihilar infiltrate extending the left lower lung. This can also be seen with pneumonia. Upper lungs clear. Normal heart size and pulmonary vascularity.        DISCHARGE MEDS  New Prescriptions    No medications on file     Agus Ralph MD  [facility] EMERGENCY DEPARTMENT  [address] [place] [id number] [phone]       Agus Ralph MD  07/01/22 9793

## 2022-07-01 NOTE — PROGRESS NOTES
Brief intensivist note  2022     31 year old woman at 36 weeks of gestation and baseline severe persistent asthma, currently in the ED w/ an asthma exacerbation and uterine contractions.  She received DuoNeb and methylprednisolone in the ED.  She reportedly having persistent dyspnea, reportedly still wheezy, reportedly not tripoding.  Contacted multiple times by ED staff -- I am unable to leave the ICU d/t the acuity of patients under my care at this time.      Discussed with nursing supervisor at 0150 hrs., patient reportedly in active labor with plan for OB staff to assess.  If patient requires an emergent  and will need to be intubated for the procedure, we will work with the nursing supervisor to figure out staffing (we currently do not have staffing for our only crash bed).    Unfortunately, due to my acute responsibilities in the ICU, I am physically unable to come down to the ED to personally assess the patient right now.    I will assess Ms. Freitas as soon as I am physically able to leave the ICU.    At this time I will defer to ED staff to make a clinical decision as to whether patient will need to be emergently intubated for respiratory distress.    Brittany Slater MD  Pulmonary and Critical Care

## 2022-07-01 NOTE — PROGRESS NOTES
Pt continues on high flow, however I don't believe she is being very compliant.  Entered room cannula was out of her nose, she briefly placed it back, went back in room at cannula was out again.  RN called about desats, however, I don't believe she is having desats, believe it is more motion of her hand and she is typically in upper 90's, and drops quick to 60's, then right back up to 90's.  She is diminished, expiratory wheezes, and slightly tight, however, she most likely isn't getting any albuterol do to compliance of wearing it.  Pt is agitated, threatening to take out lines.

## 2022-07-01 NOTE — ED NOTES
5120 - Dr Lindsay updated on pts status.  VO for BPP, and cervical recheck in two hours. ED updated on OBGYN consult.  Pt informed of OB plan of care. Agreable to cares.

## 2022-07-01 NOTE — ED TRIAGE NOTES
Asthma attack started at 1800 tonight, no known trigger. Wheezes throughout.    neb x2 by EMS   Triage Assessment     Row Name 06/30/22 2051       Triage Assessment (Adult)    Airway WDL airway symptoms;X    Additional Documentation Breath Sounds (Group)       Respiratory WDL    Respiratory WDL X;rhythm/pattern    Rhythm/Pattern, Respiratory deep;labored;dyspnea on exertion;pursed lip breathing;shortness of breath;tachypneic       Breath Sounds    Breath Sounds All Fields    All Lung Fields Breath Sounds wheezes, inspiratory;wheezes, expiratory       Skin Circulation/Temperature WDL    Skin Circulation/Temperature WDL temperature    Skin Temperature warm       Cardiac WDL    Cardiac WDL X;rhythm    Cardiac Rhythm tachycardic       Peripheral/Neurovascular WDL    Peripheral Neurovascular WDL WDL       Cognitive/Neuro/Behavioral WDL    Cognitive/Neuro/Behavioral WDL WDL

## 2022-07-01 NOTE — PHARMACY-ADMISSION MEDICATION HISTORY
Pharmacy Note - Admission Medication History    Pertinent Provider Information: non compliant with all medications. She states that she takes them when she thinks about it which is about once a week per her testimonial.      ______________________________________________________________________    Prior To Admission (PTA) med list completed and updated in EMR.       PTA Med List   Medication Sig Last Dose     acetaminophen (TYLENOL) 325 MG tablet Take 325-650 mg by mouth every 6 hours as needed for mild pain Past Month     albuterol (PROAIR HFA/PROVENTIL HFA/VENTOLIN HFA) 108 (90 Base) MCG/ACT inhaler Inhale 2 puffs into the lungs every 4 hours as needed for shortness of breath / dyspnea or wheezing 6/30/2022     albuterol (PROVENTIL) (2.5 MG/3ML) 0.083% neb solution Take 1 vial (2.5 mg) by nebulization every 6 hours as needed for shortness of breath / dyspnea or wheezing Past Week     mometasone-formoterol (DULERA) 200-5 MCG/ACT inhaler Inhale 2 puffs into the lungs 2 times daily Past Week     montelukast (SINGULAIR) 10 MG tablet Take 10 mg by mouth every morning Past Week     Prenatal Vit-Fe Fumarate-FA (PRENATAL MULTIVITAMIN W/IRON) 27-0.8 MG tablet Take 1 tablet by mouth daily Past Week       Information source(s): Patient, Clinic records, Hospital records and [facility]  Method of interview communication: in-person    Summary of Changes to PTA Med List  New: nothing  Discontinued: nothing  Changed: nothing     Patient was asked about OTC/herbal products specifically.  PTA med list reflects this.    In the past week, patient estimated taking medication this percent of the time:  less than 50% due to other.    Allergies were reviewed, assessed, and updated with the patient.      Patient did not bring any medications to the hospital and can't retrieve from home. No multi-dose medications are available for use during hospital stay.     The information provided in this note is only as accurate as the  sources available at the time of the update(s).    Thank you for the opportunity to participate in the care of this patient.    Marco Antonio Burroughs RPH  7/1/2022 6:52 AM

## 2022-07-01 NOTE — ED NOTES
Patient sitting upright in bed, Dr Donis here earlier, patient will be admitted to MS floor,  Not ICU.  Patient frustrated with IV pump alarm. IV fluids stopped per patient  Request. Heart monitoring stopped. Patient up to commode independently.    Patient denies any contractions, states she is breathing without difficulty,   Remains on room air,  sats at 96%.

## 2022-07-01 NOTE — ED NOTES
"[facility] ED Handoff Report    ED Chief Complaint: asthma/pregnancy    ED Diagnosis:  (J45.52) Severe persistent asthma with status asthmaticus  Comment:   Plan:     (J18.9) Pneumonia of both lungs due to infectious organism, unspecified part of lung  Comment:   Plan:        PMH:    Past Medical History:   Diagnosis Date     Obesity      Severe persistent asthma with acute exacerbation 1/1/2015    Formatting of this note might be different from the original. 12/2014 Admit for exacerbation: Spirometry FEV1/FVC 68%, D/C w/dulera, albuterol, Spiriva.  Started singulair 10mg QD, Given NRT.  Dx as infant, has been on albuterol lifelong.    Last Assessment & Plan:  Formatting of this note might be different from the original. Stable on Singulair, cetirizine, albuterol, and dulera. Discussed COVID     Uncomplicated asthma         Code Status:  Full Code     Falls Risk: No Band: Not applicable    Current Living Situation/Residence: lives in an apartment     Elimination Status: Continent: Yes     Activity Level: Independent    Patients Preferred Language:  English     Needed: No    Vital Signs:  /59   Pulse 103   Temp 98.8  F (37.1  C) (Axillary)   Resp 19   Ht 1.702 m (5' 7\")   Wt 89.8 kg (198 lb)   LMP 10/12/2021 (Approximate)   SpO2 96%   BMI 31.01 kg/m       Cardiac Rhythm: NS    Pain Score: 0/10    Is the Patient Confused:  No    Last Food or Drink: 07/01/22 at     Focused Assessment:  36 week gestation, asthmatic, to ER with increased SOB, during night on continual nebs which were stopped early AM. Currently nebs every 4 hrs no longer on O2. Sats at 96-98% on RA. Walked in halls and on return sats were 94%.   Mag. And Terbutaline given during night.    Per report during night patient having contractions. Labor nurse monitored during this time, no contractions this shift.     Tests Performed: Done: Labs and Imaging    Treatments Provided:  nebs    Family Dynamics/Concerns: No    Family " Updated On Visitor Policy: Yes    Plan of Care Communicated to Family: Yes    Who Was Updated about Plan of Care: patient talking to family    Belongings Checklist Done and Signed by Patient: Yes    Medications sent with patient:     Covid:  negative    Additional Information:     RN: Sara Alvarado 7/1/2022 3:13 PM

## 2022-07-01 NOTE — ED NOTES
0130 - Writer arrived to ED to pt bedside.  EMX2 reapplied.  SVE remains 1/40-50%/-1/soft. No bloody show on fingertips.    0145 -  mod variability one notable acceleration to 160. Uctx per pt are increasing in intensity and frequency, palpated strong to abd palpation, q2-4 min.  Dr Lindsay consulted on tracing.  VO for one subcutaneous administration of .25mg Terbutaline @ 0151 in right deltoid. Will continue to monitor.

## 2022-07-01 NOTE — CONSULTS
"OB Consult    Soren Freitas is a 31 year old  at 35w3d by 6 week US who presents with an asthma exacerbation, pneumonia. She has had  contractions intermittently throughout her pregnancy. She was seen  by the midwives at [facility] who perform her regular prenatal care. At that time GBS was collected, she was checked and 1/10/high/medium/posterior. She was cephalic on their exam. She received a neb and was discharged to home. Her breathing continued to worsen since that appointment so she presented to the ER tonight. Wet prep showed yeast, but unclear if she was treated for this at this point. Patient is uncomfortable with contractions and couldn't tolerate bipap because of her contraction pain.     PMHx, surgical and social history reviewed in [facility] and CareEverywhere for [facility]. Follows with pulmonology.    O:  /60   Pulse (!) 124   Temp 98.8  F (37.1  C) (Axillary)   Resp (!) 31   Ht 1.702 m (5' 7\")   Wt 89.8 kg (198 lb)   LMP 10/12/2021 (Approximate)   SpO2 91%   BMI 31.01 kg/m    Gen: Alert  SVE: /-3    NST: Moderate variability but not reactive  BPP pending    Recommendations:  Soren Freitas is a 31 year old  at 35w3d who presents for an asthma exacerbation in pregnancy, also having  contractions without cervical change. Receiving cephalosporin for pneumonia, which would cover GBS if laboring. Our first priority is her respiratory status given her history of needing intubation for her asthma exacerbation. Currently labs obtained by ER physician okay, but currently on high flow oxygen and may need to trial bipap again. Can trial a dose of terbutaline for comfort in order to try bipap again, but could make tachycardia worse. Would labor patient in the ER if she was laboring and bring L&D resources to patient in the case that she needs intubation. I have alerted anesthesia to her being in the ER in the case of emergency.     Per ACOG- After initial treatment, " repeat assessments of the patient and fetus will determine the need for continuing care. Patients with FEV1 or PEFR measurements greater than or equal to 70% sustained for 60 minutes after last treatment, no distress, and reassuring fetal status may be discharged. For an incomplete response (FEV1 or PEFR measurements greater than or equal to 50% but less than 70%, mild or moderate symptoms), the disposition (continued treatment in the emergency department, discharge home, or hospitalization) will need to be individualized. For patients with a poor response (FEV1 or PEFR measurements less than 50%), hospitalization is indicated. For patients with a poor response and severe symptoms, drowsiness, confusion, or PCO2 level greater than 42 mm Hg, intensive care unit admission is indicated and intubation should be strongly considered.    The patient should be kept hydrated and should receive adequate analgesia in order to decrease the risk of bronchospasm.      delivery for acute exacerbation of asthma is rarely needed. Maternal and fetal compromise usually will respond to aggressive medical management. However, delivery may benefit the respiratory status of a patient with unstable asthma who has a mature fetus. Lumbar anesthesia can reduce oxygen consumption and minute ventilation during labor. Regional anesthesia was reported to incur a 2% incidence of bronchospasm. Obstetric, anesthetic, and pediatric staff should communicate to coordinate intrapartum and postpartum care.    Jyoti Lindsay MD, FACOG, [facility]  2022 2:04 AM        Addendum:  Patient feeling better from contraction standpoint after terbutaline. She doesn't want that again though because she would rather get her contractions figured out. She states that she wants to leave because she is a single mom and her kids are with a neighbor. Her last hospitalization her kid's school called CPS because they didn't show up to school and she didn't have  steady childcare while she was in the hospital. She does not want another CPS case. She declines a  consult because she knows a CPS case needs to be opened for emergency childcare. She will discuss with the FP team, from an OB standpoint her fetus is now category 1 reactive and BPP 8/8.     Jyoti Lindsay MD, FACOG, [facility]  7/1/2022 7:50 AM

## 2022-07-02 ENCOUNTER — PATIENT OUTREACH (OUTPATIENT)
Dept: CARE COORDINATION | Facility: CLINIC | Age: 32
End: 2022-07-02

## 2022-07-02 DIAGNOSIS — Z71.89 OTHER SPECIFIED COUNSELING: ICD-10-CM

## 2022-07-02 LAB
ATRIAL RATE - MUSE: 135 BPM
DIASTOLIC BLOOD PRESSURE - MUSE: 56 MMHG
INTERPRETATION ECG - MUSE: NORMAL
P AXIS - MUSE: 71 DEGREES
PR INTERVAL - MUSE: 120 MS
QRS DURATION - MUSE: 72 MS
QT - MUSE: 292 MS
QTC - MUSE: 438 MS
R AXIS - MUSE: 81 DEGREES
SYSTOLIC BLOOD PRESSURE - MUSE: 123 MMHG
T AXIS - MUSE: 71 DEGREES
VENTRICULAR RATE- MUSE: 135 BPM

## 2022-07-02 NOTE — PROGRESS NOTES
Clinic Care Coordination Contact  [facility]/Voicemail       Clinical Data: Care Coordinator Outreach  Outreach attempted x 1.  Left message on patient's voicemail with call back information and requested return call.   Care Coordinator will try to reach patient again in 1-2 business days.      Lili Haji  [phone]  Care

## 2022-07-03 NOTE — PROGRESS NOTES
Clinic Care Coordination Contact  [facility]/Voicemail       Clinical Data: Care Coordinator Outreach  Outreach attempted x 2.  Left message on patient's voicemail with call back information and requested return call.   Care Coordinator will do no further outreaches at this time.      Lili Haji  [phone]  Care

## 2022-07-06 LAB — BACTERIA BLD CULT: NO GROWTH

## 2022-08-23 ENCOUNTER — HOSPITAL ENCOUNTER (EMERGENCY)
Facility: HOSPITAL | Age: 32
Discharge: HOME | End: 2022-08-23
Attending: EMERGENCY MEDICINE | Admitting: EMERGENCY MEDICINE
Payer: COMMERCIAL

## 2022-08-23 VITALS
WEIGHT: 182 LBS | HEIGHT: 67 IN | SYSTOLIC BLOOD PRESSURE: 155 MMHG | OXYGEN SATURATION: 96 % | BODY MASS INDEX: 28.56 KG/M2 | DIASTOLIC BLOOD PRESSURE: 105 MMHG | HEART RATE: 99 BPM | TEMPERATURE: 99.1 F | RESPIRATION RATE: 33 BRPM

## 2022-08-23 DIAGNOSIS — J45.901 EXACERBATION OF PERSISTENT ASTHMA, UNSPECIFIED ASTHMA SEVERITY: ICD-10-CM

## 2022-08-23 LAB
FLUAV RNA SPEC QL NAA+PROBE: NEGATIVE
FLUBV RNA RESP QL NAA+PROBE: NEGATIVE
RSV RNA SPEC NAA+PROBE: NEGATIVE
SARS-COV-2 RNA RESP QL NAA+PROBE: NEGATIVE

## 2022-08-23 PROCEDURE — 96375 TX/PRO/DX INJ NEW DRUG ADDON: CPT

## 2022-08-23 PROCEDURE — 93005 ELECTROCARDIOGRAM TRACING: CPT | Performed by: EMERGENCY MEDICINE

## 2022-08-23 PROCEDURE — 258N000003 HC RX IP 258 OP 636: Performed by: PHYSICIAN ASSISTANT

## 2022-08-23 PROCEDURE — 99284 EMERGENCY DEPT VISIT MOD MDM: CPT | Mod: 25

## 2022-08-23 PROCEDURE — 94640 AIRWAY INHALATION TREATMENT: CPT

## 2022-08-23 PROCEDURE — 96365 THER/PROPH/DIAG IV INF INIT: CPT

## 2022-08-23 PROCEDURE — 250N000011 HC RX IP 250 OP 636: Performed by: PHYSICIAN ASSISTANT

## 2022-08-23 PROCEDURE — 87637 SARSCOV2&INF A&B&RSV AMP PRB: CPT | Performed by: EMERGENCY MEDICINE

## 2022-08-23 PROCEDURE — 250N000011 HC RX IP 250 OP 636: Performed by: EMERGENCY MEDICINE

## 2022-08-23 PROCEDURE — 250N000009 HC RX 250: Performed by: EMERGENCY MEDICINE

## 2022-08-23 PROCEDURE — 999N000157 HC STATISTIC RCP TIME EA 10 MIN

## 2022-08-23 RX ORDER — IPRATROPIUM BROMIDE AND ALBUTEROL SULFATE 2.5; .5 MG/3ML; MG/3ML
3 SOLUTION RESPIRATORY (INHALATION) ONCE
Status: COMPLETED | OUTPATIENT
Start: 2022-08-23 | End: 2022-08-23

## 2022-08-23 RX ORDER — METHYLPREDNISOLONE SODIUM SUCCINATE 125 MG/2ML
125 INJECTION, POWDER, LYOPHILIZED, FOR SOLUTION INTRAMUSCULAR; INTRAVENOUS ONCE
Status: COMPLETED | OUTPATIENT
Start: 2022-08-23 | End: 2022-08-23

## 2022-08-23 RX ORDER — PREDNISONE 20 MG/1
TABLET ORAL
Qty: 10 TABLET | Refills: 0 | Status: SHIPPED | OUTPATIENT
Start: 2022-08-23 | End: 2022-11-07 | Stop reason: DRUGHIGH

## 2022-08-23 RX ORDER — ALBUTEROL SULFATE 0.83 MG/ML
5 SOLUTION RESPIRATORY (INHALATION) ONCE
Status: DISCONTINUED | OUTPATIENT
Start: 2022-08-23 | End: 2022-08-23 | Stop reason: HOSPADM

## 2022-08-23 RX ADMIN — SODIUM CHLORIDE 1000 ML: 9 INJECTION, SOLUTION INTRAVENOUS at 18:00

## 2022-08-23 RX ADMIN — MAGNESIUM SULFATE HEPTAHYDRATE 1 G: 500 INJECTION, SOLUTION INTRAMUSCULAR; INTRAVENOUS at 18:01

## 2022-08-23 RX ADMIN — METHYLPREDNISOLONE SODIUM SUCCINATE 125 MG: 125 INJECTION, POWDER, FOR SOLUTION INTRAMUSCULAR; INTRAVENOUS at 16:37

## 2022-08-23 RX ADMIN — IPRATROPIUM BROMIDE AND ALBUTEROL SULFATE 3 ML: .5; 3 SOLUTION RESPIRATORY (INHALATION) at 17:38

## 2022-08-23 ASSESSMENT — ENCOUNTER SYMPTOMS
WHEEZING: 1
HEADACHES: 0
NAUSEA: 0
SHORTNESS OF BREATH: 1
CHILLS: 0
ABDOMINAL PAIN: 0
DIARRHEA: 0
COUGH: 1
VOMITING: 0
FEVER: 0

## 2022-08-23 ASSESSMENT — ACTIVITIES OF DAILY LIVING (ADL)
ADLS_ACUITY_SCORE: 35
ADLS_ACUITY_SCORE: 35

## 2022-08-23 NOTE — ED NOTES
"    ED Provider In Triage Note  [facility]  Encounter Date: Aug 23, 2022    Chief Complaint   Patient presents with     Breathing Problem     Asthma         Use of Intrepreter: N/A    Brief HPI:   Soren Freitas is a 31 year old female presenting to the Emergency Department with a chief complaint of SOB due to her asthma.    She has tried nebulizer and MDI without relief. This began today. Denies fever.        Brief Physical Exam:  BP (!) 118/103   Pulse (!) 143   Temp 99.1  F (37.3  C) (Temporal)   Resp (!) 32   Ht 1.702 m (5' 7\")   Wt 82.6 kg (182 lb)   LMP 10/12/2021 (Approximate)   SpO2 94%   BMI 28.51 kg/m    General: Non-toxic appearing  HEENT: Atraumatic  Resp: Mild respiratory distress, +diffuse wheezing  Abdomen: Non-peritoneal  Neuro: Alert, oriented, answers questions appropriately  Psych: Behavior appropriate  Musculoskeletal: atraumatic      Plan Initiated in Triage:  Orders Placed This Encounter     Symptomatic; Unknown Influenza A/B & SARS-CoV2 (COVID-19) Virus PCR Multiplex     ipratropium - albuterol 0.5 mg/2.5 mg/3 mL (DUONEB) neb solution 3 mL     methylPREDNISolone sodium succinate (solu-MEDROL) injection 125 mg     Will do meds for likely asthma exacerbation. Will check COVID since temp 99.Will do EKG since tachy but suspect sinus tach.      PIT Dispo:   Place patient in the next available ED bed          Vani Bar MD on 8/23/2022 at 3:59 PM        Patient was evaluated by the Physician in Triage due to a limitation of available rooms in the Emergency Department. A plan of care was discussed based on the information obtained on the initial evaluation and patient was counseled to return back to the Emergency Department lobby after this initial evalutaiton until results were obtained or a room became available in the Emergency Department. Patient was counseled not to leave prior to receiving the results of their workup.            Dipika" Vani Sexton MD  08/23/22 5001

## 2022-08-23 NOTE — ED TRIAGE NOTES
Pt reports has hx of asthma, increased SOB today, tried using her inhaler and nebulizers without relief.  Pt is audibly wheezing.       Triage Assessment     Row Name 08/23/22 1603       Triage Assessment (Adult)    Airway WDL X       Respiratory WDL    Respiratory WDL rhythm/pattern    Rhythm/Pattern, Respiratory shortness of breath;tachypneic       Skin Circulation/Temperature WDL    Skin Circulation/Temperature WDL WDL       Cardiac WDL    Cardiac WDL X;chest pain    Cardiac Rhythm tachycardic       Peripheral/Neurovascular WDL    Peripheral Neurovascular WDL WDL       Cognitive/Neuro/Behavioral WDL    Cognitive/Neuro/Behavioral WDL WDL              
See her [employer] message today. She is asking for Tramadol refill for 4 times daily prn.     Last refill on 6/5/22 for #20     Routing refill request to provider for review/approval because:  Drug not on the [facility] refill protocol         
If you are a smoker, it is important for your health to stop smoking. Please be aware that second hand smoke is also harmful.
If you are a smoker, it is important for your health to stop smoking. Please be aware that second hand smoke is also harmful.

## 2022-08-23 NOTE — ED PROVIDER NOTES
EMERGENCY DEPARTMENT ENCOUNTER      NAME: Soren Freiats  AGE: 31 year old female  YOB: 1990  MRN: [id number]  EVALUATION DATE & TIME: 8/23/2022  4:13 PM    PCP: Elvia Bahena    ED PROVIDER: Sherry Ferrer PA-C      Chief Complaint   Patient presents with     Breathing Problem     Asthma         FINAL IMPRESSION:  1. Exacerbation of persistent asthma, unspecified asthma severity          MEDICAL DECISION MAKING:    Pertinent Labs & Imaging studies reviewed. (See chart for details)  31 year old female with a history of severe asthma presents to the Emergency Department for evaluation of shortness of breath onset yesterday. Feels similar to her asthma. She picked up inhaler today at pharmacy, not helping. She denies any fevers. Intermittent cough which she states is typical when she has asthma exacerbation. She denies any chest pain.     Vitals reviewed and notable for tachycardia in the 140s on arrival, RR in the 30s, hypertensive. Afebrile. On exam, mild respiratory distress, RR in the upper 20s, saturating mid 90s on room air, inspiratory and expiratory wheezing, Speaks full sentences easily. No cough. Tachycardic in the 120s. No lower extremity edema. Abdomen soft and non-tender. Differential diagnosis includes but not limited to asthma exacerbation, anaphylaxis, pneumonia, PTX, PE, ACS, viral URI.    COVID-19, influenza and RSV negative. No infectious symptoms. She was given duonebs, solumedrol, magnesium, IVF. On re-evaluation, significant improvement in wheezing. Patient reports feeling much more comfortable. HR in the 90s, RR 22, 97% on room air. She has been hospitalized a few times in the past. She does not feel her symptoms today are as severe as in the past. She feels comfortable with discharge home. We will put her on prednisone x 5 days. She has plenty of nebs at home and just picked up a new inhaler and has refills. We discussed strict return precautions and close PCP follow up in  1-2 days. Patient discharged home in stable condition.     0 minutes of critical care time     ED COURSE:  4:30 PM I met with the patient, obtained history, performed an initial exam, and discussed options and plan for diagnostics and treatment here in the ED.  5:01 PM Staffed patient with Dr. Sandoval  7:02 PM Patient discharged after being provided with extensive anticipatory guidance and given return precautions, importance of PCP follow-up emphasized.    At the conclusion of the encounter I discussed the results of all of the tests and the disposition. The questions were answered. The patient acknowledged understanding and was agreeable with the care plan.     MEDICATIONS GIVEN IN THE EMERGENCY:  Medications   ipratropium - albuterol 0.5 mg/2.5 mg/3 mL (DUONEB) neb solution 3 mL (3 mLs Nebulization Given 8/23/22 1738)   methylPREDNISolone sodium succinate (solu-MEDROL) injection 125 mg (125 mg Intravenous Given 8/23/22 1637)   0.9% sodium chloride BOLUS (0 mLs Intravenous Stopped 8/23/22 1858)   magnesium sulfate 1 g in sodium chloride 0.9 % 100 mL intermittent infusion (0 g Intravenous Stopped 8/23/22 1858)       NEW PRESCRIPTIONS STARTED AT TODAY'S ER VISIT  Discharge Medication List as of 8/23/2022  7:07 PM      START taking these medications    Details   predniSONE (DELTASONE) 20 MG tablet Take two tablets (= 40mg) each day for 5 (five) days, Disp-10 tablet, R-0, Local Print                  =================================================================    HPI:    Patient information was obtained from: Patient    Use of Interpretor: N/A      Soren GRAY Fortino is a 31 year old female with a pertinent history of severe asthma, sinus tachycardia who presents to this ED via private vehicle for evaluation of shortness of breath.    Patient here with onset of mild shortness of breath yesterday. Worsened today. Symptoms consistent with her asthma. She did not have her inhaler so she went to the pharmacy to get  one. She was using it and it was not helping prompting her to come to ED. She was last admitted in  for asthma exacerbation when she was pregnant. She delivered a child  on . She is breastfeeding. She denies any fevers or chills. She reports a cough that developed yesterday that she reports is due to her asthma. She denies any chest pain, leg swelling, vomiting, diarrhea, headache, abdominal pain.        REVIEW OF SYSTEMS:  Review of Systems   Constitutional: Negative for chills and fever.   Respiratory: Positive for cough, shortness of breath and wheezing.    Cardiovascular: Negative for chest pain and leg swelling.   Gastrointestinal: Negative for abdominal pain, diarrhea, nausea and vomiting.   Neurological: Negative for headaches.   All other systems reviewed and are negative.      PAST MEDICAL HISTORY:  Past Medical History:   Diagnosis Date     Obesity      Severe persistent asthma with acute exacerbation 2015    Formatting of this note might be different from the original. 2014 Admit for exacerbation: Spirometry FEV1/FVC 68%, D/C w/dulera, albuterol, Spiriva.  Started singulair 10mg QD, Given NRT.  Dx as infant, has been on albuterol lifelong.    Last Assessment & Plan:  Formatting of this note might be different from the original. Stable on Singulair, cetirizine, albuterol, and dulera. Discussed COVID     Uncomplicated asthma        PAST SURGICAL HISTORY:  No past surgical history on file.        CURRENT MEDICATIONS:    No current facility-administered medications for this encounter.    Current Outpatient Medications:      predniSONE (DELTASONE) 20 MG tablet, Take two tablets (= 40mg) each day for 5 (five) days, Disp: 10 tablet, Rfl: 0     acetaminophen (TYLENOL) 325 MG tablet, Take 325-650 mg by mouth every 6 hours as needed for mild pain, Disp: , Rfl:      albuterol (PROAIR HFA/PROVENTIL HFA/VENTOLIN HFA) 108 (90 Base) MCG/ACT inhaler, Inhale 2 puffs into the lungs every 4 hours as  "needed for shortness of breath / dyspnea or wheezing, Disp: 18 g, Rfl: 4     albuterol (PROVENTIL) (2.5 MG/3ML) 0.083% neb solution, Take 1 vial (2.5 mg) by nebulization every 6 hours as needed for shortness of breath / dyspnea or wheezing, Disp: 12 mL, Rfl: 0     mometasone-formoterol (DULERA) 200-5 MCG/ACT inhaler, Inhale 2 puffs into the lungs 2 times daily, Disp: 13 g, Rfl: 3     montelukast (SINGULAIR) 10 MG tablet, Take 10 mg by mouth every morning, Disp: , Rfl:      Prenatal Vit-Fe Fumarate-FA (PRENATAL MULTIVITAMIN W/IRON) 27-0.8 MG tablet, Take 1 tablet by mouth daily, Disp: , Rfl:       ALLERGIES:  Allergies   Allergen Reactions     Cashew Nut Oil Anaphylaxis and Angioedema     Fish Allergy Itching     Sea Food causes Throat swelling     Peanut-Derived Angioedema and Swelling     Throat swelling.       Shellfish-Derived Products Angioedema       FAMILY HISTORY:  No family history on file.    SOCIAL HISTORY:   Social History     Socioeconomic History     Marital status: Single   Tobacco Use     Smoking status: Former Smoker       VITALS:  Patient Vitals for the past 24 hrs:   BP Temp Temp src Pulse Resp SpO2 Height Weight   08/23/22 1900 (!) 155/105 -- -- 99 (!) 33 96 % -- --   08/23/22 1845 (!) 150/78 -- -- 100 25 97 % -- --   08/23/22 1830 (!) 158/89 -- -- 102 27 96 % -- --   08/23/22 1815 123/73 -- -- 108 25 96 % -- --   08/23/22 1800 125/78 -- -- 114 (!) 34 96 % -- --   08/23/22 1745 121/80 -- -- 112 14 100 % -- --   08/23/22 1739 -- -- -- 118 28 96 % -- --   08/23/22 1730 130/67 -- -- 118 24 96 % -- --   08/23/22 1715 137/77 -- -- (!) 126 (!) 43 96 % -- --   08/23/22 1700 131/81 -- -- (!) 127 23 97 % -- --   08/23/22 1645 129/76 -- -- (!) 124 25 96 % -- --   08/23/22 1601 (!) 118/103 99.1  F (37.3  C) Temporal (!) 143 (!) 32 94 % 1.702 m (5' 7\") 82.6 kg (182 lb)       PHYSICAL EXAM  Constitutional: Well developed, Well nourished, NAD  HENT: Normocephalic, Atraumatic, Bilateral external ears normal, " Oropharynx normal, mucous membranes moist, Nose normal.   Neck: Normal range of motion, No tenderness, Supple, No stridor.  Eyes: Conjunctiva normal, No discharge.  Respiratory: mild respiratory distress, RR in the upper 20s, saturating mid 90s on room air, inspiratory and expiratory wheezing, Speaks full sentences easily. No cough.  Cardiovascular: Tachycardic in the 120s, Regular rhythm, No murmurs, No rubs, No gallops. Chest wall nontender.  GI: Soft, No tenderness, No masses, No flank tenderness. No rebound or guarding.  Musculoskeletal: 2+ DP pulses. No edema. No cyanosis, No clubbing. Good range of motion in all major joints.  Integument: Warm, Dry, No erythema, No rash. No petechiae.  Neurologic: Alert & oriented x 3, Normal motor function, Normal sensory function, No focal deficits noted. Normal gait.  Psychiatric: Affect normal, Judgment normal, Mood normal. Cooperative.    LAB:  All pertinent labs reviewed and interpreted.  Recent Results (from the past 24 hour(s))   Symptomatic; Unknown Influenza A/B & SARS-CoV2 (COVID-19) Virus PCR Multiplex Nasopharyngeal    Collection Time: 08/23/22  4:25 PM    Specimen: Nasopharyngeal; Swab   Result Value Ref Range    Influenza A PCR Negative Negative    Influenza B PCR Negative Negative    RSV PCR Negative Negative    SARS CoV2 PCR Negative Negative   ECG 12-LEAD WITH MUSE ([facility])    Collection Time: 08/23/22  4:37 PM   Result Value Ref Range    Systolic Blood Pressure  mmHg    Diastolic Blood Pressure  mmHg    Ventricular Rate 130 BPM    Atrial Rate 130 BPM    WA Interval 128 ms    QRS Duration 70 ms     ms    QTc 453 ms    P Axis 68 degrees    R AXIS 64 degrees    T Axis 22 degrees    Interpretation ECG       Sinus tachycardia  Nonspecific ST abnormality  Abnormal ECG  When compared with ECG of 30-JUN-2022 21:51,  No significant change was found  Confirmed by SEE ED PROVIDER NOTE FOR, ECG INTERPRETATION ([id number]),  SAAD CASTILLO ([id number]) on 8/24/2022  11:08:34 AM         EKG:      Performed at: 1637    Impression: sinus tachycardia    Rate: 130  Rhythm: sinus  Axis: 64  AL Interval: 128  QRS Interval: 70  QTc Interval: 453  ST Changes: no acute ST elevations or depressions  Comparison: 6/30/22 no significant change found    I have independently reviewed and interpreted the EKG(s) documented above.  Reviewed with Dr. Lashawn Ferrer PA-C  Emergency Medicine  [facility]  8/23/2022     Sherry Ferrer PA-C  08/24/22 9822

## 2022-08-23 NOTE — ED NOTES
"Pt has 3 children present in the room including a 4 wk old. Pt states she has no one else to care for them when asked if someone could pick the children up while she is being treated in the ED. Pt became angry, raised her voice and said \"I knew this was going to happen.\"     "

## 2022-08-23 NOTE — ED PROVIDER NOTES
I, Ninoska Sandoval have reviewed the documentation, personally taken the patient's history, performed an exam and agree with the physical finds, diagnosis and management plan.  I saw this patient with Sherry Ferrer PA-C.  ED Course as of 08/24/22 0006   Tue Aug 23, 2022   1720 Patient is a 31-year-old female who comes in today for evaluation of shortness of breath.  She has a history of asthma and started having symptoms today.  She was admitted at the end of June for asthma exacerbation when she was pregnant.  She has since had the child and he is doing well.  She is tachycardic on arrival.  She is tight and wheezy but satting okay.  She not having any fevers.  We will give her some nebs and some steroids and see if we can get her turned around.  I discussed this with the patient she is in agreement with the plan.  We will give her some magnesium and some IV fluids as well since she was tachycardic on arrival.  I suspect that she will turn around.  She does not appear in significant distress.   1730 Patient tested negative for influenza and COVID   1839 Patient is feeling much better so we will work on getting her discharged home.       I personally saw the patient and performed a substantive portion of the visit including all aspects of the medical decision making.     Ninoska Sandoval MD  08/24/22 0007

## 2022-08-24 LAB
ATRIAL RATE - MUSE: 130 BPM
DIASTOLIC BLOOD PRESSURE - MUSE: NORMAL MMHG
INTERPRETATION ECG - MUSE: NORMAL
P AXIS - MUSE: 68 DEGREES
PR INTERVAL - MUSE: 128 MS
QRS DURATION - MUSE: 70 MS
QT - MUSE: 308 MS
QTC - MUSE: 453 MS
R AXIS - MUSE: 64 DEGREES
SYSTOLIC BLOOD PRESSURE - MUSE: NORMAL MMHG
T AXIS - MUSE: 22 DEGREES
VENTRICULAR RATE- MUSE: 130 BPM

## 2022-08-24 NOTE — ED NOTES
Discharge paperwork, follow up care and prescriptions discussed with pt. Pt verbalized understanding and has no questions at this time. Pt is a/ox4 abc's intact ambulatory with steady gait when leaving ED

## 2022-08-24 NOTE — DISCHARGE INSTRUCTIONS
Significantly improved after steroids and nebulizer treatments here.  I want you to use prednisone 40 mg once daily for the next 5 days.  Continue to use your inhaler and nebulizers as needed.  I want you to follow-up with your primary care provider in the next few days.  If you develop any new or worsening symptoms you need to return to the emergency department.

## 2022-11-06 ENCOUNTER — HOSPITAL ENCOUNTER (EMERGENCY)
Facility: HOSPITAL | Age: 32
Discharge: HOME | End: 2022-11-07
Attending: EMERGENCY MEDICINE | Admitting: EMERGENCY MEDICINE
Payer: COMMERCIAL

## 2022-11-06 DIAGNOSIS — J45.21 INTERMITTENT ASTHMA WITH ACUTE EXACERBATION, UNSPECIFIED ASTHMA SEVERITY: ICD-10-CM

## 2022-11-06 PROCEDURE — 96374 THER/PROPH/DIAG INJ IV PUSH: CPT

## 2022-11-06 PROCEDURE — 94640 AIRWAY INHALATION TREATMENT: CPT

## 2022-11-06 PROCEDURE — 250N000009 HC RX 250: Performed by: EMERGENCY MEDICINE

## 2022-11-06 PROCEDURE — 99284 EMERGENCY DEPT VISIT MOD MDM: CPT | Mod: 25

## 2022-11-06 PROCEDURE — 250N000011 HC RX IP 250 OP 636: Performed by: EMERGENCY MEDICINE

## 2022-11-06 RX ORDER — DEXAMETHASONE SODIUM PHOSPHATE 10 MG/ML
10 INJECTION, SOLUTION INTRAMUSCULAR; INTRAVENOUS ONCE
Status: COMPLETED | OUTPATIENT
Start: 2022-11-06 | End: 2022-11-06

## 2022-11-06 RX ORDER — IPRATROPIUM BROMIDE AND ALBUTEROL SULFATE 2.5; .5 MG/3ML; MG/3ML
3 SOLUTION RESPIRATORY (INHALATION) ONCE
Status: COMPLETED | OUTPATIENT
Start: 2022-11-06 | End: 2022-11-06

## 2022-11-06 RX ADMIN — IPRATROPIUM BROMIDE AND ALBUTEROL SULFATE 3 ML: 2.5; .5 SOLUTION RESPIRATORY (INHALATION) at 23:16

## 2022-11-06 RX ADMIN — DEXAMETHASONE SODIUM PHOSPHATE 10 MG: 10 INJECTION, SOLUTION INTRAMUSCULAR; INTRAVENOUS at 23:17

## 2022-11-06 ASSESSMENT — ACTIVITIES OF DAILY LIVING (ADL): ADLS_ACUITY_SCORE: 35

## 2022-11-06 ASSESSMENT — ENCOUNTER SYMPTOMS
FEVER: 0
NAUSEA: 0
ABDOMINAL PAIN: 0
VOMITING: 0
DIARRHEA: 0
WHEEZING: 1
COUGH: 0

## 2022-11-07 VITALS
TEMPERATURE: 98.6 F | HEART RATE: 92 BPM | DIASTOLIC BLOOD PRESSURE: 60 MMHG | WEIGHT: 185 LBS | SYSTOLIC BLOOD PRESSURE: 119 MMHG | RESPIRATION RATE: 22 BRPM | OXYGEN SATURATION: 97 % | BODY MASS INDEX: 28.98 KG/M2

## 2022-11-07 RX ORDER — PREDNISONE 20 MG/1
TABLET ORAL
Qty: 10 TABLET | Refills: 0 | Status: SHIPPED | OUTPATIENT
Start: 2022-11-07 | End: 2022-11-13

## 2022-11-07 RX ORDER — IPRATROPIUM BROMIDE AND ALBUTEROL SULFATE 2.5; .5 MG/3ML; MG/3ML
1 SOLUTION RESPIRATORY (INHALATION) EVERY 6 HOURS PRN
Qty: 90 ML | Refills: 0 | Status: SHIPPED | OUTPATIENT
Start: 2022-11-07 | End: 2023-02-03

## 2022-11-07 NOTE — ED PROVIDER NOTES
EMERGENCY DEPARTMENT ENCOUNTER      NAME: Soren Freitas  AGE: 31 year old female  YOB: 1990  MRN: [id number]  EVALUATION DATE & TIME: 11/6/2022 10:50 PM    PCP: Elvia Bahena    ED PROVIDER: Felicia Ramos M.D.      Chief Complaint   Patient presents with     Asthma         FINAL IMPRESSION:  1. Intermittent asthma with acute exacerbation, unspecified asthma severity        MEDICAL DECISION MAKING:    10:56 PM I met with the patient, obtained history, performed an initial exam, and discussed options and plan for diagnostics and treatment here in the ED.   Pertinent Labs & Imaging studies reviewed. (See chart for details)  12:28 AM Rechecked and updated patient. We discussed the plan for discharge and the patient is agreeable. Reviewed supportive cares, symptomatic treatment, outpatient follow up, and reasons to return to the Emergency Department. Patient to be discharged by ED RN.      Soren Freitas is a 31 year old female who presents with difficulty breathing.  She has a history of asthma.  She has been using her inhalers and nebulizers at home with increasing frequency and no improvement.  Oxygen saturation here is 95 %.  She is tachycardic.  Respiratory rate is in the 30s and she does appear in obvious distress.  She is not moving air well.  She will get a DuoNeb, steroids, and supplemental oxygen as needed to help decrease work of breathing.  She did have a negative test for COVID.  She believes she is pregnant with a positive home pregnancy test and last period mid September.  I did consider getting a chest x-ray but she is afebrile and has no other infectious signs or symptoms such as body aches, congestion or cough.    She did respond well to the DuoNeb and the Decadron.  She was observed in the emergency department for almost 2-1/2 hours to evaluate for recurrence of symptoms.  She was able to get up and ambulate without severe dyspnea.  Heart rate improved to 92 and rate of  breathing down to 22.  O2 sats 97% on room air at the time of discharge.  I do feel that she will require a steroid burst which is safe in pregnancy.  Additionally instructed her to use her nebulizer when she is having acute symptoms rather than her inhaler to get better penetration.  I will give her a prescription for the ipratropium and albuterol neb as this may be more effective for her than the albuterol alone.    We discussed warning signs and indications to return to the emergency department.  She understands these and all discharge instructions and she will follow-up with her primary clinic with any additional concerns.      MEDICATIONS GIVEN IN THE EMERGENCY:  Medications   ipratropium - albuterol 0.5 mg/2.5 mg/3 mL (DUONEB) neb solution 3 mL (3 mLs Nebulization Given 11/6/22 2316)   dexamethasone PF (DECADRON) injection 10 mg (10 mg Intravenous Given 11/6/22 2317)       NEW PRESCRIPTIONS STARTED AT TODAY'S ER VISIT:  New Prescriptions    IPRATROPIUM - ALBUTEROL 0.5 MG/2.5 MG/3 ML (DUONEB) 0.5-2.5 (3) MG/3ML NEB SOLUTION    Take 1 vial (3 mLs) by nebulization every 6 hours as needed for shortness of breath / dyspnea or wheezing    PREDNISONE (DELTASONE) 20 MG TABLET    Take two tablets (= 40mg) each day for 5 (five) days          =================================================================    HPI    Patient information was obtained from: patient    Use of : Use of : N/A       Soren GRAY Fortino is a 31 year old female with a history of severe persistent asthma with exacerbation who presents with wheezing.    Patient reports wheezing that started yesterday. She states she was seen at [facility] yesterday and was given a steroid before being discharged home. She states symptoms resolved itself last night but came back this morning. She has been using her inhalers and nebulizers without relief. She is currently pregnant (took a pregnancy test on 10/13). She has not had her  first OB appointment but states her last menstrual cycle was normal on 9/3. She denies cough, fever, congestion, nausea, breast tenderness, diarrhea, abdominal pain, and vomiting. There were no other concerns/complaints at this time.      REVIEW OF SYSTEMS   Review of Systems   Constitutional: Negative for fever.   HENT: Negative for congestion.    Respiratory: Positive for wheezing. Negative for cough.    Gastrointestinal: Negative for abdominal pain, diarrhea, nausea and vomiting.   Musculoskeletal:        Negative for breast tenderness.   All other systems reviewed and are negative.       PAST MEDICAL HISTORY:  Past Medical History:   Diagnosis Date     Obesity      Severe persistent asthma with acute exacerbation 1/1/2015    Formatting of this note might be different from the original. 12/2014 Admit for exacerbation: Spirometry FEV1/FVC 68%, D/C w/dulera, albuterol, Spiriva.  Started singulair 10mg QD, Given NRT.  Dx as infant, has been on albuterol lifelong.    Last Assessment & Plan:  Formatting of this note might be different from the original. Stable on Singulair, cetirizine, albuterol, and dulera. Discussed COVID     Uncomplicated asthma        PAST SURGICAL HISTORY:  History reviewed. No pertinent surgical history.    CURRENT MEDICATIONS:    No current facility-administered medications for this encounter.    Current Outpatient Medications:      ipratropium - albuterol 0.5 mg/2.5 mg/3 mL (DUONEB) 0.5-2.5 (3) MG/3ML neb solution, Take 1 vial (3 mLs) by nebulization every 6 hours as needed for shortness of breath / dyspnea or wheezing, Disp: 90 mL, Rfl: 0     predniSONE (DELTASONE) 20 MG tablet, Take two tablets (= 40mg) each day for 5 (five) days, Disp: 10 tablet, Rfl: 0     acetaminophen (TYLENOL) 325 MG tablet, Take 325-650 mg by mouth every 6 hours as needed for mild pain, Disp: , Rfl:      albuterol (PROAIR HFA/PROVENTIL HFA/VENTOLIN HFA) 108 (90 Base) MCG/ACT inhaler, Inhale 2 puffs into the lungs every  4 hours as needed for shortness of breath / dyspnea or wheezing, Disp: 18 g, Rfl: 4     albuterol (PROVENTIL) (2.5 MG/3ML) 0.083% neb solution, Take 1 vial (2.5 mg) by nebulization every 6 hours as needed for shortness of breath / dyspnea or wheezing, Disp: 12 mL, Rfl: 0     mometasone-formoterol (DULERA) 200-5 MCG/ACT inhaler, Inhale 2 puffs into the lungs 2 times daily, Disp: 13 g, Rfl: 3     montelukast (SINGULAIR) 10 MG tablet, Take 10 mg by mouth every morning, Disp: , Rfl:      Prenatal Vit-Fe Fumarate-FA (PRENATAL MULTIVITAMIN W/IRON) 27-0.8 MG tablet, Take 1 tablet by mouth daily, Disp: , Rfl:     ALLERGIES:  Allergies   Allergen Reactions     Cashew Nut Oil Anaphylaxis and Angioedema     Fish Allergy Itching     Sea Food causes Throat swelling     Peanut-Derived Angioedema and Swelling     Throat swelling.       Shellfish-Derived Products Angioedema       FAMILY HISTORY:  History reviewed. No pertinent family history.    SOCIAL HISTORY:   Social History     Tobacco Use     Smoking status: Former        PHYSICAL EXAM:    Vitals: /59   Pulse 76   Temp 98.6  F (37  C) (Oral)   Resp 22   Wt 83.9 kg (185 lb)   SpO2 99%   BMI 28.98 kg/m     General:. Alert and interactive, uncomfortable appearing.  HENT: Oropharynx without erythema or exudates. MMM.  TMs clear bilaterally.  Eyes: Pupils mid-sized and equally reactive.   Neck: Full AROM.  No midline tenderness to palpation.  Cardiovascular: Regular rate and rhythm. Peripheral pulses 2+ bilaterally.  Chest/Pulmonary: Able to speak in full sentences. Definite increase in respiratory effort, diffuse wheezes bilaterally, decrease in air movement bilaterally. No chest wall tenderness or deformities.  Abdomen: Soft, nondistended. Nontender without guarding or rebound.  Back/Spine: No CVA or midline tenderness.  Extremities: Normal ROM of all major joints. No lower extremity edema.   Skin: Warm and dry. Normal skin color.   Neuro: Speech clear. CNs grossly  intact. Moves all extremities appropriately. Strength and sensation grossly intact to all extremities.   Psych: Normal affect/mood, cooperative, memory appropriate.         I, Kristy Dixon, am serving as a scribe to document services personally performed by Dr. Felicia Ramos  based on my observation and the provider's statements to me. I, Felicia Ramos MD attest that Kristy Dixon is acting in a scribe capacity, has observed my performance of the services and has documented them in accordance with my direction.      Felicia Ramos M.D.  Emergency Medicine  [facility] EMERGENCY DEPARTMENT  [address] [place] [id number]  [phone]  Dept: [phone]         Felicia Ramos MD  11/07/22 0301           Felicia Ramos MD  11/13/22 1491

## 2022-11-07 NOTE — DISCHARGE INSTRUCTIONS
The ipratropium nebulization treatment actually contains albuterol as well--similar to the medication you received in the hospital.  This nebule can be given every 6 hours.  For symptoms in between the ipratropium doses, you may use a plain albuterol nebule or you may use 2 puffs of your inhaler.  While you are having very severe symptoms, it is often better to use the nebulizer rather than the inhaler as the medication gets better penetration into the lungs.    You will also start on steroids.  You did get your first dose in the emergency department.  Take your next dose Tuesday morning with breakfast.  Try to take the prednisone in the morning with breakfast as taking this too late in the day can disrupt your sleep.    Follow-up in clinic if you are not getting better.  Return to the emergency department if you are unable to control your symptoms at home.

## 2022-11-07 NOTE — ED TRIAGE NOTES
Wheezing without relief from nebs/inhalers; struggling x 2 days; negative covid test; may be pregnant.      Triage Assessment     Row Name 11/06/22 8412       Triage Assessment (Adult)    Airway WDL WDL       Respiratory WDL    Respiratory WDL cough    Cough Frequency frequent       Skin Circulation/Temperature WDL    Skin Circulation/Temperature WDL WDL       Cardiac WDL    Cardiac WDL WDL       Peripheral/Neurovascular WDL    Peripheral Neurovascular WDL WDL       Cognitive/Neuro/Behavioral WDL    Cognitive/Neuro/Behavioral WDL WDL

## 2022-11-07 NOTE — ED NOTES
Pt discharged ambulatory to home at 0058. All questions answered. Pt expressed understanding of info

## 2022-11-12 ENCOUNTER — HOSPITAL ENCOUNTER (EMERGENCY)
Facility: HOSPITAL | Age: 32
Discharge: HOME | End: 2022-11-13
Attending: EMERGENCY MEDICINE | Admitting: EMERGENCY MEDICINE
Payer: COMMERCIAL

## 2022-11-12 DIAGNOSIS — J45.51 SEVERE PERSISTENT ASTHMA WITH EXACERBATION (H): ICD-10-CM

## 2022-11-12 LAB
BASE EXCESS BLDV CALC-SCNC: -1.9 MMOL/L
HCO3 BLDV-SCNC: 25 MMOL/L (ref 24–30)
OXYHGB MFR BLDV: 67.2 % (ref 70–75)
PCO2 BLDV: 51 MM HG (ref 35–50)
PH BLDV: 7.29 [PH] (ref 7.35–7.45)
PO2 BLDV: 40 MM HG (ref 25–47)
SAO2 % BLDV: 68.7 % (ref 70–75)

## 2022-11-12 PROCEDURE — 36415 COLL VENOUS BLD VENIPUNCTURE: CPT | Performed by: EMERGENCY MEDICINE

## 2022-11-12 PROCEDURE — 96374 THER/PROPH/DIAG INJ IV PUSH: CPT

## 2022-11-12 PROCEDURE — 250N000011 HC RX IP 250 OP 636: Performed by: EMERGENCY MEDICINE

## 2022-11-12 PROCEDURE — 94640 AIRWAY INHALATION TREATMENT: CPT

## 2022-11-12 PROCEDURE — 250N000009 HC RX 250: Performed by: EMERGENCY MEDICINE

## 2022-11-12 PROCEDURE — 82805 BLOOD GASES W/O2 SATURATION: CPT | Performed by: EMERGENCY MEDICINE

## 2022-11-12 PROCEDURE — 99285 EMERGENCY DEPT VISIT HI MDM: CPT | Mod: 25

## 2022-11-12 RX ORDER — DEXAMETHASONE SODIUM PHOSPHATE 10 MG/ML
10 INJECTION, SOLUTION INTRAMUSCULAR; INTRAVENOUS ONCE
Status: COMPLETED | OUTPATIENT
Start: 2022-11-12 | End: 2022-11-12

## 2022-11-12 RX ORDER — IPRATROPIUM BROMIDE AND ALBUTEROL SULFATE 2.5; .5 MG/3ML; MG/3ML
3 SOLUTION RESPIRATORY (INHALATION) ONCE
Status: COMPLETED | OUTPATIENT
Start: 2022-11-12 | End: 2022-11-12

## 2022-11-12 RX ADMIN — IPRATROPIUM BROMIDE AND ALBUTEROL SULFATE 3 ML: 2.5; .5 SOLUTION RESPIRATORY (INHALATION) at 23:02

## 2022-11-12 RX ADMIN — DEXAMETHASONE SODIUM PHOSPHATE 10 MG: 10 INJECTION, SOLUTION INTRAMUSCULAR; INTRAVENOUS at 23:03

## 2022-11-12 ASSESSMENT — ENCOUNTER SYMPTOMS
ABDOMINAL PAIN: 1
WHEEZING: 1
ACTIVITY CHANGE: 0
FEVER: 0
SHORTNESS OF BREATH: 1

## 2022-11-12 ASSESSMENT — ACTIVITIES OF DAILY LIVING (ADL): ADLS_ACUITY_SCORE: 35

## 2022-11-13 VITALS
RESPIRATION RATE: 18 BRPM | HEART RATE: 97 BPM | OXYGEN SATURATION: 95 % | SYSTOLIC BLOOD PRESSURE: 132 MMHG | DIASTOLIC BLOOD PRESSURE: 65 MMHG | TEMPERATURE: 99 F

## 2022-11-13 LAB
HOLD SPECIMEN: NORMAL

## 2022-11-13 PROCEDURE — 999N000105 HC STATISTIC NO DOCUMENTATION TO SUPPORT CHARGE

## 2022-11-13 PROCEDURE — 250N000009 HC RX 250: Performed by: EMERGENCY MEDICINE

## 2022-11-13 RX ORDER — IPRATROPIUM BROMIDE AND ALBUTEROL SULFATE 2.5; .5 MG/3ML; MG/3ML
3 SOLUTION RESPIRATORY (INHALATION) ONCE
Status: COMPLETED | OUTPATIENT
Start: 2022-11-13 | End: 2022-11-13

## 2022-11-13 RX ORDER — ALBUTEROL SULFATE 0.83 MG/ML
5 SOLUTION RESPIRATORY (INHALATION) ONCE
Status: COMPLETED | OUTPATIENT
Start: 2022-11-13 | End: 2022-11-13

## 2022-11-13 RX ORDER — PREDNISONE 20 MG/1
TABLET ORAL
Qty: 10 TABLET | Refills: 0 | Status: SHIPPED | OUTPATIENT
Start: 2022-11-13 | End: 2023-02-03

## 2022-11-13 RX ADMIN — IPRATROPIUM BROMIDE AND ALBUTEROL SULFATE 3 ML: 2.5; .5 SOLUTION RESPIRATORY (INHALATION) at 00:11

## 2022-11-13 RX ADMIN — ALBUTEROL SULFATE 5 MG: 2.5 SOLUTION RESPIRATORY (INHALATION) at 01:47

## 2022-11-13 ASSESSMENT — ACTIVITIES OF DAILY LIVING (ADL): ADLS_ACUITY_SCORE: 35

## 2022-11-13 NOTE — ED PROVIDER NOTES
EMERGENCY DEPARTMENT ENCOUNTER      NAME: Soren Freitas  AGE: 31 year old female  YOB: 1990  MRN: [id number]  EVALUATION DATE & TIME: 11/12/2022 10:50 PM    PCP: Elvia Bahena    ED PROVIDER: Felicia Ramos M.D.      Chief Complaint   Patient presents with     Shortness of Breath         FINAL IMPRESSION:  1. Severe persistent asthma with exacerbation        MEDICAL DECISION MAKING:    10:54 PM I met with the patient, obtained history, performed an initial exam, and discussed options and plan for diagnostics and treatment here in the ED.   Pertinent Labs & Imaging studies reviewed. (See chart for details)       Soren Freitas is a 31 year old female who presents with difficulty breathing.  She has a history of asthma.  She has been using her inhalers and nebulizers at home with increasing frequency and no improvement.  Oxygen saturation here is 96 %.  She is tachycardic.  Respiratory rate is in the 20s and she does appear in distress.  She is not moving air well.  She will get a DuoNeb, steroids, and supplemental oxygen as needed to help decrease work of breathing.  She did have a negative test for COVID the last time that I saw her.  I did consider getting a chest x-ray but she is afebrile and has no other infectious signs or symptoms such as body aches, congestion or cough.     She did respond well to the DuoNeb and the Decadron.  She was observed in the emergency department for almost 3-1/2 hours to evaluate for recurrence of symptoms.  She was able to get up and ambulate without severe dyspnea.  Heart rate improved and rate of breathing down to 18.  O2 sats 95% on room air at the time of discharge.  I do feel that she will require a steroid burst which is safe in pregnancy.  Additionally instructed her to use her nebulizer when she is having acute symptoms rather than her inhaler to get better penetration.      We discussed warning signs and indications to return to the emergency  department.  She understands these and all discharge instructions and she will follow-up with her primary clinic with any additional concerns.         MEDICATIONS GIVEN IN THE EMERGENCY:  Medications   dexamethasone PF (DECADRON) injection 10 mg (10 mg Intravenous Given 11/12/22 2303)   ipratropium - albuterol 0.5 mg/2.5 mg/3 mL (DUONEB) neb solution 3 mL (3 mLs Nebulization Given 11/12/22 2302)         =================================================================    HPI    Patient information was obtained from: patient    Use of : Use of : N/A       Soren GRAY Fortino is a 31 year old female with a history of severe persistent asthma with acute exacerbation who presents with shortness of breath.    Patient reports she has been using a nebulizer every 4 hours. About 1 hour ago (10pm), current symptoms started while watching TV. Patient was recently seen at [facility] for similar complaint. Denies fever, smoking, or other changes to health. Patient endorses abdominal pain with breathing.         REVIEW OF SYSTEMS   Review of Systems   Constitutional: Negative for activity change and fever.   Respiratory: Positive for shortness of breath and wheezing.    Gastrointestinal: Positive for abdominal pain.   All other systems reviewed and are negative.       PAST MEDICAL HISTORY:  Past Medical History:   Diagnosis Date     Obesity      Severe persistent asthma with acute exacerbation 1/1/2015    Formatting of this note might be different from the original. 12/2014 Admit for exacerbation: Spirometry FEV1/FVC 68%, D/C w/dulera, albuterol, Spiriva.  Started singulair 10mg QD, Given NRT.  Dx as infant, has been on albuterol lifelong.    Last Assessment & Plan:  Formatting of this note might be different from the original. Stable on Singulair, cetirizine, albuterol, and dulera. Discussed COVID     Uncomplicated asthma        PAST SURGICAL HISTORY:  No past surgical history on file.    CURRENT MEDICATIONS:     No current facility-administered medications for this encounter.    Current Outpatient Medications:      acetaminophen (TYLENOL) 325 MG tablet, Take 325-650 mg by mouth every 6 hours as needed for mild pain, Disp: , Rfl:      albuterol (PROAIR HFA/PROVENTIL HFA/VENTOLIN HFA) 108 (90 Base) MCG/ACT inhaler, Inhale 2 puffs into the lungs every 4 hours as needed for shortness of breath / dyspnea or wheezing, Disp: 18 g, Rfl: 4     albuterol (PROVENTIL) (2.5 MG/3ML) 0.083% neb solution, Take 1 vial (2.5 mg) by nebulization every 6 hours as needed for shortness of breath / dyspnea or wheezing, Disp: 12 mL, Rfl: 0     ipratropium - albuterol 0.5 mg/2.5 mg/3 mL (DUONEB) 0.5-2.5 (3) MG/3ML neb solution, Take 1 vial (3 mLs) by nebulization every 6 hours as needed for shortness of breath / dyspnea or wheezing, Disp: 90 mL, Rfl: 0     mometasone-formoterol (DULERA) 200-5 MCG/ACT inhaler, Inhale 2 puffs into the lungs 2 times daily, Disp: 13 g, Rfl: 3     montelukast (SINGULAIR) 10 MG tablet, Take 10 mg by mouth every morning, Disp: , Rfl:      predniSONE (DELTASONE) 20 MG tablet, Take two tablets (= 40mg) each day for 5 (five) days, Disp: 10 tablet, Rfl: 0     Prenatal Vit-Fe Fumarate-FA (PRENATAL MULTIVITAMIN W/IRON) 27-0.8 MG tablet, Take 1 tablet by mouth daily, Disp: , Rfl:     ALLERGIES:  Allergies   Allergen Reactions     Cashew Nut Oil Anaphylaxis and Angioedema     Fish Allergy Itching     Sea Food causes Throat swelling     Peanut-Derived Angioedema and Swelling     Throat swelling.       Shellfish-Derived Products Angioedema       FAMILY HISTORY:  No family history on file.    SOCIAL HISTORY:   Social History     Tobacco Use     Smoking status: Former        PHYSICAL EXAM:    Vitals: /78   Pulse (!) 132   Temp 99  F (37.2  C) (Axillary)   Resp 24   SpO2 96%    General:. Distressed, in tripod position.   HENT: Oropharynx without erythema or exudates. MMM.  TMs clear bilaterally.  Eyes: Pupils mid-sized and  equally reactive.   Neck: Full AROM.  No midline tenderness to palpation.  Cardiovascular: Tachycardic. Peripheral pulses 2+ bilaterally.  Chest/Pulmonary: Severe decrease in air movement. Expiratory wheezing bilaterally with excess muscle use.   Abdomen: Soft, nondistended. Nontender without guarding or rebound.  Extremities: Normal ROM of all major joints. No lower extremity edema.   Skin: Warm and dry. Normal skin color.   Neuro: Speech clear. CNs grossly intact. Moves all extremities appropriately. Strength and sensation grossly intact to all extremities.   Psych: Normal affect/mood, cooperative, memory appropriate.       LAB:  All pertinent labs reviewed and interpreted.  Labs Ordered and Resulted from Time of ED Arrival to Time of ED Departure   BLOOD GAS VENOUS - Abnormal       Result Value    pH Venous 7.29 (*)     pCO2 Venous 51 (*)     pO2 Venous 40      Bicarbonate Venous 25      Base Excess/Deficit (+/-) -1.9      Oxyhemoglobin Venous 67.2 (*)     O2 Sat, Venous 68.7 (*)      I, Lai Yusuf, am serving as a scribe to document services personally performed by Dr. Felicia Ramos  based on my observation and the provider's statements to me. I, Felicia Ramos MD attest that Lai Yusuf is acting in a scribe capacity, has observed my performance of the services and has documented them in accordance with my direction.      Felicia Ramos M.D.  Emergency Medicine  [facility] EMERGENCY DEPARTMENT  [facility] [facility] [address]  [phone]  Dept: [phone]         Felicia Ramos MD  11/13/22 9663

## 2022-11-13 NOTE — ED TRIAGE NOTES
Pt here d/t asthma. SOB, tripodding, increased WOB. Pt states she was watching TV when this started about an hour ago. sats in high 90

## 2022-11-13 NOTE — DISCHARGE INSTRUCTIONS
Please follow up with your pulmonologist ASAP to discuss addition of long-term controlling medications to your current asthma treatment plan.    Return to the emergency department again if you have worsening symptoms.

## 2023-02-03 ENCOUNTER — HOSPITAL ENCOUNTER (EMERGENCY)
Facility: HOSPITAL | Age: 33
Discharge: HOME | End: 2023-02-04
Attending: EMERGENCY MEDICINE | Admitting: EMERGENCY MEDICINE
Payer: COMMERCIAL

## 2023-02-03 DIAGNOSIS — J45.901 ASTHMA WITH ACUTE EXACERBATION, UNSPECIFIED ASTHMA SEVERITY, UNSPECIFIED WHETHER PERSISTENT: ICD-10-CM

## 2023-02-03 LAB
ANION GAP SERPL CALCULATED.3IONS-SCNC: 9 MMOL/L (ref 7–15)
BASE EXCESS BLDV CALC-SCNC: 0.1 MMOL/L
BASOPHILS # BLD AUTO: 0 10E3/UL (ref 0–0.2)
BASOPHILS NFR BLD AUTO: 0 %
BUN SERPL-MCNC: 10.1 MG/DL (ref 6–20)
CALCIUM SERPL-MCNC: 9 MG/DL (ref 8.6–10)
CHLORIDE SERPL-SCNC: 103 MMOL/L (ref 98–107)
CREAT SERPL-MCNC: 0.78 MG/DL (ref 0.51–0.95)
DEPRECATED HCO3 PLAS-SCNC: 24 MMOL/L (ref 22–29)
EOSINOPHIL # BLD AUTO: 0.5 10E3/UL (ref 0–0.7)
EOSINOPHIL NFR BLD AUTO: 6 %
ERYTHROCYTE [DISTWIDTH] IN BLOOD BY AUTOMATED COUNT: 14.8 % (ref 10–15)
GFR SERPL CREATININE-BSD FRML MDRD: >90 ML/MIN/1.73M2
GLUCOSE SERPL-MCNC: 76 MG/DL (ref 70–99)
HCO3 BLDV-SCNC: 27 MMOL/L (ref 24–30)
HCT VFR BLD AUTO: 39.3 % (ref 35–47)
HGB BLD-MCNC: 13.1 G/DL (ref 11.7–15.7)
IMM GRANULOCYTES # BLD: 0.1 10E3/UL
IMM GRANULOCYTES NFR BLD: 1 %
LYMPHOCYTES # BLD AUTO: 3.3 10E3/UL (ref 0.8–5.3)
LYMPHOCYTES NFR BLD AUTO: 34 %
MCH RBC QN AUTO: 28.8 PG (ref 26.5–33)
MCHC RBC AUTO-ENTMCNC: 33.3 G/DL (ref 31.5–36.5)
MCV RBC AUTO: 86 FL (ref 78–100)
MONOCYTES # BLD AUTO: 0.7 10E3/UL (ref 0–1.3)
MONOCYTES NFR BLD AUTO: 7 %
NEUTROPHILS # BLD AUTO: 5 10E3/UL (ref 1.6–8.3)
NEUTROPHILS NFR BLD AUTO: 52 %
NRBC # BLD AUTO: 0 10E3/UL
NRBC BLD AUTO-RTO: 0 /100
OXYHGB MFR BLDV: 26.1 % (ref 70–75)
PCO2 BLDV: 58 MM HG (ref 35–50)
PH BLDV: 7.27 [PH] (ref 7.35–7.45)
PLATELET # BLD AUTO: 243 10E3/UL (ref 150–450)
PO2 BLDV: 21 MM HG (ref 25–47)
POTASSIUM SERPL-SCNC: 4.1 MMOL/L (ref 3.4–5.3)
RBC # BLD AUTO: 4.55 10E6/UL (ref 3.8–5.2)
SAO2 % BLDV: 26.6 % (ref 70–75)
SODIUM SERPL-SCNC: 136 MMOL/L (ref 136–145)
WBC # BLD AUTO: 9.5 10E3/UL (ref 4–11)

## 2023-02-03 PROCEDURE — 85025 COMPLETE CBC W/AUTO DIFF WBC: CPT | Performed by: EMERGENCY MEDICINE

## 2023-02-03 PROCEDURE — 99285 EMERGENCY DEPT VISIT HI MDM: CPT | Mod: 25

## 2023-02-03 PROCEDURE — 94640 AIRWAY INHALATION TREATMENT: CPT

## 2023-02-03 PROCEDURE — 250N000011 HC RX IP 250 OP 636: Performed by: EMERGENCY MEDICINE

## 2023-02-03 PROCEDURE — 96365 THER/PROPH/DIAG IV INF INIT: CPT

## 2023-02-03 PROCEDURE — 36415 COLL VENOUS BLD VENIPUNCTURE: CPT | Performed by: EMERGENCY MEDICINE

## 2023-02-03 PROCEDURE — 250N000012 HC RX MED GY IP 250 OP 636 PS 637: Performed by: EMERGENCY MEDICINE

## 2023-02-03 PROCEDURE — 258N000003 HC RX IP 258 OP 636: Performed by: EMERGENCY MEDICINE

## 2023-02-03 PROCEDURE — 96375 TX/PRO/DX INJ NEW DRUG ADDON: CPT

## 2023-02-03 PROCEDURE — 80048 BASIC METABOLIC PNL TOTAL CA: CPT | Performed by: EMERGENCY MEDICINE

## 2023-02-03 PROCEDURE — 96361 HYDRATE IV INFUSION ADD-ON: CPT

## 2023-02-03 PROCEDURE — 999N000157 HC STATISTIC RCP TIME EA 10 MIN

## 2023-02-03 PROCEDURE — 82805 BLOOD GASES W/O2 SATURATION: CPT | Performed by: EMERGENCY MEDICINE

## 2023-02-03 PROCEDURE — 94640 AIRWAY INHALATION TREATMENT: CPT | Mod: 76

## 2023-02-03 PROCEDURE — 250N000009 HC RX 250: Performed by: EMERGENCY MEDICINE

## 2023-02-03 RX ORDER — IPRATROPIUM BROMIDE AND ALBUTEROL SULFATE 2.5; .5 MG/3ML; MG/3ML
3 SOLUTION RESPIRATORY (INHALATION) ONCE
Status: COMPLETED | OUTPATIENT
Start: 2023-02-03 | End: 2023-02-03

## 2023-02-03 RX ORDER — ALBUTEROL SULFATE 5 MG/ML
2.5 SOLUTION RESPIRATORY (INHALATION) EVERY 6 HOURS PRN
Status: DISCONTINUED | OUTPATIENT
Start: 2023-02-03 | End: 2023-02-04 | Stop reason: HOSPADM

## 2023-02-03 RX ORDER — METHYLPREDNISOLONE SODIUM SUCCINATE 125 MG/2ML
125 INJECTION, POWDER, LYOPHILIZED, FOR SOLUTION INTRAMUSCULAR; INTRAVENOUS ONCE
Status: COMPLETED | OUTPATIENT
Start: 2023-02-03 | End: 2023-02-03

## 2023-02-03 RX ORDER — PREDNISONE 20 MG/1
40 TABLET ORAL ONCE
Status: COMPLETED | OUTPATIENT
Start: 2023-02-03 | End: 2023-02-03

## 2023-02-03 RX ORDER — MAGNESIUM SULFATE HEPTAHYDRATE 40 MG/ML
2 INJECTION, SOLUTION INTRAVENOUS ONCE
Status: COMPLETED | OUTPATIENT
Start: 2023-02-03 | End: 2023-02-03

## 2023-02-03 RX ORDER — PREDNISONE 20 MG/1
TABLET ORAL
Qty: 8 TABLET | Refills: 0 | Status: SHIPPED | OUTPATIENT
Start: 2023-02-03 | End: 2023-02-06

## 2023-02-03 RX ADMIN — IPRATROPIUM BROMIDE AND ALBUTEROL SULFATE 3 ML: 2.5; .5 SOLUTION RESPIRATORY (INHALATION) at 18:41

## 2023-02-03 RX ADMIN — PREDNISONE 40 MG: 20 TABLET ORAL at 21:17

## 2023-02-03 RX ADMIN — METHYLPREDNISOLONE SODIUM SUCCINATE 125 MG: 125 INJECTION, POWDER, FOR SOLUTION INTRAMUSCULAR; INTRAVENOUS at 18:43

## 2023-02-03 RX ADMIN — ALBUTEROL SULFATE 2.5 MG: 2.5 SOLUTION RESPIRATORY (INHALATION) at 22:30

## 2023-02-03 RX ADMIN — ALBUTEROL SULFATE 2.5 MG: 2.5 SOLUTION RESPIRATORY (INHALATION) at 19:40

## 2023-02-03 RX ADMIN — ALBUTEROL SULFATE 2.5 MG: 2.5 SOLUTION RESPIRATORY (INHALATION) at 23:16

## 2023-02-03 RX ADMIN — MAGNESIUM SULFATE HEPTAHYDRATE 2 G: 40 INJECTION, SOLUTION INTRAVENOUS at 18:46

## 2023-02-03 RX ADMIN — IPRATROPIUM BROMIDE AND ALBUTEROL SULFATE 3 ML: 2.5; .5 SOLUTION RESPIRATORY (INHALATION) at 18:40

## 2023-02-03 RX ADMIN — SODIUM CHLORIDE, POTASSIUM CHLORIDE, SODIUM LACTATE AND CALCIUM CHLORIDE 1000 ML: 600; 310; 30; 20 INJECTION, SOLUTION INTRAVENOUS at 21:32

## 2023-02-03 ASSESSMENT — ACTIVITIES OF DAILY LIVING (ADL)
ADLS_ACUITY_SCORE: 35

## 2023-02-04 ENCOUNTER — HOSPITAL ENCOUNTER (OUTPATIENT)
Facility: HOSPITAL | Age: 33
Setting detail: OBSERVATION
Discharge: HOME | End: 2023-02-04
Attending: EMERGENCY MEDICINE | Admitting: EMERGENCY MEDICINE
Payer: COMMERCIAL

## 2023-02-04 VITALS
SYSTOLIC BLOOD PRESSURE: 130 MMHG | TEMPERATURE: 98 F | OXYGEN SATURATION: 97 % | DIASTOLIC BLOOD PRESSURE: 70 MMHG | HEART RATE: 109 BPM | RESPIRATION RATE: 22 BRPM

## 2023-02-04 VITALS
DIASTOLIC BLOOD PRESSURE: 62 MMHG | HEART RATE: 95 BPM | OXYGEN SATURATION: 95 % | SYSTOLIC BLOOD PRESSURE: 122 MMHG | WEIGHT: 188 LBS | BODY MASS INDEX: 29.44 KG/M2 | RESPIRATION RATE: 24 BRPM

## 2023-02-04 DIAGNOSIS — J45.51 SEVERE PERSISTENT ASTHMA WITH EXACERBATION (H): ICD-10-CM

## 2023-02-04 PROCEDURE — 96374 THER/PROPH/DIAG INJ IV PUSH: CPT

## 2023-02-04 PROCEDURE — 94640 AIRWAY INHALATION TREATMENT: CPT

## 2023-02-04 PROCEDURE — 250N000011 HC RX IP 250 OP 636: Performed by: EMERGENCY MEDICINE

## 2023-02-04 PROCEDURE — 250N000009 HC RX 250: Performed by: EMERGENCY MEDICINE

## 2023-02-04 PROCEDURE — G0378 HOSPITAL OBSERVATION PER HR: HCPCS

## 2023-02-04 PROCEDURE — 99223 1ST HOSP IP/OBS HIGH 75: CPT | Performed by: EMERGENCY MEDICINE

## 2023-02-04 PROCEDURE — 250N000013 HC RX MED GY IP 250 OP 250 PS 637: Performed by: EMERGENCY MEDICINE

## 2023-02-04 PROCEDURE — 250N000009 HC RX 250: Performed by: STUDENT IN AN ORGANIZED HEALTH CARE EDUCATION/TRAINING PROGRAM

## 2023-02-04 PROCEDURE — 87637 SARSCOV2&INF A&B&RSV AMP PRB: CPT | Performed by: EMERGENCY MEDICINE

## 2023-02-04 PROCEDURE — 99285 EMERGENCY DEPT VISIT HI MDM: CPT | Mod: 25

## 2023-02-04 PROCEDURE — C9803 HOPD COVID-19 SPEC COLLECT: HCPCS

## 2023-02-04 RX ORDER — PRENATAL VIT/IRON FUM/FOLIC AC 27MG-0.8MG
1 TABLET ORAL DAILY
Status: DISCONTINUED | OUTPATIENT
Start: 2023-02-05 | End: 2023-02-05 | Stop reason: HOSPADM

## 2023-02-04 RX ORDER — IPRATROPIUM BROMIDE AND ALBUTEROL SULFATE 2.5; .5 MG/3ML; MG/3ML
1 SOLUTION RESPIRATORY (INHALATION) EVERY 4 HOURS PRN
Qty: 90 ML | Refills: 1 | Status: SHIPPED | OUTPATIENT
Start: 2023-02-04 | End: 2023-02-06

## 2023-02-04 RX ORDER — FLUTICASONE FUROATE AND VILANTEROL 200; 25 UG/1; UG/1
1 POWDER RESPIRATORY (INHALATION) DAILY
Status: DISCONTINUED | OUTPATIENT
Start: 2023-02-05 | End: 2023-02-05 | Stop reason: HOSPADM

## 2023-02-04 RX ORDER — GUAIFENESIN 200 MG/10ML
10 LIQUID ORAL EVERY 4 HOURS PRN
Status: DISCONTINUED | OUTPATIENT
Start: 2023-02-04 | End: 2023-02-05 | Stop reason: HOSPADM

## 2023-02-04 RX ORDER — ACETAMINOPHEN 325 MG/1
650 TABLET ORAL EVERY 4 HOURS PRN
Status: DISCONTINUED | OUTPATIENT
Start: 2023-02-04 | End: 2023-02-04

## 2023-02-04 RX ORDER — ALBUTEROL SULFATE 0.83 MG/ML
2.5 SOLUTION RESPIRATORY (INHALATION) ONCE
Status: COMPLETED | OUTPATIENT
Start: 2023-02-04 | End: 2023-02-04

## 2023-02-04 RX ORDER — IPRATROPIUM BROMIDE AND ALBUTEROL SULFATE 2.5; .5 MG/3ML; MG/3ML
3 SOLUTION RESPIRATORY (INHALATION) ONCE
Status: COMPLETED | OUTPATIENT
Start: 2023-02-04 | End: 2023-02-04

## 2023-02-04 RX ORDER — PREDNISONE 20 MG/1
40 TABLET ORAL DAILY
Status: DISCONTINUED | OUTPATIENT
Start: 2023-02-05 | End: 2023-02-04

## 2023-02-04 RX ORDER — METHYLPREDNISOLONE SODIUM SUCCINATE 125 MG/2ML
60 INJECTION, POWDER, LYOPHILIZED, FOR SOLUTION INTRAMUSCULAR; INTRAVENOUS ONCE
Status: DISCONTINUED | OUTPATIENT
Start: 2023-02-04 | End: 2023-02-04

## 2023-02-04 RX ORDER — METHYLPREDNISOLONE SODIUM SUCCINATE 125 MG/2ML
60 INJECTION, POWDER, LYOPHILIZED, FOR SOLUTION INTRAMUSCULAR; INTRAVENOUS ONCE
Status: DISCONTINUED | OUTPATIENT
Start: 2023-02-05 | End: 2023-02-05 | Stop reason: HOSPADM

## 2023-02-04 RX ORDER — ACETAMINOPHEN 650 MG/1
650 SUPPOSITORY RECTAL EVERY 4 HOURS PRN
Status: DISCONTINUED | OUTPATIENT
Start: 2023-02-04 | End: 2023-02-05 | Stop reason: HOSPADM

## 2023-02-04 RX ORDER — METHYLPREDNISOLONE SODIUM SUCCINATE 125 MG/2ML
125 INJECTION, POWDER, LYOPHILIZED, FOR SOLUTION INTRAMUSCULAR; INTRAVENOUS ONCE
Status: COMPLETED | OUTPATIENT
Start: 2023-02-04 | End: 2023-02-04

## 2023-02-04 RX ORDER — IPRATROPIUM BROMIDE AND ALBUTEROL SULFATE 2.5; .5 MG/3ML; MG/3ML
1 SOLUTION RESPIRATORY (INHALATION) EVERY 4 HOURS PRN
Status: DISCONTINUED | OUTPATIENT
Start: 2023-02-04 | End: 2023-02-05 | Stop reason: HOSPADM

## 2023-02-04 RX ORDER — ACETAMINOPHEN 325 MG/1
325-650 TABLET ORAL EVERY 6 HOURS PRN
Status: DISCONTINUED | OUTPATIENT
Start: 2023-02-04 | End: 2023-02-05 | Stop reason: HOSPADM

## 2023-02-04 RX ORDER — IPRATROPIUM BROMIDE AND ALBUTEROL SULFATE 2.5; .5 MG/3ML; MG/3ML
3 SOLUTION RESPIRATORY (INHALATION)
Status: DISCONTINUED | OUTPATIENT
Start: 2023-02-04 | End: 2023-02-05 | Stop reason: HOSPADM

## 2023-02-04 RX ORDER — ALBUTEROL SULFATE 0.83 MG/ML
3 SOLUTION RESPIRATORY (INHALATION)
Status: DISCONTINUED | OUTPATIENT
Start: 2023-02-04 | End: 2023-02-05 | Stop reason: HOSPADM

## 2023-02-04 RX ORDER — LIDOCAINE 40 MG/G
CREAM TOPICAL
Status: DISCONTINUED | OUTPATIENT
Start: 2023-02-04 | End: 2023-02-05 | Stop reason: HOSPADM

## 2023-02-04 RX ADMIN — IPRATROPIUM BROMIDE AND ALBUTEROL SULFATE 3 ML: 2.5; .5 SOLUTION RESPIRATORY (INHALATION) at 19:38

## 2023-02-04 RX ADMIN — ALBUTEROL SULFATE 2.5 MG: 2.5 SOLUTION RESPIRATORY (INHALATION) at 00:34

## 2023-02-04 RX ADMIN — METHYLPREDNISOLONE SODIUM SUCCINATE 125 MG: 125 INJECTION, POWDER, FOR SOLUTION INTRAMUSCULAR; INTRAVENOUS at 19:41

## 2023-02-04 RX ADMIN — NICOTINE 1 PATCH: 7 PATCH, EXTENDED RELEASE TRANSDERMAL at 21:13

## 2023-02-04 RX ADMIN — IPRATROPIUM BROMIDE AND ALBUTEROL SULFATE 3 ML: 2.5; .5 SOLUTION RESPIRATORY (INHALATION) at 19:52

## 2023-02-04 ASSESSMENT — ACTIVITIES OF DAILY LIVING (ADL)
ADLS_ACUITY_SCORE: 35
DEPENDENT_IADLS:: INDEPENDENT

## 2023-02-04 NOTE — ED NOTES
EMERGENCY DEPARTMENT SIGN OUT NOTE        ED COURSE AND MEDICAL DECISION MAKING  Patient was signed out to me by Dr Gabriela Mercado at 10:00 PM.  10:57 PM I introduced myself, reevaluated, and updated the patient. Patient still with appreciable wheezing. She has some mild conversational dyspnea. I recommended admission, but patient declines at this time.    In brief, Soren Freitas is a 32 year old female who initially presented with asthma exacerbation.  No relief with nebs, inhalers.    - Recommended admission again the patient and expressed my concern that she is still wheezing significantly.  Patient again declined admission.  Patient was given prescription for albuterol nebulizers at home as well as prednisone.         FINAL IMPRESSION    1. Asthma with acute exacerbation, unspecified asthma severity, unspecified whether persistent        ED MEDS  Medications   albuterol (PROVENTIL) neb solution 2.5 mg (2.5 mg Nebulization Given 2/3/23 2230)   albuterol (PROVENTIL) neb solution 2.5 mg (has no administration in time range)   albuterol (PROVENTIL) neb solution 2.5 mg (has no administration in time range)   ipratropium - albuterol 0.5 mg/2.5 mg/3 mL (DUONEB) neb solution 3 mL (3 mLs Nebulization Given 2/3/23 1841)   ipratropium - albuterol 0.5 mg/2.5 mg/3 mL (DUONEB) neb solution 3 mL (3 mLs Nebulization Given 2/3/23 1840)   methylPREDNISolone sodium succinate (solu-MEDROL) injection 125 mg (125 mg Intravenous Given 2/3/23 1843)   magnesium sulfate 2 g in water intermittent infusion (0 g Intravenous Stopped 2/3/23 1958)   predniSONE (DELTASONE) tablet 40 mg (40 mg Oral Given 2/3/23 2117)   lactated ringers BOLUS 1,000 mL (1,000 mLs Intravenous New Bag 2/3/23 2132)       LAB  Labs Ordered and Resulted from Time of ED Arrival to Time of ED Departure   BLOOD GAS VENOUS - Abnormal       Result Value    pH Venous 7.27 (*)     pCO2 Venous 58 (*)     pO2 Venous 21 (*)     Bicarbonate Venous 27      Base Excess/Deficit  (+/-) 0.1      Oxyhemoglobin Venous 26.1 (*)     O2 Sat, Venous 26.6 (*)    BASIC METABOLIC PANEL - Normal    Sodium 136      Potassium 4.1      Chloride 103      Carbon Dioxide (CO2) 24      Anion Gap 9      Urea Nitrogen 10.1      Creatinine 0.78      Calcium 9.0      Glucose 76      GFR Estimate >90     CBC WITH PLATELETS AND DIFFERENTIAL    WBC Count 9.5      RBC Count 4.55      Hemoglobin 13.1      Hematocrit 39.3      MCV 86      MCH 28.8      MCHC 33.3      RDW 14.8      Platelet Count 243      % Neutrophils 52      % Lymphocytes 34      % Monocytes 7      % Eosinophils 6      % Basophils 0      % Immature Granulocytes 1      NRBCs per 100 WBC 0      Absolute Neutrophils 5.0      Absolute Lymphocytes 3.3      Absolute Monocytes 0.7      Absolute Eosinophils 0.5      Absolute Basophils 0.0      Absolute Immature Granulocytes 0.1      Absolute NRBCs 0.0         EKG      RADIOLOGY    No orders to display       DISCHARGE MEDS  New Prescriptions    PREDNISONE (DELTASONE) 20 MG TABLET    Take two tablets (= 40mg) each day for 4 (four) days       Myke Nieto DO  [facility] EMERGENCY DEPARTMENT  [facility] [facility] [address] [phone]     Myke Nieto DO  02/04/23 0244

## 2023-02-04 NOTE — ED PROVIDER NOTES
Emergency Department Encounter     Evaluation Date & Time:   2/3/2023  6:35 PM    CHIEF COMPLAINT:  Asthma Exacerbation      Triage Note:  Pt arrives in obvious respiratory distress. Pt felt fine yesterday. Has been using her nebulizer three times with no relief. Pt has a hx of asthma exacerbation. Pt has audible expiratory wheezing. Pt is 4 months pregnant.      Triage Assessment     Row Name 23 [id number]       Triage Assessment (Adult)    Airway WDL X    Additional Documentation Breath Sounds (Group)       Respiratory WDL    Respiratory WDL X;rhythm/pattern    Rhythm/Pattern, Respiratory tachypneic       Skin Circulation/Temperature WDL    Skin Circulation/Temperature WDL WDL       Cardiac WDL    Cardiac WDL WDL       Peripheral/Neurovascular WDL    Peripheral Neurovascular WDL WDL       Cognitive/Neuro/Behavioral WDL    Cognitive/Neuro/Behavioral WDL WDL                    Impression and Plan       FINAL IMPRESSION:    ICD-10-CM    1. Asthma with acute exacerbation, unspecified asthma severity, unspecified whether persistent  J45.901             ED COURSE & MEDICAL DECISION MAKIN:29 PM I met with the patient, obtained history, performed an initial exam, and discussed options and plan for diagnostics and treatment here in the ED. PPE worn including surgical mask, surgical gloves, surgical gown.  6:45 PM I reevaluated the patient.  7:09 PM I reevaluated the patient. She is showing symptomatic improvement.  8:16 PM I reevaluated the patient.    32 year old female,  at ~4 months gestation with history of severe asthma, who presents for evaluation of shortness of breath that started earlier today consistent with previous asthma exacerbations. She has used her albuterol MDI 5-6 times and her albuterol neb 3 times without significant improvement. They recently got a new dog and she has been outside a lot today - both of which are triggers for her.     On presentation, patient in moderate acute respiratory  distress with increased work of breathing. She is in tripod position with wheezing audible without stethoscope. She is able to speak 3-4 word sentences. With stethoscope, patient with diffuse inspiratory and expiratory wheezes; no audible rhonchi or rales.     Patient placed on cardiac monitor, IV access established and blood sent for labs.    Patient given DuoNeb x 2 and 125 Mg IV Solu-Medrol with improvement.  She also was given 2g IV magnesium.    I offered to perform a CXR, however patient declined. She denies cough, URI symptoms and fevers and I do not suspect pneumonia, COVID or other infectious etiology.      No chest pain and I do not suspect PE, CHF or other cardiac etiology.    Labs remarkable for no leukocytosis, anemia, electrolyte derangements or renal impairment. VBG consistent with acute respiratory acidosis.     Patient much improved, however with ongoing inspiratory and expiratory wheezes for which she was given additional Albuterol nebs x 2 and po prednisone 40mg.    I offered admission, however patient feeling significantly better and would like to discharge to home. She requested an additional Albuterol neb, which was ordered.    Her BP did decrease (84 systolic) after IV magnesium for which she was given 1L LR.    At time of shift change, patient much improved and will likely discharge to home. Patient signed out to Dr. Nieto at shift change pending 3rd Albuterol neb, a little longer observation period and reassessment.     Discharge instructions written as well as a prescription for a prednisone burst.  Patient was advised to follow-up with her primary care provider on Monday for a close recheck.       At the conclusion of the encounter I discussed the results of all the tests and the disposition. The questions were answered. The patient and family acknowledged understanding and were agreeable with the care plan.    Medical Decision Making    History:    Supplemental history from: Documented in  chart, if applicable and Family Member/Significant Other    Work Up:    Chart documentation includes differential considered and any EKGs or imaging independently interpreted by provider, where specified.    In additional to work up documented, I considered the following work up: Documented in chart, if applicable. CXR considered; see above.     Complicating factors:    Care impacted by chronic illness: Chronic Lung Disease / asthma; current pregnancy    Care affected by social determinants of health: N/A    Disposition considerations: Discharge. I prescribed additional prescription strength medication(s) as charted. I considered admission, but discharged patient after significant clinical improvement.      MEDICATIONS GIVEN IN THE EMERGENCY DEPARTMENT:  Medications   ipratropium - albuterol 0.5 mg/2.5 mg/3 mL (DUONEB) neb solution 3 mL (3 mLs Nebulization Given 2/3/23 1841)   ipratropium - albuterol 0.5 mg/2.5 mg/3 mL (DUONEB) neb solution 3 mL (3 mLs Nebulization Given 2/3/23 1840)   methylPREDNISolone sodium succinate (solu-MEDROL) injection 125 mg (125 mg Intravenous Given 2/3/23 1843)   magnesium sulfate 2 g in water intermittent infusion (0 g Intravenous Stopped 2/3/23 1958)   predniSONE (DELTASONE) tablet 40 mg (40 mg Oral Given 2/3/23 2117)   lactated ringers BOLUS 1,000 mL (0 mLs Intravenous Stopped 2/3/23 2320)   albuterol (PROVENTIL) neb solution 2.5 mg (2.5 mg Nebulization Given 23)       NEW PRESCRIPTIONS STARTED AT TODAY'S ED VISIT:  Discharge Medication List as of 2023  1:14 AM      START taking these medications    Details   predniSONE (DELTASONE) 20 MG tablet Take two tablets (= 40mg) each day for 4 (four) days, Disp-8 tablet, R-0, Local Print             HPI     History obtained from: Patient, patient's boyfriend      Soren Freitas is a 32 year old female,  at ~4 months gestation with history of severe asthma, who presents to this ED by private vehicle with her boyfriend for  evaluation of asthma exacerbation. Patient presents to the ED with shortness of breath consistent with her previous asthma exacerbations with an onset earlier today. Yesterday, she was otherwise feeling normal.    Per the patient's boyfriend, he states the patient attempted to use her albuterol inhaler 5-6 times and used her nebulizer 3 times without any relief to her symptoms. He states the patient normally uses her nebulizer once at night. They recently got a new dog, which is a trigger for her. He also reports that she has been been outside in the cold and the weather is also a trigger for her.     Patient denies cough, URI symptoms, fevers. She also denies abdominal pain and vaginal bleeding and has started to feel fetal movements, which have been normal.     Denies chest pain, N/V/D or other concerns.    States she has needed to be hospitalized in the past and required a ventilator.    Per chart review, patient was last seen at this ED on 11/12/22 for asthma exacerbation. Home inhaler and neb without improvement. O2 saturations in the ED 96%. Patient tachycardic and appeared to be in distress, not overall moving air well. In the ED she received a DuoNeb, dexamethasone, supplemental O2 with improvement in the ED and she was able to ambulate in the ED without severe dyspnea. 95% O2 sats at time of discharge.    REVIEW OF SYSTEMS:  All other systems reviewed and are negative.      Medical History     Past Medical History:   Diagnosis Date     Obesity      Severe persistent asthma with acute exacerbation 1/1/2015     Uncomplicated asthma        History reviewed. No pertinent surgical history.    History reviewed. No pertinent family history.    Social History     Tobacco Use     Smoking status: Former       acetaminophen (TYLENOL) 325 MG tablet  albuterol (PROAIR HFA/PROVENTIL HFA/VENTOLIN HFA) 108 (90 Base) MCG/ACT inhaler  albuterol (PROVENTIL) (2.5 MG/3ML) 0.083% neb solution  ipratropium - albuterol 0.5 mg/2.5  mg/3 mL (DUONEB) 0.5-2.5 (3) MG/3ML neb solution  mometasone-formoterol (DULERA) 200-5 MCG/ACT inhaler  predniSONE (DELTASONE) 20 MG tablet  Prenatal Vit-Fe Fumarate-FA (PRENATAL MULTIVITAMIN W/IRON) 27-0.8 MG tablet        Physical Exam     First Vitals:  Patient Vitals for the past 24 hrs:   BP Pulse Resp SpO2 Weight   02/04/23 0127 122/62 95 -- 95 % --   02/03/23 2300 119/69 103 24 95 % --   02/03/23 2245 108/61 103 21 97 % --   02/03/23 2230 112/59 98 21 99 % --   02/03/23 2215 128/74 101 24 99 % --   02/03/23 2200 117/65 108 21 100 % --   02/03/23 2145 117/71 98 17 99 % --   02/03/23 2131 114/66 101 19 98 % --   02/03/23 2130 114/66 105 21 98 % --   02/03/23 2115 (!) 84/50 106 24 96 % --   02/03/23 2100 112/65 100 20 96 % --   02/03/23 2045 114/58 103 21 97 % --   02/03/23 2030 110/57 103 20 97 % --   02/03/23 2015 118/58 107 24 97 % --   02/03/23 1945 108/56 102 20 100 % --   02/03/23 1940 -- -- -- 97 % --   02/03/23 1930 100/57 104 21 98 % --   02/03/23 1915 108/58 111 23 98 % --   02/03/23 1900 119/58 (!) 123 22 99 % --   02/03/23 1845 124/63 (!) 129 23 100 % --   02/03/23 1840 124/63 (!) 132 28 100 % --   02/03/23 1835 -- -- -- -- 85.3 kg (188 lb)       PHYSICAL EXAM:   Physical Exam    GENERAL: Awake, alert.  In moderate acute distress with increased work of breathing. Patient in tripod position with wheezing audible without stethoscope. Speaking in 3-4 word sentences.    HEENT: Normocephalic, atraumatic. Pupils equal, round and reactive. Conjunctiva normal.  NECK: No stridor.  PULMONARY: Moderate acute respiratory distress with increased work of breathing. Patient in tripod position with wheezing audible without stethoscope. Speaking in 3-4 word sentences. With stethoscope, patient with diffuse inspiratory and expiratory wheezes. No audible rhonchi or rales.   CARDIO: Tachycardic rate with regular rhythm. No significant murmur, rub or gallop.  Radial pulses strong and symmetrical.  ABDOMINAL: Lower  abdomen gravid; abdomen otherwise soft and non-tender to palpation.    EXTREMITIES: No lower extremity swelling or edema.      NEURO: Alert and oriented to person, place and time.  Cranial nerves grossly intact.  No focal motor deficit.  PSYCH: Normal mood and affect.    Results     LAB:  All pertinent labs reviewed and interpreted  Labs Ordered and Resulted from Time of ED Arrival to Time of ED Departure   BLOOD GAS VENOUS - Abnormal       Result Value    pH Venous 7.27 (*)     pCO2 Venous 58 (*)     pO2 Venous 21 (*)     Bicarbonate Venous 27      Base Excess/Deficit (+/-) 0.1      Oxyhemoglobin Venous 26.1 (*)     O2 Sat, Venous 26.6 (*)    BASIC METABOLIC PANEL - Normal    Sodium 136      Potassium 4.1      Chloride 103      Carbon Dioxide (CO2) 24      Anion Gap 9      Urea Nitrogen 10.1      Creatinine 0.78      Calcium 9.0      Glucose 76      GFR Estimate >90     CBC WITH PLATELETS AND DIFFERENTIAL    WBC Count 9.5      RBC Count 4.55      Hemoglobin 13.1      Hematocrit 39.3      MCV 86      MCH 28.8      MCHC 33.3      RDW 14.8      Platelet Count 243      % Neutrophils 52      % Lymphocytes 34      % Monocytes 7      % Eosinophils 6      % Basophils 0      % Immature Granulocytes 1      NRBCs per 100 WBC 0      Absolute Neutrophils 5.0      Absolute Lymphocytes 3.3      Absolute Monocytes 0.7      Absolute Eosinophils 0.5      Absolute Basophils 0.0      Absolute Immature Granulocytes 0.1      Absolute NRBCs 0.0         I, Saul De La Cruz, am serving as a scribe to document services personally performed by Gabriela Mercado MD based on my observation and the provider's statements to me. I, Gabriela Mercado MD attest that Saul De La Cruz is acting in a scribe capacity, has observed my performance of the services and has documented them in accordance with my direction.    Gabriela Mercado MD  Emergency Medicine  [facility] EMERGENCY DEPARTMENT         Gabriela Mercado  MD Stephanie  02/04/23 0850

## 2023-02-04 NOTE — ED TRIAGE NOTES
Pt arrives in obvious respiratory distress. Pt felt fine yesterday. Has been using her nebulizer three times with no relief. Pt has a hx of asthma exacerbation. Pt has audible expiratory wheezing. Pt is 4 months pregnant.      Triage Assessment     Row Name 02/03/23 6300       Triage Assessment (Adult)    Airway WDL X    Additional Documentation Breath Sounds (Group)       Respiratory WDL    Respiratory WDL X;rhythm/pattern    Rhythm/Pattern, Respiratory tachypneic       Skin Circulation/Temperature WDL    Skin Circulation/Temperature WDL WDL       Cardiac WDL    Cardiac WDL WDL       Peripheral/Neurovascular WDL    Peripheral Neurovascular WDL WDL       Cognitive/Neuro/Behavioral WDL    Cognitive/Neuro/Behavioral WDL WDL

## 2023-02-04 NOTE — DISCHARGE INSTRUCTIONS
Please follow-up with your Primary Care Provider on Monday for a recheck; call to arrange appointment.    Return to the ER for worsening symptoms, worsening / recurrent shortness of breath, persistent wheezing, cough, fever or other concerns.

## 2023-02-04 NOTE — PHARMACY-ADMISSION MEDICATION HISTORY
Pharmacy Note - Admission Medication History     ______________________________________________________________________    Prior To Admission (PTA) med list completed and updated in EMR.       PTA Med List   Medication Sig Last Dose     acetaminophen (TYLENOL) 325 MG tablet Take 325-650 mg by mouth every 6 hours as needed for mild pain Unknown at prn     albuterol (PROAIR HFA/PROVENTIL HFA/VENTOLIN HFA) 108 (90 Base) MCG/ACT inhaler Inhale 2 puffs into the lungs every 4 hours as needed for shortness of breath / dyspnea or wheezing 2/3/2023 at many times     albuterol (PROVENTIL) (2.5 MG/3ML) 0.083% neb solution Take 1 vial (2.5 mg) by nebulization every 6 hours as needed for shortness of breath / dyspnea or wheezing 2/3/2023 at many times     mometasone-formoterol (DULERA) 200-5 MCG/ACT inhaler Inhale 2 puffs into the lungs 2 times daily 2/3/2023 at am     predniSONE (DELTASONE) 20 MG tablet Take two tablets (= 40mg) each day for 4 (four) days      Prenatal Vit-Fe Fumarate-FA (PRENATAL MULTIVITAMIN W/IRON) 27-0.8 MG tablet Take 1 tablet by mouth daily 2/3/2023       Information source(s): Patient and CareEverywhere/[facility]  Method of interview communication: in-person    Summary of Changes to PTA Med List  New: none  Discontinued: DuoNeb, montelukast, prednisone  Changed: none    Patient was asked about OTC/herbal products specifically.  PTA med list reflects this.    In the past week, patient estimated taking medication this percent of the time:  greater than 90%.    Medication Affordability:       Allergies were reviewed, assessed, and updated with the patient.      Patient does not anticipate needing any multi-use medications during admission.    The information provided in this note is only as accurate as the sources available at the time of the update(s).    Thank you,  Negar Adames [facility]  2/3/2023 10:58 PM

## 2023-02-05 NOTE — DISCHARGE INSTRUCTIONS
Please return to the emergency department soon as you resolve the situation with your fiancé that you are concerned about.  Continue the nebulizers and prednisone as previously prescribed.  If you elect not to return to the emergency department may see your primary care physician soon as possible for follow-up next week.  At any time if you feel you are having difficulty breathing either return or call the paramedics will return to the emergency department.

## 2023-02-05 NOTE — H&P
ADMISSION HISTORY & PHYSICAL      Elvia Bahena, None  ASSESSMENT AND PLAN:  32 year old female 19 weeks 5 days gestation presenting with:    1.  Severe recurrent asthma exacerbation: This is her second ED visit in 2 days.  History of multiple past admissions with intubation.  Symptoms improved in the ED with 125 mg of IV Solu-Medrol and DuoNeb x2.  Currently appears much improved, could also consider IV magnesium if worsens.  Will consult pulmonology for further recommendations.  She has declined x-ray and I am not strongly suspicious of pneumonia given lack of infectious symptoms.    2.  19-week 5-day gestation: We will do fetal/uterine monitoring.        Barriers to discharge: Severe asthma exacerbation, anticipate discharge tomorrow      CHIEF COMPLAINT:  Dyspnea    HISTORY OF PRESENTING ILLNESS:  Soren Freitas is a 32 year old female who presents with her typical symptoms of asthma exacerbation.  She felt very short of breath with audible wheezing and symptoms have been unresponsive to her using her inhaler multiple times.  No fevers or chills or body aches.  She currently feels much improved with the above interventions.  She presented to the ED here a day prior and received similar treatment although at that time did receive 2 g of IV magnesium.  Admission was recommended at that time but she declined.    PMH/PSH:  Patient Active Problem List   Diagnosis     Acute respiratory failure with hypoxia (H)     Severe asthma with exacerbation, unspecified whether persistent     Severe persistent asthma with acute exacerbation     Bone lesion     Suspected COVID-19 virus infection     Acute respiratory failure with hypoxia and hypercapnia (H)     Severe asthma with acute exacerbation, unspecified whether persistent     Moderate persistent asthma, unspecified whether complicated     Community acquired bilateral lower lobe pneumonia     Acute asthma exacerbation     Severe persistent asthma with status  asthmaticus     Community acquired bacterial pneumonia     Sinus tachycardia      contractions     Yeast infection of the vagina     Hypoalbuminemia     Obesity     Pneumonia of both lungs due to infectious organism, unspecified part of lung     Severe persistent asthma with exacerbation       ALLERGIES:  Allergies   Allergen Reactions     Cashew Nut Oil Anaphylaxis and Angioedema     Fish Allergy Itching     Sea Food causes Throat swelling     Peanut-Derived Angioedema and Swelling     Throat swelling.       Shellfish-Derived Products Angioedema       MEDICATIONS:  Reviewed.  Current Facility-Administered Medications   Medication     acetaminophen (TYLENOL) Suppository 650 mg     acetaminophen (TYLENOL) tablet 325-650 mg     acetaminophen (TYLENOL) tablet 650 mg     albuterol (PROVENTIL) neb solution 2.5 mg     [START ON 2023] fluticasone-vilanterol (BREO ELLIPTA) 200-25 MCG/ACT inhaler 1 puff     guaiFENesin (ROBITUSSIN) 20 mg/mL solution 10 mL     ipratropium - albuterol 0.5 mg/2.5 mg/3 mL (DUONEB) neb solution 3 mL     ipratropium - albuterol 0.5 mg/2.5 mg/3 mL (DUONEB) neb solution 3 mL     lidocaine (LMX4) cream     lidocaine 1 % 0.1-1 mL     [START ON 2023] methylPREDNISolone sodium succinate (solu-MEDROL) injection 62.5 mg     nicotine (NICODERM CQ) 7 MG/24HR 24 hr patch 1 patch     nicotine Patch in Place     [START ON 2023] prenatal multivitamin w/iron per tablet 1 tablet     sodium chloride (PF) 0.9% PF flush 3 mL     sodium chloride (PF) 0.9% PF flush 3 mL     Current Outpatient Medications   Medication     acetaminophen (TYLENOL) 325 MG tablet     albuterol (PROAIR HFA/PROVENTIL HFA/VENTOLIN HFA) 108 (90 Base) MCG/ACT inhaler     albuterol (PROVENTIL) (2.5 MG/3ML) 0.083% neb solution     ipratropium - albuterol 0.5 mg/2.5 mg/3 mL (DUONEB) 0.5-2.5 (3) MG/3ML neb solution     mometasone-formoterol (DULERA) 200-5 MCG/ACT inhaler     predniSONE (DELTASONE) 20 MG tablet     Prenatal Vit-Fe  Fumarate-FA (PRENATAL MULTIVITAMIN W/IRON) 27-0.8 MG tablet       SOCIAL HISTORY:  Social History     Socioeconomic History     Marital status: Single     Spouse name: Not on file     Number of children: Not on file     Years of education: Not on file     Highest education level: Not on file   Occupational History     Not on file   Tobacco Use     Smoking status: Former     Smokeless tobacco: Not on file   Substance and Sexual Activity     Alcohol use: Not on file     Drug use: Not on file     Sexual activity: Not on file   Other Topics Concern     Not on file   Social History Narrative     Not on file     Social Determinants of Health     Financial Resource Strain: Not on file   Food Insecurity: Not on file   Transportation Needs: Not on file   Physical Activity: Not on file   Stress: Not on file   Social Connections: Not on file   Intimate Partner Violence: Not on file   Housing Stability: Not on file       FAMILY HISTORY:  History reviewed. No pertinent family history.    ROS:  12 point ROS was performed and found to be negative except for the pertinent positives mentioned in the HPI.      PHYSICAL EXAM:  /63   Pulse 100   Temp 98  F (36.7  C) (Oral)   Resp 22   SpO2 97%   No intake/output data recorded.  No intake/output data recorded.  CONSTITUTIONAL: No apparent distress, looking at her phone throughout our visit.  HEENT:       Sclera- anicteric      Mucous membrane- moist and pink  LUNGS: Very poor air movement with wheezing in all lung fields  CARDIOVASCULAR: S1S2 regular, heart rate in the low 100s no murmurs, rubs or gallops,no pedal edema  GI: Soft. Non-tender, non-distended. No organomegaly. No guarding or rigidity. Bowel sounds- active  NEURO: Cranial nerves II-XII grossly intact. No focal neurological deficit. No involuntary movements  INTGM: No skin rash, no cyanosis or clubbing  LYMPH: no adenopathy  MSK: no joint swelling or tenderness  PSYCH: alert and oriented x 3, no  agitation      DIAGNOSTIC DATA:  Recent Results (from the past 24 hour(s))   Asymptomatic Influenza A/B & SARS-CoV2 (COVID-19) Virus PCR Multiplex Nasopharyngeal    Collection Time: 02/04/23  7:52 PM    Specimen: Nasopharyngeal; Swab   Result Value Ref Range    Influenza A PCR Negative Negative    Influenza B PCR Negative Negative    RSV PCR Negative Negative    SARS CoV2 PCR Negative Negative     All lab studies reviewed personally    No results found.  Radiology report reviewed.    Medical Decision Making       80 MINUTES SPENT BY ME on the date of service doing chart review, history, exam, documentation & further activities per the note.      William Chu MD  [facility]

## 2023-02-05 NOTE — ED NOTES
[facility] ED Handoff Report    ED Chief Complaint: shortness of breath     ED Diagnosis:  (J45.51) Severe persistent asthma with exacerbation  Comment: Patient admitted to ED past 2 days with exacerbation.  To be admitted to hospital this evening   Plan: Observe in hospital.  Breathing treatments and steroids.       PMH:    Past Medical History:   Diagnosis Date    Obesity     Severe persistent asthma with acute exacerbation 1/1/2015    Formatting of this note might be different from the original. 12/2014 Admit for exacerbation: Spirometry FEV1/FVC 68%, D/C w/dulera, albuterol, Spiriva.  Started singulair 10mg QD, Given NRT.  Dx as infant, has been on albuterol lifelong.    Last Assessment & Plan:  Formatting of this note might be different from the original. Stable on Singulair, cetirizine, albuterol, and dulera. Discussed COVID    Uncomplicated asthma         Code Status:  Full Code     Falls Risk: No Band: Not applicable    Current Living Situation/Residence: lives with a significant other     Elimination Status: Continent: Yes     Activity Level: Independent    Patients Preferred Language:  English     Needed: No    Vital Signs:  /63   Pulse 100   Temp 98  F (36.7  C) (Oral)   Resp 22   SpO2 97%      Cardiac Rhythm: NSR     Pain Score: 0/10    Is the Patient Confused:  No    Last Food or Drink: 02/04/23 at 2130      Focused Assessment:  Expiratory wheezes in all field up on arrival to ED    Tests Performed: none    Treatments Provided:  duoneb,s and solu-medrol  Family Dynamics/Concerns: No    Family Updated On Visitor Policy: No    Plan of Care Communicated to Family: Yes    Who Was Updated about Plan of Care: significant other    Belongings Checklist Done and Signed by Patient: Yes    Medications sent with patient: none     Covid: asymptomatic , negative    Additional Information: Patient also 19 weeks pregnant    RN: Amaya Fraga RN  2/4/2023 9:31 PM

## 2023-02-05 NOTE — PHARMACY-ADMISSION MEDICATION HISTORY
Pharmacy Note - Admission Medication History    Pertinent Provider Information: Still needs to  Duoneb and prednisone. Used albuterol today. Reports needing Dulera refill as well but fill hx looks up to date as last fill 1/10/23.     ______________________________________________________________________    Prior To Admission (PTA) med list completed and updated in EMR.       PTA Med List   Medication Sig Last Dose     acetaminophen (TYLENOL) 325 MG tablet Take 325-650 mg by mouth every 6 hours as needed for mild pain Unknown     albuterol (PROAIR HFA/PROVENTIL HFA/VENTOLIN HFA) 108 (90 Base) MCG/ACT inhaler Inhale 2 puffs into the lungs every 4 hours as needed for shortness of breath / dyspnea or wheezing 2/4/2023     albuterol (PROVENTIL) (2.5 MG/3ML) 0.083% neb solution Take 1 vial (2.5 mg) by nebulization every 6 hours as needed for shortness of breath / dyspnea or wheezing 2/4/2023     ipratropium - albuterol 0.5 mg/2.5 mg/3 mL (DUONEB) 0.5-2.5 (3) MG/3ML neb solution Take 1 vial (3 mLs) by nebulization every 4 hours as needed for shortness of breath, wheezing or cough did not get supply outpt yet     mometasone-formoterol (DULERA) 200-5 MCG/ACT inhaler Inhale 2 puffs into the lungs 2 times daily needs to refill     predniSONE (DELTASONE) 20 MG tablet Take two tablets (= 40mg) each day for 4 (four) days did not get outpt yet     Prenatal Vit-Fe Fumarate-FA (PRENATAL MULTIVITAMIN W/IRON) 27-0.8 MG tablet Take 1 tablet by mouth daily 2/4/2023       Information source(s): Patient and CareEverywhere/[employer]  Method of interview communication: in-person    Summary of Changes to PTA Med List - not changed but still needs to get new meds from yesterday     Patient was asked about OTC/herbal products specifically.  PTA med list reflects this.    Medication Affordability:No     Patient did not bring any medications to the hospital and can't retrieve from home. No multi-dose medications are available for use  during hospital stay.     The information provided in this note is only as accurate as the sources available at the time of the update(s).    Thank you for the opportunity to participate in the care of this patient.    Yu Motley [facility]  2/4/2023 8:29 PM
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
## 2023-02-05 NOTE — ED TRIAGE NOTES
Pt was seen yesterday for asthma exacerbation and was discharged. Pt reports she has continued to be SOB since discharge yesterday. Pt has used inhaler multiple times with no relief. Pt has audible expiratory wheezing.      Triage Assessment     Row Name 02/04/23 1921       Triage Assessment (Adult)    Airway WDL X    Additional Documentation Breath Sounds (Group)  expiratory wheezing       Respiratory WDL    Respiratory WDL X;rhythm/pattern    Rhythm/Pattern, Respiratory tachypneic       Skin Circulation/Temperature WDL    Skin Circulation/Temperature WDL WDL       Cardiac WDL    Cardiac WDL WDL       Peripheral/Neurovascular WDL    Peripheral Neurovascular WDL WDL       Cognitive/Neuro/Behavioral WDL    Cognitive/Neuro/Behavioral WDL WDL

## 2023-02-05 NOTE — CONSULTS
"Care Management Initial Consult    General Information  Assessment completed with: PatientSoren via phone  Type of CM/SW Visit: Initial Assessment    Primary Care Provider verified and updated as needed: Yes   Readmission within the last 30 days: no previous admission in last 30 days      Reason for Consult: discharge planning  Advance Care Planning: Advance Care Planning Reviewed: no concerns identified          Communication Assessment  Patient's communication style: spoken language (English or Bilingual)                           Living Environment:   People in home: child(pierre), dependent (\"kids age 10, 6, and pregnant\")     Current living Arrangements: apartment      Able to return to prior arrangements: yes       Family/Social Support:  Care provided by: self  Provides care for: child(pierre) (\"kids age 10, 6, and 4 months pregnant\")     Sibling(s)          Description of Support System: Supportive, Involved    Support Assessment: Adequate family and caregiver support, Adequate social supports, Patient communicates needs well met    Current Resources:   Patient receiving home care services: No     Community Resources: None  Equipment currently used at home: none  Supplies currently used at home: Nebulizer tubing    Employment/Financial:  Employment Status: employed part-time     Employment/ Comments: \"no  benefits\"  Financial Concerns:     Referral to Financial Worker: No       Lifestyle & Psychosocial Needs:  Social Determinants of Health     Tobacco Use: Medium Risk     Smoking Tobacco Use: Former     Smokeless Tobacco Use: Unknown     Passive Exposure: Not on file   Alcohol Use: Not on file   Financial Resource Strain: Not on file   Food Insecurity: Not on file   Transportation Needs: Not on file   Physical Activity: Not on file   Stress: Not on file   Social Connections: Not on file   Intimate Partner Violence: Not on file   Depression: Not on file   Housing Stability: Not on file "       Functional Status:  Prior to admission patient needed assistance:   Dependent ADLs:: Independent, Ambulation-no assistive device  Dependent IADLs:: Independent  Assesssment of Functional Status: At functional baseline    Mental Health Status:          Chemical Dependency Status:                Values/Beliefs:  Spiritual, Cultural Beliefs, Jehovah's witness Practices, Values that affect care:                 Additional Information:  Soren lives in an apartment with her kids age 10 and 6 and she is 4 months pregnant.     She is independent with ADLs at baseline and drives and works part time.     Unknown discharge needs at this time, but should be able to return home.    Flaca Koch RN

## 2023-02-06 ENCOUNTER — HOSPITAL ENCOUNTER (OUTPATIENT)
Facility: HOSPITAL | Age: 33
Setting detail: OBSERVATION
Discharge: HOME | End: 2023-02-06
Attending: EMERGENCY MEDICINE | Admitting: INTERNAL MEDICINE
Payer: COMMERCIAL

## 2023-02-06 VITALS
HEART RATE: 93 BPM | DIASTOLIC BLOOD PRESSURE: 64 MMHG | HEIGHT: 67 IN | WEIGHT: 188 LBS | BODY MASS INDEX: 29.51 KG/M2 | RESPIRATION RATE: 18 BRPM | TEMPERATURE: 98.2 F | SYSTOLIC BLOOD PRESSURE: 131 MMHG | OXYGEN SATURATION: 97 %

## 2023-02-06 DIAGNOSIS — J45.41 MODERATE PERSISTENT ASTHMA WITH EXACERBATION: ICD-10-CM

## 2023-02-06 DIAGNOSIS — J45.51 SEVERE PERSISTENT ASTHMA WITH ACUTE EXACERBATION (H): ICD-10-CM

## 2023-02-06 DIAGNOSIS — J45.40 MODERATE PERSISTENT ASTHMA, UNSPECIFIED WHETHER COMPLICATED: ICD-10-CM

## 2023-02-06 LAB
BASE EXCESS BLDV CALC-SCNC: 0.1 MMOL/L
HCO3 BLDV-SCNC: 25 MMOL/L (ref 24–30)
OXYHGB MFR BLDV: 76.2 % (ref 70–75)
PCO2 BLDV: 41 MM HG (ref 35–50)
PH BLDV: 7.39 [PH] (ref 7.35–7.45)
PO2 BLDV: 42 MM HG (ref 25–47)
SAO2 % BLDV: 77.4 % (ref 70–75)

## 2023-02-06 PROCEDURE — 99207 PR NO BILLABLE SERVICE THIS VISIT: CPT | Performed by: HOSPITALIST

## 2023-02-06 PROCEDURE — 250N000013 HC RX MED GY IP 250 OP 250 PS 637: Performed by: HOSPITALIST

## 2023-02-06 PROCEDURE — 96374 THER/PROPH/DIAG INJ IV PUSH: CPT

## 2023-02-06 PROCEDURE — 99222 1ST HOSP IP/OBS MODERATE 55: CPT | Mod: AI | Performed by: INTERNAL MEDICINE

## 2023-02-06 PROCEDURE — 99285 EMERGENCY DEPT VISIT HI MDM: CPT | Mod: 25

## 2023-02-06 PROCEDURE — 82805 BLOOD GASES W/O2 SATURATION: CPT | Performed by: EMERGENCY MEDICINE

## 2023-02-06 PROCEDURE — 36415 COLL VENOUS BLD VENIPUNCTURE: CPT | Performed by: EMERGENCY MEDICINE

## 2023-02-06 PROCEDURE — 250N000009 HC RX 250: Performed by: INTERNAL MEDICINE

## 2023-02-06 PROCEDURE — 250N000011 HC RX IP 250 OP 636: Performed by: INTERNAL MEDICINE

## 2023-02-06 PROCEDURE — 250N000009 HC RX 250: Performed by: EMERGENCY MEDICINE

## 2023-02-06 PROCEDURE — G0378 HOSPITAL OBSERVATION PER HR: HCPCS

## 2023-02-06 PROCEDURE — 94640 AIRWAY INHALATION TREATMENT: CPT

## 2023-02-06 RX ORDER — PREDNISONE 20 MG/1
40 TABLET ORAL DAILY
Qty: 10 TABLET | Refills: 0 | Status: SHIPPED | OUTPATIENT
Start: 2023-02-06 | End: 2023-02-11

## 2023-02-06 RX ORDER — IPRATROPIUM BROMIDE AND ALBUTEROL SULFATE 2.5; .5 MG/3ML; MG/3ML
1 SOLUTION RESPIRATORY (INHALATION) EVERY 4 HOURS PRN
Qty: 90 ML | Refills: 0 | Status: SHIPPED | OUTPATIENT
Start: 2023-02-06 | End: 2023-09-24

## 2023-02-06 RX ORDER — PRENATAL VIT/IRON FUM/FOLIC AC 27MG-0.8MG
1 TABLET ORAL DAILY
Status: DISCONTINUED | OUTPATIENT
Start: 2023-02-06 | End: 2023-02-06 | Stop reason: HOSPADM

## 2023-02-06 RX ORDER — ACETAMINOPHEN 650 MG/1
650 SUPPOSITORY RECTAL EVERY 4 HOURS PRN
Status: DISCONTINUED | OUTPATIENT
Start: 2023-02-06 | End: 2023-02-06 | Stop reason: HOSPADM

## 2023-02-06 RX ORDER — ACETAMINOPHEN 325 MG/1
650 TABLET ORAL EVERY 4 HOURS PRN
Status: DISCONTINUED | OUTPATIENT
Start: 2023-02-06 | End: 2023-02-06 | Stop reason: HOSPADM

## 2023-02-06 RX ORDER — ALBUTEROL SULFATE 0.83 MG/ML
3 SOLUTION RESPIRATORY (INHALATION)
Status: DISCONTINUED | OUTPATIENT
Start: 2023-02-06 | End: 2023-02-06 | Stop reason: HOSPADM

## 2023-02-06 RX ORDER — METHYLPREDNISOLONE SODIUM SUCCINATE 125 MG/2ML
60 INJECTION, POWDER, LYOPHILIZED, FOR SOLUTION INTRAMUSCULAR; INTRAVENOUS EVERY 12 HOURS
Status: DISCONTINUED | OUTPATIENT
Start: 2023-02-06 | End: 2023-02-06 | Stop reason: HOSPADM

## 2023-02-06 RX ORDER — ALBUTEROL SULFATE 90 UG/1
2 AEROSOL, METERED RESPIRATORY (INHALATION) EVERY 4 HOURS PRN
Qty: 18 G | Refills: 0 | Status: SHIPPED | OUTPATIENT
Start: 2023-02-06 | End: 2024-02-15

## 2023-02-06 RX ORDER — ALBUTEROL SULFATE 5 MG/ML
2.5 SOLUTION RESPIRATORY (INHALATION) EVERY 6 HOURS PRN
Status: DISCONTINUED | OUTPATIENT
Start: 2023-02-06 | End: 2023-02-06 | Stop reason: HOSPADM

## 2023-02-06 RX ORDER — LIDOCAINE 40 MG/G
CREAM TOPICAL
Status: DISCONTINUED | OUTPATIENT
Start: 2023-02-06 | End: 2023-02-06 | Stop reason: HOSPADM

## 2023-02-06 RX ADMIN — PRENATAL VIT W/ FE FUMARATE-FA TAB 27-0.8 MG 1 TABLET: 27-0.8 TAB at 08:52

## 2023-02-06 RX ADMIN — ALBUTEROL SULFATE 2.5 MG: 2.5 SOLUTION RESPIRATORY (INHALATION) at 04:31

## 2023-02-06 RX ADMIN — METHYLPREDNISOLONE 62.5 MG: 125 INJECTION, POWDER, LYOPHILIZED, FOR SOLUTION INTRAMUSCULAR; INTRAVENOUS at 04:31

## 2023-02-06 RX ADMIN — ALBUTEROL SULFATE 2.5 MG: 2.5 SOLUTION RESPIRATORY (INHALATION) at 01:51

## 2023-02-06 ASSESSMENT — ACTIVITIES OF DAILY LIVING (ADL)
ADLS_ACUITY_SCORE: 35

## 2023-02-06 NOTE — ED NOTES
Bed: JNED-22  Expected date: 2/6/23  Expected time: 12:23 AM  Means of arrival: Ambulance  Comments:  SPF  33 yo F asthma

## 2023-02-06 NOTE — DISCHARGE INSTRUCTIONS
When to contact your care team    Call your primary doctor if you have any of the following: chest pain, shortness of breath, fever, chills, fainting, dizziness, vomiting, or bleeding, or any other symptoms that are new or concerning to you.         Icon activity    Activity instructions    Your activity upon discharge: activity as tolerated  Icon diet    Diet instructions    Follow this diet upon discharge: resume your regular diet as tolerated  Follow-up Appointments    Follow-up Appointments   Follow-up and recommended labs and tests    Follow up with primary care provider, DEZ Potter, CNP, within 7 days for hospital follow- up.   Follow up with your lung doctor- please call the nurse to update them as soon as possible. Schedule with doctor for next available appointment.

## 2023-02-06 NOTE — PHARMACY-ADMISSION MEDICATION HISTORY
Pharmacy Note - Admission Medication History    Pertinent Provider Information: Still needs to  Duoneb and prednisone. Used albuterol today. Reports needing Dulera refill as well but fill hx looks up to date as last fill 1/10/23.     ______________________________________________________________________    Prior To Admission (PTA) med list completed and updated in EMR.       PTA Med List   Medication Sig Last Dose     acetaminophen (TYLENOL) 325 MG tablet Take 325-650 mg by mouth every 6 hours as needed for mild pain Unknown     albuterol (PROAIR HFA/PROVENTIL HFA/VENTOLIN HFA) 108 (90 Base) MCG/ACT inhaler Inhale 2 puffs into the lungs every 4 hours as needed for shortness of breath / dyspnea or wheezing 2/5/2023     albuterol (PROVENTIL) (2.5 MG/3ML) 0.083% neb solution Take 1 vial (2.5 mg) by nebulization every 6 hours as needed for shortness of breath / dyspnea or wheezing 2/5/2023     ipratropium - albuterol 0.5 mg/2.5 mg/3 mL (DUONEB) 0.5-2.5 (3) MG/3ML neb solution Take 1 vial (3 mLs) by nebulization every 4 hours as needed for shortness of breath, wheezing or cough did not fill yet     mometasone-formoterol (DULERA) 200-5 MCG/ACT inhaler Inhale 2 puffs into the lungs 2 times daily needs to refill     Prenatal Vit-Fe Fumarate-FA (PRENATAL MULTIVITAMIN W/IRON) 27-0.8 MG tablet Take 1 tablet by mouth daily 2/5/2023       Information source(s): Patient  Method of interview communication: in-person    Summary of Changes to PTA Med List  No changes    Patient was asked about OTC/herbal products specifically.  PTA med list reflects this.    In the past week, patient estimated taking medication this percent of the time:  50-90% due to running out.    Medication Affordability:  Not including over the counter (OTC) medications, was there a time in the past 12 months when you did not take your medications as prescribed because of cost?: No    Allergies were reviewed, assessed, and updated with the patient.       Patient did not bring any medications to the hospital and can't retrieve from home. No multi-dose medications are available for use during hospital stay.     The information provided in this note is only as accurate as the sources available at the time of the update(s).    Thank you for the opportunity to participate in the care of this patient.    Saray Leal [facility]  2/6/2023 7:47 AM

## 2023-02-06 NOTE — PROGRESS NOTES
[facility] brief update    Patient reports feeling ok this morning. Satting 96-97% on room air. Has not been out of bed. Feels able to return home.    RN will ambulate patient and if maintains sats >=95% then ok to discharge home. RN will also check fetal heart tones prior to discharge.    Patient ok with ordering meds from [facility]- has had difficulty filling meds to deal with current exacerbation.    Patient will follow up with PCP and Pulm    Patient does not want to start Singulair at this time- reasonable and she should discuss further with Pulm    Likely discharge later this morning    Sally Avendano MD  [facility] Medicine Service

## 2023-02-06 NOTE — H&P
[facility]    History and Physical - Hospitalist Service       Date of Admission:  2/6/2023    Assessment & Plan      Soren Freitas is a 32 year old 4-month pregnant female admitted on 2/6/2023 with status asthmaticus.    Status asthmaticus:  Bronchodilator treatment.  Systemic steroids.  Peak flows pre and postbronchodilator treatment.    Acute respiratory failure: Secondary to asthma.  Oxygen supplementation as needed to keep saturations over 92%.     Diet: Regular Diet Adult    DVT Prophylaxis: Anti-embolisim stockings (TEDs)  Hare Catheter: Not present  Lines: None     Cardiac Monitoring: None  Code Status: Full Code      Clinically Significant Risk Factors Present on Admission                               Disposition Plan Home     Expected Discharge Date: 02/07/2023                  Filomena Cain MD  Hospitalist Service  [facility]  Securely message with [employer] (more info)  Text page via [email or web address] Paging/Directory     ______________________________________________________________________    Chief Complaint   Shortness of breath for 3 days    History is obtained from the patient    History of Present Illness   Soren Freitas is a 32 year old female with history of severe asthma, requiring intubation in the past, presenting to the ED for the third night in a row with shortness of breath and wheezing, not responding to her home medications for asthma.  The patient had left the ED on her prior visits without completing her treatment because of social issues.  No fever, chest pain, palpitations, nausea, vomiting or altered mentation.      Past Medical History    Past Medical History:   Diagnosis Date     Obesity      Severe persistent asthma with acute exacerbation 1/1/2015    Formatting of this note might be different from the original. 12/2014 Admit for exacerbation: Spirometry FEV1/FVC 68%, D/C w/dulera, albuterol, Spiriva.  Started singulair 10mg QD,  Given NRT.  Dx as infant, has been on albuterol lifelong.    Last Assessment & Plan:  Formatting of this note might be different from the original. Stable on Singulair, cetirizine, albuterol, and dulera. Discussed COVID     Uncomplicated asthma        Past Surgical History   History reviewed. No pertinent surgical history.    Prior to Admission Medications   Prior to Admission Medications   Prescriptions Last Dose Informant Patient Reported? Taking?   Prenatal Vit-Fe Fumarate-FA (PRENATAL MULTIVITAMIN W/IRON) 27-0.8 MG tablet   Yes No   Sig: Take 1 tablet by mouth daily   acetaminophen (TYLENOL) 325 MG tablet   Yes No   Sig: Take 325-650 mg by mouth every 6 hours as needed for mild pain   albuterol (PROAIR HFA/PROVENTIL HFA/VENTOLIN HFA) 108 (90 Base) MCG/ACT inhaler   No No   Sig: Inhale 2 puffs into the lungs every 4 hours as needed for shortness of breath / dyspnea or wheezing   albuterol (PROVENTIL) (2.5 MG/3ML) 0.083% neb solution   No No   Sig: Take 1 vial (2.5 mg) by nebulization every 6 hours as needed for shortness of breath / dyspnea or wheezing   ipratropium - albuterol 0.5 mg/2.5 mg/3 mL (DUONEB) 0.5-2.5 (3) MG/3ML neb solution   No No   Sig: Take 1 vial (3 mLs) by nebulization every 4 hours as needed for shortness of breath, wheezing or cough   mometasone-formoterol (DULERA) 200-5 MCG/ACT inhaler   No No   Sig: Inhale 2 puffs into the lungs 2 times daily   predniSONE (DELTASONE) 20 MG tablet   No No   Sig: Take two tablets (= 40mg) each day for 4 (four) days      Facility-Administered Medications: None        Review of Systems    The 10 point Review of Systems is negative other than noted in the HPI    Social History   I have reviewed this patient's social history and updated it with pertinent information if needed.  Social History     Tobacco Use     Smoking status: Former       Family History   No significant family history    Allergies   Allergies   Allergen Reactions     Cashew Nut Oil Anaphylaxis  and Angioedema     Fish Allergy Itching     Sea Food causes Throat swelling     Peanut-Derived Angioedema and Swelling     Throat swelling.       Shellfish-Derived Products Angioedema        Physical Exam   Vital Signs: Temp: 98.2  F (36.8  C) Temp src: Oral BP: 132/58 Pulse: 100   Resp: 18 SpO2: 96 % O2 Device: None (Room air)    Weight: 188 lbs 0 oz    Constitutional: awake, fatigued, alert, cooperative and moderate respiratory distress  Eyes: Lids and lashes normal, pupils equal, round and reactive to light, extra ocular muscles intact, sclera clear, conjunctiva normal  ENT: Normocephalic, without obvious abnormality, atraumatic, sinuses nontender on palpation, external ears without lesions, oral pharynx with moist mucous membranes, tonsils without erythema or exudates, gums normal and good dentition.  Hematologic / Lymphatic: no cervical lymphadenopathy and no supraclavicular lymphadenopathy  Respiratory: Moderate respiratory distress, decreased air exchange and wheeze diffuse, diminished breath sounds right base and left base, prolonged expiratory phase  Cardiovascular: Normal apical impulse, regular rate and rhythm, normal S1 and S2, no S3 or S4, and no murmur noted  GI: normal bowel sounds, soft, non-tender, no hepatosplenomegally, gravid uterus and ascites absent  Skin: normal skin color, texture, turgor and no redness, warmth, or swelling  Musculoskeletal: There is no redness, warmth, or swelling of the joints.  Full range of motion noted.  Motor strength is 5 out of 5 all extremities bilaterally.  Tone is normal.  Neurologic: Awake, alert, oriented to name, place and time.  Cranial nerves II-XII are grossly intact.  Motor is 5 out of 5 bilaterally.  Cerebellar finger to nose, heel to shin intact.  Sensory is intact.  Babinski down going, Romberg negative, and gait is normal.  Neuropsychiatric: General: normal, calm and normal eye contact  Level of consciousness: alert / normal  Affect: normal  Orientation:  oriented to self, place, time and situation  Memory and insight: normal, memory for past and recent events intact and thought process normal    Medical Decision Making     60 MINUTES SPENT BY ME on the date of service doing chart review, history, exam, documentation & further activities per the note.      Data       Imaging results reviewed over the past 24 hrs:   No results found for this or any previous visit (from the past 24 hour(s)).

## 2023-02-06 NOTE — ED PROVIDER NOTES
EMERGENCY DEPARTMENT ENCOUNTER      NAME: Soren Freitas  AGE: 32 year old female  YOB: 1990  MRN: [id number]  EVALUATION DATE & TIME: 2/6/2023 12:31 AM    PCP: Elvia Bahena    ED PROVIDER: Bk Mcbride M.D.      Chief Complaint   Patient presents with     Shortness of Breath         FINAL IMPRESSION:  Asthma exacerbation  16-week pregnancy      ED COURSE & MEDICAL DECISION MAKING:    Pertinent Labs & Imaging studies reviewed. (See chart for details)  32 year old female presents to the Emergency Department for evaluation of continued shortness of breath and wheezing.  Patient has been seen the last 2 nights.  Patient with a history of severe asthma which is required prior intubation.  Recommendations for hospitalization last night however patient left AMA due to social issues.  Reports continued excessive use of nebulization today with only brief improvement.  Does report recent increase exposure to a dog which may be related to her symptoms.  Non-smoker.  Did receive a nebulization in route by EMS.  On arrival patient in mild distress.  Tachycardic.  Diffuse inspiratory and expiratory wheezing noted.  Records reviewed from recent visits.  Patient with slight hypercarbia on her VBG's.  Will repeat this.  Other laboratory evaluation unremarkable including swab for COVID/influenza/RSV.. Patient appears non toxic with stable vitals signs.    1:05 AM I met with the patient for the initial interview and physical examination. Discussed plan for treatment and workup in the ED.    1:55 AM.  Patient slightly improved after albuterol nebulization.  Continued wheezing noted.  We will proceed with hospitalization for continued care.  Call placed to admitting physician.  2:11 AM I spoke with the hospitalist, Dr. Cain. We discussed the patient's case and they agree to admit the patient.  VBG essentially normal  At the conclusion of the encounter I discussed the results of all of the tests and the  disposition. The questions were answered and return precautions provided. The patient or family acknowledged understanding and was agreeable with the care plan.     Medical Decision Making    History:    Supplemental history from: N/A    External Record(s) reviewed: Documented in chart, if applicable.    Work Up:    Chart documentation includes differential considered and any EKGs or imaging independently interpreted by provider, where specified.    In additional to work up documented, I considered the following work up: None    External consultation:    Discussion of management with another provider: Documented in chart, if applicable    Complicating factors:    Care impacted by chronic illness: Chronic Lung Disease    Care affected by social determinants of health: N/A    Disposition considerations: Admit.      PPE: Provider wore gloves, N95 mask, eye protection.     MEDICATIONS GIVEN IN THE EMERGENCY:  Medications   albuterol (PROVENTIL) neb solution 2.5 mg (2.5 mg Nebulization Given 23 0151)       NEW PRESCRIPTIONS STARTED AT TODAY'S ER VISIT  New Prescriptions    No medications on file          =================================================================    HPI    Patient information was obtained from: Patient    Use of Intrepreter: N/A      Soren ISAAC Freitas is a 32 year old female who is currently 4 months pregnant () with a pertient medical history of asthma who presents to the ED for evaluation of shortness of breath.    Per chart review, the patient was seen in [facility] Emergency Department on 2/3/23 presenting with shortness of breath. The patient reported symptoms consistent with previous asthma exacerbations. On exam, in tripod position with wheezing audible without stethoscope. Labs without leukocytosis, anemia, electrolyte abnormalities, renal impairment. Patient declined chest x-ray. Patient given DuoNeb x 3, 125 Mg IV Solu-Medrol, prednisone 40 mg with improvement. Patient offered  admission as she had ongoing wheezing, but declined, prescribed albuterol nebulizer and prednisone. Patient returned to the ED on 2/4/23 for ongoing shortness of breath and no relief after using inhaler multiple times. Respiratory panel negative. Patient offered admission gain, however she declined and requested to leave AMA.    The patient reports she was doing well for most of the day today, but this evening her shortness of breath started to worsen. She reports these symptoms are normal for an asthma exacerbation. Other than her home inhalers and nebulizer she is not taking any additional steroid medications. She has used several nebulizer treatments at home today without significant improvement of her symptoms. EMS gave the patient a nebulizer treatment en route and the patient reports some mild improvement of her symptoms. The patient reports her boyfriend had a dog which seemed to exacerbate her asthma symptoms, but the dog is no longer at her home. She has not had any smoke exposures.    REVIEW OF SYSTEMS   Constitutional:  Denies fever, chills  Respiratory:  Denies productive cough. Endorses increased work of breathing.  Cardiovascular:  Denies chest pain, palpitations  GI:  Denies abdominal pain, nausea, vomiting, or change in bowel or bladder habits   Musculoskeletal:  Denies any new muscle/joint swelling  Skin:  Denies rash   Neurologic:  Denies focal weakness  All systems negative except as marked.     PAST MEDICAL HISTORY:  Past Medical History:   Diagnosis Date     Obesity      Severe persistent asthma with acute exacerbation 1/1/2015    Formatting of this note might be different from the original. 12/2014 Admit for exacerbation: Spirometry FEV1/FVC 68%, D/C w/dulera, albuterol, Spiriva.  Started singulair 10mg QD, Given NRT.  Dx as infant, has been on albuterol lifelong.    Last Assessment & Plan:  Formatting of this note might be different from the original. Stable on Singulair, cetirizine,  albuterol, and dulera. Discussed COVID     Uncomplicated asthma        PAST SURGICAL HISTORY:  History reviewed. No pertinent surgical history.      CURRENT MEDICATIONS:      Current Facility-Administered Medications:      albuterol (PROVENTIL) neb solution 2.5 mg, 2.5 mg, Nebulization, Q6H PRN, Bk Mcbride MD, 2.5 mg at 02/06/23 0151    Current Outpatient Medications:      acetaminophen (TYLENOL) 325 MG tablet, Take 325-650 mg by mouth every 6 hours as needed for mild pain, Disp: , Rfl:      albuterol (PROAIR HFA/PROVENTIL HFA/VENTOLIN HFA) 108 (90 Base) MCG/ACT inhaler, Inhale 2 puffs into the lungs every 4 hours as needed for shortness of breath / dyspnea or wheezing, Disp: 18 g, Rfl: 4     albuterol (PROVENTIL) (2.5 MG/3ML) 0.083% neb solution, Take 1 vial (2.5 mg) by nebulization every 6 hours as needed for shortness of breath / dyspnea or wheezing, Disp: 12 mL, Rfl: 0     ipratropium - albuterol 0.5 mg/2.5 mg/3 mL (DUONEB) 0.5-2.5 (3) MG/3ML neb solution, Take 1 vial (3 mLs) by nebulization every 4 hours as needed for shortness of breath, wheezing or cough, Disp: 90 mL, Rfl: 1     mometasone-formoterol (DULERA) 200-5 MCG/ACT inhaler, Inhale 2 puffs into the lungs 2 times daily, Disp: 13 g, Rfl: 3     predniSONE (DELTASONE) 20 MG tablet, Take two tablets (= 40mg) each day for 4 (four) days, Disp: 8 tablet, Rfl: 0     Prenatal Vit-Fe Fumarate-FA (PRENATAL MULTIVITAMIN W/IRON) 27-0.8 MG tablet, Take 1 tablet by mouth daily, Disp: , Rfl:     ALLERGIES:  Allergies   Allergen Reactions     Cashew Nut Oil Anaphylaxis and Angioedema     Fish Allergy Itching     Sea Food causes Throat swelling     Peanut-Derived Angioedema and Swelling     Throat swelling.       Shellfish-Derived Products Angioedema       FAMILY HISTORY:  History reviewed. No pertinent family history.    SOCIAL HISTORY:   Social History     Socioeconomic History     Marital status: Single     Spouse name: None     Number of children: None      "Years of education: None     Highest education level: None   Tobacco Use     Smoking status: Former       VITALS:  Patient Vitals for the past 24 hrs:   BP Temp Temp src Pulse Resp SpO2 Height Weight   02/06/23 0043 -- -- -- 112 -- 94 % -- --   02/06/23 0042 -- -- -- 111 -- 94 % -- --   02/06/23 0041 -- -- -- 115 -- 93 % -- --   02/06/23 0040 -- -- -- (!) 130 -- 94 % -- --   02/06/23 0039 -- -- -- (!) 132 -- 94 % -- --   02/06/23 0038 -- -- -- 116 -- 93 % -- --   02/06/23 0037 -- -- -- 117 -- 94 % -- --   02/06/23 0036 132/58 98.2  F (36.8  C) Oral 115 18 95 % 1.702 m (5' 7\") 85.3 kg (188 lb)        PHYSICAL EXAM    Constitutional:  Awake, alert, in mild distress  HENT:  Normocephalic, Atraumatic. Bilateral external ears normal. Oropharynx moist. Nose normal. Neck- Normal range of motion with no guarding  Eyes:  PERRL, EOMI with no signs of entrapment, Conjunctiva normal, No discharge.   Respiratory:  Normal breath sounds, No respiratory distress, Mild inspiratory and expiratory wheezing bilaterally. Speaking full sentences.   Cardiovascular:  Tachycardic, Normal rhythm, No appreciable rubs or gallops.   GI:  Soft, No tenderness, No distension, No palpable masses.  Gravid abdomen consistent with dates  Musculoskeletal:  , No edema. Good range of motion in all major joints. No tenderness to palpation or major deformities noted.  Integument:  Warm, Dry, No erythema, No rash.   Neurologic:  Alert & oriented, Normal motor function, Normal sensory function, No focal deficits noted.   Psychiatric:  Affect normal, Judgment normal, Mood normal.     LAB:  All pertinent labs reviewed and interpreted.  Results for orders placed or performed during the hospital encounter of 02/06/23   Blood gas venous   Result Value Ref Range    pH Venous 7.39 7.35 - 7.45    pCO2 Venous 41 35 - 50 mm Hg    pO2 Venous 42 25 - 47 mm Hg    Bicarbonate Venous 25 24 - 30 mmol/L    Base Excess/Deficit (+/-) 0.1   mmol/L    Oxyhemoglobin Venous 76.2 " (H) 70.0 - 75.0 %    O2 Sat, Venous 77.4 (H) 70.0 - 75.0 %           I, Khang Satish, am serving as a scribe to document services personally performed by Bk Mcbride MD, based on my observation and the provider's statements to me. I, Bk Mcbride MD attest that Khang Covarrubias is acting in a scribe capacity, has observed my performance of the services and has documented them in accordance with my direction.    Bk Mcbride M.D.  Emergency Medicine  [facility] EMERGENCY DEPARTMENT       Bk Mcbride MD  02/06/23 0213

## 2023-02-06 NOTE — ED TRIAGE NOTES
Patient arrives via EMS for evaluation of shortness of breath, states that she has asthma and has been here the past few nights and has left prior to treatment being done.

## 2023-02-07 ENCOUNTER — PATIENT OUTREACH (OUTPATIENT)
Dept: CARE COORDINATION | Facility: CLINIC | Age: 33
End: 2023-02-07
Payer: COMMERCIAL

## 2023-02-07 NOTE — PROGRESS NOTES
Clinic Care Coordination Contact  [facility]: Post-Discharge Note  SITUATION                                                      Admission:    Admission Date: 02/06/23   Reason for Admission: Moderate persistent asthma with exacerbation  Discharge:   Discharge Date: 02/06/23  Discharge Diagnosis: Moderate persistent asthma with exacerbation    BACKGROUND                                                      Per hospital discharge summary and inpatient provider notes:  Pertinent Labs & Imaging studies reviewed. (See chart for details)  32 year old female presents to the Emergency Department for evaluation of continued shortness of breath and wheezing.  Patient has been seen the last 2 nights.  Patient with a history of severe asthma which is required prior intubation.  Recommendations for hospitalization last night however patient left AMA due to social issues.  Reports continued excessive use of nebulization today with only brief improvement.  Does report recent increase exposure to a dog which may be related to her symptoms.  Non-smoker.  Did receive a nebulization in route by EMS.  On arrival patient in mild distress.  Tachycardic.  Diffuse inspiratory and expiratory wheezing noted.  Records reviewed from recent visits.  Patient with slight hypercarbia on her VBG's.  Will repeat this.  Other laboratory evaluation unremarkable including swab for COVID/influenza/RSV.. Patient appears non toxic with stable vitals signs.     1:05 AM I met with the patient for the initial interview and physical examination. Discussed plan for treatment and workup in the ED.    1:55 AM.  Patient slightly improved after albuterol nebulization.  Continued wheezing noted.  We will proceed with hospitalization for continued care.  Call placed to admitting physician.  2:11 AM I spoke with the hospitalist, Dr. Cain. We discussed the patient's case and they agree to admit the patient.  VBG essentially normal  At the conclusion of the  encounter I discussed the results of all of the tests and the disposition. The questions were answered and return precautions provided. The patient or family acknowledged understanding and was agreeable with the care plan.      Medical Decision Making     History:    Supplemental history from: N/A    External Record(s) reviewed: Documented in chart, if applicable.     Work Up:    Chart documentation includes differential considered and any EKGs or imaging independently interpreted by provider, where specified.    In additional to work up documented, I considered the following work up: None     External consultation:    Discussion of management with another provider: Documented in chart, if applicable     Complicating factors:    Care impacted by chronic illness: Chronic Lung Disease    Care affected by social determinants of health: N/A     Disposition considerations: Admit.        PPE: Provider wore gloves, N95 mask, eye protection.     ASSESSMENT           Discharge Assessment  How are you doing now that you are home?: Pt reports she is well and feeling much better.  How are your symptoms? (Red Flag symptoms escalate to triage hotline per guidelines): Improved  Do you feel your condition is stable enough to be safe at home until your provider visit?: Yes  Does the patient have their discharge instructions? : Yes  Does the patient have questions regarding their discharge instructions? : No  Were you started on any new medications or were there changes to any of your previous medications? : Yes  Does the patient have all of their medications?: Yes  Do you have questions regarding any of your medications? : No  Do you have all of your needed medical supplies or equipment (DME)?  (i.e. oxygen tank, CPAP, cane, etc.): Yes  Discharge follow-up appointment scheduled within 14 calendar days? : Yes  Discharge Follow Up Appointment Date: 02/21/23  Discharge Follow Up Appointment Scheduled with?: Specialty Care Provider               PLAN                                                      Outpatient Plan:  Follow up with primary care provider, DEZ Potter, CNP,  within 7 days for hospital follow- up.  Follow up with your lung doctor- please call the nurse to update  them as soon as possible. Schedule with doctor for next available  appointment.    No future appointments.      For any urgent concerns, please contact our 24 hour nurse triage line: [phone] ([phone])         WESLEY Doan  Connected Care Resource Center  [facility]     *Connected Care Resource Team does NOT follow patient ongoing. Referrals are identified based on internal discharge reports and the outreach is to ensure patient has an understanding of their discharge instructions.

## 2023-05-23 NOTE — DISCHARGE SUMMARY
[facility]  DISCHARGE SUMMARY     Primary Care Physician: Elvia Bahena  Admission Date: 2022   Discharge Provider: Kashmir Flor MD Discharge Date: 2022   Diet: Regular diet   Code Status: Full Code   Activity: As tolerated        Condition at Discharge: Good      REASON FOR PRESENTATION(See Admission Note for Details)   SOB    PRINCIPAL & ACTIVE DISCHARGE DIAGNOSES     Active Problems:    Acute asthma exacerbation    Severe persistent asthma with status asthmaticus    Community acquired bacterial pneumonia    Sinus tachycardia     contractions    Yeast infection of the vagina    Hypoalbuminemia    Obesity    Pneumonia of both lungs due to infectious organism, unspecified part of lung      SIGNIFICANT FINDINGS (Imaging, labs):   EXAM: XR CHEST PORT 1 VIEW  LOCATION: [facility]  DATE/TIME: 2022 10:00 PM     INDICATION: Dyspnea  COMPARISON: 2022                                                                      IMPRESSION: Persistent right basilar infiltrate related to pneumonia. New hazy ill-defined left perihilar infiltrate extending the left lower lung. This can also be seen with pneumonia. Upper lungs clear. Normal heart size and pulmonary vascularity.    PENDING LABS     None    PROCEDURES ( this hospitalization only)      None    RECOMMENDATIONS TO OUTPATIENT PROVIDER FOR F/U VISIT     Ensure she is taking prednisone as prescribed with taper  Ensure she is taking her Dulera twice per day (was currently only using once per day)    DISPOSITION     Home    SUMMARY OF HOSPITAL COURSE:      Soren Freitas is a 31 year old  (currently 36w pregnant) female hx of asthma, obesity who presents with dyspnea and wheezing.  Symptoms consistent with acute asthma exacerbation.  Admitted for further evaluation and management.     Respiratory distress 2/2 to acute asthma exacerbation   Acute sx (dyspnea, wheezing, cough). VS notable for  hypoxia PTA but not in the ED. Admission exam notable for signs of respiratory distress (neck accessory muscle, paradoxical breathing, speaking in 1-2 word sentences); diffuse tight, wheezy / coarse lung sounds -- no overt crackles in one particular area. Does have leukocytosis to 15 with left shift (though has had that several recent lab checks). Negative for COVID / flu; BNP 17. CXR notable for persistent right basilar pneumonia, as well as possible new ill-defined left perihilar infiltrate (possibly pneumonia) -- though this is subtle on my read. Procal negative.  Clinically consistent with a severe asthma exacerbation. May have degree of obesity / pregnancy-induced hypoventilation. Considered peripartum CM/HF, though doubt based on clinical exam / BNP / trop. Management: Received multiple nebs, IV methylpred, IV mag, IV ceftriaxone, 1 L NS; initially required BiPAP, though quickly transitioned to nasal cannula, and now off oxygen altogether. Seen by pulmonology, counseled on using Dulera twice per day, as previously only using once per day.  Maintained saturations of 98% with ambulation, deemed safe for discharge.  PLAN:  - Pulm, RT consult - appreciate     -Transition to oral prednisone - 20mg x 5 days, 10mg x 5 days, stop   -Previously only using Dulera once daily, counseled on increasing to twice daily   -Sent with refill of albuterol rescue inhaler - advised she take 4x/day until she sees PCP  - Follow up BC x 2 (unfortunatley not drawn prior to abx)  - Stop abx given neg procal  - Follow up with PCP within next 5 days - offered f/u at [facility] but she states she prefers PCP, will mychart message them    Bone lesions  Elevated alkaline phosphatase  Upon chart review, found incidental sclerotic bone lesions in spine from CT scan 5/25/2022.  Consistent with alk phos elevation to 175.  Per radiologist, lesions are suspicious for metastases.  Patient aware of lesions, states she had previous work-up at [facility] [place].  Per  chart review, looks like in 2016 she had an MRI of her abdomen and pelvis, demonstrating multiple sclerotic lesions throughout her pelvic bone and spine.  Had a biopsy of a spinal lesion in 2016 which showed myeloid hyperplasia, erythroid hyperplasia, and B-cell lymphoid aggregates, with many areas of necrosis.  Oncology had planned to get a PET scan to further evaluate the lesions, as well as some labs, including SPEP, immunofixation (which were normal).  She has never completed the PET scan out of fear of the results. Denies consitutional sx currently.  Differential for sclerotic bone lesions includes breast cancer, carcinoid, small cell lung cancer, lymphoma.  Mammogram in 2016 normal.  -Patient aware of gravity of this and need for further workup to expedite starting possible treatment after delivery. Wants to see her PCP at [facility] for this, rather than us at [facility], as they know the history of this issue.     Current pregnancy at 36w    contractions without cervical change   Follows with [facility] midwives. Has been having contractions PTA; cervical checks with 1 cm max dilation. OB consulted by ED, appreciate. NST was nonreactive but BPP .  - Continue prenatal vit       Yeast on wet prep   Uncertain significance. Wet prep done at clinic .   -  Consider antifungal when respiratory status more stable            Discharge Medications with Med changes:        Review of your medicines      START taking      Dose / Directions   predniSONE 20 MG tablet  Commonly known as: DELTASONE      Start taking on: 2022  Take 1 tablet (20 mg) by mouth daily for 5 days, THEN 0.5 tablets (10 mg) daily for 5 days.  Quantity: 8 tablet  Refills: 0        CONTINUE these medicines which have NOT CHANGED      Dose / Directions   acetaminophen 325 MG tablet  Commonly known as: TYLENOL      Dose: 325-650 mg  Take 325-650 mg by mouth every 6 hours as needed for mild pain  Refills: 0     * albuterol (2.5 MG/3ML) 0.083% neb  solution  Commonly known as: PROVENTIL  Used for: Severe asthma with acute exacerbation, unspecified whether persistent      Dose: 2.5 mg  Take 1 vial (2.5 mg) by nebulization every 6 hours as needed for shortness of breath / dyspnea or wheezing  Quantity: 12 mL  Refills: 0     * albuterol 108 (90 Base) MCG/ACT inhaler  Commonly known as: PROAIR HFA/PROVENTIL HFA/VENTOLIN HFA  Used for: Severe persistent asthma with acute exacerbation      Dose: 2 puff  Inhale 2 puffs into the lungs every 4 hours as needed for shortness of breath / dyspnea or wheezing  Quantity: 18 g  Refills: 4     mometasone-formoterol 200-5 MCG/ACT inhaler  Commonly known as: DULERA  Used for: Moderate persistent asthma, unspecified whether complicated      Dose: 2 puff  Inhale 2 puffs into the lungs 2 times daily  Quantity: 13 g  Refills: 3     montelukast 10 MG tablet  Commonly known as: SINGULAIR      Dose: 10 mg  Take 10 mg by mouth every morning  Refills: 0     prenatal multivitamin w/iron 27-0.8 MG tablet      Dose: 1 tablet  Take 1 tablet by mouth daily  Refills: 0         * This list has 2 medication(s) that are the same as other medications prescribed for you. Read the directions carefully, and ask your doctor or other care provider to review them with you.               Where to get your medicines      These medications were sent to [facility] [address] [address]    Phone: [phone]     albuterol 108 (90 Base) MCG/ACT inhaler    mometasone-formoterol 200-5 MCG/ACT inhaler    predniSONE 20 MG tablet                   Consults   Pulmonology      Immunizations given this encounter     None      Discharge Orders     Discharge Procedure Orders   Reason for your hospital stay   Order Comments: Asthma flare     Follow-up and recommended labs and tests    Order Comments: Follow up with primary care provider, DEZ Potter, CNP, within 3-5,  "for hospital follow- up. No follow up labs or test are needed.     Activity   Order Comments: Your activity upon discharge: activity as tolerated     Order Specific Question Answer Comments   Is discharge order? Yes      When to contact your care team   Order Comments: Call your primary doctor if you have any of the following: temperature greater than 100.4,  increased shortness of breath, or wheezing.     Discharge Instructions   Order Comments: Use the prednisone as prescribed for the next 10 days. Use your dulera inhaler 2 puffs twice per day every day to prevent future asthma flares. Use albuterol 4 times per day until you see your primary provider [place] [facility] [place] hospital follow up.     Diet   Order Comments: Follow this diet upon discharge: Orders Placed This Encounter      Regular Diet Adult     Order Specific Question Answer Comments   Is discharge order? Yes      Examination     Vital Signs in last 24 hours:   /59   Pulse 112   Temp 98.8  F (37.1  C) (Axillary)   Resp 20   Ht 1.702 m (5' 7\")   Wt 89.8 kg (198 lb)   LMP 10/12/2021 (Approximate)   SpO2 100%   BMI 31.01 kg/m      Constitutional:  Well developed, Well nourished,  HENT:  Normocephalic, Atraumatic, Bilateral external ears normal, Oropharynx moist, Nose normal.   Neck:  Normal range of motion, No meningismus, No stridor.   Eyes:  EOMI, Conjunctiva normal, No discharge.   Respiratory:  Normal breath sounds, No respiratory distress, No wheezing, No chest tenderness.   Cardiovascular:  Normal heart rate, Normal rhythm, No murmurs  GI:  Soft, No tenderness, No guarding, No CVA tenderness.   Musculoskeletal:  Neurovascularly intact distally, No edema, No tenderness, No cyanosis, Good range of motion in all major joints. No tenderness to palpation or major deformities noted.   Integument:  Warm, Dry, No erythema, No rash.   Lymphatic:  No lymphadenopathy noted.   Neurologic:  Alert & oriented x 3, Normal motor function, Normal sensory " function, No focal deficits noted.   Psychiatric:  Affect normal, Judgment normal, Mood normal.       Johnie Flor MD - PGY2  [facility]  P: [phone]    Precepted patient with Dr. Raquel Mccartney    CC:Elvia Bahena       Adjacent Tissue Transfer Text: The defect edges were debeveled with a #15 scalpel blade.  Given the location of the defect and the proximity to free margins an adjacent tissue transfer was deemed most appropriate.  Using a sterile surgical marker, an appropriate flap was drawn incorporating the defect and placing the expected incisions within the relaxed skin tension lines where possible.    The area thus outlined was incised deep to adipose tissue with a #15 scalpel blade.  The skin margins were undermined to an appropriate distance in all directions utilizing iris scissors.

## 2023-09-05 ENCOUNTER — HOSPITAL ENCOUNTER (EMERGENCY)
Facility: HOSPITAL | Age: 33
Discharge: HOME | End: 2023-09-05
Attending: EMERGENCY MEDICINE | Admitting: EMERGENCY MEDICINE
Payer: COMMERCIAL

## 2023-09-05 VITALS
DIASTOLIC BLOOD PRESSURE: 71 MMHG | HEART RATE: 94 BPM | SYSTOLIC BLOOD PRESSURE: 95 MMHG | RESPIRATION RATE: 26 BRPM | BODY MASS INDEX: 29.44 KG/M2 | TEMPERATURE: 97.7 F | HEIGHT: 67 IN | OXYGEN SATURATION: 100 %

## 2023-09-05 DIAGNOSIS — J45.901 EXACERBATION OF ASTHMA, UNSPECIFIED ASTHMA SEVERITY, UNSPECIFIED WHETHER PERSISTENT: ICD-10-CM

## 2023-09-05 LAB — HCG UR QL: NEGATIVE

## 2023-09-05 PROCEDURE — 999N000157 HC STATISTIC RCP TIME EA 10 MIN

## 2023-09-05 PROCEDURE — 94640 AIRWAY INHALATION TREATMENT: CPT

## 2023-09-05 PROCEDURE — 250N000012 HC RX MED GY IP 250 OP 636 PS 637: Performed by: STUDENT IN AN ORGANIZED HEALTH CARE EDUCATION/TRAINING PROGRAM

## 2023-09-05 PROCEDURE — 99285 EMERGENCY DEPT VISIT HI MDM: CPT | Mod: 25

## 2023-09-05 PROCEDURE — 250N000009 HC RX 250: Performed by: EMERGENCY MEDICINE

## 2023-09-05 PROCEDURE — 81025 URINE PREGNANCY TEST: CPT | Performed by: EMERGENCY MEDICINE

## 2023-09-05 RX ORDER — PREDNISONE 10 MG/1
TABLET ORAL
Qty: 30 TABLET | Refills: 0 | Status: SHIPPED | OUTPATIENT
Start: 2023-09-05 | End: 2023-09-15

## 2023-09-05 RX ORDER — IPRATROPIUM BROMIDE AND ALBUTEROL SULFATE 2.5; .5 MG/3ML; MG/3ML
3 SOLUTION RESPIRATORY (INHALATION) ONCE
Status: COMPLETED | OUTPATIENT
Start: 2023-09-05 | End: 2023-09-05

## 2023-09-05 RX ORDER — PREDNISONE 20 MG/1
60 TABLET ORAL ONCE
Status: COMPLETED | OUTPATIENT
Start: 2023-09-05 | End: 2023-09-05

## 2023-09-05 RX ORDER — METHYLPREDNISOLONE SODIUM SUCCINATE 125 MG/2ML
125 INJECTION, POWDER, LYOPHILIZED, FOR SOLUTION INTRAMUSCULAR; INTRAVENOUS ONCE
Status: DISCONTINUED | OUTPATIENT
Start: 2023-09-05 | End: 2023-09-05

## 2023-09-05 RX ORDER — ALBUTEROL SULFATE 0.83 MG/ML
5 SOLUTION RESPIRATORY (INHALATION) ONCE
Status: COMPLETED | OUTPATIENT
Start: 2023-09-05 | End: 2023-09-05

## 2023-09-05 RX ADMIN — IPRATROPIUM BROMIDE AND ALBUTEROL SULFATE 3 ML: .5; 3 SOLUTION RESPIRATORY (INHALATION) at 16:29

## 2023-09-05 RX ADMIN — PREDNISONE 60 MG: 20 TABLET ORAL at 16:52

## 2023-09-05 RX ADMIN — ALBUTEROL SULFATE 5 MG: 2.5 SOLUTION RESPIRATORY (INHALATION) at 17:59

## 2023-09-05 ASSESSMENT — ACTIVITIES OF DAILY LIVING (ADL)
ADLS_ACUITY_SCORE: 35
ADLS_ACUITY_SCORE: 35

## 2023-09-05 NOTE — ED PROVIDER NOTES
EMERGENCY DEPARTMENT ENCOUNTER      NAME: Soren Freitas  AGE: 32 year old female  YOB: 1990  MRN: [id number]  EVALUATION DATE & TIME: 2023  4:06 PM    PCP: Elvia Bahena    ED PROVIDER: Lj Thomas D.O.      Chief Complaint   Patient presents with    Shortness of Breath       FINAL IMPRESSION:  1. Exacerbation of asthma, unspecified asthma severity, unspecified whether persistent        ED COURSE & MEDICAL DECISION MAKIN:38 PM I met with the patient to gather history and to perform my initial exam. I discussed the plan for care while in the Emergency Department.  6:52 PM I discussed discharge with patient. Patient is agreeable with these plans.         Pertinent Labs & Imaging studies reviewed. (See chart for details)  32 year old female presents to the Emergency Department for evaluation of shortness of breath with known history of asthma.  The patient has been attempting treatments with her inhalers and nebulizer at home with minimal success.  Symptoms have been going on for several days.  In the emergency department, she had significant wheezing, and some increased work of breathing.  Initial differential was concerning for the potential for COVID-19 versus influenza versus pneumonia versus simple asthma exacerbation.  Patient has been afebrile, and symptoms appear to be most consistent with her normal underlying asthma.  We did discuss the potential for infection, however she feels this is more likely just her asthma.  Two DuoNebs, and albuterol neb as well as prednisone oral, the patient's symptoms dramatically improved.  Therefore at this time I do not believe additional testing is indicated.  Believe the patient is appropriate for discharge at this time as her symptoms are nearly completely resolved, and will discharge on a prednisone taper.  Will have follow-up as an outpatient with primary care provider.  Return precautions were discussed.    Medical Decision  Making    History:  Supplemental history from: Documented in chart, if applicable  External Record(s) reviewed: Documented in chart, if applicable.    Work Up:  Chart documentation includes differential considered and any EKGs or imaging independently interpreted by provider, where specified.  In additional to work up documented, I considered the following work up: Documented in chart, if applicable.    External consultation:  Discussion of management with another provider: Documented in chart, if applicable    Complicating factors:  Care impacted by chronic illness: Hypertension and Other: severe asthma  Care affected by social determinants of health: N/A    Disposition considerations: Discharge. I prescribed additional prescription strength medication(s) as charted. I considered admission, but discharged patient after significant clinical improvement.        At the conclusion of the encounter I discussed the results of all of the tests and the disposition. The questions were answered. The patient or family acknowledged understanding and was agreeable with the care plan.        HPI    Patient information was obtained from: the patient    Use of : N/A        Soren Freitas is a 32 year old female who presents shortness of breath.    Per Chart Review: the patient has a pertinent history of severe asthma, acute respiratory failure with hypoxia, and chronic hypertension affecting pregnancy.    Patient presents with shortness of breath and wheezing from her asthma attack starting last night. The patient has used her albuterol inhaler and nebulizer at home with no relief. She notes no other significant medical history. Patient presents with no other complaints at this time.      REVIEW OF SYSTEMS  Constitutional:  Denies fever, chills, weight loss or weakness  Eyes:  No pain, discharge, redness  HENT:  Denies sore throat, ear pain, congestion  Respiratory: No cough. Endorses shortness of breath and  wheeze.  Cardiovascular:  No CP, palpitations  GI:  Denies abdominal pain, nausea, vomiting, diarrhea  : Denies dysuria, hematuria  Musculoskeletal:  Denies any new muscle/joint pain, swelling or loss of function.  Skin:  Denies rash, pallor  Neurologic:  Denies headache, focal weakness or sensory changes  Lymph: Denies swollen nodes    All other systems negative unless noted in HPI.    PAST MEDICAL HISTORY:  Past Medical History:   Diagnosis Date    Obesity     Severe persistent asthma with acute exacerbation 1/1/2015    Formatting of this note might be different from the original. 12/2014 Admit for exacerbation: Spirometry FEV1/FVC 68%, D/C w/dulera, albuterol, Spiriva.  Started singulair 10mg QD, Given NRT.  Dx as infant, has been on albuterol lifelong.    Last Assessment & Plan:  Formatting of this note might be different from the original. Stable on Singulair, cetirizine, albuterol, and dulera. Discussed COVID    Uncomplicated asthma        PAST SURGICAL HISTORY:  No past surgical history on file.      CURRENT MEDICATIONS:    No current facility-administered medications for this encounter.     Current Outpatient Medications   Medication    predniSONE (DELTASONE) 10 MG tablet    acetaminophen (TYLENOL) 325 MG tablet    albuterol (PROAIR HFA/PROVENTIL HFA/VENTOLIN HFA) 108 (90 Base) MCG/ACT inhaler    albuterol (PROVENTIL) (2.5 MG/3ML) 0.083% neb solution    ipratropium - albuterol 0.5 mg/2.5 mg/3 mL (DUONEB) 0.5-2.5 (3) MG/3ML neb solution    mometasone-formoterol (DULERA) 200-5 MCG/ACT inhaler    Prenatal Vit-Fe Fumarate-FA (PRENATAL MULTIVITAMIN W/IRON) 27-0.8 MG tablet         ALLERGIES:  Allergies   Allergen Reactions    Cashew Nut Oil Anaphylaxis and Angioedema    Fish Allergy Itching     Sea Food causes Throat swelling    Peanut-Containing Drug Products Angioedema and Swelling     Throat swelling.      Shellfish-Derived Products Angioedema       FAMILY HISTORY:  No family history on file.    SOCIAL  "HISTORY:  Social History     Socioeconomic History    Marital status: Single   Tobacco Use    Smoking status: Former       VITALS:  Patient Vitals for the past 24 hrs:   BP Temp Temp src Pulse Resp SpO2 Height   09/05/23 1809 -- -- -- 94 -- 100 % --   09/05/23 1631 -- -- -- 98 26 97 % --   09/05/23 1558 95/71 97.7  F (36.5  C) Oral 105 28 95 % 1.702 m (5' 7\")       PHYSICAL EXAM    VITAL SIGNS: BP 95/71   Pulse 94   Temp 97.7  F (36.5  C) (Oral)   Resp 26   Ht 1.702 m (5' 7\")   SpO2 100%   BMI 29.44 kg/m      General Appearance: Well-appearing, well-nourished, no acute distress.  Head:  Normocephalic, without obvious abnormality, atraumatic  Eyes:  PERRL, conjunctiva/corneas clear, EOM's intact,  ENT:  Lips, mucosa, and tongue normal, membranes are moist without pallor  Neck:  Normal ROM, symmetrical, trachea midline    Cardio:  Regular rate and rhythm, no murmur, rub or gallop, 2+ pulses symmetric in all extremities  Pulm:  Diffuse wheezing in all lung fields. Mild increase in work of breathing.  Abdomen:  Soft, non-tender, no rebound or guarding.  Musculoskeletal: Full ROM, no edema, no cyanosis, good ROM of major joints  Integument:  Warm, Dry, No erythema, No rash.    Neurologic:  Alert & oriented.  No focal deficits appreciated.  Ambulatory.  Psychiatric:  Affect normal, Judgment normal, Mood normal.      LABS  Results for orders placed or performed during the hospital encounter of 09/05/23 (from the past 24 hour(s))   HCG qualitative urine   Result Value Ref Range    hCG Urine Qualitative Negative Negative         RADIOLOGY  No orders to display            MEDICATIONS GIVEN IN THE EMERGENCY:  Medications   ipratropium - albuterol 0.5 mg/2.5 mg/3 mL (DUONEB) neb solution 3 mL (3 mLs Nebulization Not Given 9/5/23 1633)   predniSONE (DELTASONE) tablet 60 mg (60 mg Oral $Given 9/5/23 1652)   ipratropium - albuterol 0.5 mg/2.5 mg/3 mL (DUONEB) neb solution 3 mL (3 mLs Nebulization $Given 9/5/23 9595) "   ipratropium - albuterol 0.5 mg/2.5 mg/3 mL (DUONEB) neb solution 3 mL (3 mLs Nebulization $Given 9/5/23 9984)   albuterol (PROVENTIL) neb solution 5 mg (5 mg Nebulization $Given 9/5/23 0150)       NEW PRESCRIPTIONS STARTED AT TODAY'S ER VISIT  Discharge Medication List as of 9/5/2023  7:04 PM        START taking these medications    Details   predniSONE (DELTASONE) 10 MG tablet Take 5 tablets (50 mg) by mouth daily for 2 days, THEN 4 tablets (40 mg) daily for 2 days, THEN 3 tablets (30 mg) daily for 2 days, THEN 2 tablets (20 mg) daily for 2 days, THEN 1 tablet (10 mg) daily for 2 days., Disp-30 tablet, R-0, Local Print              I, Andrea Reveles, am serving as a scribe to document services personally performed by Lj Thomas D.O., based on my observations and the provider's statements to me.  I, Lj Thomas D.O., attest that Andrea Reveles is acting in a scribe capacity, has observed my performance of the services and has documented them in accordance with my direction.     Lj Thomas D.O.  Emergency Medicine  [facility] EMERGENCY DEPARTMENT  [employer] [facility] [id number] [phone]  Dept: [phone]       Lj Thomas,   09/05/23 6677

## 2023-09-05 NOTE — ED NOTES
Patient reports she has used her home inhalers and neb treatments without relief.     1630: Duoneb x2 given without adverse effects. BBS inspiratory and expiratory wheezes pre and post treatmentx. Respiratory rate in the mid 20s. SpO2 98% in room air.     1800: 5 mg albuterol neb given. Patient reports her breathing has improved. Increased aeration post neb treatments; crackles with both inspiratory and expiratory wheezes.    Mable Acosta, RT

## 2023-09-05 NOTE — ED TRIAGE NOTES
Patient presents to ER for asthma attack, feeling short of breath with chest tightness.  Started yesterday. Has tried nebs and inhalers at home and nothing is working.  Tried a neb prior to coming in.      Triage Assessment       Row Name 09/05/23 5020       Triage Assessment (Adult)    Airway WDL WDL       Respiratory WDL    Respiratory WDL rhythm/pattern    Rhythm/Pattern, Respiratory shortness of breath;tachypneic       Skin Circulation/Temperature WDL    Skin Circulation/Temperature WDL WDL       Cardiac WDL    Cardiac WDL WDL       Peripheral/Neurovascular WDL    Peripheral Neurovascular WDL WDL       Cognitive/Neuro/Behavioral WDL    Cognitive/Neuro/Behavioral WDL WDL

## 2023-09-24 ENCOUNTER — HOSPITAL ENCOUNTER (EMERGENCY)
Facility: HOSPITAL | Age: 33
Discharge: HOME | End: 2023-09-24
Attending: STUDENT IN AN ORGANIZED HEALTH CARE EDUCATION/TRAINING PROGRAM | Admitting: STUDENT IN AN ORGANIZED HEALTH CARE EDUCATION/TRAINING PROGRAM
Payer: COMMERCIAL

## 2023-09-24 ENCOUNTER — APPOINTMENT (OUTPATIENT)
Dept: RADIOLOGY | Facility: HOSPITAL | Age: 33
End: 2023-09-24
Attending: STUDENT IN AN ORGANIZED HEALTH CARE EDUCATION/TRAINING PROGRAM
Payer: COMMERCIAL

## 2023-09-24 VITALS
DIASTOLIC BLOOD PRESSURE: 86 MMHG | HEART RATE: 105 BPM | TEMPERATURE: 98.1 F | SYSTOLIC BLOOD PRESSURE: 134 MMHG | OXYGEN SATURATION: 97 %

## 2023-09-24 DIAGNOSIS — J45.51 SEVERE PERSISTENT ASTHMA WITH ACUTE EXACERBATION (H): ICD-10-CM

## 2023-09-24 DIAGNOSIS — J45.909 MODERATE ASTHMA, UNSPECIFIED WHETHER COMPLICATED, UNSPECIFIED WHETHER PERSISTENT: ICD-10-CM

## 2023-09-24 PROCEDURE — 250N000009 HC RX 250

## 2023-09-24 PROCEDURE — 999N000157 HC STATISTIC RCP TIME EA 10 MIN

## 2023-09-24 PROCEDURE — 71046 X-RAY EXAM CHEST 2 VIEWS: CPT

## 2023-09-24 PROCEDURE — 250N000012 HC RX MED GY IP 250 OP 636 PS 637: Performed by: STUDENT IN AN ORGANIZED HEALTH CARE EDUCATION/TRAINING PROGRAM

## 2023-09-24 PROCEDURE — 94640 AIRWAY INHALATION TREATMENT: CPT | Mod: 76

## 2023-09-24 PROCEDURE — 99285 EMERGENCY DEPT VISIT HI MDM: CPT | Mod: 25

## 2023-09-24 PROCEDURE — 250N000009 HC RX 250: Performed by: STUDENT IN AN ORGANIZED HEALTH CARE EDUCATION/TRAINING PROGRAM

## 2023-09-24 RX ORDER — IPRATROPIUM BROMIDE AND ALBUTEROL SULFATE 2.5; .5 MG/3ML; MG/3ML
3 SOLUTION RESPIRATORY (INHALATION) ONCE
Status: COMPLETED | OUTPATIENT
Start: 2023-09-24 | End: 2023-09-24

## 2023-09-24 RX ORDER — ALBUTEROL SULFATE 0.83 MG/ML
1.25 SOLUTION RESPIRATORY (INHALATION) EVERY 6 HOURS PRN
Qty: 75 ML | Refills: 0 | Status: SHIPPED | OUTPATIENT
Start: 2023-09-24 | End: 2024-03-19

## 2023-09-24 RX ORDER — PREDNISONE 20 MG/1
TABLET ORAL
Qty: 10 TABLET | Refills: 0 | Status: SHIPPED | OUTPATIENT
Start: 2023-09-24 | End: 2024-03-19

## 2023-09-24 RX ORDER — IPRATROPIUM BROMIDE AND ALBUTEROL SULFATE 2.5; .5 MG/3ML; MG/3ML
1 SOLUTION RESPIRATORY (INHALATION) EVERY 4 HOURS PRN
Qty: 90 ML | Refills: 0 | Status: SHIPPED | OUTPATIENT
Start: 2023-09-24 | End: 2024-03-19

## 2023-09-24 RX ORDER — PREDNISONE 20 MG/1
60 TABLET ORAL ONCE
Status: COMPLETED | OUTPATIENT
Start: 2023-09-24 | End: 2023-09-24

## 2023-09-24 RX ORDER — IPRATROPIUM BROMIDE AND ALBUTEROL SULFATE 2.5; .5 MG/3ML; MG/3ML
SOLUTION RESPIRATORY (INHALATION)
Status: COMPLETED
Start: 2023-09-24 | End: 2023-09-24

## 2023-09-24 RX ADMIN — IPRATROPIUM BROMIDE AND ALBUTEROL SULFATE 3 ML: .5; 3 SOLUTION RESPIRATORY (INHALATION) at 11:43

## 2023-09-24 RX ADMIN — IPRATROPIUM BROMIDE AND ALBUTEROL SULFATE 3 ML: .5; 3 SOLUTION RESPIRATORY (INHALATION) at 11:13

## 2023-09-24 RX ADMIN — IPRATROPIUM BROMIDE AND ALBUTEROL SULFATE 3 ML: .5; 3 SOLUTION RESPIRATORY (INHALATION) at 10:55

## 2023-09-24 RX ADMIN — PREDNISONE 60 MG: 20 TABLET ORAL at 10:55

## 2023-09-24 RX ADMIN — IPRATROPIUM BROMIDE AND ALBUTEROL SULFATE 3 ML: .5; 3 SOLUTION RESPIRATORY (INHALATION) at 13:26

## 2023-09-24 ASSESSMENT — ENCOUNTER SYMPTOMS
WHEEZING: 1
SHORTNESS OF BREATH: 1

## 2023-09-24 ASSESSMENT — ACTIVITIES OF DAILY LIVING (ADL)
ADLS_ACUITY_SCORE: 35

## 2023-09-24 NOTE — ED PROVIDER NOTES
EMERGENCY DEPARTMENT ENCOUNTER      NAME: Soren Freitas  AGE: 32 year old female  YOB: 1990  MRN: [id number]  EVALUATION DATE & TIME: 9/24/2023 10:40 AM    PCP: Elvia Bahena    ED PROVIDER: Brian Bhat M.D.      Chief Complaint   Patient presents with    Shortness of Breath    asthma attack         FINAL IMPRESSION:  1. Moderate asthma, unspecified whether complicated, unspecified whether persistent    2. Severe persistent asthma with acute exacerbation          ED COURSE & MEDICAL DECISION MAKING:    Pertinent Labs & Imaging studies reviewed. (See chart for details)  32 year old female presents to the Emergency Department for evaluation of asthma attack.  Symptoms seemed very consistent with asthma.  Patient intiially was going to leave [facility] but stayed and finished treatment.  Received a total of 4 nebs and prednisone.  Improved signficantly clinically.  Considered but do not think this is consitent with PE, acs or pneumonia.  XRAy negative.  Will discahrge home with neb rx and steriods.    At the conclusion of the encounter I discussed the results of all of the tests and the disposition. The questions were answered. The patient or family acknowledged understanding and was agreeable with the care plan.     ED Course as of 09/25/23 1723   Sun Sep 24, 2023   1131 Patient seen.  Wheezing still present.  O2 sats 98% utilizing the nebulizer.  Patient is upset with having to sit in the chair and with the type of nebulizer tubing and set up we have.  Spoke to the patient however that with her asthma and remaining wheezing as well as the amount of tachypnea she had on presentation that it would be best for her to stay here and let us continue with nebulizers.  I would suspect she will improve and be ready for discharge.  If she decides to stay I did state that she would have to leave AGAINST MEDICAL ADVICE but that I would like her to stay.  Unclear after my conversation with the patient plans  to stay in the ER and continue treatment or not.   1313 Patient feeling somewhat better still wheezy is requesting another neb.  Will order 1 more neb and assess.     11:16 AM I met the patient and performed my initial interview and exam.        Medical Decision Making    History:  Supplemental history from: Documented in chart, if applicable  External Record(s) reviewed: Documented in chart, if applicable.    Work Up:  Chart documentation includes differential considered and any EKGs or imaging independently interpreted by provider, where specified.  In additional to work up documented, I considered the following work up: Documented in chart, if applicable.    External consultation:  Discussion of management with another provider: Documented in chart, if applicable    Complicating factors:  Care impacted by chronic illness: Hypertension and Other: Asthma with exacerbations  Care affected by social determinants of health: N/A    Disposition considerations: Discharge. I prescribed additional prescription strength medication(s) as charted. See documentation for any additional details.        This patient involved a high degree of complexity in medical decision making, as significant risks were present and assessed. Recent encounters & results in medical record reviewed by me.     All workup (i.e. any EKG/labs/imaging as per charting below) reviewed and independently interpreted by me. See respective sections for details.      30 minutes of critical care time     MEDICATIONS GIVEN IN THE EMERGENCY:  Medications   predniSONE (DELTASONE) tablet 60 mg (60 mg Oral $Given 9/24/23 1055)   ipratropium - albuterol 0.5 mg/2.5 mg/3 mL (DUONEB) neb solution 3 mL (3 mLs Nebulization $Given 9/24/23 1055)   ipratropium - albuterol 0.5 mg/2.5 mg/3 mL (DUONEB) neb solution 3 mL (3 mLs Nebulization $Given 9/24/23 1113)   ipratropium - albuterol 0.5 mg/2.5 mg/3 mL (DUONEB) 0.5-2.5 (3) MG/3ML neb solution (3 mLs  $Given 9/24/23 1143)    ipratropium - albuterol 0.5 mg/2.5 mg/3 mL (DUONEB) neb solution 3 mL (3 mLs Nebulization $Given 9/24/23 1326)       NEW PRESCRIPTIONS STARTED AT TODAY'S ER VISIT  Discharge Medication List as of 9/24/2023  2:23 PM        START taking these medications    Details   predniSONE (DELTASONE) 20 MG tablet Take two tablets (= 40mg) each day for 5 (five) days, Disp-10 tablet, R-0, Local Print                =================================================================    HPI    Patient information was obtained from: patient     Use of :         Soren Freitas is a 32 year old female with a pertinent history of severe asthma with exacerbations, HTN who presents for evaluation of asthma exacerbation.     Last night, the patient reports having an asthma exacerbation. Her symptoms worsened overnight. The patient tired used neb treatments at home without relief. En route EMS gave another duoneb and albuterol neb. Patient oxygen 94-98% on room air. She otherwise is in her normal state of health and denies any fever or any other complaints at this time.     REVIEW OF SYSTEMS   Review of Systems   Respiratory:  Positive for shortness of breath (asthma) and wheezing.    All other systems reviewed and are negative.       PAST MEDICAL HISTORY:  Past Medical History:   Diagnosis Date    Obesity     Severe persistent asthma with acute exacerbation 1/1/2015    Formatting of this note might be different from the original. 12/2014 Admit for exacerbation: Spirometry FEV1/FVC 68%, D/C w/dulera, albuterol, Spiriva.  Started singulair 10mg QD, Given NRT.  Dx as infant, has been on albuterol lifelong.    Last Assessment & Plan:  Formatting of this note might be different from the original. Stable on Singulair, cetirizine, albuterol, and dulera. Discussed COVID    Uncomplicated asthma        PAST SURGICAL HISTORY:  History reviewed. No pertinent surgical history.        CURRENT MEDICATIONS:    albuterol (PROVENTIL) (2.5  MG/3ML) 0.083% neb solution  ipratropium - albuterol 0.5 mg/2.5 mg/3 mL (DUONEB) 0.5-2.5 (3) MG/3ML neb solution  predniSONE (DELTASONE) 20 MG tablet  acetaminophen (TYLENOL) 325 MG tablet  albuterol (PROAIR HFA/PROVENTIL HFA/VENTOLIN HFA) 108 (90 Base) MCG/ACT inhaler  albuterol (PROVENTIL) (2.5 MG/3ML) 0.083% neb solution  mometasone-formoterol (DULERA) 200-5 MCG/ACT inhaler  Prenatal Vit-Fe Fumarate-FA (PRENATAL MULTIVITAMIN W/IRON) 27-0.8 MG tablet        ALLERGIES:  Allergies   Allergen Reactions    Cashew Nut Oil Anaphylaxis and Angioedema    Fish Allergy Itching     Sea Food causes Throat swelling    Peanut-Containing Drug Products Angioedema and Swelling     Throat swelling.      Shellfish-Derived Products Angioedema       FAMILY HISTORY:  History reviewed. No pertinent family history.    SOCIAL HISTORY:   Social History     Socioeconomic History    Marital status: Single   Tobacco Use    Smoking status: Former       VITALS:  /86   Pulse 105   Temp 98.1  F (36.7  C) (Axillary)   SpO2 97%     PHYSICAL EXAM    Constitutional: Well developed, Well nourished, NAD, GCS 15  HENT: Normocephalic, Atraumatic, Bilateral external ears normal, Oropharynx normal, mucous membranes moist, Nose normal. Neck-  Normal range of motion, No tenderness, Supple, No stridor.  Eyes: PERRL, EOMI, Conjunctiva normal, No discharge.   Respiratory: Speaks full sentences easily. No cough. Wheezes bilaterally   Musculoskeletal: 2+ DP pulses. No edema.No cyanosis, No clubbing. Good range of motion in all major joints. No tenderness to palpation or major deformities noted.   Integument: Warm, Dry, No erythema, No rash. No petechiae.   Neurologic: Alert & oriented x 3,  CN 3-12 intact Normal motor function, Normal sensory function, No focal deficits noted. Normal gait. Normal finger to nose bilaterally  Psychiatric: Affect normal, Judgment normal, Mood normal. Cooperative.     LAB:  All pertinent labs reviewed and interpreted.  Labs  Ordered and Resulted from Time of ED Arrival to Time of ED Departure - No data to display    RADIOLOGY:  Reviewed all pertinent imaging. Please see official radiology report.  Chest XR,  PA & LAT   Final Result   IMPRESSION: Lungs are clear. No pleural effusion. Heart size and pulmonary vascularity within normal limits.          PROCEDURES:   none      I, Tsering Lei, am serving as a scribe to document services personally performed by Dr. Brian Bhat based on my observation and the provider's statements to me. IBrian MD attest that Tsering Lei is acting in a scribe capacity, has observed my performance of the services and has documented them in accordance with my direction.    Brian Bhat M.D.  Emergency Medicine  [facility] EMERGENCY DEPARTMENT  [address] [place] [id number] [phone]  Dept: [phone]       Brian Bhat MD  09/25/23 2079

## 2023-09-24 NOTE — ED NOTES
Patient asking for insurance information on file to take with her to fill scripts. Given information per registration.

## 2023-09-24 NOTE — ED NOTES
"Patient extremely agitated about the room she was given. She is upset about the recliner chair and is requesting a bed saying the chair is uncomfortable. Writer explained and demonstrated to patient that the chair does recline far back and the feet can go up. Patient stated, \"I have been in one of these chairs before and they are so uncomfortable that I left.\" Charge RN notified.   "

## 2023-09-24 NOTE — ED TRIAGE NOTES
Patient coming in via [facility] from home after experiencing an asthma attack. Patient reportedly took a neb at home with no relief. EMS gave duoneb and albuterol neb en route. Patient has wheezes bilaterally. Satting 94-98% on RA.      Triage Assessment       Row Name 09/24/23 1045       Triage Assessment (Adult)    Airway WDL WDL       Respiratory WDL    Respiratory WDL X;rhythm/pattern    Rhythm/Pattern, Respiratory shortness of breath;tachypneic       Cardiac WDL    Cardiac WDL X;rhythm    Pulse Rate & Regularity tachycardic       Peripheral/Neurovascular WDL    Peripheral Neurovascular WDL WDL       Cognitive/Neuro/Behavioral WDL    Cognitive/Neuro/Behavioral WDL X;mood/behavior    Mood/Behavior agitated;angry;uncooperative       [place] Coma Scale    Best Eye Response 4-->(E4) spontaneous    Best Motor Response 6-->(M6) obeys commands    Best Verbal Response 5-->(V5) oriented    [place] Coma Scale Score 15

## 2024-02-15 ENCOUNTER — HOSPITAL ENCOUNTER (EMERGENCY)
Facility: HOSPITAL | Age: 34
Discharge: HOME | End: 2024-02-15
Attending: EMERGENCY MEDICINE | Admitting: EMERGENCY MEDICINE
Payer: COMMERCIAL

## 2024-02-15 VITALS
RESPIRATION RATE: 18 BRPM | DIASTOLIC BLOOD PRESSURE: 81 MMHG | HEART RATE: 108 BPM | TEMPERATURE: 98.3 F | SYSTOLIC BLOOD PRESSURE: 140 MMHG | OXYGEN SATURATION: 94 %

## 2024-02-15 DIAGNOSIS — J45.901 MODERATE ASTHMA WITH ACUTE EXACERBATION, UNSPECIFIED WHETHER PERSISTENT: ICD-10-CM

## 2024-02-15 PROCEDURE — 250N000012 HC RX MED GY IP 250 OP 636 PS 637: Performed by: EMERGENCY MEDICINE

## 2024-02-15 PROCEDURE — 94640 AIRWAY INHALATION TREATMENT: CPT

## 2024-02-15 PROCEDURE — 99285 EMERGENCY DEPT VISIT HI MDM: CPT | Mod: 25

## 2024-02-15 PROCEDURE — 250N000013 HC RX MED GY IP 250 OP 250 PS 637: Performed by: EMERGENCY MEDICINE

## 2024-02-15 PROCEDURE — 250N000009 HC RX 250: Performed by: EMERGENCY MEDICINE

## 2024-02-15 RX ORDER — ALBUTEROL SULFATE 0.83 MG/ML
5 SOLUTION RESPIRATORY (INHALATION) ONCE
Status: COMPLETED | OUTPATIENT
Start: 2024-02-15 | End: 2024-02-15

## 2024-02-15 RX ORDER — ALBUTEROL SULFATE 90 UG/1
2 AEROSOL, METERED RESPIRATORY (INHALATION) EVERY 6 HOURS PRN
Qty: 18 G | Refills: 0 | Status: SHIPPED | OUTPATIENT
Start: 2024-02-15 | End: 2024-10-06

## 2024-02-15 RX ORDER — FLUTICASONE FUROATE AND VILANTEROL 200; 25 UG/1; UG/1
1 POWDER RESPIRATORY (INHALATION) ONCE
Status: COMPLETED | OUTPATIENT
Start: 2024-02-15 | End: 2024-02-15

## 2024-02-15 RX ORDER — IPRATROPIUM BROMIDE AND ALBUTEROL SULFATE 2.5; .5 MG/3ML; MG/3ML
3 SOLUTION RESPIRATORY (INHALATION) ONCE
Status: COMPLETED | OUTPATIENT
Start: 2024-02-15 | End: 2024-02-15

## 2024-02-15 RX ORDER — PREDNISONE 20 MG/1
40 TABLET ORAL ONCE
Status: COMPLETED | OUTPATIENT
Start: 2024-02-15 | End: 2024-02-15

## 2024-02-15 RX ADMIN — FLUTICASONE FUROATE AND VILANTEROL TRIFENATATE 1 PUFF: 200; 25 POWDER RESPIRATORY (INHALATION) at 09:12

## 2024-02-15 RX ADMIN — ALBUTEROL SULFATE 5 MG: 2.5 SOLUTION RESPIRATORY (INHALATION) at 09:26

## 2024-02-15 RX ADMIN — IPRATROPIUM BROMIDE AND ALBUTEROL SULFATE 3 ML: .5; 3 SOLUTION RESPIRATORY (INHALATION) at 08:34

## 2024-02-15 RX ADMIN — PREDNISONE 40 MG: 20 TABLET ORAL at 08:28

## 2024-02-15 RX ADMIN — ALBUTEROL SULFATE 5 MG: 2.5 SOLUTION RESPIRATORY (INHALATION) at 08:34

## 2024-02-15 ASSESSMENT — ENCOUNTER SYMPTOMS
FEVER: 0
VOMITING: 0
DIARRHEA: 0
SHORTNESS OF BREATH: 1
DYSURIA: 0
COUGH: 0
NAUSEA: 0
ABDOMINAL PAIN: 0

## 2024-02-15 ASSESSMENT — ACTIVITIES OF DAILY LIVING (ADL)
ADLS_ACUITY_SCORE: 35
ADLS_ACUITY_SCORE: 35

## 2024-02-15 NOTE — DISCHARGE INSTRUCTIONS
Use the Breo Ellipta medicine 1 puff daily.    Continue to work with the pharmacy and your prescribing doctor about getting your usual Dulera.    Use the DuoNebs and prednisone as prescribed at yesterday's ER visit.  You can use the albuterol inhaler as prescribed by me.    Return to emergency department if you notice increasing shortness of breath, fevers, chest pain, or any other concerns.    Thank you for choosing [facility] Emergency Department.  It has been my pleasure caring for you today.     ~Dr. Dipika MD

## 2024-02-15 NOTE — ED TRIAGE NOTES
Pt arrived to ER via [facility] from home for evaluation of SOB related to an asthma exacerbation. Pt states the symptoms started yesterday evening, she went to [facility] and they gave her a neb and sent her home, pt states she never actually felt better. Pt used own rescue inhaler with no relief. Pt audibly wheezing. EMS gave duo neb en route.      Triage Assessment (Adult)       Row Name 02/15/24 0819          Triage Assessment    Airway WDL WDL        Respiratory WDL    Respiratory WDL X;rhythm/pattern     Rhythm/Pattern, Respiratory dyspnea upon exertion;tachypneic;shortness of breath;unlabored        Skin Circulation/Temperature WDL    Skin Circulation/Temperature WDL WDL        Cardiac WDL    Cardiac WDL X     Cardiac Rhythm ST        Peripheral/Neurovascular WDL    Peripheral Neurovascular WDL WDL        Cognitive/Neuro/Behavioral WDL    Cognitive/Neuro/Behavioral WDL mood/behavior     Mood/Behavior agitated

## 2024-02-15 NOTE — Clinical Note
Soren Freitas was seen and treated in our emergency department on 2/15/2024.  She may return to work on 02/16/2024.       If you have any questions or concerns, please don't hesitate to call.      Vani Bar MD

## 2024-02-15 NOTE — ED PROVIDER NOTES
EMERGENCY DEPARTMENT ENCOUNTER      NAME: Soren Freitas  AGE: 33 year old female  YOB: 1990  MRN: [id number]  EVALUATION DATE & TIME: 2/15/2024  8:16 AM    PCP: Elvia Bahena    ED PROVIDER: Vani Bar M.D.        Chief Complaint   Patient presents with    Asthma Exacerbation         FINAL IMPRESSION:    1. Moderate asthma with acute exacerbation, unspecified whether persistent            MEDICAL DECISION MAKIN year old female history of asthma who presents emergency department with increasing shortness of breath.  She states that she was seen at [facility] yesterday for the same and was given a nebulizer treatment and some steroids but did not feel better when she was discharged.  She now presents with ongoing shortness of breath.  She was given 1 DuoNeb prior to arrival by EMS.  Here she received another DuoNeb and albuterol and then a third nebulizer treatment (albuterol) with significant improvement.  She feels much better and is requesting to go home.  She states she has prescriptions for DuoNeb and prednisone from yesterday's ER visit and does not need anymore.  She is requesting a prescription for albuterol inhaler which is reasonable.  She is out of her Dulera and cannot refill this for another few days per her insurance but she was given Breo Ellipta to use in the meantime.  Patient aware that she will need to continue to work with the prescribing physician and her insurance.  She assures me she is taking the medication as prescribed.  She plans to make an appointment to follow-up with her prescriber.    She understands what to watch for and when to return to the ER and all of her questions have been answered.      ED COURSE:  8:22 AM   I met with the patient to gather history and perform my exam. ED course and treatment discussed.  Patient indicates that she prefers that we do not do any IV, labs or imaging.  At this time I think that is reasonable.  Seems  consistent with asthma exacerbation.  I spoke with hospital pharmacist and the plan at this time is to give her our equivalent of Dulera here in the ER.    9:25 AM  Patient is feeling much better.  Lungs have improved significantly but still has a few scattered wheezes.  Will try another nebulizer treatment.  She is breathing comfortable without any retractions and no signs of increased work of breathing.  She agrees with this plan.    10:39 AM  Patient is feeling much better.  Lungs are now clear.  I spoke with her about observation in the ER for little while longer versus another nebulizer treatment, but she feels like she is appropriate for discharge home.  She states that she has a prescription for prednisone and DuoNebs from the ER visit yesterday.  She is requesting a prescription for albuterol inhaler which will be provided.  She will continue to work with her primary scriber and pharmacy to refill her Dulera.  She states that she has plenty of refills at the pharmacy.    I do not think that this represents rib fractures, allergic reaction, pneumonia, CHF, myocarditis, pericarditis, endocarditis, ACS, PE, ruptured AAA, pneumothorax, aortic dissection, bowel obstruction, or other such etiologies at this time.    At the conclusion of the encounter I discussed the results of all of the tests and the disposition. Their questions were answered. The patient (and any family present) acknowledged understanding and were agreeable with the care plan.      CONSULTANTS:  Hosp Pharmacist - Yasir        MEDICATIONS GIVEN IN THE EMERGENCY:  Medications   ipratropium - albuterol 0.5 mg/2.5 mg/3 mL (DUONEB) neb solution 3 mL (3 mLs Nebulization $Given 2/15/24 0834)   albuterol (PROVENTIL) neb solution 5 mg (5 mg Nebulization $Given 2/15/24 0834)   predniSONE (DELTASONE) tablet 40 mg (40 mg Oral $Given 2/15/24 0828)   fluticasone-vilanterol (BREO ELLIPTA) 200-25 MCG/ACT inhaler 1 puff (1 puff Inhalation $Given 2/15/24 0912)    albuterol (PROVENTIL) neb solution 5 mg (5 mg Nebulization $Given 2/15/24 1497)           NEW PRESCRIPTIONS STARTED AT TODAY'S ER VISIT     Medication List        Modified      * albuterol (2.5 MG/3ML) 0.083% neb solution  Commonly known as: PROVENTIL  2.5 mg, Nebulization, EVERY 6 HOURS PRN  What changed: Another medication with the same name was changed. Make sure you understand how and when to take each.     * albuterol (2.5 MG/3ML) 0.083% neb solution  Commonly known as: PROVENTIL  1.25 mg, Nebulization, EVERY 6 HOURS PRN  What changed: Another medication with the same name was changed. Make sure you understand how and when to take each.     * albuterol 108 (90 Base) MCG/ACT inhaler  Commonly known as: PROAIR HFA/PROVENTIL HFA/VENTOLIN HFA  2 puffs, Inhalation, EVERY 6 HOURS PRN  What changed:   when to take this  reasons to take this           * This list has 3 medication(s) that are the same as other medications prescribed for you. Read the directions carefully, and ask your doctor or other care provider to review them with you.                      CONDITION:  stable        DISPOSITION:  D.c home         =================================================================  =================================================================  TRIAGE ASSESSMENT:   Pt arrived to ER via [facility] from home for evaluation of SOB related to an asthma exacerbation. Pt states the symptoms started yesterday evening, she went to [place] [facility] and they gave her a neb and sent her home, pt states she never actually felt better. Pt used own rescue inhaler with no relief. Pt audibly wheezing. EMS gave duo neb en route.      Triage Assessment (Adult)       Row Name 02/15/24 0819          Triage Assessment    Airway WDL WDL        Respiratory WDL    Respiratory WDL X;rhythm/pattern     Rhythm/Pattern, Respiratory dyspnea upon exertion;tachypneic;shortness of breath;unlabored        Skin Circulation/Temperature WDL    Skin  Circulation/Temperature WDL WDL        Cardiac WDL    Cardiac WDL X     Cardiac Rhythm ST        Peripheral/Neurovascular WDL    Peripheral Neurovascular WDL WDL        Cognitive/Neuro/Behavioral WDL    Cognitive/Neuro/Behavioral WDL mood/behavior     Mood/Behavior agitated                      ED Triage Vitals [02/15/24 0817]   Enc Vitals Group      BP (!) 151/88      Pulse 115      Resp 24      Temp 98.3  F (36.8  C)      Temp src Oral      SpO2 97 %       ================================================================  ================================================================    HPI    Patient information was obtained from: patient and EMS    Use of Intrepreter: N/A      Soren GRAY Fortino is a 33 year old female with history of astham who presents to the ER with complaints of shortness of breath.    EMS reports when they arrived they had sats at 94%.  Audible wheezing.  Nebulizer provided and transported to the emergency department.     Patient reports that yesterday she started having increasing shortness of breath.  She reported to another emergency department where she states she got 1 neb and a dose of steroids but did not actually feel better and was discharged.  She continues to feel shortness of breath.  Otherwise denies fevers, cough, chest pain, abdominal pain, vomiting or diarrhea.    She states the last prednisone that she was on was in September and her last ER visit for asthma other than yesterday was in September.    She is supposed to be on Dulera but ran out of this medication recently and was told by her pharmacy that she is not due for another refill for about 1 week.  She believes that she is using it as directed.  She notes that her asthma symptoms act up whenever she runs out of her Dulera medicine.        CHART REVIEW:  Chart review shows that patient was seen yesterday in the emergency department for asthma exacerbation.    Medications   albuterol 0.083% (2.5 mg/3 mL) neb solution  2.5 mg (2.5 mg Neb Given 2/14/24 1844)   predniSONE 60 mg tablet (DELTASONE) (60 mg Oral Given 2/14/24 1844)            LABORATORY:  Labs Reviewed - No data to display        ED COURSE:  Clinical Impressions as of 02/14/24 2005   Asthma with acute exacerbation, unspecified asthma severity, unspecified whether persistent            IMPRESSION and PLAN:  33 y.o. female with the above hx and presentation.  EMR reviewed including past ED visit for asthma exacerbation at [facility]. Reportedly hypoxic PTA, though not requiring O2 on my assessment with stable O2 sat at 95 to 96%. No resp distress.  Wheezing noted on exam bilaterally.  Given additional neb and prednisone PO.  No additional O2 requirement noted and no resp distress at any time during ED stay.  Plan home with outpt f/u and prednisone burst.     ENCOUNTER DIAGNOSES:        ENCOUNTER DIAGNOSES       ICD-10-CM     1. Asthma with acute exacerbation, unspecified asthma severity, unspecified whether persistent  J45.901 predniSONE (DELTASONE) 20 mg tablet       albuterol-ipratropium (DUONEB) (2.5-0.5 mg) in 3 mL NEBULIZATION solution             Ian Walker MD  2/14/2024   [place] EMERGENCY DEPARTMENT      Electronically signed by Ian Walker MD at 2/14/2024  8:06 PM         REVIEW OF SYSTEMS  Review of Systems   Constitutional:  Negative for fever.   Respiratory:  Positive for shortness of breath. Negative for cough.    Cardiovascular:  Negative for chest pain.   Gastrointestinal:  Negative for abdominal pain, diarrhea, nausea and vomiting.   Genitourinary:  Negative for dysuria.   Allergic/Immunologic: Negative for immunocompromised state.   All other systems reviewed and are negative.        PAST MEDICAL HISTORY:  Past Medical History:   Diagnosis Date    Obesity     Severe persistent asthma with acute exacerbation (H28) 1/1/2015    Formatting of this note might be different from the original. 12/2014 Admit for exacerbation: Spirometry FEV1/FVC 68%, D/C  w/dulera, albuterol, Spiriva.  Started singulair 10mg QD, Given NRT.  Dx as infant, has been on albuterol lifelong.    Last Assessment & Plan:  Formatting of this note might be different from the original. Stable on Singulair, cetirizine, albuterol, and dulera. Discussed COVID    Uncomplicated asthma          PAST SURGICAL HISTORY:  History reviewed. No pertinent surgical history.      CURRENT MEDICATIONS:    Prior to Admission medications    Medication Sig Start Date End Date Taking? Authorizing Provider   acetaminophen (TYLENOL) 325 MG tablet Take 325-650 mg by mouth every 6 hours as needed for mild pain    Unknown, Entered By History   albuterol (PROAIR HFA/PROVENTIL HFA/VENTOLIN HFA) 108 (90 Base) MCG/ACT inhaler Inhale 2 puffs into the lungs every 4 hours as needed for shortness of breath or wheezing 2/6/23   Sally Avendano MD   albuterol (PROVENTIL) (2.5 MG/3ML) 0.083% neb solution Take 0.5 vials (1.25 mg) by nebulization every 6 hours as needed for shortness of breath or wheezing 9/24/23   Brian Bhat MD   albuterol (PROVENTIL) (2.5 MG/3ML) 0.083% neb solution Take 1 vial (2.5 mg) by nebulization every 6 hours as needed for shortness of breath / dyspnea or wheezing 5/26/22   Betzy Huggins MD   ipratropium - albuterol 0.5 mg/2.5 mg/3 mL (DUONEB) 0.5-2.5 (3) MG/3ML neb solution Take 1 vial (3 mLs) by nebulization every 4 hours as needed for shortness of breath, wheezing or cough 9/24/23   Brian Bhat MD   mometasone-formoterol (DULERA) 200-5 MCG/ACT inhaler Inhale 2 puffs into the lungs 2 times daily 2/6/23   Sally Avendano MD   predniSONE (DELTASONE) 20 MG tablet Take two tablets (= 40mg) each day for 5 (five) days 9/24/23   Brian Bhat MD   Prenatal Vit-Fe Fumarate-FA (PRENATAL MULTIVITAMIN W/IRON) 27-0.8 MG tablet Take 1 tablet by mouth daily    Unknown, Entered By History         ALLERGIES:  Allergies   Allergen Reactions    Cashew Nut Oil Anaphylaxis and Angioedema    Fish Allergy Itching      Sea Food causes Throat swelling    Peanut-Containing Drug Products Angioedema and Swelling     Throat swelling.      Shellfish-Derived Products Angioedema         FAMILY HISTORY:  No family history on file.      SOCIAL HISTORY:  Social History     Socioeconomic History    Marital status: Single   Tobacco Use    Smoking status: Former         VITALS:  Patient Vitals for the past 24 hrs:   BP Temp Temp src Pulse Resp SpO2   02/15/24 1022 -- -- -- -- -- 96 %   02/15/24 1021 -- -- -- 120 -- 95 %   02/15/24 1015 132/76 -- -- -- -- --   02/15/24 1000 121/66 -- -- 102 18 99 %   02/15/24 0935 -- -- -- 101 -- 100 %   02/15/24 0930 135/69 -- -- 118 -- 94 %   02/15/24 0924 -- -- -- 109 -- --   02/15/24 0916 -- -- -- 117 -- 95 %   02/15/24 0915 121/70 -- -- 116 -- 95 %   02/15/24 0900 126/75 -- -- 102 18 100 %   02/15/24 0850 -- -- -- 100 20 100 %   02/15/24 0845 129/74 -- -- 99 -- 100 %   02/15/24 0830 127/74 -- -- 109 -- 95 %   02/15/24 0817 (!) 151/88 98.3  F (36.8  C) Oral 115 24 97 %       Wt Readings from Last 3 Encounters:   02/06/23 85.3 kg (188 lb)   02/03/23 85.3 kg (188 lb)   11/06/22 83.9 kg (185 lb)       CrCl cannot be calculated (Patient's most recent lab result is older than the maximum 365 days allowed.).    PHYSICAL EXAM    Constitutional:  Well developed, Well nourished, NAD  HENT:  Normocephalic, Atraumatic, Bilateral external ears normal, Nose normal. Neck- Supple, No stridor.   Eyes:  PERRL, EOMI, Conjunctiva normal, No discharge.  Respiratory:  Slightly decreased breath sounds, No respiratory distress, +diffuse wheezing, Speaks full sentences easily. No cough.   Cardiovascular:  Normal heart rate, Regular rhythm, No rubs, No gallops.   GI:  No excessive obesity.  Bowel sounds normal, Soft, No tenderness, No masses, No flank tenderness. No rebound or guarding.   : deferred  Musculoskeletal: No cyanosis, No clubbing. Good range of motion in all major joints. No major deformities noted.   Integument:   "Warm, Dry, No erythema, No rash.  No petechiae.   Neurologic:  Alert & oriented x 3  Psychiatric:  Affect normal, Cooperative         LAB:  All pertinent labs reviewed and interpreted.  No results found for this or any previous visit (from the past 24 hour(s)).    No results found for: \"ABORH\"        RADIOLOGY:  Reviewed all pertinent imaging. Please see official radiology report.    No orders to display         EKG:    none        PROCEDURES:  none      Medical Decision Making  Obtained supplemental history:Supplemental history obtained?: No  Reviewed external records: External records reviewed?: Documented in chart and Outpatient Record: see HPI  Care impacted by chronic illness:Chronic Lung Disease  Care significantly affected by social determinants of health:Access to Medical Care and Access to Affordable Health Care  Did you consider but not order tests?: Work up considered but not performed and documented in chart, if applicable  Did you interpret images independently?: Independent interpretation of ECG and images noted in documentation, when applicable.  Consultation discussion with other provider:Did you involve another provider (consultant, , pharmacy, etc.)?: No  Discharge. I prescribed additional prescription strength medication(s) as charted. I considered admission, but ultimately discharged patient patient feels much better and requesting to go home which I think is reasonable..      Vani Bar M.D. [facility]  Emergency Medicine and Medical Toxicology  Formerly [facility] EMERGENCY DEPARTMENT  [facility] [facility] [address] [phone]  Dept: [phone]           Vani Bar MD  02/15/24 1043    "

## 2024-02-15 NOTE — ED NOTES
Pt discharged to home by self in cab using [employer]. Pt given printed rx. In no distress at discharge.

## 2024-02-15 NOTE — ED NOTES
Bed: JNED-03  Expected date: 2/15/24  Expected time: 8:04 AM  Means of arrival: Ambulance  Comments:  SPF - asthma

## 2024-03-19 ENCOUNTER — HOSPITAL ENCOUNTER (EMERGENCY)
Facility: HOSPITAL | Age: 34
Discharge: HOME | End: 2024-03-19
Attending: EMERGENCY MEDICINE | Admitting: EMERGENCY MEDICINE
Payer: COMMERCIAL

## 2024-03-19 ENCOUNTER — APPOINTMENT (OUTPATIENT)
Dept: CT IMAGING | Facility: HOSPITAL | Age: 34
End: 2024-03-19
Payer: COMMERCIAL

## 2024-03-19 ENCOUNTER — HOSPITAL ENCOUNTER (OUTPATIENT)
Facility: HOSPITAL | Age: 34
Setting detail: OBSERVATION
Discharge: HOME | End: 2024-03-20
Attending: FAMILY MEDICINE | Admitting: FAMILY MEDICINE
Payer: COMMERCIAL

## 2024-03-19 ENCOUNTER — APPOINTMENT (OUTPATIENT)
Dept: RADIOLOGY | Facility: HOSPITAL | Age: 34
End: 2024-03-19
Attending: EMERGENCY MEDICINE
Payer: COMMERCIAL

## 2024-03-19 VITALS
WEIGHT: 182 LBS | DIASTOLIC BLOOD PRESSURE: 63 MMHG | TEMPERATURE: 99.9 F | HEART RATE: 122 BPM | BODY MASS INDEX: 29.25 KG/M2 | RESPIRATION RATE: 22 BRPM | HEIGHT: 66 IN | OXYGEN SATURATION: 95 % | SYSTOLIC BLOOD PRESSURE: 115 MMHG

## 2024-03-19 DIAGNOSIS — R00.0 SINUS TACHYCARDIA: ICD-10-CM

## 2024-03-19 DIAGNOSIS — J15.9 COMMUNITY ACQUIRED BACTERIAL PNEUMONIA: ICD-10-CM

## 2024-03-19 DIAGNOSIS — R06.02 SOB (SHORTNESS OF BREATH): ICD-10-CM

## 2024-03-19 DIAGNOSIS — J45.901 EXACERBATION OF ASTHMA, UNSPECIFIED ASTHMA SEVERITY, UNSPECIFIED WHETHER PERSISTENT: ICD-10-CM

## 2024-03-19 DIAGNOSIS — J45.901 EXACERBATION OF ASTHMA, UNSPECIFIED ASTHMA SEVERITY, UNSPECIFIED WHETHER PERSISTENT: Primary | ICD-10-CM

## 2024-03-19 DIAGNOSIS — J45.41 MODERATE PERSISTENT ASTHMA WITH ACUTE EXACERBATION: ICD-10-CM

## 2024-03-19 DIAGNOSIS — J45.52 SEVERE PERSISTENT ASTHMA WITH STATUS ASTHMATICUS (H): ICD-10-CM

## 2024-03-19 DIAGNOSIS — J45.901 SEVERE ASTHMA WITH EXACERBATION, UNSPECIFIED WHETHER PERSISTENT: ICD-10-CM

## 2024-03-19 DIAGNOSIS — J45.51 SEVERE PERSISTENT ASTHMA WITH EXACERBATION (H): Primary | ICD-10-CM

## 2024-03-19 DIAGNOSIS — J45.51 SEVERE PERSISTENT ASTHMA WITH EXACERBATION (H): ICD-10-CM

## 2024-03-19 DIAGNOSIS — J18.9 PNEUMONIA OF BOTH LUNGS DUE TO INFECTIOUS ORGANISM, UNSPECIFIED PART OF LUNG: ICD-10-CM

## 2024-03-19 LAB
ANION GAP SERPL CALCULATED.3IONS-SCNC: 13 MMOL/L (ref 7–15)
ANION GAP SERPL CALCULATED.3IONS-SCNC: 13 MMOL/L (ref 7–15)
BASE EXCESS BLDV CALC-SCNC: -3.1 MMOL/L (ref -3–3)
BASOPHILS # BLD AUTO: 0 10E3/UL (ref 0–0.2)
BASOPHILS NFR BLD AUTO: 0 %
BUN SERPL-MCNC: 7.9 MG/DL (ref 6–20)
BUN SERPL-MCNC: 9 MG/DL (ref 6–20)
CALCIUM SERPL-MCNC: 9 MG/DL (ref 8.6–10)
CALCIUM SERPL-MCNC: 9 MG/DL (ref 8.6–10)
CHLORIDE SERPL-SCNC: 105 MMOL/L (ref 98–107)
CHLORIDE SERPL-SCNC: 105 MMOL/L (ref 98–107)
CREAT SERPL-MCNC: 0.86 MG/DL (ref 0.51–0.95)
CREAT SERPL-MCNC: 0.88 MG/DL (ref 0.51–0.95)
CREAT SERPL-MCNC: 0.96 MG/DL (ref 0.51–0.95)
DEPRECATED HCO3 PLAS-SCNC: 19 MMOL/L (ref 22–29)
DEPRECATED HCO3 PLAS-SCNC: 23 MMOL/L (ref 22–29)
EGFRCR SERPLBLD CKD-EPI 2021: 80 ML/MIN/1.73M2
EGFRCR SERPLBLD CKD-EPI 2021: 88 ML/MIN/1.73M2
EGFRCR SERPLBLD CKD-EPI 2021: >90 ML/MIN/1.73M2
EOSINOPHIL # BLD AUTO: 0.4 10E3/UL (ref 0–0.7)
EOSINOPHIL NFR BLD AUTO: 4 %
ERYTHROCYTE [DISTWIDTH] IN BLOOD BY AUTOMATED COUNT: 13 % (ref 10–15)
ERYTHROCYTE [DISTWIDTH] IN BLOOD BY AUTOMATED COUNT: 13.1 % (ref 10–15)
FLUAV RNA SPEC QL NAA+PROBE: NEGATIVE
FLUBV RNA RESP QL NAA+PROBE: NEGATIVE
GLUCOSE SERPL-MCNC: 120 MG/DL (ref 70–99)
GLUCOSE SERPL-MCNC: 214 MG/DL (ref 70–99)
HCG SERPL QL: NEGATIVE
HCO3 BLDV-SCNC: 23 MMOL/L (ref 21–28)
HCT VFR BLD AUTO: 40.2 % (ref 35–47)
HCT VFR BLD AUTO: 42.3 % (ref 35–47)
HGB BLD-MCNC: 13.6 G/DL (ref 11.7–15.7)
HGB BLD-MCNC: 14.2 G/DL (ref 11.7–15.7)
HOLD SPECIMEN: NORMAL
IMM GRANULOCYTES # BLD: 0 10E3/UL
IMM GRANULOCYTES NFR BLD: 0 %
LYMPHOCYTES # BLD AUTO: 2.8 10E3/UL (ref 0.8–5.3)
LYMPHOCYTES NFR BLD AUTO: 28 %
MCH RBC QN AUTO: 27.9 PG (ref 26.5–33)
MCH RBC QN AUTO: 28 PG (ref 26.5–33)
MCHC RBC AUTO-ENTMCNC: 33.6 G/DL (ref 31.5–36.5)
MCHC RBC AUTO-ENTMCNC: 33.8 G/DL (ref 31.5–36.5)
MCV RBC AUTO: 83 FL (ref 78–100)
MCV RBC AUTO: 83 FL (ref 78–100)
MONOCYTES # BLD AUTO: 0.8 10E3/UL (ref 0–1.3)
MONOCYTES NFR BLD AUTO: 7 %
NEUTROPHILS # BLD AUTO: 6.2 10E3/UL (ref 1.6–8.3)
NEUTROPHILS NFR BLD AUTO: 61 %
NRBC # BLD AUTO: 0 10E3/UL
NRBC BLD AUTO-RTO: 0 /100
NT-PROBNP SERPL-MCNC: <36 PG/ML (ref 0–450)
O2/TOTAL GAS SETTING VFR VENT: 0 %
OXYHGB MFR BLDV: 53 % (ref 70–75)
PCO2 BLDV: 42 MM HG (ref 40–50)
PH BLDV: 7.34 [PH] (ref 7.32–7.43)
PLATELET # BLD AUTO: 232 10E3/UL (ref 150–450)
PLATELET # BLD AUTO: 267 10E3/UL (ref 150–450)
PO2 BLDV: 29 MM HG (ref 25–47)
POTASSIUM SERPL-SCNC: 3.4 MMOL/L (ref 3.4–5.3)
POTASSIUM SERPL-SCNC: 3.6 MMOL/L (ref 3.4–5.3)
RBC # BLD AUTO: 4.86 10E6/UL (ref 3.8–5.2)
RBC # BLD AUTO: 5.09 10E6/UL (ref 3.8–5.2)
RSV RNA SPEC NAA+PROBE: NEGATIVE
SAO2 % BLDV: 54.1 % (ref 70–75)
SARS-COV-2 RNA RESP QL NAA+PROBE: NEGATIVE
SODIUM SERPL-SCNC: 137 MMOL/L (ref 135–145)
SODIUM SERPL-SCNC: 141 MMOL/L (ref 135–145)
WBC # BLD AUTO: 10.2 10E3/UL (ref 4–11)
WBC # BLD AUTO: 8.6 10E3/UL (ref 4–11)

## 2024-03-19 PROCEDURE — 250N000011 HC RX IP 250 OP 636: Performed by: FAMILY MEDICINE

## 2024-03-19 PROCEDURE — 85027 COMPLETE CBC AUTOMATED: CPT | Performed by: STUDENT IN AN ORGANIZED HEALTH CARE EDUCATION/TRAINING PROGRAM

## 2024-03-19 PROCEDURE — 36415 COLL VENOUS BLD VENIPUNCTURE: CPT | Performed by: STUDENT IN AN ORGANIZED HEALTH CARE EDUCATION/TRAINING PROGRAM

## 2024-03-19 PROCEDURE — 96374 THER/PROPH/DIAG INJ IV PUSH: CPT

## 2024-03-19 PROCEDURE — 250N000012 HC RX MED GY IP 250 OP 636 PS 637: Performed by: STUDENT IN AN ORGANIZED HEALTH CARE EDUCATION/TRAINING PROGRAM

## 2024-03-19 PROCEDURE — 83880 ASSAY OF NATRIURETIC PEPTIDE: CPT | Performed by: EMERGENCY MEDICINE

## 2024-03-19 PROCEDURE — 999N000157 HC STATISTIC RCP TIME EA 10 MIN

## 2024-03-19 PROCEDURE — 36415 COLL VENOUS BLD VENIPUNCTURE: CPT | Performed by: EMERGENCY MEDICINE

## 2024-03-19 PROCEDURE — 96375 TX/PRO/DX INJ NEW DRUG ADDON: CPT | Mod: 59

## 2024-03-19 PROCEDURE — 99207 PR NO BILLABLE SERVICE THIS VISIT: CPT | Performed by: STUDENT IN AN ORGANIZED HEALTH CARE EDUCATION/TRAINING PROGRAM

## 2024-03-19 PROCEDURE — 71045 X-RAY EXAM CHEST 1 VIEW: CPT

## 2024-03-19 PROCEDURE — 85025 COMPLETE CBC W/AUTO DIFF WBC: CPT | Performed by: EMERGENCY MEDICINE

## 2024-03-19 PROCEDURE — 250N000011 HC RX IP 250 OP 636: Performed by: EMERGENCY MEDICINE

## 2024-03-19 PROCEDURE — 99207 PR NO BILLABLE SERVICE THIS VISIT: CPT | Performed by: HOSPITALIST

## 2024-03-19 PROCEDURE — 99285 EMERGENCY DEPT VISIT HI MDM: CPT | Mod: 25,27

## 2024-03-19 PROCEDURE — 250N000009 HC RX 250

## 2024-03-19 PROCEDURE — 82374 ASSAY BLOOD CARBON DIOXIDE: CPT | Performed by: STUDENT IN AN ORGANIZED HEALTH CARE EDUCATION/TRAINING PROGRAM

## 2024-03-19 PROCEDURE — 82565 ASSAY OF CREATININE: CPT | Mod: 91

## 2024-03-19 PROCEDURE — 71275 CT ANGIOGRAPHY CHEST: CPT

## 2024-03-19 PROCEDURE — 93005 ELECTROCARDIOGRAM TRACING: CPT | Performed by: EMERGENCY MEDICINE

## 2024-03-19 PROCEDURE — G0378 HOSPITAL OBSERVATION PER HR: HCPCS

## 2024-03-19 PROCEDURE — 80048 BASIC METABOLIC PNL TOTAL CA: CPT | Performed by: EMERGENCY MEDICINE

## 2024-03-19 PROCEDURE — 82805 BLOOD GASES W/O2 SATURATION: CPT

## 2024-03-19 PROCEDURE — 99285 EMERGENCY DEPT VISIT HI MDM: CPT | Mod: 25

## 2024-03-19 PROCEDURE — 250N000009 HC RX 250: Performed by: STUDENT IN AN ORGANIZED HEALTH CARE EDUCATION/TRAINING PROGRAM

## 2024-03-19 PROCEDURE — 87637 SARSCOV2&INF A&B&RSV AMP PRB: CPT | Performed by: STUDENT IN AN ORGANIZED HEALTH CARE EDUCATION/TRAINING PROGRAM

## 2024-03-19 PROCEDURE — 94640 AIRWAY INHALATION TREATMENT: CPT | Mod: 76

## 2024-03-19 PROCEDURE — 250N000011 HC RX IP 250 OP 636

## 2024-03-19 PROCEDURE — 250N000009 HC RX 250: Performed by: FAMILY MEDICINE

## 2024-03-19 PROCEDURE — 250N000009 HC RX 250: Performed by: EMERGENCY MEDICINE

## 2024-03-19 PROCEDURE — 99223 1ST HOSP IP/OBS HIGH 75: CPT | Mod: AI | Performed by: FAMILY MEDICINE

## 2024-03-19 PROCEDURE — 84703 CHORIONIC GONADOTROPIN ASSAY: CPT

## 2024-03-19 PROCEDURE — 96365 THER/PROPH/DIAG IV INF INIT: CPT | Mod: 59

## 2024-03-19 PROCEDURE — 36415 COLL VENOUS BLD VENIPUNCTURE: CPT

## 2024-03-19 RX ORDER — PROCHLORPERAZINE 25 MG
25 SUPPOSITORY, RECTAL RECTAL EVERY 12 HOURS PRN
Status: DISCONTINUED | OUTPATIENT
Start: 2024-03-19 | End: 2024-03-19 | Stop reason: HOSPADM

## 2024-03-19 RX ORDER — FLUTICASONE FUROATE AND VILANTEROL 200; 25 UG/1; UG/1
1 POWDER RESPIRATORY (INHALATION) DAILY
Status: DISCONTINUED | OUTPATIENT
Start: 2024-03-19 | End: 2024-03-19 | Stop reason: HOSPADM

## 2024-03-19 RX ORDER — ACETAMINOPHEN 650 MG/1
650 SUPPOSITORY RECTAL EVERY 4 HOURS PRN
Status: DISCONTINUED | OUTPATIENT
Start: 2024-03-19 | End: 2024-03-19 | Stop reason: HOSPADM

## 2024-03-19 RX ORDER — IPRATROPIUM BROMIDE AND ALBUTEROL SULFATE 2.5; .5 MG/3ML; MG/3ML
3 SOLUTION RESPIRATORY (INHALATION) ONCE
Status: COMPLETED | OUTPATIENT
Start: 2024-03-19 | End: 2024-03-19

## 2024-03-19 RX ORDER — ONDANSETRON 4 MG/1
4 TABLET, ORALLY DISINTEGRATING ORAL EVERY 6 HOURS PRN
Status: DISCONTINUED | OUTPATIENT
Start: 2024-03-19 | End: 2024-03-19 | Stop reason: HOSPADM

## 2024-03-19 RX ORDER — ALBUTEROL SULFATE 5 MG/ML
SOLUTION RESPIRATORY (INHALATION)
Status: COMPLETED
Start: 2024-03-19 | End: 2024-03-19

## 2024-03-19 RX ORDER — POLYETHYLENE GLYCOL 3350 17 G/17G
17 POWDER, FOR SOLUTION ORAL 2 TIMES DAILY PRN
Status: DISCONTINUED | OUTPATIENT
Start: 2024-03-19 | End: 2024-03-19 | Stop reason: HOSPADM

## 2024-03-19 RX ORDER — PREDNISONE 20 MG/1
40 TABLET ORAL
Status: DISCONTINUED | OUTPATIENT
Start: 2024-03-21 | End: 2024-03-20 | Stop reason: HOSPADM

## 2024-03-19 RX ORDER — PREDNISONE 20 MG/1
40 TABLET ORAL DAILY
Qty: 10 TABLET | Refills: 0 | Status: SHIPPED | OUTPATIENT
Start: 2024-03-20 | End: 2024-03-25

## 2024-03-19 RX ORDER — LORAZEPAM 2 MG/ML
0.25 INJECTION INTRAMUSCULAR ONCE
Status: COMPLETED | OUTPATIENT
Start: 2024-03-19 | End: 2024-03-19

## 2024-03-19 RX ORDER — IPRATROPIUM BROMIDE AND ALBUTEROL SULFATE 2.5; .5 MG/3ML; MG/3ML
3 SOLUTION RESPIRATORY (INHALATION)
Status: DISCONTINUED | OUTPATIENT
Start: 2024-03-20 | End: 2024-03-20 | Stop reason: HOSPADM

## 2024-03-19 RX ORDER — PROCHLORPERAZINE MALEATE 10 MG
10 TABLET ORAL EVERY 6 HOURS PRN
Status: DISCONTINUED | OUTPATIENT
Start: 2024-03-19 | End: 2024-03-19 | Stop reason: HOSPADM

## 2024-03-19 RX ORDER — ALBUTEROL SULFATE 1.25 MG/3ML
1 SOLUTION RESPIRATORY (INHALATION) EVERY 6 HOURS PRN
COMMUNITY
Start: 2023-09-24 | End: 2024-03-19

## 2024-03-19 RX ORDER — PREDNISONE 20 MG/1
40 TABLET ORAL DAILY
Status: DISCONTINUED | OUTPATIENT
Start: 2024-03-19 | End: 2024-03-19 | Stop reason: HOSPADM

## 2024-03-19 RX ORDER — ACETAMINOPHEN 325 MG/1
650 TABLET ORAL EVERY 4 HOURS PRN
Status: DISCONTINUED | OUTPATIENT
Start: 2024-03-19 | End: 2024-03-19 | Stop reason: HOSPADM

## 2024-03-19 RX ORDER — METHYLPREDNISOLONE SODIUM SUCCINATE 125 MG/2ML
125 INJECTION, POWDER, LYOPHILIZED, FOR SOLUTION INTRAMUSCULAR; INTRAVENOUS ONCE
Status: COMPLETED | OUTPATIENT
Start: 2024-03-19 | End: 2024-03-19

## 2024-03-19 RX ORDER — METHYLPREDNISOLONE SODIUM SUCCINATE 40 MG/ML
40 INJECTION, POWDER, LYOPHILIZED, FOR SOLUTION INTRAMUSCULAR; INTRAVENOUS EVERY 8 HOURS
Status: DISCONTINUED | OUTPATIENT
Start: 2024-03-19 | End: 2024-03-20 | Stop reason: HOSPADM

## 2024-03-19 RX ORDER — IPRATROPIUM BROMIDE AND ALBUTEROL SULFATE 2.5; .5 MG/3ML; MG/3ML
SOLUTION RESPIRATORY (INHALATION)
Status: COMPLETED
Start: 2024-03-19 | End: 2024-03-19

## 2024-03-19 RX ORDER — IOPAMIDOL 755 MG/ML
90 INJECTION, SOLUTION INTRAVASCULAR ONCE
Status: COMPLETED | OUTPATIENT
Start: 2024-03-19 | End: 2024-03-19

## 2024-03-19 RX ORDER — IPRATROPIUM BROMIDE AND ALBUTEROL SULFATE 2.5; .5 MG/3ML; MG/3ML
3 SOLUTION RESPIRATORY (INHALATION) EVERY 4 HOURS PRN
Qty: 90 ML | Refills: 0 | Status: SHIPPED | OUTPATIENT
Start: 2024-03-19 | End: 2024-10-06

## 2024-03-19 RX ORDER — MAGNESIUM SULFATE HEPTAHYDRATE 40 MG/ML
2 INJECTION, SOLUTION INTRAVENOUS ONCE
Status: COMPLETED | OUTPATIENT
Start: 2024-03-19 | End: 2024-03-19

## 2024-03-19 RX ORDER — IPRATROPIUM BROMIDE AND ALBUTEROL SULFATE 2.5; .5 MG/3ML; MG/3ML
3 SOLUTION RESPIRATORY (INHALATION) EVERY 4 HOURS PRN
Status: DISCONTINUED | OUTPATIENT
Start: 2024-03-19 | End: 2024-03-19 | Stop reason: HOSPADM

## 2024-03-19 RX ORDER — ALBUTEROL SULFATE 5 MG/ML
2.5 SOLUTION RESPIRATORY (INHALATION)
Status: DISCONTINUED | OUTPATIENT
Start: 2024-03-19 | End: 2024-03-19 | Stop reason: HOSPADM

## 2024-03-19 RX ORDER — BISACODYL 10 MG
10 SUPPOSITORY, RECTAL RECTAL DAILY PRN
Status: DISCONTINUED | OUTPATIENT
Start: 2024-03-19 | End: 2024-03-19 | Stop reason: HOSPADM

## 2024-03-19 RX ORDER — ONDANSETRON 2 MG/ML
4 INJECTION INTRAMUSCULAR; INTRAVENOUS EVERY 6 HOURS PRN
Status: DISCONTINUED | OUTPATIENT
Start: 2024-03-19 | End: 2024-03-19 | Stop reason: HOSPADM

## 2024-03-19 RX ADMIN — METHYLPREDNISOLONE SODIUM SUCCINATE 40 MG: 40 INJECTION, POWDER, FOR SOLUTION INTRAMUSCULAR; INTRAVENOUS at 23:03

## 2024-03-19 RX ADMIN — METHYLPREDNISOLONE SODIUM SUCCINATE 125 MG: 125 INJECTION, POWDER, FOR SOLUTION INTRAMUSCULAR; INTRAVENOUS at 02:01

## 2024-03-19 RX ADMIN — LORAZEPAM 0.25 MG: 2 INJECTION INTRAMUSCULAR; INTRAVENOUS at 20:04

## 2024-03-19 RX ADMIN — IPRATROPIUM BROMIDE AND ALBUTEROL SULFATE 3 ML: .5; 3 SOLUTION RESPIRATORY (INHALATION) at 01:50

## 2024-03-19 RX ADMIN — PREDNISONE 40 MG: 20 TABLET ORAL at 07:19

## 2024-03-19 RX ADMIN — ALBUTEROL SULFATE 2.5 MG: 2.5 SOLUTION RESPIRATORY (INHALATION) at 04:24

## 2024-03-19 RX ADMIN — IOPAMIDOL 90 ML: 755 INJECTION, SOLUTION INTRAVENOUS at 20:16

## 2024-03-19 RX ADMIN — ALBUTEROL SULFATE 2.5 MG: 2.5 SOLUTION RESPIRATORY (INHALATION) at 03:20

## 2024-03-19 RX ADMIN — IPRATROPIUM BROMIDE AND ALBUTEROL SULFATE 3 ML: .5; 3 SOLUTION RESPIRATORY (INHALATION) at 18:53

## 2024-03-19 RX ADMIN — IPRATROPIUM BROMIDE AND ALBUTEROL SULFATE 6 ML: .5; 3 SOLUTION RESPIRATORY (INHALATION) at 01:50

## 2024-03-19 RX ADMIN — IPRATROPIUM BROMIDE AND ALBUTEROL SULFATE 3 ML: .5; 3 SOLUTION RESPIRATORY (INHALATION) at 02:34

## 2024-03-19 RX ADMIN — IPRATROPIUM BROMIDE AND ALBUTEROL SULFATE 3 ML: .5; 3 SOLUTION RESPIRATORY (INHALATION) at 23:03

## 2024-03-19 RX ADMIN — ALBUTEROL SULFATE 2.5 MG: 2.5 SOLUTION RESPIRATORY (INHALATION) at 07:02

## 2024-03-19 RX ADMIN — ALBUTEROL SULFATE 2.5 MG: 2.5 SOLUTION RESPIRATORY (INHALATION) at 05:22

## 2024-03-19 RX ADMIN — MAGNESIUM SULFATE HEPTAHYDRATE 2 G: 40 INJECTION, SOLUTION INTRAVENOUS at 02:04

## 2024-03-19 RX ADMIN — IPRATROPIUM BROMIDE AND ALBUTEROL SULFATE 3 ML: .5; 3 SOLUTION RESPIRATORY (INHALATION) at 06:23

## 2024-03-19 RX ADMIN — ALBUTEROL SULFATE 2.5 MG: 2.5 SOLUTION RESPIRATORY (INHALATION) at 09:31

## 2024-03-19 ASSESSMENT — ACTIVITIES OF DAILY LIVING (ADL)
ADLS_ACUITY_SCORE: 18
WEAR_GLASSES_OR_BLIND: NO
DIFFICULTY_COMMUNICATING: NO
ADLS_ACUITY_SCORE: 18
ADLS_ACUITY_SCORE: 18
DIFFICULTY_EATING/SWALLOWING: NO
CONCENTRATING,_REMEMBERING_OR_MAKING_DECISIONS_DIFFICULTY: NO
CHANGE_IN_FUNCTIONAL_STATUS_SINCE_ONSET_OF_CURRENT_ILLNESS/INJURY: NO
ADLS_ACUITY_SCORE: 35
TOILETING_ISSUES: NO
ADLS_ACUITY_SCORE: 35
ADLS_ACUITY_SCORE: 35
FALL_HISTORY_WITHIN_LAST_SIX_MONTHS: NO
WALKING_OR_CLIMBING_STAIRS_DIFFICULTY: NO
ADLS_ACUITY_SCORE: 35
ADLS_ACUITY_SCORE: 35
HEARING_DIFFICULTY_OR_DEAF: NO
ADLS_ACUITY_SCORE: 35
ADLS_ACUITY_SCORE: 35
ADLS_ACUITY_SCORE: 18
DRESSING/BATHING_DIFFICULTY: NO
DOING_ERRANDS_INDEPENDENTLY_DIFFICULTY: NO
ADLS_ACUITY_SCORE: 18
ADLS_ACUITY_SCORE: 35
ADLS_ACUITY_SCORE: 18

## 2024-03-19 ASSESSMENT — COLUMBIA-SUICIDE SEVERITY RATING SCALE - C-SSRS
1. IN THE PAST MONTH, HAVE YOU WISHED YOU WERE DEAD OR WISHED YOU COULD GO TO SLEEP AND NOT WAKE UP?: NO
6. HAVE YOU EVER DONE ANYTHING, STARTED TO DO ANYTHING, OR PREPARED TO DO ANYTHING TO END YOUR LIFE?: NO
2. HAVE YOU ACTUALLY HAD ANY THOUGHTS OF KILLING YOURSELF IN THE PAST MONTH?: NO

## 2024-03-19 NOTE — SIGNIFICANT EVENT
0345 - Patient post four Duoneb's and PRN albuterol neb. LS remain tight and with inspiratory/expiratory wheezing. Patient is tachypneic and reports she still feels extremely short of breath. Provider (Dr. Max) notified of patients respiratory status and that no PRN's available for next thirty minutes. No new orders at this time.     0424 - Patient desatting to high 80's on room air. Remains tachypneic and LS with inspiratory/expiratory wheezing     0428 - Provider again notified of patient respiratory status and has come to bedside to see patient. No new orders at this time.     0458 - Post albuterol neb. No changes in respiratory status. Provider notified.     0637 - Duoneb given at this time with no changes in respiratory status. Provider again notified. No new orders.     0702 - Albuterol neb administered at this time d/t no changes in respiratory status.

## 2024-03-19 NOTE — INTERIM SUMMARY
SUBJECTIVE:  Patient seen this morning. Patient reports breathing has improved. She doesn't want to spend another night at the hospital and states would like to leave due to toddlers at home. Explained the lung exam still concerning and would require more treatment. Patient willing to stay for a few more hours.     OBJECTIVE:  Temp:  [99.9  F (37.7  C)] 99.9  F (37.7  C)  Pulse:  [117-163] 121  Resp:  [18-44] 24  BP: (110-127)/(58-87) 110/60  SpO2:  [87 %-99 %] 95 %     Constitutional:   awake, alert, cooperative, no apparent distress, and appears stated age   Eyes:   pupils equal, round and reactive to light and sclera clear   ENT:   atraumatic, oral pharynx with moist mucus membranes   Lungs:   no increased work of breathing, moderate air exchange, and wheeze diffuse   Cardiovascular:   tachycardic with regular rhythm and normal S1 and S2   Abdomen:   soft, non-distended, and non-tender   Musculoskeletal:   no lower extremity pitting edema present       ASSESSMENT/PLAN:  33 year old female with PMH of asthma admitted for asthma exacerbation.      #asthma exacerbation  -duonebs q4h/prn  -predisone 40mg PO every day  -given Mg in ED  -restart dulera 200/5mcg INH BID as nearing prednisone course completion  -PCP establishment for review     #tachycardia  Suspect related to duoneb administration

## 2024-03-19 NOTE — ED NOTES
Pt discharged to home in  Care cab. Respirations even and nonlabored, some audible wheezing still noted. Pt verbalized understanding to  rx from pharmacy and to follow up with PCP. Pt understood risks of leaving at this time and to return if needed. Pt cooperative and just reiterated that she needed to get home to her kids.

## 2024-03-19 NOTE — MEDICATION SCRIBE - ADMISSION MEDICATION HISTORY
Medication Scribe Admission Medication History    Admission medication history is complete. The information provided in this note is only as accurate as the sources available at the time of the update.    Information Source(s): Patient via in-person    Pertinent Information:     Changes made to PTA medication list:  Added: None  Deleted: Duoneb, Prednisone(per patient)  Changed: None    Allergies reviewed with patient and updates made in EHR: yes    Medication History Completed By: Josesito Mata 3/19/2024 4:05 AM    PTA Med List   Medication Sig Last Dose    acetaminophen (TYLENOL) 325 MG tablet Take 325-650 mg by mouth every 6 hours as needed for mild pain Unknown at prn    albuterol (ACCUNEB) 1.25 MG/3ML neb solution Take 1 vial by nebulization every 6 hours as needed for shortness of breath USE 1 VIAL VIA NEBULIZER EVERY 6 HOURS AS NEEDED FOR SHORTNESS OF BREATH OR WHEEZING Unknown at prn    albuterol (PROAIR HFA/PROVENTIL HFA/VENTOLIN HFA) 108 (90 Base) MCG/ACT inhaler Inhale 2 puffs into the lungs every 6 hours as needed for shortness of breath, wheezing or cough Unknown at prn    albuterol (PROVENTIL) (2.5 MG/3ML) 0.083% neb solution Take 1 vial (2.5 mg) by nebulization every 6 hours as needed for shortness of breath / dyspnea or wheezing Unknown at prn    mometasone-formoterol (DULERA) 200-5 MCG/ACT inhaler Inhale 2 puffs into the lungs 2 times daily Past Week at unknown

## 2024-03-19 NOTE — ED NOTES
Nebs complete. Patient still audibly wheezing and tachypneic. States her breathing feels somewhat better than it did upon arrival to ED.

## 2024-03-19 NOTE — LETTER
[facility] EXTENDED RECOVERY AND SHORT STAY  [address] [facility] [address]  [phone]      2024    Soren Freitas  [address] [place] [address]  [phone] (home)     : 1990      To Whom it may concern:    Soren Freitas was seen in our Emergency Department today, 2024 for an injury that was reported to be work related.      For the next 1 days she should not work    The employee might be taking medication so that they cannot operate moving machinery or perform activities that require balancing or working above ground.    {Restrictions:[id number]}    { RESTRICT:415956}    Sincerely,    Anamika Wagoner NP

## 2024-03-19 NOTE — ED PROVIDER NOTES
EMERGENCY DEPARTMENT ENCOUNTER      NAME: Soren Freitas  AGE: 33 year old female  YOB: 1990  MRN: [id number]  EVALUATION DATE & TIME: 03/19/24 6:27 PM    PCP: No Ref-Primary, Physician    ED PROVIDER: Jovita Olivia PA-C      CHIEF COMPLAINT:  Shortness of Breath      FINAL IMPRESSION:  1. Severe asthma with exacerbation, unspecified whether persistent          ED COURSE & MEDICAL DECISION MAKING:  Pertinent Labs & Imaging studies reviewed. (See chart for details)    ED Course as of 03/20/24 0139   Tue Mar 19, 2024   1838 I have staffed the patient with Dr. Lars Vitale, ED physician, who has evaluated the patient and agrees with all aspects of today's care.    1844 The patient is a 33-year-old female with a history of asthma requiring hospitalization and intubation previously who presents to the emergency department with wheezing and shortness of breath.  She was seen earlier today in the emergency department for the symptoms and admitted for asthma exacerbation.  She left AMA due to childcare.  She returns with persistent wheezing and shortness of breath after her home nebulizer treatments, last treatment 40 minutes prior to arrival.   1845 Initial vital signs reviewed and significant for tachycardia with heart rate 138 bpm and tachypnea with respiratory rate 27.  Patient SpO2 is 97% on room air.  On exam, she is nontoxic-appearing.  She is audibly wheezing with increased work of breathing with slight accessory muscle usage.  She is speaking in short sentences.  Lungs fields with diffuse wheezing throughout.   1846 Differential diagnosis includes asthma exacerbation, pulmonary embolism, also could be component of viral illness.  Chest x-ray was obtained earlier this morning with no convincing focal infiltrates to suggest pneumonia.  Low clinical suspicion for pneumothorax, pulmonary edema/CHF, anemia. No CP or history to suggest atypical presentation of MI.     1848 I do suspect  the component of her tachycardia is albuterol medication, however with her persistent shortness of breath and possible infiltrates on CXR earlier today, will obtain CT chest PE run to assess for pulmonary embolism as well as potential pneumonia.  DuoNeb treatment also ordered.   2105 CT chest PE run with no PE seen on imaging, they do note caleb-bronchovascular groundglass opacities, scattered airway debris, and bronchial wall thickening in the upper and lower lungs, possible infectious versus inflammatory etiology.     The patient already received magnesium and Solu-Medrol early this morning as well as a dose of prednisone therefore no additional steroids or magnesium given in the emergency department.  Discussed plan for admission for acute asthma exacerbation with the patient, she is agreeable with this plan. The patient verbalized understanding and is comfortable with this plan.    At the conclusion of the encounter I discussed the results of all of the tests and the disposition. The questions were answered. The patient or family acknowledged understanding and was agreeable with the care plan.       ED COURSE:  6:28 PM I met and introduced myself to the patient. I gathered initial history and performed an initial physical exam. We discussed options and plan for diagnostics and treatment here in the ED. Discussed possible admission, she is amenable and will be checking with her sister to make sure that her children can be taken care of.  6:40 PM I have staffed the patient with Dr. Lars Vitale, ED physician, who has evaluated the patient and agrees with all aspects of today's care.   7:54 PM CT called me and reported that patient had questions about why she was getting a scan.  7:54 PM Discussed CT scan with patient. All questions answered.  9:07 PM Paged hospitalist.  10:02 PM I discussed the patient with the hospitalist, Dr. Johnson, who accepts the patient for admission.       Medical Decision  Making  Obtained supplemental history:Supplemental history obtained?: No  Reviewed external records: External records reviewed?: Inpatient Record: [facility] 02/14/2024  Care impacted by chronic illness:Other: Severe Asthma  Care significantly affected by social determinants of health:Other: Difficulty obtaining childcare  Did you consider but not order tests?: Work up considered but not performed and documented in chart, if applicable  Did you interpret images independently?: Independent interpretation of ECG and images noted in documentation, when applicable.  Consultation discussion with other provider:Did you involve another provider (consultant, , pharmacy, etc.)?: I discussed the care with another health care provider, see documentation for details.  Admit.      CRITICAL CARE:  None      MEDICATIONS GIVEN IN THE EMERGENCY:  Medications   ipratropium - albuterol 0.5 mg/2.5 mg/3 mL (DUONEB) neb solution 3 mL (3 mLs Nebulization $Given 3/19/24 2303)   methylPREDNISolone sodium succinate (SOLU-MEDROL) injection 40 mg (40 mg Intravenous $Given 3/19/24 2303)     Followed by   predniSONE (DELTASONE) tablet 40 mg (has no administration in time range)   azithromycin (ZITHROMAX) 500 mg in sodium chloride 0.9 % 250 mL intermittent infusion (500 mg Intravenous $New Bag 3/20/24 0015)   ipratropium - albuterol 0.5 mg/2.5 mg/3 mL (DUONEB) neb solution 3 mL (3 mLs Nebulization $Given 3/19/24 1853)   LORazepam (ATIVAN) injection 0.25 mg (0.25 mg Intravenous $Given 3/19/24 2004)   iopamidol (ISOVUE-370) solution 90 mL (90 mLs Intravenous $Given 3/19/24 2016)       NEW PRESCRIPTIONS STARTED AT TODAY'S ER VISIT  Current Discharge Medication List             =================================================================    HPI    Patient information was obtained from: Patient    Use of Intrepreter: N/A      Narcisael Freitas is a 33 year old female with pertinent medical history of severe persistent asthma who presents to  the emergency department for evaluation of shortness of breath.    Per chart review, the patient was seen in [facility] ED this morning, brought via EMS for evaluation of shortness of breath, diffuse wheezing, chest tightness, tachycardia. Solu-Medrol, magnesium, DuoNebs x3 given. Chest x-ray with atelectasis vs infiltrate in right lung base. The patient was admitted to the hospital, but requested discharge as she had to go home to her children.    The patient reports continued shortness of breath and wheezing, but states that she is improved from when she came to the ED this morning. She reports difficulty standing or talking secondary to her shortness of breath. Her last nebulizer treatment was 40 minutes ago without improvement.    She endorses a history of intubation due to asthma exacerbation and states that her last hospitalization was about 1 year ago. The patient reports difficulty with finding childcare and states that this is why she left the hospital today.    The patient denies recent cough, cold, or congestion. No new pets or moves.    Per chart review, the patient has been seen in the ED 4 times in the last two months, all for asthma exacerbation. She was seen at [facility] on 02/14/2024, and given neb treatment and prednisone. She was seen at [facility] ED the next day (02/15/2024) after lack of improvement, given two DuoNebs and discharged with albuterol.     PAST MEDICAL HISTORY:  Past Medical History:   Diagnosis Date    Obesity     Severe persistent asthma with acute exacerbation (H28) 1/1/2015    Formatting of this note might be different from the original. 12/2014 Admit for exacerbation: Spirometry FEV1/FVC 68%, D/C w/dulera, albuterol, Spiriva.  Started singulair 10mg QD, Given NRT.  Dx as infant, has been on albuterol lifelong.    Last Assessment & Plan:  Formatting of this note might be different from the original. Stable on Singulair, cetirizine, albuterol, and dulera. Discussed COVID     "Uncomplicated asthma        PAST SURGICAL HISTORY:  No past surgical history on file.    CURRENT MEDICATIONS:    Prior to Admission Medications   Prescriptions Last Dose Informant Patient Reported? Taking?   acetaminophen (TYLENOL) 325 MG tablet Unknown at prn Self Yes Yes   Sig: Take 325-650 mg by mouth every 6 hours as needed for mild pain   albuterol (PROAIR HFA/PROVENTIL HFA/VENTOLIN HFA) 108 (90 Base) MCG/ACT inhaler Unknown at prn Self No Yes   Sig: Inhale 2 puffs into the lungs every 6 hours as needed for shortness of breath, wheezing or cough   albuterol (PROVENTIL) (2.5 MG/3ML) 0.083% neb solution 3/19/2024 at am- given here Self No Yes   Sig: Take 1 vial (2.5 mg) by nebulization every 6 hours as needed for shortness of breath / dyspnea or wheezing   ipratropium - albuterol 0.5 mg/2.5 mg/3 mL (DUONEB) 0.5-2.5 (3) MG/3ML neb solution  at [facility] yet started  No Yes   Sig: Take 1 vial (3 mLs) by nebulization every 4 hours as needed for wheezing, shortness of breath or cough   mometasone-formoterol (DULERA) 200-5 MCG/ACT inhaler Unknown at pt hanst used Self No Yes   Sig: Inhale 2 puffs into the lungs 2 times daily   predniSONE (DELTASONE) 20 MG tablet  at [facility] piicked up med  No Yes   Sig: Take 2 tablets (40 mg) by mouth daily for 5 days      Facility-Administered Medications: None       ALLERGIES:  Allergies   Allergen Reactions    Cashew Nut Oil Anaphylaxis and Angioedema    Fish Allergy Itching     Sea Food causes Throat swelling    Peanut-Containing Drug Products Angioedema and Swelling     Throat swelling.      Shellfish-Derived Products Angioedema       FAMILY HISTORY:  No family history on file.    SOCIAL HISTORY:  Social History     Tobacco Use    Smoking status: Former        VITALS:    First Vitals:  Patient Vitals for the past 24 hrs:   BP Temp Temp src Pulse Resp SpO2 Height Weight   03/19/24 2344 123/61 98.3  F (36.8  C) Oral (!) 124 30 96 % 1.676 m (5' 6\") 98.2 kg (216 lb 7.9 oz)   03/19/24 2300 " "133/66 -- -- 116 22 99 % -- --   03/19/24 2219 -- -- -- (!) 126 23 97 % -- --   03/19/24 2215 -- -- -- (!) 126 24 96 % -- --   03/19/24 2119 -- -- -- (!) 121 (!) 33 96 % -- --   03/19/24 1930 -- -- -- (!) 125 23 96 % -- --   03/19/24 1825 (!) 148/90 98.6  F (37  C) Tympanic (!) 138 24 97 % -- 95.3 kg (210 lb)       Patient Vitals for the past 24 hrs:   BP Temp Temp src Pulse Resp SpO2 Height Weight   03/19/24 2344 123/61 98.3  F (36.8  C) Oral (!) 124 30 96 % 1.676 m (5' 6\") 98.2 kg (216 lb 7.9 oz)   03/19/24 2300 133/66 -- -- 116 22 99 % -- --   03/19/24 2219 -- -- -- (!) 126 23 97 % -- --   03/19/24 2215 -- -- -- (!) 126 24 96 % -- --   03/19/24 2119 -- -- -- (!) 121 (!) 33 96 % -- --   03/19/24 1930 -- -- -- (!) 125 23 96 % -- --   03/19/24 1825 (!) 148/90 98.6  F (37  C) Tympanic (!) 138 24 97 % -- 95.3 kg (210 lb)         PHYSICAL EXAM  VITAL SIGNS: /61 (BP Location: Left arm, Patient Position: Supine, Cuff Size: Adult Regular)   Pulse (!) 124   Temp 98.3  F (36.8  C) (Oral)   Resp 30   Ht 1.676 m (5' 6\")   Wt 98.2 kg (216 lb 7.9 oz)   SpO2 96%   BMI 34.94 kg/m     GENERAL: Awake, alert, answering questions appropriately, in no acute distress.  HEENT: Conjunctiva clear, no scleral icterus.  Moist mucous membranes. Posterior oropharynx clear with no erythema, no exudate. No tonsillar hypertrophy. Uvula midline. Tolerating secretions, no drooling.    SPEECH:  Easy to understand speech, Normal volume and steven. Normal phonation.  PULMONARY: Speaking in short sentences with audible expiratory wheezing.  Diffuse wheezing throughout all lung fields bilaterally.  Slightly tachypneic with increased work of breathing.  Speaking in short sentences.  CARDIOVASCULAR: Tachycardic rate with regular rhythm, radial pulses present, symmetric, and normal.  ABDOMINAL: Soft, Nondistended, Nontender, No rebound or guarding.  EXTREMITIES: Extremities are warm and well perfused. No lower extremity edema.  NEUROLOGIC: " Moving all extremities spontaneously.   SKIN: Exposed areas of skin warm, dry, no rashes.  PSYCH: Normal mood and affect.           RADIOLOGY/LAB:  Reviewed all pertinent imaging. Please see official radiology report.  All pertinent labs reviewed and interpreted.  Results for orders placed or performed during the hospital encounter of 03/19/24   CT Chest Pulmonary Embolism w Contrast    Impression    IMPRESSION:  1.  Somewhat limited exam due to contrast timing, however no proximal pulmonary embolus or findings of right heart strain.  2.  Shira-bronchovascular groundglass opacities, scattered airway debris, and bronchial wall thickening in the upper and lower lungs, similar to 05/25/2022. As before, differential considerations include viral infection, infectious/inflammatory airway   disease, or drug toxicity.  3.  Unchanged heterogeneous sclerosis of the spine.   HCG QUALitative pregnancy (blood)   Result Value Ref Range    hCG Serum Qualitative Negative Negative   Extra Blue Top Tube   Result Value Ref Range    Hold Specimen JIC    Extra Green Top (Lithium Heparin) Tube   Result Value Ref Range    Hold Specimen JIC    Extra Purple Top Tube   Result Value Ref Range    Hold Specimen JIC    Creatinine   Result Value Ref Range    Creatinine 0.88 0.51 - 0.95 mg/dL    GFR Estimate 88 >60 mL/min/1.73m2   Blood gas venous   Result Value Ref Range    pH Venous 7.34 7.32 - 7.43    pCO2 Venous 42 40 - 50 mm Hg    pO2 Venous 29 25 - 47 mm Hg    Bicarbonate Venous 23 21 - 28 mmol/L    Base Excess/Deficit Venous -3.1 (L) -3.0 - 3.0 mmol/L    FIO2 0     Oxyhemoglobin Venous 53 (L) 70 - 75 %    O2 Sat, Venous 54.1 (L) 70.0 - 75.0 %         EKG:  None      PROCEDURES:  None        I, José Manuel Mata, am serving as a scribe to document services personally performed by Jovita Olivia PA-C, based on my observation and the provider's statements to me. I, Jovita Olivia PA-C attest that José Manuel Mata is acting in a scribe capacity, has  observed my performance of the services and has documented them in accordance with my direction.         Jovita Olivia PA-C  Emergency Medicine  [facility] EMERGENCY DEPARTMENT  [address] [place] [id number] [phone]  Dept: [phone]     Jovita Olivia PA-C  03/20/24 0142

## 2024-03-19 NOTE — LETTER
[facility] EXTENDED RECOVERY AND SHORT STAY  [address] [place] [id number]  Phone: [phone]  Fax: [phone]    March 20, 2024        Soren Freitas  [address] [place] [address]          To whom it may concern:    RE: Soren Freitas    Patient was seen and treated today at our hospital. Patient was in the hospital from 3/19/2024- 3/20/2024  Patient may return to work 1-2 with the following when symptoms approve:  No restrictions        Sincerely,  Anamika Wagoner NP
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
## 2024-03-19 NOTE — ED NOTES
"[facility] ED Handoff Report    ED Chief Complaint: SOB, Wheezing    ED Diagnosis:  (J45.901) Exacerbation of asthma, unspecified asthma severity, unspecified whether persistent  Comment: Pt arrived to ER via EMS for evaluation of acute asthma exacerbation. Pt has had multiple recent hospitalizations for same issue, states her asthma gets worse with the changing weather like this. Per report, pt was labored breathing and speaking in short sentences upon arrival. During day shift, pt has been resting comfortably in bed, speaking in normal sentences, on RA, expiratory and inspiratory wheezing still noted, but pt denies SOB. Tachycardia likely due to frequent nebs. Pt states she feels better.   Plan: nebs    (R06.02) SOB (shortness of breath)    (R00.0) Sinus tachycardia       PMH:    Past Medical History:   Diagnosis Date    Obesity     Severe persistent asthma with acute exacerbation (H28) 1/1/2015    Formatting of this note might be different from the original. 12/2014 Admit for exacerbation: Spirometry FEV1/FVC 68%, D/C w/dulera, albuterol, Spiriva.  Started singulair 10mg QD, Given NRT.  Dx as infant, has been on albuterol lifelong.    Last Assessment & Plan:  Formatting of this note might be different from the original. Stable on Singulair, cetirizine, albuterol, and dulera. Discussed COVID    Uncomplicated asthma         Code Status:  Full Code     Fall risk: no     Current Living Situation/Residence: lives with their son or daughter and lives in a house     Elimination Status: Continent: Yes     Activity Level: Independent    Patients Preferred Language:  English     Needed: No    Vital Signs:  /60   Pulse (!) 121   Temp 99.9  F (37.7  C) (Axillary)   Resp 24   Ht 1.676 m (5' 6\")   Wt 82.6 kg (182 lb)   SpO2 95%   BMI 29.38 kg/m       Cardiac Rhythm: ST    Pain Score: 0/10    Is the Patient Confused:  No    Last Food or Drink: 03/19/24 at 0800    Focused Assessment:  wheezing " still noted in all lung fields, pt denies SOB, slightly tachypneic, tachycardia likely related to frequent neb administration. GCS 15. Independent, continent.     Tests Performed: Done: Labs and Imaging    Treatments Provided:  see MAR    Family Dynamics/Concerns: No    Covid: symptomatic, negative    Additional Information: Pt has stated multiple times that she would like to be discharged to get home to her kids, have not seen hospitalist round on pt, RT has seen pt    RN: Tami Mcguire RN 3/19/2024 11:28 AM

## 2024-03-19 NOTE — ED NOTES
Bed: JNED-09  Expected date: 3/19/24  Expected time:   Means of arrival:   Comments:  [place]  33 female  Asthma Exacerbation     Lab: 6

## 2024-03-19 NOTE — DISCHARGE SUMMARY
"[facility]  Hospitalist Discharge Summary      Date of Admission:  3/19/2024  Date of Discharge:  3/19/2024 Left AMA  Discharging Provider: Jaylene Mancilla MD  Discharge Service: Hospitalist Service    Discharge Diagnoses   Acute asthma exacerbation    Clinically Significant Risk Factors     # Overweight: Estimated body mass index is 29.38 kg/m  as calculated from the following:    Height as of this encounter: 1.676 m (5' 6\").    Weight as of this encounter: 82.6 kg (182 lb).       Follow-ups Needed After Discharge   Follow-up Appointments     Follow-up and recommended labs and tests       Follow up with primary care provider, Physician No Ref-Primary, within 7   days for hospital follow- up.  No follow up labs or test are needed.             Unresulted Labs Ordered in the Past 30 Days of this Admission       No orders found from 2/18/2024 to 3/20/2024.             Discharge Disposition   Left AMA  Condition at discharge: Good    Hospital Course   33 year old female with PMH of asthma admitted for asthma exacerbation. Patient was placed on bronchodilator and received IV Mg and Methylprednisolone. RSV/Flu/Covid negative. CXR with no infectious process noted. Patient unable to stay and complete treatment due to childcare needs. Patient received explanation for requiring more treatment given mild improvement in lung exam but declined. Patient is competent and understands risk and benefits for leaving AMA. Patient signed paperwork and bronchodilator, steroids sent to pharmacy. Patient states has PCP appointment on Friday.     Consultations This Hospital Stay   CARE MANAGEMENT / SOCIAL WORK IP CONSULT    Code Status   Full Code    Time Spent on this Encounter   I, Jaylene Mancilla MD, personally saw the patient today and spent greater than 30 minutes discharging this patient.       Jaylene Mancilla MD  [facility] EMERGENCY DEPARTMENT  [address] [facility] " [id number]  Phone: [phone]  ______________________________________________________________________    Physical Exam   Vital Signs: Temp: 99.9  F (37.7  C) Temp src: Axillary BP: 115/63 Pulse: (!) 122   Resp: 22 SpO2: 95 % O2 Device: None (Room air)    Weight: 182 lbs 0 oz  Constitutional:    awake, alert, cooperative, no apparent distress, and appears stated age   Eyes:    pupils equal, round and reactive to light and sclera clear   ENT:    atraumatic, oral pharynx with moist mucus membranes   Lungs:    no increased work of breathing, moderate air exchange, and wheeze diffuse   Cardiovascular:    tachycardic with regular rhythm and normal S1 and S2   Abdomen:    soft, non-distended, and non-tender   Musculoskeletal:    no lower extremity pitting edema present          Primary Care Physician   Physician No Ref-Primary    Discharge Orders      Reason for your hospital stay    Acute Asthma Exacerbation     Follow-up and recommended labs and tests     Follow up with primary care provider, Physician No Ref-Primary, within 7 days for hospital follow- up.  No follow up labs or test are needed.     Activity    Your activity upon discharge: activity as tolerated     Diet    Follow this diet upon discharge: Orders Placed This Encounter      Regular Diet Adult       Significant Results and Procedures   Results for orders placed or performed during the hospital encounter of 03/19/24   XR Chest Port 1 View    Narrative    EXAM: XR CHEST PORT 1 VIEW  LOCATION: [facility]  DATE: 3/19/2024    INDICATION: Shortness of breath  COMPARISON: 09/24/2023.      Impression    IMPRESSION: Heart size is normal. Mild streaky opacities in the right lung base could reflect atelectasis or infiltrate. Lungs otherwise clear. No visible pneumothorax.       Discharge Medications   Current Discharge Medication List        START taking these medications    Details   ipratropium - albuterol 0.5 mg/2.5 mg/3 mL (DUONEB)  0.5-2.5 (3) MG/3ML neb solution Take 1 vial (3 mLs) by nebulization every 4 hours as needed for wheezing, shortness of breath or cough  Qty: 90 mL, Refills: 0    Associated Diagnoses: Severe persistent asthma with exacerbation (H28)      predniSONE (DELTASONE) 20 MG tablet Take 2 tablets (40 mg) by mouth daily for 5 days  Qty: 10 tablet, Refills: 0    Associated Diagnoses: Severe persistent asthma with exacerbation (H28)           CONTINUE these medications which have NOT CHANGED    Details   acetaminophen (TYLENOL) 325 MG tablet Take 325-650 mg by mouth every 6 hours as needed for mild pain      albuterol (PROAIR HFA/PROVENTIL HFA/VENTOLIN HFA) 108 (90 Base) MCG/ACT inhaler Inhale 2 puffs into the lungs every 6 hours as needed for shortness of breath, wheezing or cough  Qty: 18 g, Refills: 0    Comments: Pharmacy may dispense brand covered by insurance (Proair, or proventil or ventolin or generic albuterol inhaler)      albuterol (PROVENTIL) (2.5 MG/3ML) 0.083% neb solution Take 1 vial (2.5 mg) by nebulization every 6 hours as needed for shortness of breath / dyspnea or wheezing  Qty: 12 mL, Refills: 0    Associated Diagnoses: Severe asthma with acute exacerbation, unspecified whether persistent      mometasone-formoterol (DULERA) 200-5 MCG/ACT inhaler Inhale 2 puffs into the lungs 2 times daily  Qty: 13 g, Refills: 0    Comments: Ok to sub advair at equivalent dose if covered by insurance  Associated Diagnoses: Moderate persistent asthma, unspecified whether complicated           STOP taking these medications       albuterol (ACCUNEB) 1.25 MG/3ML neb solution Comments:   Reason for Stopping:             Allergies   Allergies   Allergen Reactions    Cashew Nut Oil Anaphylaxis and Angioedema    Fish Allergy Itching     Sea Food causes Throat swelling    Peanut-Containing Drug Products Angioedema and Swelling     Throat swelling.      Shellfish-Derived Products Angioedema
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
## 2024-03-19 NOTE — ED NOTES
Provider at bedside, pt signed out AMA. Risks of leaving discussed. Provider sending rx to pt's pharmacy. Pt calling [facility] to schedule a ride home.

## 2024-03-19 NOTE — H&P
"[facility]    History and Physical - Hospitalist Service       Date of Admission:  3/19/2024    Assessment & Plan      Soren Freitas is a 33F presents with dyspnea; pmhx includes poorly controlled asthma, medical adherence difficulty; admitted to observation for asthma exacerbation.    #asthma exacerbation  -duonebs q4h/prn  -predisone 40mg PO every day  -given Mg in ED  -restart dulera 200/5mcg INH BID as nearing prednisone course completion  -PCP establishment for review    #tachycardia  Suspect related to duoneb administration    #medical adherence difficulty  -CM consult for aid in PCP establishment       Observation Goals: -diagnostic tests and consults completed and resulted, -vital signs normal or at patient baseline, Nurse to notify provider when observation goals have been met and patient is ready for discharge.  Diet: Renal Diet (dialysis)  DVT Prophylaxis: Ambulate every shift  Hare Catheter: Not present  Lines: None     Cardiac Monitoring: ACTIVE order. Indication: Tachyarrhythmias, acute (48 hours)  Code Status: Full Code    Clinically Significant Risk Factors Present on Admission                    # Acute Respiratory Failure: Documented O2 saturation < 91%.  Continue supplemental oxygen as needed     # Overweight: Estimated body mass index is 29.38 kg/m  as calculated from the following:    Height as of this encounter: 1.676 m (5' 6\").    Weight as of this encounter: 82.6 kg (182 lb).       # Asthma: noted on problem list        Disposition Plan      Expected Discharge Date: 03/20/2024                  Linus Max MD  Hospitalist Service  [facility]  Securely message with [employer] (more info)  Text page via [employer] Paging/Directory     ______________________________________________________________________    Chief Complaint   asthma    History is obtained from the patient    History of Present Illness   Soren Freitas is a 33F presents " with dyspnea; pmhx includes poorly controlled asthma, medical adherence difficulty; admitted to observation for asthma exacerbation.      Past Medical History    Past Medical History:   Diagnosis Date    Obesity     Severe persistent asthma with acute exacerbation (H28) 1/1/2015    Formatting of this note might be different from the original. 12/2014 Admit for exacerbation: Spirometry FEV1/FVC 68%, D/C w/dulera, albuterol, Spiriva.  Started singulair 10mg QD, Given NRT.  Dx as infant, has been on albuterol lifelong.    Last Assessment & Plan:  Formatting of this note might be different from the original. Stable on Singulair, cetirizine, albuterol, and dulera. Discussed COVID    Uncomplicated asthma        Past Surgical History   History reviewed. No pertinent surgical history.    Prior to Admission Medications   Prior to Admission Medications   Prescriptions Last Dose Informant Patient Reported? Taking?   acetaminophen (TYLENOL) 325 MG tablet Unknown at prn Self Yes Yes   Sig: Take 325-650 mg by mouth every 6 hours as needed for mild pain   albuterol (ACCUNEB) 1.25 MG/3ML neb solution Unknown at prn Self Yes Yes   Sig: Take 1 vial by nebulization every 6 hours as needed for shortness of breath USE 1 VIAL VIA NEBULIZER EVERY 6 HOURS AS NEEDED FOR SHORTNESS OF BREATH OR WHEEZING   albuterol (PROAIR HFA/PROVENTIL HFA/VENTOLIN HFA) 108 (90 Base) MCG/ACT inhaler Unknown at prn Self No Yes   Sig: Inhale 2 puffs into the lungs every 6 hours as needed for shortness of breath, wheezing or cough   albuterol (PROVENTIL) (2.5 MG/3ML) 0.083% neb solution Unknown at prn Self No Yes   Sig: Take 1 vial (2.5 mg) by nebulization every 6 hours as needed for shortness of breath / dyspnea or wheezing   mometasone-formoterol (DULERA) 200-5 MCG/ACT inhaler Past Week at unknown Self No Yes   Sig: Inhale 2 puffs into the lungs 2 times daily      Facility-Administered Medications: None        Review of Systems    The 10 point Review of  Systems is negative other than noted in the HPI or here.      Physical Exam   Vital Signs: Temp: 99.9  F (37.7  C) Temp src: Axillary BP: 114/58 Pulse: (!) 126   Resp: (!) 36 SpO2: 94 % O2 Device: None (Room air)    Weight: 182 lbs 0 oz    Constitutional: awake, alert, cooperative, no apparent distress, and appears stated age  Respiratory: bilateral wheeze; tachypneic awake, rate 16-18 sleeping  Cardiovascular: tachycardic, no m/g/r  GI: soft, nontender, nondistended  Musculoskeletal: shoulder/elbow flexion/extension 5/5 bilaterally and symmetric  Neurologic: AOx4, no focal deficits    Medical Decision Making       45 MINUTES SPENT BY ME on the date of service doing chart review, history, exam, documentation & further activities per the note.  MANAGEMENT DISCUSSED with the following over the past 24 hours: patient   NOTE(S)/MEDICAL RECORDS REVIEWED over the past 24 hours: ED notes, prior ED visits       Data     I have personally reviewed the following data over the past 24 hrs:    10.2  \   14.2   / 267     141 105 9.0 /  120 (H)   3.6 23 0.96 (H) \     Trop: N/A BNP: <36       Imaging results reviewed over the past 24 hrs:   Recent Results (from the past 24 hour(s))   XR Chest Port 1 View    Narrative    EXAM: XR CHEST PORT 1 VIEW  LOCATION: [facility]  DATE: 3/19/2024    INDICATION: Shortness of breath  COMPARISON: 09/24/2023.      Impression    IMPRESSION: Heart size is normal. Mild streaky opacities in the right lung base could reflect atelectasis or infiltrate. Lungs otherwise clear. No visible pneumothorax.

## 2024-03-19 NOTE — ED NOTES
Patient's primary RN unavailable at this time. Writer rounded on patient and assessed respiratory status, notable for inspiratory wheezing bilaterally. Patient is talking on the phone, speaking in full sentences, and is stable at this time but requesting a neb. Writer contacted RT who accepted responsibility for administration of neb.  Patient does report desire to be discharged this morning and is requesting re-evaluation by hospitalist. Writer will update primary RN Tami.

## 2024-03-19 NOTE — ED NOTES
Pt reports no shortness of breath at this time. Pt feels fast heart rate is side effect of nebulizer meds.

## 2024-03-19 NOTE — ED PROVIDER NOTES
EMERGENCY DEPARTMENT ENCOUNTER      NAME: Soren Freitas  AGE: 33 year old female  YOB: 1990  MRN: [id number]  EVALUATION DATE & TIME: 3/19/2024  1:45 AM    PCP: No Ref-Primary, Physician    ED PROVIDER: Fay Jama MD    Chief Complaint   Patient presents with    Shortness of Breath         FINAL IMPRESSION:  1. Exacerbation of asthma, unspecified asthma severity, unspecified whether persistent    2. SOB (shortness of breath)    3. Sinus tachycardia          ED COURSE & MEDICAL DECISION MAKING:    Pertinent Labs & Imaging studies reviewed. (See chart for details)  33 year old female with history of asthma with numerous previous hospitalizations, 1 previous intubation who presents to the Emergency Department for evaluation of shortness of breath, diffuse wheezing, feeling tight approximately 1 hour prior to arrival.  No specific trigger though she states that this time of year with the weather change is often bothersome for her.  Compliant with her Dulera as well as home albuterol MDI versus neb.  Tried them tonight without improvement send on arrival patient is still in respiratory distress after neb x 1 per EMS.  She is tachycardic, tachypneic speaking in 2-3 word sentences with diffuse wheezing.  Symptoms are fairly classic for asthma exacerbation.  Differential is viral syndrome, influenza, pneumonia.  Cannot rule out pulmonary embolism based on PERC criteria because of her tachycardia, however my concern for PE is low given the significant wheezing associated with her respiratory distress and I do not think patient warrants workup for this at this time.  She does not have any known history of heart failure and does not appear volume overload or edematous at this time.    Patient placed on monitor, IV established and blood obtained.  She was given Solu-Medrol, magnesium, DuoNebs x 3.  Twelve-lead EKG shows sinus tachycardia.  CBC, BMP, BNP unremarkable.  Refused COVID/influenza/RSV.   Chest x-ray read as atelectasis versus infiltrate in the right lung base but on my review this looks fairly symmetric and I think is related to overlying tissue and shadows.  No antibiotics administered.  Patient is feeling improved, but still symptomatic and warrants admission for further management of asthma exacerbation.  Admitted to hospitalist medicine service.  After DuoNebs x 3 continued to feel symptomatic.  Given additional albuterol neb.,  And will schedule this every hour as needed.      ED Course as of 03/19/24 0238   Tue Mar 19, 2024   0223 Work of breathing and heart rate have improved after nebs x 2.  Though remains in respiratory distress, with diffuse wheezing and tachycardia       Medical Decision Making    History:  Supplemental history from: EMS  External Record(s) reviewed: Outside ED visit 2/14/2024    Work Up:  Chart documentation includes differential considered and any EKGs or imaging independently interpreted by provider, see MDM  In additional to work up documented, I considered the following work up: see MDM    External consultation:  Discussion of management with another provider: Hospitalist    Complicating factors:  Care impacted by chronic illness: Chronic Lung Disease  Care affected by social determinants of health: Access to Medical Care night shift no access to PCP    Disposition considerations: Admit.        At the conclusion of the encounter I discussed the results of all of the tests and the disposition. The questions were answered. The patient or family acknowledged understanding and was agreeable with the care plan.    CRITICAL CARE:  Critical Care  Performed by: Fay Jama MD  Authorized by: Fay Jama MD     Total critical care time: 49 minutes  Criticalcare time was exclusive of separately billable procedures and treating other patients.  Critical care was necessary to treat or prevent imminent or life-threatening deterioration of the following conditions: resp  distress  Critical care was time spent personally by me on the following activities: development of treatment plan with patient or surrogate, discussions with consultants, examination of patient, evaluation of patient's response totreatment, obtaining history from patient or surrogate, ordering and performing treatments and interventions, ordering and review of laboratory studies, ordering and review of radiographic studies and re-evaluation ofpatient's condition, this excludes any separately billable procedures.      MEDICATIONS GIVEN IN THE EMERGENCY:  Medications   albuterol (PROVENTIL) neb solution 2.5 mg (has no administration in time range)   ipratropium - albuterol 0.5 mg/2.5 mg/3 mL (DUONEB) neb solution 3 mL (3 mLs Nebulization $Given 3/19/24 0150)   ipratropium - albuterol 0.5 mg/2.5 mg/3 mL (DUONEB) 0.5-2.5 (3) MG/3ML neb solution (6 mLs  $Given 3/19/24 0150)   methylPREDNISolone sodium succinate (solu-MEDROL) injection 125 mg (125 mg Intravenous $Given 3/19/24 0201)   magnesium sulfate 2 g in 50 mL sterile water intermittent infusion (2 g Intravenous $New Bag 3/19/24 0204)   ipratropium - albuterol 0.5 mg/2.5 mg/3 mL (DUONEB) neb solution 3 mL (3 mLs Nebulization $Given 3/19/24 0234)       NEW PRESCRIPTIONS STARTED AT TODAY'S ER VISIT  New Prescriptions    No medications on file          =================================================================    HPI    Patient information was obtained from: The patient    Use of Intrepreter: N/A        Soren GRAY Fortino is a 33 year old female with pertinent medical history of severe persistent asthma with acute exacerbation who presents with shortness of breath.    The patient started feeling short of breath around 1.5 hours prior to ER arrival. She has a history of asthma and used her albuterol nebulizer and inhaler prior to ER arrival without any significant relief. She reports that she was hospitalized in the past due to her asthma and was placed on  ventilation once. She denies any other personal medical history. No recent travels or sick contacts.      PAST MEDICAL HISTORY:  Past Medical History:   Diagnosis Date    Obesity     Severe persistent asthma with acute exacerbation (H28) 1/1/2015    Formatting of this note might be different from the original. 12/2014 Admit for exacerbation: Spirometry FEV1/FVC 68%, D/C w/dulera, albuterol, Spiriva.  Started singulair 10mg QD, Given NRT.  Dx as infant, has been on albuterol lifelong.    Last Assessment & Plan:  Formatting of this note might be different from the original. Stable on Singulair, cetirizine, albuterol, and dulera. Discussed COVID    Uncomplicated asthma        PAST SURGICAL HISTORY:  History reviewed. No pertinent surgical history.    CURRENT MEDICATIONS:    Prior to Admission Medications   Prescriptions Last Dose Informant Patient Reported? Taking?   Prenatal Vit-Fe Fumarate-FA (PRENATAL MULTIVITAMIN W/IRON) 27-0.8 MG tablet   Yes No   Sig: Take 1 tablet by mouth daily   acetaminophen (TYLENOL) 325 MG tablet   Yes No   Sig: Take 325-650 mg by mouth every 6 hours as needed for mild pain   albuterol (PROAIR HFA/PROVENTIL HFA/VENTOLIN HFA) 108 (90 Base) MCG/ACT inhaler   No No   Sig: Inhale 2 puffs into the lungs every 6 hours as needed for shortness of breath, wheezing or cough   albuterol (PROVENTIL) (2.5 MG/3ML) 0.083% neb solution   No No   Sig: Take 1 vial (2.5 mg) by nebulization every 6 hours as needed for shortness of breath / dyspnea or wheezing   albuterol (PROVENTIL) (2.5 MG/3ML) 0.083% neb solution   No No   Sig: Take 0.5 vials (1.25 mg) by nebulization every 6 hours as needed for shortness of breath or wheezing   ipratropium - albuterol 0.5 mg/2.5 mg/3 mL (DUONEB) 0.5-2.5 (3) MG/3ML neb solution   No No   Sig: Take 1 vial (3 mLs) by nebulization every 4 hours as needed for shortness of breath, wheezing or cough   mometasone-formoterol (DULERA) 200-5 MCG/ACT inhaler   No No   Sig: Inhale 2  "puffs into the lungs 2 times daily   predniSONE (DELTASONE) 20 MG tablet   No No   Sig: Take two tablets (= 40mg) each day for 5 (five) days      Facility-Administered Medications: None       ALLERGIES:  Allergies   Allergen Reactions    Cashew Nut Oil Anaphylaxis and Angioedema    Fish Allergy Itching     Sea Food causes Throat swelling    Peanut-Containing Drug Products Angioedema and Swelling     Throat swelling.      Shellfish-Derived Products Angioedema       FAMILY HISTORY:  No family history on file.    SOCIAL HISTORY:  Social History     Tobacco Use    Smoking status: Former        VITALS:  Patient Vitals for the past 24 hrs:   BP Temp Temp src Pulse Resp SpO2 Height Weight   03/19/24 0200 127/87 -- -- (!) 145 (!) 40 99 % -- --   03/19/24 0154 -- 99.9  F (37.7  C) Axillary -- -- -- 1.676 m (5' 6\") 82.6 kg (182 lb)   03/19/24 0148 -- -- -- (!) 163 (!) 38 97 % -- --       PHYSICAL EXAM    General Appearance: In moderate respiratory distress, speaks in 2-3 word sentences,   Head:  Normocephalic  Eyes:   conjunctiva/corneas clear  ENT:  membranes are moist without pallor  Neck:  Supple  Cardio:  tachycardic in 160s, regular rhythm on monitor appears to be sinus tach on telemetry, no murmur/gallop/rub  Pulm:  Moderate respiratory distress,  very tight with wheezes throughout, prolonged expiratory phase  Abdomen:  Soft, obese  Extremities: No peripheral edema  Neuro:  Alert and oriented ×3     RADIOLOGY/LABS:  Reviewed all pertinent imaging. Please see official radiology report. All pertinent labs reviewed and interpreted.    Results for orders placed or performed during the hospital encounter of 03/19/24   Basic metabolic panel   Result Value Ref Range    Sodium 141 135 - 145 mmol/L    Potassium 3.6 3.4 - 5.3 mmol/L    Chloride 105 98 - 107 mmol/L    Carbon Dioxide (CO2) 23 22 - 29 mmol/L    Anion Gap 13 7 - 15 mmol/L    Urea Nitrogen 9.0 6.0 - 20.0 mg/dL    Creatinine 0.96 (H) 0.51 - 0.95 mg/dL    GFR Estimate " 80 >60 mL/min/1.73m2    Calcium 9.0 8.6 - 10.0 mg/dL    Glucose 120 (H) 70 - 99 mg/dL   CBC with platelets and differential   Result Value Ref Range    WBC Count 10.2 4.0 - 11.0 10e3/uL    RBC Count 5.09 3.80 - 5.20 10e6/uL    Hemoglobin 14.2 11.7 - 15.7 g/dL    Hematocrit 42.3 35.0 - 47.0 %    MCV 83 78 - 100 fL    MCH 27.9 26.5 - 33.0 pg    MCHC 33.6 31.5 - 36.5 g/dL    RDW 13.0 10.0 - 15.0 %    Platelet Count 267 150 - 450 10e3/uL    % Neutrophils 61 %    % Lymphocytes 28 %    % Monocytes 7 %    % Eosinophils 4 %    % Basophils 0 %    % Immature Granulocytes 0 %    NRBCs per 100 WBC 0 <1 /100    Absolute Neutrophils 6.2 1.6 - 8.3 10e3/uL    Absolute Lymphocytes 2.8 0.8 - 5.3 10e3/uL    Absolute Monocytes 0.8 0.0 - 1.3 10e3/uL    Absolute Eosinophils 0.4 0.0 - 0.7 10e3/uL    Absolute Basophils 0.0 0.0 - 0.2 10e3/uL    Absolute Immature Granulocytes 0.0 <=0.4 10e3/uL    Absolute NRBCs 0.0 10e3/uL       EKG:  Sinus tachycardia rate 139.  No notable ST or T wave abnormalities.  QRS 74 QTc 3 2.  Compared to previous EKG from 11/12/2022, no significant interval change.      The creation of this record is based on the scribe s observations of the work being performed by Fay Jama MD and the provider s statements to them. It was created on her behalf by Malcolm Cifuentes, a trained medical scribe. This document has been checked and approved by the attending provider.    Fay Jama MD  Emergency Medicine  [facility] EMERGENCY DEPARTMENT  [facility] [facility] [id number] [phone]  Dept: [phone]     Fay Jama MD  03/19/24 0777

## 2024-03-19 NOTE — ED TRIAGE NOTES
Patient arrives from home via [facility].  Medics called by patient d/t asthma exacerbation.     Duoneb given en route to ED.     Upon arrival to room patient LS wheezing and tight. Provider called to room at this time.

## 2024-03-20 VITALS
WEIGHT: 216.49 LBS | BODY MASS INDEX: 34.79 KG/M2 | SYSTOLIC BLOOD PRESSURE: 129 MMHG | HEIGHT: 66 IN | TEMPERATURE: 98.4 F | RESPIRATION RATE: 18 BRPM | DIASTOLIC BLOOD PRESSURE: 66 MMHG | HEART RATE: 97 BPM | OXYGEN SATURATION: 99 %

## 2024-03-20 LAB
ATRIAL RATE - MUSE: 139 BPM
DIASTOLIC BLOOD PRESSURE - MUSE: 87 MMHG
INTERPRETATION ECG - MUSE: NORMAL
P AXIS - MUSE: 73 DEGREES
PR INTERVAL - MUSE: 130 MS
QRS DURATION - MUSE: 74 MS
QT - MUSE: 284 MS
QTC - MUSE: 432 MS
R AXIS - MUSE: 67 DEGREES
SYSTOLIC BLOOD PRESSURE - MUSE: 127 MMHG
T AXIS - MUSE: 56 DEGREES
VENTRICULAR RATE- MUSE: 139 BPM

## 2024-03-20 PROCEDURE — 94640 AIRWAY INHALATION TREATMENT: CPT

## 2024-03-20 PROCEDURE — 96376 TX/PRO/DX INJ SAME DRUG ADON: CPT

## 2024-03-20 PROCEDURE — 250N000009 HC RX 250: Performed by: FAMILY MEDICINE

## 2024-03-20 PROCEDURE — G0378 HOSPITAL OBSERVATION PER HR: HCPCS

## 2024-03-20 PROCEDURE — 999N000157 HC STATISTIC RCP TIME EA 10 MIN

## 2024-03-20 PROCEDURE — 96375 TX/PRO/DX INJ NEW DRUG ADDON: CPT

## 2024-03-20 PROCEDURE — 99207 PR APP CREDIT; MD BILLING SHARED VISIT: CPT | Performed by: HOSPITALIST

## 2024-03-20 PROCEDURE — 250N000011 HC RX IP 250 OP 636: Performed by: FAMILY MEDICINE

## 2024-03-20 PROCEDURE — 99239 HOSP IP/OBS DSCHRG MGMT >30: CPT | Mod: FS

## 2024-03-20 PROCEDURE — 258N000003 HC RX IP 258 OP 636: Performed by: FAMILY MEDICINE

## 2024-03-20 PROCEDURE — 271N000002 HC RX 271: Performed by: HOSPITALIST

## 2024-03-20 RX ORDER — INHALER, ASSIST DEVICES
1 SPACER (EA) MISCELLANEOUS ONCE
Status: COMPLETED | OUTPATIENT
Start: 2024-03-20 | End: 2024-03-20

## 2024-03-20 RX ORDER — AZITHROMYCIN 250 MG/1
TABLET, FILM COATED ORAL
Qty: 8 TABLET | Refills: 0 | Status: SHIPPED | OUTPATIENT
Start: 2024-03-20 | End: 2024-03-25

## 2024-03-20 RX ADMIN — IPRATROPIUM BROMIDE AND ALBUTEROL SULFATE 3 ML: .5; 3 SOLUTION RESPIRATORY (INHALATION) at 09:07

## 2024-03-20 RX ADMIN — IPRATROPIUM BROMIDE AND ALBUTEROL SULFATE 3 ML: .5; 3 SOLUTION RESPIRATORY (INHALATION) at 03:22

## 2024-03-20 RX ADMIN — METHYLPREDNISOLONE SODIUM SUCCINATE 40 MG: 40 INJECTION, POWDER, FOR SOLUTION INTRAMUSCULAR; INTRAVENOUS at 06:27

## 2024-03-20 RX ADMIN — Medication 1 EACH: at 09:10

## 2024-03-20 RX ADMIN — AZITHROMYCIN MONOHYDRATE 500 MG: 500 INJECTION, POWDER, LYOPHILIZED, FOR SOLUTION INTRAVENOUS at 00:15

## 2024-03-20 RX ADMIN — IPRATROPIUM BROMIDE AND ALBUTEROL SULFATE 3 ML: .5; 3 SOLUTION RESPIRATORY (INHALATION) at 12:20

## 2024-03-20 ASSESSMENT — ACTIVITIES OF DAILY LIVING (ADL)
ADLS_ACUITY_SCORE: 18

## 2024-03-20 NOTE — PLAN OF CARE
"PRIMARY DIAGNOSIS: \"GENERIC\" NURSING  OUTPATIENT/OBSERVATION GOALS TO BE MET BEFORE DISCHARGE:  ADLs back to baseline: Yes    Activity and level of assistance: Ambulating independently.    Pain status: Pain free.    Return to near baseline physical activity: Yes     Discharge Planner Nurse   Safe discharge environment identified: No  Barriers to discharge: Yes       Entered by: Cindy Santos RN 03/20/2024 5:28 AM     Pt AOx4, Pt indep to the bathroom. Pt denies any signs of pain and SOB. Pt  slept in between cares and has no concerns.                        "

## 2024-03-20 NOTE — H&P
"[facility]    History and Physical - Hospitalist Service       Date of Admission:  3/19/2024    Assessment & Plan      Soren Freitas is a 33 year old female with PMH significant for Asthma who is admitted for observation on 3/19/2024 due to Acute Asthma exacerbation.    # Acute Asthma exacerbation- Admit for observation. Duonebs q 4 hours, IV Solumedrol and supplemental O2 as needed. Empiric Azithromycin. Monitor vitals closely. Will make further recommendations based on clinical course.    # Dyspnea- 2/2 Asthma exacerbation. CT chest also reveals caleb-bronchovascular groundglass opacities, scattered debris and bronchial wall thickening similar to 05/2/2022. Continue duonebs, supplemental O2 as needed, IV steroids. Patient quit smoking 2.5 years ago. Recommend outpatient follow up with Pulmonology.    # Tachycardia- Likely 2/2 Asthma exacerbation and albuterol. Trial of Xopenex if available. No PE on CT chest. Continue telemetry monitoring.       Diet: Regular Diet Adult  DVT Prophylaxis: Pneumatic Compression Devices  Hare Catheter: Not present  Lines: None     Cardiac Monitoring: ACTIVE order. Indication: Tachyarrhythmias, acute (48 hours)  Code Status: Full Code    Clinically Significant Risk Factors Present on Admission                    # Acute Respiratory Failure: Documented O2 saturation < 91%.  Continue supplemental oxygen as needed     # Obesity: Estimated body mass index is 33.89 kg/m  as calculated from the following:    Height as of an earlier encounter on 3/19/24: 1.676 m (5' 6\").    Weight as of this encounter: 95.3 kg (210 lb).       # Asthma: noted on problem list        Disposition Plan  Anticipate less than 2 midnight hospital stay.     Expected Discharge Date: 03/20/2024                  Natalie Hayes MD  Hospitalist Service  [facility]  Securely message with [employer] (more info)  Text page via [employer] Paging/Directory "     ______________________________________________________________________    Chief Complaint   Shortness of breath, wheezing    History is obtained from the patient, ED Physician and chart review.    History of Present Illness   Soren Freitas is a 33 year old female with PMH significant for Asthma who presents to ED due to shortness of breath and wheezing x 1 day.  Patient was initially admitted earlier today 3/19/2024 but had to leave AMA due to childcare.  She returned this evening due to persistent shortness of breath and wheezing.  She says that she has used her home nebulizer and albuterol rescue inhaler multiple times without any relief.  She feels that her asthma exacerbations are more frequent during the winter.  She has a history of previous intubation in 2014.  Patient denies any fever, chills, chest pain, shortness of breath, nausea, vomiting, diarrhea, lightheadedness or dizziness.  Per staff, patient initially was only able to speak a few words, but now she is able to speak complete sentences during my assessment.  Patient currently appears in no acute distress.      Past Medical History    Past Medical History:   Diagnosis Date    Obesity     Severe persistent asthma with acute exacerbation (H28) 1/1/2015    Formatting of this note might be different from the original. 12/2014 Admit for exacerbation: Spirometry FEV1/FVC 68%, D/C w/dulera, albuterol, Spiriva.  Started singulair 10mg QD, Given NRT.  Dx as infant, has been on albuterol lifelong.    Last Assessment & Plan:  Formatting of this note might be different from the original. Stable on Singulair, cetirizine, albuterol, and dulera. Discussed COVID    Uncomplicated asthma        Past Surgical History   Denies    Prior to Admission Medications   Prior to Admission Medications   Prescriptions Last Dose Informant Patient Reported? Taking?   acetaminophen (TYLENOL) 325 MG tablet Unknown at prn Self Yes Yes   Sig: Take 325-650 mg by mouth every 6  hours as needed for mild pain   albuterol (PROAIR HFA/PROVENTIL HFA/VENTOLIN HFA) 108 (90 Base) MCG/ACT inhaler Unknown at prn Self No Yes   Sig: Inhale 2 puffs into the lungs every 6 hours as needed for shortness of breath, wheezing or cough   albuterol (PROVENTIL) (2.5 MG/3ML) 0.083% neb solution 3/19/2024 at am- given here Self No Yes   Sig: Take 1 vial (2.5 mg) by nebulization every 6 hours as needed for shortness of breath / dyspnea or wheezing   ipratropium - albuterol 0.5 mg/2.5 mg/3 mL (DUONEB) 0.5-2.5 (3) MG/3ML neb solution  at [facility] yet started  No Yes   Sig: Take 1 vial (3 mLs) by nebulization every 4 hours as needed for wheezing, shortness of breath or cough   mometasone-formoterol (DULERA) 200-5 MCG/ACT inhaler Unknown at pt hanst used Self No Yes   Sig: Inhale 2 puffs into the lungs 2 times daily   predniSONE (DELTASONE) 20 MG tablet  at [facility] piicked up med  No Yes   Sig: Take 2 tablets (40 mg) by mouth daily for 5 days      Facility-Administered Medications: None        Review of Systems    The 10 point Review of Systems is negative other than noted in the HPI.    Social History   I have reviewed this patient's social history and updated it with pertinent information if needed.  Social History     Tobacco Use    Smoking status: Former   Quit smoking 2.5 years ago.   Drinks wine twice a week.  Denies illicit drug use.      Family History   Multiple paternal relatives with Asthma.    Physical Exam   Vital Signs: Temp: 98.6  F (37  C) Temp src: Tympanic BP: 133/66 Pulse: 116   Resp: 22 SpO2: 99 % O2 Device: None (Room air)    Weight: 210 lbs 0 oz    General Appearance: NAD, AAOx3  Respiratory: Scattered wheezes, diminished breath sounds in bases.  Cardiovascular: Tachycardic, S1S2  GI: +BS, soft, NT, ND  Skin: No rash  Other: No pedal edema     Medical Decision Making       45 MINUTES SPENT BY ME on the date of service doing chart review, history, exam, documentation & further activities per the note.       Data     I have personally reviewed the following data over the past 24 hrs:    8.6  \   13.6   / 232     137 105 7.9 /  214 (H)   3.4 19 (L) 0.88 \     Trop: N/A BNP: <36       Imaging results reviewed over the past 24 hrs:   Recent Results (from the past 24 hour(s))   XR Chest Port 1 View    Narrative    EXAM: XR CHEST PORT 1 VIEW  LOCATION: [facility]  DATE: 3/19/2024    INDICATION: Shortness of breath  COMPARISON: 09/24/2023.      Impression    IMPRESSION: Heart size is normal. Mild streaky opacities in the right lung base could reflect atelectasis or infiltrate. Lungs otherwise clear. No visible pneumothorax.   CT Chest Pulmonary Embolism w Contrast    Narrative    EXAM: CT CHEST PULMONARY EMBOLISM W CONTRAST  LOCATION: [facility]  DATE: 3/19/2024    INDICATION: shortness of breath, tachycardia  COMPARISON: CTA chest 05/25/2022  TECHNIQUE: CT chest pulmonary angiogram during arterial phase injection of IV contrast. Multiplanar reformats and MIP reconstructions were performed. Dose reduction techniques were used.   CONTRAST: isovue 370 90ml    FINDINGS:  ANGIOGRAM CHEST: Pulmonary arteries are normal in caliber. Examination for pulmonary embolus is somewhat limited due to contrast timing; within these limits, no pulmonary mass to the proximal segmental level is identified. No findings of right heart   strain. The thoracic aorta is nonaneurysmal without acute abnormality.      LUNGS AND PLEURA: Trace debris in the trachea and airways with bronchial wall thickening. Patchy bronchovascular groundglass opacities in the bilateral upper lobes, lingula, right middle lobe, and bilateral lower lobes, similar to 05/25/2022. No pleural   effusion.    MEDIASTINUM/AXILLAE: Normal heart size. No pericardial effusion. No thoracic lymphadenopathy. Normal thymic tissue.    CORONARY ARTERY CALCIFICATION: None.    UPPER ABDOMEN: No acute findings.    MUSCULOSKELETAL: Unchanged  sclerosis is heterogeneity of the spine dating back to at least 08/22/2021, though by report previous bone biopsy was performed on 06/17/2016 (with pathology not found in chart).      Impression    IMPRESSION:  1.  Somewhat limited exam due to contrast timing, however no proximal pulmonary embolus or findings of right heart strain.  2.  Shira-bronchovascular groundglass opacities, scattered airway debris, and bronchial wall thickening in the upper and lower lungs, similar to 05/25/2022. As before, differential considerations include viral infection, infectious/inflammatory airway   disease, or drug toxicity.  3.  Unchanged heterogeneous sclerosis of the spine.

## 2024-03-20 NOTE — PROGRESS NOTES
RESPIRATORY CARE NOTE     Patient was on room air and 93% breath depth and pattern normal .  Clear prior to neb and scattered expiratory wheezing throughout post treatment with increased aeration.      Patient has an inhaled steroid DULERA but states she is out and will be seeing her provider for a refill. Also stated she may need to start singulair up again.    Patient reports no dyspnea on exertion or at rest.     Spacer given for inhaler.     RT will continue to assess and monitor cardiopulmonary status.     Vani Quintana, RT

## 2024-03-20 NOTE — PLAN OF CARE
"PRIMARY DIAGNOSIS: severe asthma exacerbation    OUTPATIENT/OBSERVATION GOALS TO BE MET BEFORE DISCHARGE  1. Orthostatic performed: No    2. Tolerating PO medications: Yes    3. Return to near baseline physical activity: Yes  Per patient, she has returned to her \"normal\"      4. Cleared for discharge by consultants (if involved): No  Pt has scheduled RT this AM   Will revisit discharge planning with Attending.     Discharge Planner Nurse   Safe discharge environment identified: Yes  Barriers to discharge: No       Entered by: Vani Sexton RN 03/20/2024 9:23 AM     Please review provider order for any additional goals.   Nurse to notify provider when observation goals have been met and patient is ready for discharge.Goal Outcome Evaluation:       /73 (BP Location: Left arm)   Pulse 116   Temp 99  F (37.2  C) (Oral)   Resp 20   Ht 1.676 m (5' 6\")   Wt 98.2 kg (216 lb 7.9 oz)   SpO2 93%   BMI 34.94 kg/m    A & O x4  SR on telemetry Pt refused AM labs and demonstrates irritability upon bedside shift change.  Albuterol inhaler remains with pt after she refused to allow it to be labelled by pharmacy with NOC RN.  Possible discharge after AM RT treatment.  Randee Sexton RN                  "

## 2024-03-20 NOTE — PROVIDER NOTIFICATION
"Provider notification:  Patient has emphatically stated that if she does not have orders to discharge by 1300, that she will be leaving.  This RN educated that at this time, there are no orders to discharge and that RT is planning to administer a treatment within the 1200 hour, and that if she were to leave prior to being discharged, that it would be AMA.  Pt stated \"I don't care, if I don't sign any paperwork then I am not leaving AMA, and if there are no orders by 1300, I am leaving either way\".  This RN again educated that leaving without orders is AMA, with or without her signature.  Patient is fully aware that if she leaves prior to discharge orders being placed, it will be AMA and recorded as such.  Randee Sexton RN   "

## 2024-03-20 NOTE — PROGRESS NOTES
"PRIMARY DIAGNOSIS: asthma exacerbation     OUTPATIENT/OBSERVATION GOALS TO BE MET BEFORE DISCHARGE  1. Orthostatic performed: No    2. Tolerating PO medications: Yes    3. Return to near baseline physical activity: Yes    4. Cleared for discharge by consultants (if involved): Yes    Discharge Planner Nurse   Safe discharge environment identified: Yes  Barriers to discharge: No       Entered by: Vani Sexton RN 03/20/2024 1:46 PM     Please review provider order for any additional goals.   Nurse to notify provider when observation goals have been met and patient is ready for discharge.    /66 (BP Location: Left arm)   Pulse 97   Temp 98.4  F (36.9  C) (Oral)   Resp 18   Ht 1.676 m (5' 6\")   Wt 98.2 kg (216 lb 7.9 oz)   SpO2 99%   BMI 34.94 kg/m    Pt is A&O x4 VSS  Pt has received discharge orders.  PO ABX has been reviewed with pt, pharmacy verified as the correct pharmacy to fill this order.  All belongings have stayed with patient.  IV and telemetry have been discontinued.  Pt states that she will be discharging to home, transported by taxi she is arranging herself.  Pt states all questions / concerns have been addressed at this time.  Randee Sexton RN   "

## 2024-03-20 NOTE — ED NOTES
[facility] ED Handoff Report    ED Chief Complaint: SOB    ED Diagnosis:  (J45.901) Severe asthma with exacerbation, unspecified whether persistent  Comment:   Plan:        PMH:    Past Medical History:   Diagnosis Date    Obesity     Severe persistent asthma with acute exacerbation (H28) 1/1/2015    Formatting of this note might be different from the original. 12/2014 Admit for exacerbation: Spirometry FEV1/FVC 68%, D/C w/dulera, albuterol, Spiriva.  Started singulair 10mg QD, Given NRT.  Dx as infant, has been on albuterol lifelong.    Last Assessment & Plan:  Formatting of this note might be different from the original. Stable on Singulair, cetirizine, albuterol, and dulera. Discussed COVID    Uncomplicated asthma         Code Status:  Full Code     Falls Risk: No Band: Not applicable    Current Living Situation/Residence: lives with their son or daughter     Elimination Status: Continent: Yes     Activity Level: Independent    Patients Preferred Language:  English     Needed: No    Vital Signs:  BP (!) 148/90   Pulse (!) 126   Temp 98.6  F (37  C) (Tympanic)   Resp 23   Wt 95.3 kg (210 lb)   SpO2 97%   BMI 33.89 kg/m       Cardiac Rhythm: ST    Pain Score: 1/10    Is the Patient Confused:  No    Last Food or Drink: 03/19/24 at 2100    Focused Assessment:  Pt presents to ED today with c/o SOB. Patient seen earlier today and left AMA due to no . Returns with continued SOB but  improved from earlier today.  On arrival to ED, pt tachycardic and tachypneic, still with some wheezing but patient says she feels better than earlier today. Given duoneb on arrival with improvement. LS are wheezy throughout. Has been tachycardic in 110-130s here. Given a second duoneb at 2300. Pt reports improvement in sx.     A&Ox4. Ambulates independently, but reports increase in SOB with ambulation. Ordered [employer] to ED for dinner. On no supplemental O2 at this time.     Tests Performed: Done: Labs  and Imaging    Treatments Provided:  Duonebs, solumedrol     Family Dynamics/Concerns: No    Family Updated On Visitor Policy: No    Plan of Care Communicated to Family: No    Who Was Updated about Plan of Care: pt able to communicate with cell phone    Belongings Checklist Done and Signed by Patient: Yes    Belongings Sent with Patient: phone, , clothes, shoes    Medications sent with patient: none    Covid: symptomatic, negative    Additional Information:     RN: Isael Tyler RN   3/19/2024 11:07 PM

## 2024-03-20 NOTE — MEDICATION SCRIBE - ADMISSION MEDICATION HISTORY
Medication Scribe Admission Medication History    Admission medication history is complete. The information provided in this note is only as accurate as the sources available at the time of the update.    Information Source(s): Patient and CareEverywhere/SureScripts via in-person    Pertinent Information: pt hasn't taken any med's tdy at home when discharged     Changes made to PTA medication list:  Added: None  Deleted: None  Changed: None    Allergies reviewed with patient and updates made in EHR: yes    Medication History Completed By: GABRIEL LARSEN 3/19/2024 8:43 PM    PTA Med List   Medication Sig Last Dose    acetaminophen (TYLENOL) 325 MG tablet Take 325-650 mg by mouth every 6 hours as needed for mild pain Unknown at prn    albuterol (PROAIR HFA/PROVENTIL HFA/VENTOLIN HFA) 108 (90 Base) MCG/ACT inhaler Inhale 2 puffs into the lungs every 6 hours as needed for shortness of breath, wheezing or cough Unknown at prn    albuterol (PROVENTIL) (2.5 MG/3ML) 0.083% neb solution Take 1 vial (2.5 mg) by nebulization every 6 hours as needed for shortness of breath / dyspnea or wheezing 3/19/2024 at am- given here    ipratropium - albuterol 0.5 mg/2.5 mg/3 mL (DUONEB) 0.5-2.5 (3) MG/3ML neb solution Take 1 vial (3 mLs) by nebulization every 4 hours as needed for wheezing, shortness of breath or cough  at [facility] yet started    mometasone-formoterol (DULERA) 200-5 MCG/ACT inhaler Inhale 2 puffs into the lungs 2 times daily [facility]    [START ON 3/20/2024] predniSONE (DELTASONE) 20 MG tablet Take 2 tablets (40 mg) by mouth daily for 5 days  at [facility] piicked up med

## 2024-03-20 NOTE — PLAN OF CARE
"Goal Outcome Evaluation:                      PRIMARY DIAGNOSIS: \"GENERIC\" NURSING  OUTPATIENT/OBSERVATION GOALS TO BE MET BEFORE DISCHARGE:  ADLs back to baseline: Yes    Activity and level of assistance: Ambulating independently.    Pain status: Pain free.    Return to near baseline physical activity: No     Discharge Planner Nurse   Safe discharge environment identified: NO  Barriers to discharge: Yes       Entered by: Cindy Sanots RN 03/20/2024 4:04 AM     Please review provider order for any additional goals.   Nurse to notify provider when observation goals have been met and patient is ready for discharge.  "

## 2024-03-20 NOTE — PROGRESS NOTES
Patient admitted to room 5 at approximately 2344 via wheel chair from emergency room.  Reason for Admission:   Report received from:   Patient was accompanied by none.  Discharge transportation provided by:  Patient ambulated/transferred:  independently. self.  Patient is alert and orientated x 3.  Outpatient Observation education provided to: (patient, family, friend)  MDRO Education done if applicable (MRSA, VRE, etc)  Safety risks were identified during admission:  none.   Yellow risk/fall band applied:  No  Detailed Belongings: clothing, cell phone, purse, inhaler.

## 2024-03-20 NOTE — DISCHARGE SUMMARY
"[facility]  Hospitalist Discharge Summary      Date of Admission:  3/19/2024  Date of Discharge:  3/20/2024  Discharging Provider: Anamika Wagoner NP  Discharge Service: Hospitalist Service    Discharge Diagnoses   Asthma exacerbation    Clinically Significant Risk Factors     # Obesity: Estimated body mass index is 34.94 kg/m  as calculated from the following:    Height as of this encounter: 1.676 m (5' 6\").    Weight as of this encounter: 98.2 kg (216 lb 7.9 oz).       Follow-ups Needed After Discharge   Follow-up Appointments     Follow-up and recommended labs and tests       Follow up with primary care provider, Physician No Ref-Primary, within 7   days to evaluate medication change and for hospital follow- up.            Discharge Disposition   Discharged to home  Condition at discharge: Stable    Hospital Course      Soren Freitas is a 33 year old female with PMH significant for Asthma who is admitted for observation on 3/19/2024 due to Acute Asthma exacerbation.  Patient states she feels improved after nebulizer treatments patient states she wants to go home agrees with plan to follow-up with pulmonology and continue with home nebulizers and oral antibiotic.    # Acute Asthma exacerbation-   Admit for observation.   Duonebs q 4 hours,  Prednisone   Empiric Azithromycin.  Continue at home x 4 doses    # Dyspnea-   2/2 Asthma exacerbation.  CT chest also reveals caleb-bronchovascular groundglass opacities, scattered debris and bronchial wall thickening similar to 05/2/2022.   Continue duonebs, patient has home nebulizers will use as needed for wheezing and cough  Prednisone  Patient quit smoking 2.5 years ago.  Outpatient follow up with Pulmonology.    # Tachycardia-   Likely 2/2 Asthma exacerbation and albuterol.   Resolved today    Consultations This Hospital Stay   RESPIRATORY CARE IP CONSULT    Code Status   Full Code    Time Spent on this Encounter   I, Anamika Wagoner NP, " personally saw the patient today and spent greater than 30 minutes discharging this patient.       Anamika Wagoner NP  [facility] EXTENDED RECOVERY AND SHORT STAY  [address] [place] [id number]  Phone: [phone]  Fax: [phone]  ______________________________________________________________________    Physical Exam   Vital Signs: Temp: 98.4  F (36.9  C) Temp src: Oral BP: 129/66 Pulse: 97   Resp: 18 SpO2: 99 % O2 Device: None (Room air)    Weight: 216 lbs 7.87 oz  Constitutional: awake, alert, cooperative, no apparent distress, and appears stated age  Hematologic / Lymphatic: no cervical lymphadenopathy and no supraclavicular lymphadenopathy  Respiratory: No increased work of breathing, good air exchange, diminished to auscultation bilaterally, fine expiratory wheezing bilateral bases.  Cardiovascular: Normal apical impulse, regular rate and rhythm, normal S1 and S2, no S3 or S4, and no murmur noted  GI: No scars, normal bowel sounds, soft, non-distended, non-tender, no masses palpated, no hepatosplenomegally  Skin: no bruising or bleeding, normal skin color, texture, turgor, no redness, warmth, or swelling, no rashes, and no lesions  Musculoskeletal: There is no redness, warmth, or swelling of the joints.   Neurologic: Awake, alert, oriented to name, place and time.    Neuropsychiatric: General: normal, calm, and normal eye contact       Primary Care Physician   Physician No Ref-Primary    Discharge Orders      Adult Pulmonary Medicine  Referral      Reason for your hospital stay    Asthma exacerbation     Activity    Your activity upon discharge: activity as tolerated     Follow-up and recommended labs and tests     Follow up with primary care provider, Physician No Ref-Primary, within 7 days to evaluate medication change and for hospital follow- up.    Follow-up with pulmonology referral outpatient within 1 to 2-week for persistent asthma exacerbation     Diet     Follow this diet upon discharge: Orders Placed This Encounter      Regular Diet Adult       Significant Results and Procedures   Most Recent 3 CBC's:  Recent Labs   Lab Test 03/19/24  0741 03/19/24  0156 02/03/23  1837   WBC 8.6 10.2 9.5   HGB 13.6 14.2 13.1   MCV 83 83 86    267 243     Most Recent 3 BMP's:  Recent Labs   Lab Test 03/19/24  1854 03/19/24  0741 03/19/24  0156 02/03/23  1837   NA  --  137 141 136   POTASSIUM  --  3.4 3.6 4.1   CHLORIDE  --  105 105 103   CO2  --  19* 23 24   BUN  --  7.9 9.0 10.1   CR 0.88 0.86 0.96* 0.78   ANIONGAP  --  13 13 9   CARYN  --  9.0 9.0 9.0   GLC  --  214* 120* 76   ,   Results for orders placed or performed during the hospital encounter of 03/19/24   CT Chest Pulmonary Embolism w Contrast    Narrative    EXAM: CT CHEST PULMONARY EMBOLISM W CONTRAST  LOCATION: [facility]  DATE: 3/19/2024    INDICATION: shortness of breath, tachycardia  COMPARISON: CTA chest 05/25/2022  TECHNIQUE: CT chest pulmonary angiogram during arterial phase injection of IV contrast. Multiplanar reformats and MIP reconstructions were performed. Dose reduction techniques were used.   CONTRAST: isovue 370 90ml    FINDINGS:  ANGIOGRAM CHEST: Pulmonary arteries are normal in caliber. Examination for pulmonary embolus is somewhat limited due to contrast timing; within these limits, no pulmonary mass to the proximal segmental level is identified. No findings of right heart   strain. The thoracic aorta is nonaneurysmal without acute abnormality.      LUNGS AND PLEURA: Trace debris in the trachea and airways with bronchial wall thickening. Patchy bronchovascular groundglass opacities in the bilateral upper lobes, lingula, right middle lobe, and bilateral lower lobes, similar to 05/25/2022. No pleural   effusion.    MEDIASTINUM/AXILLAE: Normal heart size. No pericardial effusion. No thoracic lymphadenopathy. Normal thymic tissue.    CORONARY ARTERY CALCIFICATION: None.    UPPER  ABDOMEN: No acute findings.    MUSCULOSKELETAL: Unchanged sclerosis is heterogeneity of the spine dating back to at least 08/22/2021, though by report previous bone biopsy was performed on 06/17/2016 (with pathology not found in chart).      Impression    IMPRESSION:  1.  Somewhat limited exam due to contrast timing, however no proximal pulmonary embolus or findings of right heart strain.  2.  Shira-bronchovascular groundglass opacities, scattered airway debris, and bronchial wall thickening in the upper and lower lungs, similar to 05/25/2022. As before, differential considerations include viral infection, infectious/inflammatory airway   disease, or drug toxicity.  3.  Unchanged heterogeneous sclerosis of the spine.       Discharge Medications   Current Discharge Medication List        START taking these medications    Details   azithromycin (ZITHROMAX) 250 MG tablet Take 1 tablet (250 mg) by mouth daily for 4 days, THEN 1 tablet (250 mg) daily for 4 days.  Qty: 8 tablet, Refills: 0    Associated Diagnoses: Exacerbation of asthma, unspecified asthma severity, unspecified whether persistent; SOB (shortness of breath); Severe persistent asthma with exacerbation (H28); Pneumonia of both lungs due to infectious organism, unspecified part of lung           CONTINUE these medications which have NOT CHANGED    Details   acetaminophen (TYLENOL) 325 MG tablet Take 325-650 mg by mouth every 6 hours as needed for mild pain      albuterol (PROAIR HFA/PROVENTIL HFA/VENTOLIN HFA) 108 (90 Base) MCG/ACT inhaler Inhale 2 puffs into the lungs every 6 hours as needed for shortness of breath, wheezing or cough  Qty: 18 g, Refills: 0    Comments: Pharmacy may dispense brand covered by insurance (Proair, or proventil or ventolin or generic albuterol inhaler)      albuterol (PROVENTIL) (2.5 MG/3ML) 0.083% neb solution Take 1 vial (2.5 mg) by nebulization every 6 hours as needed for shortness of breath / dyspnea or wheezing  Qty: 12 mL,  Refills: 0    Associated Diagnoses: Severe asthma with acute exacerbation, unspecified whether persistent      ipratropium - albuterol 0.5 mg/2.5 mg/3 mL (DUONEB) 0.5-2.5 (3) MG/3ML neb solution Take 1 vial (3 mLs) by nebulization every 4 hours as needed for wheezing, shortness of breath or cough  Qty: 90 mL, Refills: 0    Associated Diagnoses: Severe persistent asthma with exacerbation (H28)      mometasone-formoterol (DULERA) 200-5 MCG/ACT inhaler Inhale 2 puffs into the lungs 2 times daily  Qty: 13 g, Refills: 0    Comments: Ok to sub advair at equivalent dose if covered by insurance  Associated Diagnoses: Moderate persistent asthma, unspecified whether complicated      predniSONE (DELTASONE) 20 MG tablet Take 2 tablets (40 mg) by mouth daily for 5 days  Qty: 10 tablet, Refills: 0    Associated Diagnoses: Severe persistent asthma with exacerbation (H28)           Allergies   Allergies   Allergen Reactions    Cashew Nut Oil Anaphylaxis and Angioedema    Fish Allergy Itching     Sea Food causes Throat swelling    Peanut-Containing Drug Products Angioedema and Swelling     Throat swelling.      Shellfish-Derived Products Angioedema

## 2024-03-20 NOTE — PROVIDER NOTIFICATION
"Provider Notification        Delivered - 8:07 am  \"Good morning FYI Pt is refusing LABS this am She is confrontational when asked to allow another pulse ox monitor to her finger (she pulled the one she had on off) Additionally she would not hand over her inhaler to the NOC RN when asked. I just provided education as to \"why\" and clarified with pt that she has not self administered anything as RT is scheduled to see her at this time\". Randee Sexton RN   "

## 2024-03-21 ENCOUNTER — PATIENT OUTREACH (OUTPATIENT)
Dept: CARE COORDINATION | Facility: CLINIC | Age: 34
End: 2024-03-21
Payer: COMMERCIAL

## 2024-03-21 NOTE — PROGRESS NOTES
Clinic Care Coordination Contact  [facility]: Post-Discharge Note  SITUATION                                                      Admission:    Admission Date: 03/19/24   Reason for Admission: Shortness of breath  Discharge:   Discharge Date: 03/20/24  Discharge Diagnosis: Severe asthma with exacerbation, unspecified whether persistent    BACKGROUND                                                      Per hospital discharge summary and inpatient provider notes:    Soren Freitas is a 33 year old female with pertinent medical history of severe persistent asthma who presents to the emergency department for evaluation of shortness of breath.     Per chart review, the patient was seen in [facility] ED this morning, brought via EMS for evaluation of shortness of breath, diffuse wheezing, chest tightness, tachycardia. Solu-Medrol, magnesium, DuoNebs x3 given. Chest x-ray with atelectasis vs infiltrate in right lung base. The patient was admitted to the hospital, but requested discharge as she had to go home to her children.     The patient reports continued shortness of breath and wheezing, but states that she is improved from when she came to the ED this morning. She reports difficulty standing or talking secondary to her shortness of breath. Her last nebulizer treatment was 40 minutes ago without improvement.     She endorses a history of intubation due to asthma exacerbation and states that her last hospitalization was about 1 year ago. The patient reports difficulty with finding childcare and states that this is why she left the hospital today.     The patient denies recent cough, cold, or congestion. No new pets or moves.    Per chart review, the patient has been seen in the ED 4 times in the last two months, all for asthma exacerbation. She was seen at [place] ED on 02/14/2024, and given neb treatment and prednisone. She was seen at [facility] ED the next day (02/15/2024) after lack of improvement, given  "two DuoNebs and discharged with albuterol.      ASSESSMENT      Discharge Assessment  How are you doing now that you are home?: \"I'm doing fine. I'm doing better.\"  How are your symptoms? (Red Flag symptoms escalate to triage hotline per guidelines): Improved  Do you feel your condition is stable enough to be safe at home until your provider visit?: Yes  Does the patient have their discharge instructions? : Yes  Does the patient have questions regarding their discharge instructions? : No  Were you started on any new medications or were there changes to any of your previous medications? : Yes  Does the patient have all of their medications?: Yes  Do you have questions regarding any of your medications? : No  Do you have all of your needed medical supplies or equipment (DME)?  (i.e. oxygen tank, CPAP, cane, etc.): Yes  Discharge follow-up appointment scheduled within 14 calendar days? : Yes  Discharge Follow Up Appointment Date: 03/22/24  Discharge Follow Up Appointment Scheduled with?: Primary Care Provider    Post-op ([facility] CT[facility] Only)  If the patient had a surgery or procedure, do they have any questions for a nurse?: No    PLAN                                                      Outpatient Plan:      Follow-up and recommended labs and tests  Follow up with primary care provider, Physician No Ref-Primary, within 7 days to evaluate medication change and for hospital follow- up.  Follow-up with pulmonology referral outpatient within 1 to 2-week for persistent asthma exacerbation    Additional Services:   Adult Pulmonary Medicine  Referral  Please be aware that coverage of these services is subject to the terms and limitations of your health insurance plan. Call member services at your health plan with any benefit or coverage questions.  [facility] will call you to coordinate your care as prescribed by the provider. If you don t hear from a representative within 2 business days, please call [phone].    Pulmonary Disease  Reason for Referral: Asthma  Scheduling Instructions: [facility] will call you to coordinate your care as prescribed by the provider. If you don t hear from a representative within 2 business days, please call [phone].    No future appointments.      For any urgent concerns, please contact our 24 hour nurse triage line: [phone] ([phone])         ISAK Hanson  [phone]  Connected Care Resource [facility]

## 2024-03-25 ENCOUNTER — APPOINTMENT (OUTPATIENT)
Dept: RADIOLOGY | Facility: HOSPITAL | Age: 34
End: 2024-03-25
Attending: EMERGENCY MEDICINE
Payer: COMMERCIAL

## 2024-03-25 ENCOUNTER — HOSPITAL ENCOUNTER (EMERGENCY)
Facility: HOSPITAL | Age: 34
Discharge: HOME | End: 2024-03-25
Attending: EMERGENCY MEDICINE | Admitting: EMERGENCY MEDICINE
Payer: COMMERCIAL

## 2024-03-25 VITALS
HEART RATE: 103 BPM | HEIGHT: 66 IN | WEIGHT: 210 LBS | RESPIRATION RATE: 17 BRPM | TEMPERATURE: 98 F | BODY MASS INDEX: 33.75 KG/M2 | OXYGEN SATURATION: 90 % | DIASTOLIC BLOOD PRESSURE: 69 MMHG | SYSTOLIC BLOOD PRESSURE: 136 MMHG

## 2024-03-25 DIAGNOSIS — J18.9 PNEUMONIA OF RIGHT LUNG DUE TO INFECTIOUS ORGANISM, UNSPECIFIED PART OF LUNG: ICD-10-CM

## 2024-03-25 DIAGNOSIS — J45.901 SEVERE ASTHMA WITH EXACERBATION, UNSPECIFIED WHETHER PERSISTENT: ICD-10-CM

## 2024-03-25 LAB
BASE EXCESS BLDV CALC-SCNC: -2.1 MMOL/L (ref -3–3)
BASOPHILS # BLD AUTO: 0 10E3/UL (ref 0–0.2)
BASOPHILS NFR BLD AUTO: 0 %
EOSINOPHIL # BLD AUTO: 0.4 10E3/UL (ref 0–0.7)
EOSINOPHIL NFR BLD AUTO: 4 %
ERYTHROCYTE [DISTWIDTH] IN BLOOD BY AUTOMATED COUNT: 13 % (ref 10–15)
HCO3 BLDV-SCNC: 23 MMOL/L (ref 21–28)
HCT VFR BLD AUTO: 41.5 % (ref 35–47)
HGB BLD-MCNC: 14 G/DL (ref 11.7–15.7)
IMM GRANULOCYTES # BLD: 0.1 10E3/UL
IMM GRANULOCYTES NFR BLD: 1 %
LYMPHOCYTES # BLD AUTO: 2.5 10E3/UL (ref 0.8–5.3)
LYMPHOCYTES NFR BLD AUTO: 23 %
MCH RBC QN AUTO: 28.2 PG (ref 26.5–33)
MCHC RBC AUTO-ENTMCNC: 33.7 G/DL (ref 31.5–36.5)
MCV RBC AUTO: 84 FL (ref 78–100)
MONOCYTES # BLD AUTO: 0.6 10E3/UL (ref 0–1.3)
MONOCYTES NFR BLD AUTO: 6 %
NEUTROPHILS # BLD AUTO: 7.2 10E3/UL (ref 1.6–8.3)
NEUTROPHILS NFR BLD AUTO: 66 %
NRBC # BLD AUTO: 0 10E3/UL
NRBC BLD AUTO-RTO: 0 /100
O2/TOTAL GAS SETTING VFR VENT: 28 %
OXYHGB MFR BLDV: 97 % (ref 70–75)
PCO2 BLDV: 38 MM HG (ref 40–50)
PH BLDV: 7.39 [PH] (ref 7.32–7.43)
PLATELET # BLD AUTO: 135 10E3/UL (ref 150–450)
PO2 BLDV: 95 MM HG (ref 25–47)
RBC # BLD AUTO: 4.97 10E6/UL (ref 3.8–5.2)
SAO2 % BLDV: 98.2 % (ref 70–75)
WBC # BLD AUTO: 10.8 10E3/UL (ref 4–11)

## 2024-03-25 PROCEDURE — 36415 COLL VENOUS BLD VENIPUNCTURE: CPT | Performed by: EMERGENCY MEDICINE

## 2024-03-25 PROCEDURE — 94640 AIRWAY INHALATION TREATMENT: CPT

## 2024-03-25 PROCEDURE — 85025 COMPLETE CBC W/AUTO DIFF WBC: CPT | Performed by: EMERGENCY MEDICINE

## 2024-03-25 PROCEDURE — 999N000157 HC STATISTIC RCP TIME EA 10 MIN

## 2024-03-25 PROCEDURE — 96367 TX/PROPH/DG ADDL SEQ IV INF: CPT

## 2024-03-25 PROCEDURE — 96365 THER/PROPH/DIAG IV INF INIT: CPT

## 2024-03-25 PROCEDURE — 71045 X-RAY EXAM CHEST 1 VIEW: CPT

## 2024-03-25 PROCEDURE — 99291 CRITICAL CARE FIRST HOUR: CPT | Mod: 25

## 2024-03-25 PROCEDURE — 250N000009 HC RX 250: Performed by: EMERGENCY MEDICINE

## 2024-03-25 PROCEDURE — 99292 CRITICAL CARE ADDL 30 MIN: CPT

## 2024-03-25 PROCEDURE — 250N000011 HC RX IP 250 OP 636: Performed by: EMERGENCY MEDICINE

## 2024-03-25 PROCEDURE — 82805 BLOOD GASES W/O2 SATURATION: CPT | Performed by: EMERGENCY MEDICINE

## 2024-03-25 PROCEDURE — 93005 ELECTROCARDIOGRAM TRACING: CPT | Performed by: EMERGENCY MEDICINE

## 2024-03-25 PROCEDURE — 96375 TX/PRO/DX INJ NEW DRUG ADDON: CPT

## 2024-03-25 PROCEDURE — 96366 THER/PROPH/DIAG IV INF ADDON: CPT

## 2024-03-25 PROCEDURE — 258N000003 HC RX IP 258 OP 636: Performed by: EMERGENCY MEDICINE

## 2024-03-25 RX ORDER — ONDANSETRON 2 MG/ML
4 INJECTION INTRAMUSCULAR; INTRAVENOUS ONCE
Status: COMPLETED | OUTPATIENT
Start: 2024-03-25 | End: 2024-03-25

## 2024-03-25 RX ORDER — PREDNISONE 20 MG/1
TABLET ORAL
Qty: 10 TABLET | Refills: 0 | Status: SHIPPED | OUTPATIENT
Start: 2024-03-25 | End: 2024-07-29

## 2024-03-25 RX ORDER — ALBUTEROL SULFATE 0.83 MG/ML
2.5 SOLUTION RESPIRATORY (INHALATION) EVERY 6 HOURS PRN
Qty: 360 ML | Refills: 0 | Status: SHIPPED | OUTPATIENT
Start: 2024-03-25 | End: 2024-10-06

## 2024-03-25 RX ORDER — AZITHROMYCIN 250 MG/1
TABLET, FILM COATED ORAL
Qty: 6 TABLET | Refills: 0 | Status: SHIPPED | OUTPATIENT
Start: 2024-03-25 | End: 2024-03-30

## 2024-03-25 RX ORDER — IPRATROPIUM BROMIDE AND ALBUTEROL SULFATE 2.5; .5 MG/3ML; MG/3ML
1 SOLUTION RESPIRATORY (INHALATION) EVERY 6 HOURS PRN
Qty: 180 ML | Refills: 0 | Status: SHIPPED | OUTPATIENT
Start: 2024-03-25 | End: 2024-10-06

## 2024-03-25 RX ORDER — LEVALBUTEROL INHALATION SOLUTION 1.25 MG/3ML
1.25 SOLUTION RESPIRATORY (INHALATION) ONCE
Status: COMPLETED | OUTPATIENT
Start: 2024-03-25 | End: 2024-03-25

## 2024-03-25 RX ORDER — METHYLPREDNISOLONE SODIUM SUCCINATE 125 MG/2ML
125 INJECTION, POWDER, LYOPHILIZED, FOR SOLUTION INTRAMUSCULAR; INTRAVENOUS ONCE
Status: COMPLETED | OUTPATIENT
Start: 2024-03-25 | End: 2024-03-25

## 2024-03-25 RX ORDER — MAGNESIUM SULFATE HEPTAHYDRATE 40 MG/ML
2 INJECTION, SOLUTION INTRAVENOUS ONCE
Status: COMPLETED | OUTPATIENT
Start: 2024-03-25 | End: 2024-03-25

## 2024-03-25 RX ORDER — IPRATROPIUM BROMIDE AND ALBUTEROL SULFATE 2.5; .5 MG/3ML; MG/3ML
3 SOLUTION RESPIRATORY (INHALATION)
Status: COMPLETED | OUTPATIENT
Start: 2024-03-25 | End: 2024-03-25

## 2024-03-25 RX ORDER — CEFTRIAXONE 2 G/1
2 INJECTION, POWDER, FOR SOLUTION INTRAMUSCULAR; INTRAVENOUS ONCE
Status: COMPLETED | OUTPATIENT
Start: 2024-03-25 | End: 2024-03-25

## 2024-03-25 RX ORDER — IPRATROPIUM BROMIDE AND ALBUTEROL SULFATE 2.5; .5 MG/3ML; MG/3ML
3 SOLUTION RESPIRATORY (INHALATION) ONCE
Status: COMPLETED | OUTPATIENT
Start: 2024-03-25 | End: 2024-03-25

## 2024-03-25 RX ADMIN — MAGNESIUM SULFATE HEPTAHYDRATE 2 G: 40 INJECTION, SOLUTION INTRAVENOUS at 03:13

## 2024-03-25 RX ADMIN — METHYLPREDNISOLONE SODIUM SUCCINATE 125 MG: 125 INJECTION, POWDER, FOR SOLUTION INTRAMUSCULAR; INTRAVENOUS at 03:09

## 2024-03-25 RX ADMIN — LEVALBUTEROL HYDROCHLORIDE 1.25 MG: 1.25 SOLUTION RESPIRATORY (INHALATION) at 02:23

## 2024-03-25 RX ADMIN — AZITHROMYCIN MONOHYDRATE 500 MG: 500 INJECTION, POWDER, LYOPHILIZED, FOR SOLUTION INTRAVENOUS at 04:02

## 2024-03-25 RX ADMIN — IPRATROPIUM BROMIDE AND ALBUTEROL SULFATE 3 ML: .5; 3 SOLUTION RESPIRATORY (INHALATION) at 05:29

## 2024-03-25 RX ADMIN — ONDANSETRON 4 MG: 2 INJECTION INTRAMUSCULAR; INTRAVENOUS at 04:23

## 2024-03-25 RX ADMIN — CEFTRIAXONE SODIUM 2 G: 2 INJECTION, POWDER, FOR SOLUTION INTRAMUSCULAR; INTRAVENOUS at 03:30

## 2024-03-25 ASSESSMENT — ACTIVITIES OF DAILY LIVING (ADL)
ADLS_ACUITY_SCORE: 35

## 2024-03-25 NOTE — ED NOTES
No blood return  from present IV site, but IV site is patent. Pt refused for straight stick from phlebotomist. Dr Mir made aware.

## 2024-03-25 NOTE — ED NOTES
Writer attempted to insert an IV at the right AV but pt suddenly moved, pt complaining about being poked 3 times already. Pt refused IV reinsertion for now, Dr. Mri made aware.

## 2024-03-25 NOTE — ED NOTES
Bed: JNED-10  Expected date: 3/25/24  Expected time:   Means of arrival:   Comments:  33 female  Asthma exacerbation   Ax0   98% on room air after a Neb

## 2024-03-25 NOTE — ED PROVIDER NOTES
EMERGENCY DEPARTMENT ENCOUNTER      NAME: Soren Freitas  YOB: 1990  MRN: [id number]    FINAL IMPRESSION  1. Severe asthma with exacerbation, unspecified whether persistent    2. Pneumonia of right lung due to infectious organism, unspecified part of lung        MEDICAL DECISION MAKING   Pertinent Labs & Imaging studies reviewed. (See chart for details)    Soren Freitas is a 33 year old female who presents for evaluation of Dyspnea that began several hours ago.  Patient has a long and well-documented history of asthma and has been intubated for this in the past, most recently in 2014.  She reports that she tried her albuterol inhaler without relief so called EMS.  When medics arrived, she was on the stairway in her apartment and appeared to have labored breathing.  She had diminished lung sounds all lung fields with inspiratory and expiratory wheezing and was sitting in a tripod position.  She has already received am albuterol x2 nd DuoNeb x 2 neb PTA. Medics were not able to obtain IV access.  O2 sats 98% on room air after nebulizer.    At time my evaluation, patient continues to complain of dyspnea.  She also reports that she has a recent cough and associated chest tightness.  She has not had a fever, abdominal pain, nausea, vomiting, or other new complaints.  She states that her current symptoms feel similar to previous asthma exacerbations.  She does not use oxygen at baseline.  She notes that she had previously been on steroids but is not currently.    Records reviewed.  Patient has a long and well-documented history of severe persistent asthma and has been seen in the ED and admitted numerous times for exacerbation.  She was most recently admitted 3/19/2024 after presenting to the ED with dyspnea.  She was treated with DuoNebs, steroids, and given a prescription for azithromycin.  She was also advised to follow-up closely with pulmonology.  CT scan showed peribronchiolar groundglass  opacities but no evidence of PE.  Patient was seen for a telehealth visit on 3/22/2024.  Does not appear that she followed up with pulmonology since most recent admission.    I considered a broad differential including but not limited to asthma exacerbation, viral URI, pneumonia, bronchitis, pulmonary edema, medication noncompliance. Symptoms were not sudden in onset and there is no pleuritic CP, hypoxia, tachypnea, or back pain to suggest PE or dissection. It would be very atypical for ACS and I do not feel further workup of this would be indicated.  Orders placed for portable chest x-ray, EKG, labs, DuoNeb, magnesium, IV steroids.    I rechecked the patient multiple times.  After RRT initially assessed patient on arrival, there was some concern for tachycardia and that this, I did switch DuoNeb order to trial of Xopenex.      ED Course as of 03/25/24 0623   Mon Mar 25, 2024   0257 XR Chest Port 1 View  I independently reviewed x-ray which showed a subtle infiltrate in the right middle and lower lungs, concerning for community-acquired pneumonia.  Will plan to start antibiotics.   0425 Blood gas venous(!)  VBG without acidosis or hypercapnia.  pCO2 slightly low, suspect related to hyperventilation.   0425 CBC with platelets differential(!)  CBC reassuring. No evidence of leukocytosis to suggest systemic infectious/inflammatory process. No acute anemia. PLTs wnl.      Patient tolerated xopenex neb well and on reevaluation, appeared more comfortable with improved lung sounds.  There was difficulty obtaining IV access and she initially refused a second attempt.  After some time, she was amenable to repeat with bedside ultrasound.  Once IV access was obtained, she was given steroids, magnesium, and antibiotics.    Patient notes when she was most recently seen and admitted, she was told that she had pneumonia and was started on antibiotics but for some reason was not able to fill the prescription for all greens so did  not continue taking them.  I recommended admission for nebulizers, IV steroids, and antibiotics but patient declined and states that she needs to go home to be with her children.  She did agree to stay for IV interventions here and at least 1 dose of steroids and antibiotics.    Patient was observed in the ED for about 4 hours.  She remained adamant about going home.  I spoke with lab regarding BMP which unfortunately, was hemolyzed.  EKG did not show evidence of hyperacute T waves to suggest hyperkalemia but unfortunately, cannot definitively rule this out now, as patient was not amenable to a redraw.  She was given a DuoNeb after heart rate came down and had improvement in work of breathing and breath sounds.  After this, she was eager to go home.  She states that she cannot stay in the hospital because she has 2 young children and her .  She understands the risks of going home.  Will plan to send with a prescription for steroids, nebulizers, inhalers, and antibiotics.    Strict return precautions and follow up recommendations were discussed and all questions were answered. Patient endorsed understanding and was in agreement with plan.    I independently reviewed CXR (as noted above), formal radiologist read pending.      Medical Decision Making  Obtained supplemental history:Supplemental history obtained?: EMS  Reviewed external records: External records reviewed?: Documented in chart  Care impacted by chronic illness:Chronic Lung Disease  Care significantly affected by social determinants of health:Access to Medical Care, No Support for Care at Home, and No Transportation for Health Care  Did you consider but not order tests?: Work up considered but not performed and documented in chart, if applicable  Did you interpret images independently?: Independent interpretation of ECG and images noted in documentation, when applicable.  Consultation discussion with other provider:Did you involve another  provider (consultant, MH, pharmacy, etc.)?: No  Discharge. I prescribed additional prescription strength medication(s) as charted. I recommended admission, but the patient declined. - Discharged AMA    Critical care: 120 minutes excluding separately billable procedures.  Includes bedside management, time reviewing test results, review of records, discussing the case with staff, documenting the medical record and time spent with family members (or surrogate decision makers) discussing specific treatment issues.       ED COURSE  2:00 AM I met with the patient and obtained information from EMS and patient.   2:05 AM RT called.   2:18 AM Spoke with RT. They do not want to give another nebulizer due to tachycardia. Will try xopenex. She requested to be placed on O2 for comfort.  2:25 AM Patient refusing IV, would like to wait.  She also refused viral swabs.   2:32 AM Patient had improvement in work of breathing. Supplementary O2 titrated down.   3:08 AM I rechecked the patient. She does not want to stay in the hospital.   3:12 AM IV access obtained but line is not drawing.   4:31 AM Spoke with lab, potassium was high on BMP but sample hemolyzed. Will redraw.   5:17 AM I rechecked the patient. She is refusing a redraw for labs. She is willing to try a duoneb.   6:15 AM I rechecked the patient.       MEDICATIONS GIVEN IN THE ED  Medications   ipratropium - albuterol 0.5 mg/2.5 mg/3 mL (DUONEB) neb solution 3 mL (3 mLs Nebulization Not Given 3/25/24 0438)   methylPREDNISolone sodium succinate (solu-MEDROL) injection 125 mg (125 mg Intravenous $Given 3/25/24 0309)   magnesium sulfate 2 g in 50 mL sterile water intermittent infusion (0 g Intravenous Stopped 3/25/24 0328)   levalbuterol (XOPENEX) neb solution 1.25 mg (1.25 mg Nebulization $Given 3/25/24 0223)   cefTRIAXone (ROCEPHIN) 2 g vial to attach to  ml bag for ADULTS or NS 50 ml bag for PEDS (0 g Intravenous Stopped 3/25/24 0401)   azithromycin (ZITHROMAX) 500 mg  in sodium chloride 0.9 % 250 mL intermittent infusion (0 mg Intravenous Stopped 3/25/24 5028)   ondansetron (ZOFRAN) injection 4 mg (4 mg Intravenous $Given 3/25/24 2387)   ipratropium - albuterol 0.5 mg/2.5 mg/3 mL (DUONEB) neb solution 3 mL (3 mLs Nebulization $Given 3/25/24 9607)       NEW PRESCRIPTIONS STARTED AT TODAY'S VISIT  New Prescriptions    ALBUTEROL (PROVENTIL) (2.5 MG/3ML) 0.083% NEB SOLUTION    Take 1 vial (2.5 mg) by nebulization every 6 hours as needed for shortness of breath, wheezing or cough    AMOXICILLIN-CLAVULANATE (AUGMENTIN) 875-125 MG TABLET    Take 1 tablet by mouth 2 times daily for 10 days    AZITHROMYCIN (ZITHROMAX) 250 MG TABLET    Take 2 tablets (500 mg) by mouth daily for 1 day, THEN 1 tablet (250 mg) daily for 4 days.    IPRATROPIUM - ALBUTEROL 0.5 MG/2.5 MG/3 ML (DUONEB) 0.5-2.5 (3) MG/3ML NEB SOLUTION    Take 1 vial (3 mLs) by nebulization every 6 hours as needed for shortness of breath, wheezing or cough    PREDNISONE (DELTASONE) 20 MG TABLET    Take two tablets (= 40mg) each day for 5 (five) days          =================================================================    Chief Complaint   Patient presents with    Shortness of Breath         HPI: History limited secondary to respiratory distress    Patient information was obtained from: Patient and medics    Soren ISAAC Freitas is a 33 year old female who presents for evaluation of cough and shortness of breath.  Patient reports onset of symptoms about 3 hours prior to arrival.  She complains of dyspnea, wheezing, chest tightness, and cough.  She states that her current symptoms feel consistent with previous asthma exacerbations.  She tried using her albuterol inhaler without relief so called 911.  Medics gave DuoNeb x 2 and she reports feeling slightly better but still quite short of breath.  Patient notes that she was recently on steroids but is no longer taking these.  She has not had any recent travel or sick contacts.  No  "complaints of abdominal pain, nausea, vomiting.      RELEVANT HISTORY, MEDICATIONS, & ALLERGIES   Past medical history, surgical history, family history, medications, and allergies reviewed and pertinent noted in HPI.    REVIEW OF SYSTEMS:  A complete review of systems was performed with pertinent positives and negatives noted in the HPI. All other systems negative.     PHYSICAL EXAM:    Vitals: /69   Pulse 104   Temp 98  F (36.7  C) (Oral)   Resp 17   Ht 1.676 m (5' 6\")   Wt 95.3 kg (210 lb)   SpO2 97%   BMI 33.89 kg/m     General: Alert and interactive, comfortable appearing.  HENT: Atraumatic. Full AROM of neck. Conjunctiva clear.   Cardiovascular: Tachycardic, regular.  Chest/Pulmonary: Increased work of breathing.  Sitting in tripod position.  Tachypneic.  Inspiratory and expiratory wheezes all lung fields.  Diminished at bases.  Speaking in one-word sentences.  Abdomen: Soft, nondistended. Nontender without guarding or rebound.  Extremities: Normal AROM of all major joints.  Skin: Warm and dry. Normal skin color.   Neuro: Speech clear. CNs grossly intact. Moves all extremities spontaneously.   Psych: Normal affect/mood, cooperative, memory appropriate.      LAB  Labs Ordered and Resulted from Time of ED Arrival to Time of ED Departure   BLOOD GAS VENOUS - Abnormal       Result Value    pH Venous 7.39      pCO2 Venous 38 (*)     pO2 Venous 95 (*)     Bicarbonate Venous 23      Base Excess/Deficit Venous -2.1      FIO2 28      Oxyhemoglobin Venous 97 (*)     O2 Sat, Venous 98.2 (*)    CBC WITH PLATELETS AND DIFFERENTIAL - Abnormal    WBC Count 10.8      RBC Count 4.97      Hemoglobin 14.0      Hematocrit 41.5      MCV 84      MCH 28.2      MCHC 33.7      RDW 13.0      Platelet Count 135 (*)     % Neutrophils 66      % Lymphocytes 23      % Monocytes 6      % Eosinophils 4      % Basophils 0      % Immature Granulocytes 1      NRBCs per 100 WBC 0      Absolute Neutrophils 7.2      Absolute " Lymphocytes 2.5      Absolute Monocytes 0.6      Absolute Eosinophils 0.4      Absolute Basophils 0.0      Absolute Immature Granulocytes 0.1      Absolute NRBCs 0.0     BASIC METABOLIC PANEL   INFLUENZA A/B, RSV, & SARS-COV2 PCR       RADIOLOGY  XR Chest Port 1 View   Final Result   IMPRESSION: Airspace disease is seen within the right medial lung base concerning for worsening airspace disease in the right middle lobe and right lower lobe, similar but less pronounced findings are seen within the lingula.          EKG  Performed at: 0331  Impression: Sinus rhythm.  No acute ischemic changes.  Normal intervals.  Heart rate has increased, otherwise no significant change from previous.  Rate: 96  Rhythm: Sinus  QRS Interval: 78  QTc Interval: 442  Comparison: 3/19/2024    All laboratory and imaging results and EKG's were personally reviewed and interpreted by myself prior to disposition decision.       Emerald Mir M.D.  Emergency Medicine  [facility] EMERGENCY DEPARTMENT  [facility] [facility] [id number] [phone]  Dept: [phone]     Emerald Mir MD  03/25/24 0623       Emerald Mir MD  03/25/24 0625

## 2024-03-25 NOTE — ED TRIAGE NOTES
Pt brought in by ambulance from home due to SOB, pt has known hx of asthma, had asthma attack few hours ago before calling 911. 2x douneb and 2x albuterol neb given enroute. Pt O2 is at 90's on room air. A/O x 4, RT called for evaluation     Triage Assessment (Adult)       Row Name 03/25/24 0201          Triage Assessment    Airway WDL WDL        Respiratory WDL    Respiratory WDL X;rhythm/pattern     Rhythm/Pattern, Respiratory shortness of breath        Skin Circulation/Temperature WDL    Skin Circulation/Temperature WDL WDL        Cardiac WDL    Cardiac WDL X;rhythm     Pulse Rate & Regularity tachycardic        Peripheral/Neurovascular WDL    Peripheral Neurovascular WDL WDL        Cognitive/Neuro/Behavioral WDL    Cognitive/Neuro/Behavioral WDL WDL

## 2024-03-25 NOTE — DISCHARGE INSTRUCTIONS
You were seen in the Emergency Department today for shortness of breath and cough.    I think your symptoms are most likely related to asthma exacerbation and your x-ray did show some evidence of pneumonia.  I am sending you with a prescription for steroids, antibiotics, nebulizers, and an inhaler.  It is important that you take these medications as prescribed and follow-up closely with pulmonology for recheck.      Please return to the ER if you experience more difficulty breathing, chest pain, inability to keep fluids down, fever, and/or for any other new or concerning symptoms, otherwise please follow up with your primary doctor and pulmonology team in 1-2 days for recheck.     Below is some information you might find useful.     Thank you for choosing [facility]. It was a pleasure taking care of you today!  - Dr. Emerald Mir

## 2024-04-03 LAB
ATRIAL RATE - MUSE: 96 BPM
DIASTOLIC BLOOD PRESSURE - MUSE: 87 MMHG
INTERPRETATION ECG - MUSE: NORMAL
P AXIS - MUSE: 77 DEGREES
PR INTERVAL - MUSE: 148 MS
QRS DURATION - MUSE: 78 MS
QT - MUSE: 350 MS
QTC - MUSE: 442 MS
R AXIS - MUSE: 75 DEGREES
SYSTOLIC BLOOD PRESSURE - MUSE: 138 MMHG
T AXIS - MUSE: 20 DEGREES
VENTRICULAR RATE- MUSE: 96 BPM

## 2024-04-22 ENCOUNTER — HOSPITAL ENCOUNTER (EMERGENCY)
Facility: HOSPITAL | Age: 34
Discharge: LEFT BEFORE DISCHARGE | End: 2024-04-22
Attending: EMERGENCY MEDICINE | Admitting: STUDENT IN AN ORGANIZED HEALTH CARE EDUCATION/TRAINING PROGRAM
Payer: COMMERCIAL

## 2024-04-22 VITALS
SYSTOLIC BLOOD PRESSURE: 129 MMHG | HEIGHT: 67 IN | RESPIRATION RATE: 28 BRPM | WEIGHT: 218.9 LBS | TEMPERATURE: 98.2 F | HEART RATE: 123 BPM | OXYGEN SATURATION: 96 % | DIASTOLIC BLOOD PRESSURE: 77 MMHG | BODY MASS INDEX: 34.36 KG/M2

## 2024-04-22 PROCEDURE — 99281 EMR DPT VST MAYX REQ PHY/QHP: CPT

## 2024-04-23 NOTE — ED NOTES
Called for patient in the lobby. No Answer. Called patients cell phone and she stated that she left.

## 2024-04-23 NOTE — ED TRIAGE NOTES
Pt arrives from home for evaluation of SOB. Pt has asthma and states it started to flair up about 2 hours ago. Pt has tried taking her inhalers, nebulizer and even has prednisone at home, but has not gotten any relief. Reports chest tightness, but no pain.      Triage Assessment (Adult)       Row Name 04/22/24 8938          Triage Assessment    Airway WDL WDL        Respiratory WDL    Respiratory WDL X;rhythm/pattern     Rhythm/Pattern, Respiratory shortness of breath;dyspnea upon exertion;labored        Skin Circulation/Temperature WDL    Skin Circulation/Temperature WDL WDL        Cardiac WDL    Cardiac WDL X;rhythm     Pulse Rate & Regularity tachycardic        Peripheral/Neurovascular WDL    Peripheral Neurovascular WDL WDL        Cognitive/Neuro/Behavioral WDL    Cognitive/Neuro/Behavioral WDL WDL

## 2024-05-19 ENCOUNTER — HOSPITAL ENCOUNTER (EMERGENCY)
Facility: HOSPITAL | Age: 34
Discharge: HOME | End: 2024-05-19
Attending: EMERGENCY MEDICINE | Admitting: EMERGENCY MEDICINE
Payer: COMMERCIAL

## 2024-05-19 VITALS
WEIGHT: 218 LBS | HEIGHT: 67 IN | SYSTOLIC BLOOD PRESSURE: 113 MMHG | OXYGEN SATURATION: 98 % | TEMPERATURE: 97.1 F | BODY MASS INDEX: 34.21 KG/M2 | HEART RATE: 86 BPM | DIASTOLIC BLOOD PRESSURE: 68 MMHG | RESPIRATION RATE: 16 BRPM

## 2024-05-19 DIAGNOSIS — J45.21 MILD INTERMITTENT ASTHMA WITH EXACERBATION: ICD-10-CM

## 2024-05-19 PROCEDURE — 250N000009 HC RX 250: Performed by: EMERGENCY MEDICINE

## 2024-05-19 PROCEDURE — 94640 AIRWAY INHALATION TREATMENT: CPT

## 2024-05-19 PROCEDURE — 999N000157 HC STATISTIC RCP TIME EA 10 MIN

## 2024-05-19 PROCEDURE — 250N000012 HC RX MED GY IP 250 OP 636 PS 637: Performed by: EMERGENCY MEDICINE

## 2024-05-19 PROCEDURE — 99284 EMERGENCY DEPT VISIT MOD MDM: CPT | Mod: 25

## 2024-05-19 RX ORDER — PREDNISONE 20 MG/1
40 TABLET ORAL ONCE
Status: COMPLETED | OUTPATIENT
Start: 2024-05-19 | End: 2024-05-19

## 2024-05-19 RX ORDER — IPRATROPIUM BROMIDE AND ALBUTEROL SULFATE 2.5; .5 MG/3ML; MG/3ML
3 SOLUTION RESPIRATORY (INHALATION) ONCE
Status: COMPLETED | OUTPATIENT
Start: 2024-05-19 | End: 2024-05-19

## 2024-05-19 RX ORDER — PREDNISONE 20 MG/1
TABLET ORAL
Qty: 10 TABLET | Refills: 0 | Status: SHIPPED | OUTPATIENT
Start: 2024-05-19 | End: 2024-07-29

## 2024-05-19 RX ORDER — ALBUTEROL SULFATE 90 UG/1
2 AEROSOL, METERED RESPIRATORY (INHALATION) EVERY 6 HOURS PRN
Qty: 18 G | Refills: 0 | Status: SHIPPED | OUTPATIENT
Start: 2024-05-19

## 2024-05-19 RX ADMIN — PREDNISONE 40 MG: 20 TABLET ORAL at 13:38

## 2024-05-19 RX ADMIN — IPRATROPIUM BROMIDE AND ALBUTEROL SULFATE 3 ML: .5; 3 SOLUTION RESPIRATORY (INHALATION) at 13:42

## 2024-05-19 ASSESSMENT — ACTIVITIES OF DAILY LIVING (ADL): ADLS_ACUITY_SCORE: 35

## 2024-05-19 NOTE — ED TRIAGE NOTES
Increase in need of PRN asthma medications at home. AT 0600 today it became significantly worse than the past few days. Denies fever. Seasonal allergies.

## 2024-07-28 ENCOUNTER — APPOINTMENT (OUTPATIENT)
Dept: RADIOLOGY | Facility: HOSPITAL | Age: 34
End: 2024-07-28
Attending: STUDENT IN AN ORGANIZED HEALTH CARE EDUCATION/TRAINING PROGRAM
Payer: COMMERCIAL

## 2024-07-28 ENCOUNTER — HOSPITAL ENCOUNTER (EMERGENCY)
Facility: HOSPITAL | Age: 34
Discharge: HOME | End: 2024-07-29
Attending: STUDENT IN AN ORGANIZED HEALTH CARE EDUCATION/TRAINING PROGRAM | Admitting: STUDENT IN AN ORGANIZED HEALTH CARE EDUCATION/TRAINING PROGRAM
Payer: COMMERCIAL

## 2024-07-28 DIAGNOSIS — J45.901 SEVERE ASTHMA WITH EXACERBATION, UNSPECIFIED WHETHER PERSISTENT: ICD-10-CM

## 2024-07-28 LAB
HOLD SPECIMEN: NORMAL

## 2024-07-28 PROCEDURE — 250N000011 HC RX IP 250 OP 636: Performed by: STUDENT IN AN ORGANIZED HEALTH CARE EDUCATION/TRAINING PROGRAM

## 2024-07-28 PROCEDURE — 94640 AIRWAY INHALATION TREATMENT: CPT | Mod: 59 | Performed by: STUDENT IN AN ORGANIZED HEALTH CARE EDUCATION/TRAINING PROGRAM

## 2024-07-28 PROCEDURE — 99285 EMERGENCY DEPT VISIT HI MDM: CPT | Mod: 25 | Performed by: STUDENT IN AN ORGANIZED HEALTH CARE EDUCATION/TRAINING PROGRAM

## 2024-07-28 PROCEDURE — 999N000157 HC STATISTIC RCP TIME EA 10 MIN

## 2024-07-28 PROCEDURE — 250N000009 HC RX 250: Performed by: STUDENT IN AN ORGANIZED HEALTH CARE EDUCATION/TRAINING PROGRAM

## 2024-07-28 PROCEDURE — 96374 THER/PROPH/DIAG INJ IV PUSH: CPT | Performed by: STUDENT IN AN ORGANIZED HEALTH CARE EDUCATION/TRAINING PROGRAM

## 2024-07-28 PROCEDURE — 71046 X-RAY EXAM CHEST 2 VIEWS: CPT

## 2024-07-28 RX ORDER — ALBUTEROL SULFATE 0.83 MG/ML
SOLUTION RESPIRATORY (INHALATION)
Status: COMPLETED
Start: 2024-07-28 | End: 2024-07-28

## 2024-07-28 RX ORDER — METHYLPREDNISOLONE SODIUM SUCCINATE 125 MG/2ML
125 INJECTION, POWDER, LYOPHILIZED, FOR SOLUTION INTRAMUSCULAR; INTRAVENOUS ONCE
Status: COMPLETED | OUTPATIENT
Start: 2024-07-28 | End: 2024-07-28

## 2024-07-28 RX ORDER — ALBUTEROL SULFATE 0.83 MG/ML
5 SOLUTION RESPIRATORY (INHALATION) ONCE
Status: COMPLETED | OUTPATIENT
Start: 2024-07-28 | End: 2024-07-28

## 2024-07-28 RX ORDER — IPRATROPIUM BROMIDE AND ALBUTEROL SULFATE 2.5; .5 MG/3ML; MG/3ML
3 SOLUTION RESPIRATORY (INHALATION) ONCE
Status: COMPLETED | OUTPATIENT
Start: 2024-07-28 | End: 2024-07-28

## 2024-07-28 RX ADMIN — METHYLPREDNISOLONE SODIUM SUCCINATE 125 MG: 125 INJECTION, POWDER, FOR SOLUTION INTRAMUSCULAR; INTRAVENOUS at 22:25

## 2024-07-28 RX ADMIN — ALBUTEROL SULFATE 5 MG: 2.5 SOLUTION RESPIRATORY (INHALATION) at 22:31

## 2024-07-28 RX ADMIN — IPRATROPIUM BROMIDE AND ALBUTEROL SULFATE 3 ML: .5; 3 SOLUTION RESPIRATORY (INHALATION) at 22:25

## 2024-07-28 RX ADMIN — ALBUTEROL SULFATE: 2.5 SOLUTION RESPIRATORY (INHALATION) at 22:31

## 2024-07-28 ASSESSMENT — COLUMBIA-SUICIDE SEVERITY RATING SCALE - C-SSRS
1. IN THE PAST MONTH, HAVE YOU WISHED YOU WERE DEAD OR WISHED YOU COULD GO TO SLEEP AND NOT WAKE UP?: NO
2. HAVE YOU ACTUALLY HAD ANY THOUGHTS OF KILLING YOURSELF IN THE PAST MONTH?: NO
6. HAVE YOU EVER DONE ANYTHING, STARTED TO DO ANYTHING, OR PREPARED TO DO ANYTHING TO END YOUR LIFE?: NO

## 2024-07-28 ASSESSMENT — ACTIVITIES OF DAILY LIVING (ADL): ADLS_ACUITY_SCORE: 35

## 2024-07-29 VITALS
TEMPERATURE: 97.9 F | SYSTOLIC BLOOD PRESSURE: 133 MMHG | OXYGEN SATURATION: 96 % | WEIGHT: 212.8 LBS | DIASTOLIC BLOOD PRESSURE: 78 MMHG | BODY MASS INDEX: 33.33 KG/M2 | RESPIRATION RATE: 22 BRPM | HEART RATE: 111 BPM

## 2024-07-29 PROCEDURE — 250N000009 HC RX 250: Performed by: STUDENT IN AN ORGANIZED HEALTH CARE EDUCATION/TRAINING PROGRAM

## 2024-07-29 RX ORDER — PREDNISONE 20 MG/1
50 TABLET ORAL DAILY
Qty: 13 TABLET | Refills: 0 | Status: SHIPPED | OUTPATIENT
Start: 2024-07-30 | End: 2024-08-04

## 2024-07-29 RX ORDER — IPRATROPIUM BROMIDE AND ALBUTEROL SULFATE 2.5; .5 MG/3ML; MG/3ML
3 SOLUTION RESPIRATORY (INHALATION) ONCE
Status: COMPLETED | OUTPATIENT
Start: 2024-07-29 | End: 2024-07-29

## 2024-07-29 RX ADMIN — IPRATROPIUM BROMIDE AND ALBUTEROL SULFATE 3 ML: .5; 3 SOLUTION RESPIRATORY (INHALATION) at 01:11

## 2024-07-29 ASSESSMENT — ACTIVITIES OF DAILY LIVING (ADL)
ADLS_ACUITY_SCORE: 35
ADLS_ACUITY_SCORE: 35

## 2024-07-29 NOTE — ED PROVIDER NOTES
EMERGENCY DEPARTMENT ENCOUNTER       ED Course & Medical Decision Making     10:12 PM I met with the patient, obtained history, performed an initial exam, and discussed options and plan for diagnostics and treatment here in the ED.    Final Impression  33 year old female presents for evaluation of severe asthma exacerbation.  Patient has a history of asthma, reports that she typically gets flares when the weather changes, several times per year it sounds like.  Patient in fairly significant respiratory distress upon arrival, physician called to bedside stat for evaluation of respiratory distress, patient sitting at the edge of the bed, tripoding, significant expiratory wheezing bilaterally.  States that she did 1.5 nebulizers prior to arrival which did not seem to help much.  Patient given DuoNebs as well as a second albuterol neb and 125 mg IV Solu-Medrol.  Chart review shows 6 prior ED visits for asthma exacerbations in the last 5 months including 1 hospitalization.  Has several prescriptions for albuterol HFA's, albuterol nebulizers and DuoNebs and a lone prescription for Dulera following hospitalization from 2/6/2023.  Informed patient that she has been using Dulera twice daily.    Patient given DuoNeb upon arrival as well as an albuterol neb, later given a second albuterol neb and a second DuoNeb (total 4 nebs while in the ED) in addition to 1.5 mg IV Solu-Medrol.  At time of reevaluation, patient's respiratory significantly improved to about 22, lung sounds show the wheezing is almost completely resolved, just has very trace expiratory wheezing, no longer in any respiratory distress.  Patient exhibiting significant clinical improvement.  Did confirm that she does have DuoNebs as well as albuterol nebs stating that machine at home.  Will add on prednisone burst, sent to her pharmacy electronically.  All questions answered, will discharge home.    Prior to making a final disposition on this patient the results  of patient's tests and other diagnostic studies were discussed with the patient. All questions were answered. Patient expressed understanding of the plan and was amenable to it.    Medical Decision Making    History:  Supplemental history from: boyfriend  External Record(s) reviewed as documented below;  3/19/2024, admission at [facility] for asthma exacerbation, discharge summary reviewed    Work Up:  Chart documentation includes differential considered and any EKGs or imaging independently interpreted by provider, where specified.  DDx considered but not limited to: Asthma exacerbation, pneumothorax, pneumonia  Considered administering antibiotics for: Possible pneumonia, though chest x-ray negative  Considered admission / inpatient observation / escalation of cares for: Severe asthma, has been admitted on several occasions for asthma exacerbation, though thankfully responded to steroids and multiple nebs    Complicating factors:  Care impacted by chronic illness: Chronic Lung Disease  Care affected by social determinants of health: N/A    Disposition considerations: Discharge. I prescribed additional prescription strength medication(s) as charted. I considered admission, but discharged patient after significant clinical improvement.      Medications   albuterol (PROVENTIL) (2.5 MG/3ML) 0.083% neb solution (  $Given 7/28/24 2231)   ipratropium - albuterol 0.5 mg/2.5 mg/3 mL (DUONEB) neb solution 3 mL (3 mLs Nebulization $Given 7/28/24 2225)   albuterol (PROVENTIL) neb solution 5 mg (5 mg Nebulization $Given 7/28/24 2231)   methylPREDNISolone sodium succinate (solu-MEDROL) injection 125 mg (125 mg Intravenous $Given 7/28/24 2225)   ipratropium - albuterol 0.5 mg/2.5 mg/3 mL (DUONEB) neb solution 3 mL (3 mLs Nebulization $Given 7/29/24 0111)       New Prescriptions    PREDNISONE (DELTASONE) 20 MG TABLET    Take 2.5 tablets (50 mg) by mouth daily for 5 days     Modified Medications    No medications on file      Final Impression     1. Severe asthma with exacerbation, unspecified whether persistent        Chief Complaint     Chief Complaint   Patient presents with    Shortness of Breath       HPI     Soren Freitas is a 33 year old female who presents with her boyfriend for evaluation of shortness of breath.     Patient's boyfriend gives the hpi.     Today (7/28/2024), patient was outside at the pool. She did not swim but stayed at the poolside lounge chairs. At 7:30 PM, she started having difficulty breathing. Patient used a duoneb at home with no relief. Her boyfriend shares the patient gets flare-ups with extreme weather changes and reports similar past instances.     I, Francesca Shah, am serving as a scribe to document services personally performed by Dr. Ayan Hardin MD, based on my observation and the provider's statements to me. I, Dr. Ayan Hardin MD attest that Francesca Shah is acting in a scribe capacity, has observed my performance of the services and has documented them in accordance with my direction.    Physical Exam     /78   Pulse 111   Temp 97.9  F (36.6  C) (Temporal)   Resp (!) 38   Wt 96.5 kg (212 lb 12.8 oz)   SpO2 96%   BMI 33.33 kg/m    Constitutional: Awake, alert, tripoding at the edge of the bed, in moderate respiratory distress  Head: Normocephalic, atraumatic.  ENT: Mucous membranes slightly dry  Eyes: Conjunctiva normal.  Respiratory: Labored respirations, tripoding at the edge of the bed, severe expiratory wheezing bilaterally with some trace coarseness bilaterally.  Cardiovascular: Sinus tachycardia.  Good peripheral perfusion.  Musculoskeletal: Moves all 4 extremities equally.  Integument: Warm, dry.  Neurologic: Alert & oriented x 3. Normal speech. Grossly normal motor and sensory function. No focal deficits noted.  Psychiatric: Normal mood    Labs & Imaging     Imaging reviewed and independently interpreted as below;   CXR images reviewed, no focal  consolidations, no pneumothorax    Results for orders placed or performed during the hospital encounter of 07/28/24   Chest XR,  PA & LAT    Impression    IMPRESSION: Negative chest.   Extra Red Top Tube   Result Value Ref Range    Hold Specimen JIC    Extra Green Top (Lithium Heparin) Tube   Result Value Ref Range    Hold Specimen JIC    Extra Purple Top Tube   Result Value Ref Range    Hold Specimen JIC         Ayan Hardin MD  07/29/24 0158

## 2024-07-29 NOTE — ED NOTES
Pt very SOB and needing a neb. Dr. Hardin in room and writer got albuterol neb 2.5mg out of [employer] via override. Administering neb and RT down in room to take over. Relayed that patient only has received 2.5mg albuterol neb

## 2024-07-29 NOTE — PROGRESS NOTES
ED RESPIRATORY CARE NOTE     Albuterol x2 (5mg) and duoneb X1 given.  Breath sounds expiratory wheezes pre and post; improved work of breathing.        BP (!) 137/94   Pulse (!) 129   Temp 97.9  F (36.6  C) (Temporal)   Resp (!) 38   Wt 96.5 kg (212 lb 12.8 oz)   SpO2 100%   BMI 33.33 kg/m   patient tolerated well.       Maycol Hwang, RT

## 2024-07-29 NOTE — ED TRIAGE NOTES
Pt is c/o an acute asthma exacerbation.  Pt states she will get flare-ups during high heat/humidity.  Pt denies chest pain.  Pt has home duoneb that she has already taken.

## 2024-09-21 ENCOUNTER — APPOINTMENT (OUTPATIENT)
Dept: RADIOLOGY | Facility: HOSPITAL | Age: 34
End: 2024-09-21
Attending: EMERGENCY MEDICINE
Payer: COMMERCIAL

## 2024-09-21 ENCOUNTER — HOSPITAL ENCOUNTER (EMERGENCY)
Facility: HOSPITAL | Age: 34
Discharge: LEFT BEFORE DISCHARGE | End: 2024-09-21
Attending: EMERGENCY MEDICINE | Admitting: EMERGENCY MEDICINE
Payer: COMMERCIAL

## 2024-09-21 VITALS
HEIGHT: 67 IN | DIASTOLIC BLOOD PRESSURE: 72 MMHG | RESPIRATION RATE: 22 BRPM | SYSTOLIC BLOOD PRESSURE: 117 MMHG | BODY MASS INDEX: 31.39 KG/M2 | TEMPERATURE: 97.3 F | HEART RATE: 104 BPM | WEIGHT: 200 LBS | OXYGEN SATURATION: 95 %

## 2024-09-21 DIAGNOSIS — J45.41 MODERATE PERSISTENT ASTHMA WITH ACUTE EXACERBATION: ICD-10-CM

## 2024-09-21 LAB
ANION GAP SERPL CALCULATED.3IONS-SCNC: 10 MMOL/L (ref 7–15)
BASOPHILS # BLD AUTO: 0.1 10E3/UL (ref 0–0.2)
BASOPHILS NFR BLD AUTO: 1 %
BUN SERPL-MCNC: 11.3 MG/DL (ref 6–20)
CALCIUM SERPL-MCNC: 8.9 MG/DL (ref 8.8–10.4)
CHLORIDE SERPL-SCNC: 104 MMOL/L (ref 98–107)
CREAT SERPL-MCNC: 1.04 MG/DL (ref 0.51–0.95)
EGFRCR SERPLBLD CKD-EPI 2021: 72 ML/MIN/1.73M2
EOSINOPHIL # BLD AUTO: 0.8 10E3/UL (ref 0–0.7)
EOSINOPHIL NFR BLD AUTO: 8 %
ERYTHROCYTE [DISTWIDTH] IN BLOOD BY AUTOMATED COUNT: 13.8 % (ref 10–15)
GLUCOSE SERPL-MCNC: 96 MG/DL (ref 70–99)
HCG SERPL QL: NEGATIVE
HCO3 SERPL-SCNC: 23 MMOL/L (ref 22–29)
HCT VFR BLD AUTO: 43.7 % (ref 35–47)
HGB BLD-MCNC: 14.9 G/DL (ref 11.7–15.7)
IMM GRANULOCYTES # BLD: 0 10E3/UL
IMM GRANULOCYTES NFR BLD: 0 %
LYMPHOCYTES # BLD AUTO: 3.3 10E3/UL (ref 0.8–5.3)
LYMPHOCYTES NFR BLD AUTO: 35 %
MAGNESIUM SERPL-MCNC: 2.1 MG/DL (ref 1.7–2.3)
MCH RBC QN AUTO: 28.1 PG (ref 26.5–33)
MCHC RBC AUTO-ENTMCNC: 34.1 G/DL (ref 31.5–36.5)
MCV RBC AUTO: 83 FL (ref 78–100)
MONOCYTES # BLD AUTO: 0.7 10E3/UL (ref 0–1.3)
MONOCYTES NFR BLD AUTO: 7 %
NEUTROPHILS # BLD AUTO: 4.6 10E3/UL (ref 1.6–8.3)
NEUTROPHILS NFR BLD AUTO: 49 %
NRBC # BLD AUTO: 0 10E3/UL
NRBC BLD AUTO-RTO: 0 /100
PLATELET # BLD AUTO: 287 10E3/UL (ref 150–450)
POTASSIUM SERPL-SCNC: 3.8 MMOL/L (ref 3.4–5.3)
RBC # BLD AUTO: 5.3 10E6/UL (ref 3.8–5.2)
SODIUM SERPL-SCNC: 137 MMOL/L (ref 135–145)
WBC # BLD AUTO: 9.4 10E3/UL (ref 4–11)

## 2024-09-21 PROCEDURE — 999N000157 HC STATISTIC RCP TIME EA 10 MIN

## 2024-09-21 PROCEDURE — 84703 CHORIONIC GONADOTROPIN ASSAY: CPT | Performed by: EMERGENCY MEDICINE

## 2024-09-21 PROCEDURE — 36415 COLL VENOUS BLD VENIPUNCTURE: CPT | Performed by: EMERGENCY MEDICINE

## 2024-09-21 PROCEDURE — 96375 TX/PRO/DX INJ NEW DRUG ADDON: CPT

## 2024-09-21 PROCEDURE — 99285 EMERGENCY DEPT VISIT HI MDM: CPT | Mod: 25

## 2024-09-21 PROCEDURE — 96361 HYDRATE IV INFUSION ADD-ON: CPT

## 2024-09-21 PROCEDURE — 80048 BASIC METABOLIC PNL TOTAL CA: CPT | Performed by: EMERGENCY MEDICINE

## 2024-09-21 PROCEDURE — 94640 AIRWAY INHALATION TREATMENT: CPT

## 2024-09-21 PROCEDURE — 250N000011 HC RX IP 250 OP 636: Performed by: EMERGENCY MEDICINE

## 2024-09-21 PROCEDURE — 96366 THER/PROPH/DIAG IV INF ADDON: CPT

## 2024-09-21 PROCEDURE — 71045 X-RAY EXAM CHEST 1 VIEW: CPT

## 2024-09-21 PROCEDURE — 250N000009 HC RX 250: Performed by: EMERGENCY MEDICINE

## 2024-09-21 PROCEDURE — 93005 ELECTROCARDIOGRAM TRACING: CPT | Performed by: EMERGENCY MEDICINE

## 2024-09-21 PROCEDURE — 96365 THER/PROPH/DIAG IV INF INIT: CPT

## 2024-09-21 PROCEDURE — 85025 COMPLETE CBC W/AUTO DIFF WBC: CPT | Performed by: EMERGENCY MEDICINE

## 2024-09-21 PROCEDURE — 83735 ASSAY OF MAGNESIUM: CPT | Performed by: EMERGENCY MEDICINE

## 2024-09-21 PROCEDURE — 258N000003 HC RX IP 258 OP 636: Performed by: EMERGENCY MEDICINE

## 2024-09-21 RX ORDER — IPRATROPIUM BROMIDE AND ALBUTEROL SULFATE 2.5; .5 MG/3ML; MG/3ML
3 SOLUTION RESPIRATORY (INHALATION) ONCE
Status: COMPLETED | OUTPATIENT
Start: 2024-09-21 | End: 2024-09-21

## 2024-09-21 RX ORDER — METHYLPREDNISOLONE SODIUM SUCCINATE 125 MG/2ML
125 INJECTION, POWDER, LYOPHILIZED, FOR SOLUTION INTRAMUSCULAR; INTRAVENOUS ONCE
Status: COMPLETED | OUTPATIENT
Start: 2024-09-21 | End: 2024-09-21

## 2024-09-21 RX ORDER — MAGNESIUM SULFATE HEPTAHYDRATE 40 MG/ML
2 INJECTION, SOLUTION INTRAVENOUS ONCE
Status: COMPLETED | OUTPATIENT
Start: 2024-09-21 | End: 2024-09-21

## 2024-09-21 RX ORDER — PREDNISONE 20 MG/1
TABLET ORAL
Qty: 10 TABLET | Refills: 0 | Status: SHIPPED | OUTPATIENT
Start: 2024-09-21 | End: 2024-10-06

## 2024-09-21 RX ORDER — ALBUTEROL SULFATE 0.83 MG/ML
2.5 SOLUTION RESPIRATORY (INHALATION)
Status: COMPLETED | OUTPATIENT
Start: 2024-09-21 | End: 2024-09-21

## 2024-09-21 RX ADMIN — ALBUTEROL SULFATE 2.5 MG: 2.5 SOLUTION RESPIRATORY (INHALATION) at 08:34

## 2024-09-21 RX ADMIN — MAGNESIUM SULFATE HEPTAHYDRATE 2 G: 40 INJECTION, SOLUTION INTRAVENOUS at 07:53

## 2024-09-21 RX ADMIN — ALBUTEROL SULFATE 2.5 MG: 2.5 SOLUTION RESPIRATORY (INHALATION) at 08:36

## 2024-09-21 RX ADMIN — METHYLPREDNISOLONE SODIUM SUCCINATE 125 MG: 125 INJECTION, POWDER, FOR SOLUTION INTRAMUSCULAR; INTRAVENOUS at 07:51

## 2024-09-21 RX ADMIN — IPRATROPIUM BROMIDE AND ALBUTEROL SULFATE 3 ML: .5; 3 SOLUTION RESPIRATORY (INHALATION) at 07:35

## 2024-09-21 RX ADMIN — ALBUTEROL SULFATE 2.5 MG: 2.5 SOLUTION RESPIRATORY (INHALATION) at 07:37

## 2024-09-21 RX ADMIN — SODIUM CHLORIDE 1000 ML: 9 INJECTION, SOLUTION INTRAVENOUS at 07:48

## 2024-09-21 ASSESSMENT — ACTIVITIES OF DAILY LIVING (ADL)
ADLS_ACUITY_SCORE: 35

## 2024-09-21 NOTE — DISCHARGE INSTRUCTIONS
You are leaving the ER against our medical recommendation and advice to be admitted for your asthma exacerbation. If you feel any worse or you change your mind and don't feel as though you are okay or safe at home and do want to be admitted for your asthma exacerbation (flare up) please come back to the ER so we can make sure you are safe.

## 2024-09-21 NOTE — ED NOTES
Pt discharged against medical advice. Pt left the ER with personal belongings and printed discharge instructions.

## 2024-09-21 NOTE — ED PROVIDER NOTES
"EMERGENCY DEPARTMENT ENCOUNTER      NAME: Soren Freitas  AGE: 33 year old female  YOB: 1990  MRN: [id number]  EVALUATION DATE & TIME: 9/21/2024  7:23 AM    PCP: Leyda, [facility]    ED PROVIDER: Dina Mcdonald M.D.      Chief Complaint   Patient presents with    Asthma Exacerbation         FINAL IMPRESSION:  1. Moderate persistent asthma with acute exacerbation          ED COURSE & MEDICAL DECISION MAKING:    ED Course as of 09/21/24 1009   Sat Sep 21, 2024   0730 Pt with diffuse wheezing but good air entry, sat 94% RA and no retractions, speaks in full sentences, very aggressive affect, declines viral PCR/XR and notes \"It's just ashtma\" with long h/o asthma, tried a dose of neb albuterol at home this AM without substantial relief, no recent steroid therapy or abx, amenable to stat duonebs, albuterol as needed, magnesium and methylprednisolone, IVF, EKG and BMP/CBC with hcg, XR   0830 Patient reassessed, sat 96% RA and still loud wheezing, pt again declines viral PCR, prior admissions for ashtma noted, good air entry, RT coming for two more albuterol treatments now with continued wheezing and will serially reassess   0848 Pt reassessed, nebs underway currently   0920 CXR reassuringly negative for infiltrate and magnesium WNL, hcg negative, CBC and BMP reassuringly WNL, will reassess agian shortly.   1003 Pt with persistent wheeze with sat 93% RA and tachycardia with  but overall improved compared to on arrival, still only moderate air entry but no retractions, she did ambulate to and from bathroom. Given moderate and persistent asthma exacerbation I recommended admission and she declined stating that when she gets admitted she sits in the room for a long time and that is frustrating and additionally she has a one year old and a two year old at home that she wants to be with today, refuses admission, aware I recommend admission with concern for acute respiratory worsening and " failure and she does promise she will return to [facility] if worsening condition. Patient discharged after being provided with extensive anticipatory guidance and given return precautions, importance of PMD follow-up emphasized. Rx to preferred pharmacy for steroid therapy to decrease severity of her current asthma exacerbation.      7:30 AM I met with the patient, obtained history, performed an initial exam, and discussed options and plan for diagnostics and treatment here in the ED.     Pertinent Labs & Imaging studies reviewed. (See chart for details)    Medical Decision Making  Obtained supplemental history:Supplemental history obtained?: Documented in chart and Family Member/Significant Other  Reviewed external records: External records reviewed?: Documented in chart and Inpatient Record: 08/08/2024 [facility] Admission  Care impacted by chronic illness:Chronic Lung Disease  Care significantly affected by social determinants of health:N/A  Did you consider but not order tests?: Work up considered but not performed and documented in chart, if applicable  Did you interpret images independently?: Independent interpretation of ECG and images noted in documentation, when applicable.  Consultation discussion with other provider:Did you involve another provider (consultant, , pharmacy, etc.)?: No  I recommended admission, patient left against medical advice.  Asthma or COPD Exacerbation:Smoking Cessation Counseling: Patient is NOT a current smoker.      At the conclusion of the encounter I discussed the results of all of the tests and the disposition. The questions were answered. The patient or family acknowledged understanding and was agreeable with the care plan.     MEDICATIONS GIVEN IN THE EMERGENCY:  Medications   ipratropium - albuterol 0.5 mg/2.5 mg/3 mL (DUONEB) neb solution 3 mL (3 mLs Nebulization $Given 9/21/24 5706)   albuterol (PROVENTIL) neb solution 2.5 mg (2.5 mg Nebulization $Given 9/21/24 9220)    methylPREDNISolone Na Suc (solu-MEDROL) injection 125 mg (125 mg Intravenous $Given 9/21/24 0751)   magnesium sulfate 2 g in 50 mL sterile water intermittent infusion (0 g Intravenous Stopped 9/21/24 0906)   sodium chloride 0.9% BOLUS 1,000 mL (0 mLs Intravenous Stopped 9/21/24 0948)       NEW PRESCRIPTIONS STARTED AT TODAY'S ER VISIT  New Prescriptions    PREDNISONE (DELTASONE) 20 MG TABLET    Take two tablets (= 40mg) each day for 5 (five) days          =================================================================    HPI      Soren Freitas is a 33 year old female with PMHx of asthma who presents to the ED today via walk-in with an asthma exacerbation.     Per the patient's brother, the patient had an asthma flare-up last night (09/20), but was able to go to sleep okay. However, since about 6:30 AM today (09/21), she has had a hard time breathing. Used an inhaler at home with no improvement. She otherwise does not have a fever or cough. No chance of pregnancy.     Per the the patient, she has not used any steroids or antibiotics within the past week.     Per Chart Review, the patient was admitted to [facility] on 08/08/2024 with severe dyspnea. Upon admission, she was in significant respiratory distress with tachycardia and tachypnea. Treated in emergency department with albuterol, DuoNeb and Methylprednisolone 125 mg with some relief. Placed on BiPAP for respiratory support due to severity of symptoms. Labs with mildly elevated WBC and respiratory acidosis. Chest x-ray with no acute findings. Improvement with DuoNeb every four hours. Weaned off of BiPAP by second day of admission and transitioned to oral Prednisone. Discharged with instructions to continue asthma medications.       REVIEW OF SYSTEMS   All other systems reviewed and are negative except as noted above in HPI.    PAST MEDICAL HISTORY:  Past Medical History:   Diagnosis Date    Obesity     Severe persistent asthma with acute  exacerbation (H28) 1/1/2015    Formatting of this note might be different from the original. 12/2014 Admit for exacerbation: Spirometry FEV1/FVC 68%, D/C w/dulera, albuterol, Spiriva.  Started singulair 10mg QD, Given NRT.  Dx as infant, has been on albuterol lifelong.    Last Assessment & Plan:  Formatting of this note might be different from the original. Stable on Singulair, cetirizine, albuterol, and dulera. Discussed COVID    Uncomplicated asthma        PAST SURGICAL HISTORY:  No past surgical history on file.    CURRENT MEDICATIONS:    acetaminophen (TYLENOL) 325 MG tablet  albuterol (PROAIR HFA/PROVENTIL HFA/VENTOLIN HFA) 108 (90 Base) MCG/ACT inhaler  albuterol (PROAIR HFA/PROVENTIL HFA/VENTOLIN HFA) 108 (90 Base) MCG/ACT inhaler  albuterol (PROVENTIL) (2.5 MG/3ML) 0.083% neb solution  albuterol (PROVENTIL) (2.5 MG/3ML) 0.083% neb solution  ipratropium - albuterol 0.5 mg/2.5 mg/3 mL (DUONEB) 0.5-2.5 (3) MG/3ML neb solution  ipratropium - albuterol 0.5 mg/2.5 mg/3 mL (DUONEB) 0.5-2.5 (3) MG/3ML neb solution  mometasone-formoterol (DULERA) 200-5 MCG/ACT inhaler  predniSONE (DELTASONE) 20 MG tablet        ALLERGIES:  Allergies   Allergen Reactions    Cashew Nut Oil Anaphylaxis and Angioedema    Fish Allergy Itching     Sea Food causes Throat swelling    Peanut-Containing Drug Products Angioedema and Swelling     Throat swelling.      Shellfish-Derived Products Angioedema       FAMILY HISTORY:  No family history on file.    SOCIAL HISTORY:   Social History     Socioeconomic History    Marital status: Single   Tobacco Use    Smoking status: Former     Social Determinants of Health     Financial Resource Strain: Low Risk  (8/9/2024)    Received from [facility]    Financial Resource Strain     Difficulty of Paying Living Expenses: 3   Food Insecurity: No Food Insecurity (8/9/2024)    Received from [facility]    Food Insecurity     Worried About  "Running Out of Food in the Last Year: 1   Transportation Needs: No Transportation Needs (8/9/2024)    Received from [facility]    Transportation Needs     Lack of Transportation (Medical): 1   Social Connections: Socially Integrated (8/9/2024)    Received from [facility]    Social Connections     Frequency of Communication with Friends and Family: 0   Housing Stability: Low Risk  (8/9/2024)    Received from [facility]    Housing Stability     Unable to Pay for Housing in the Last Year: 1       VITALS:  Patient Vitals for the past 24 hrs:   BP Temp Temp src Pulse Resp SpO2 Height Weight   09/21/24 0930 117/72 -- -- 104 22 95 % -- --   09/21/24 0900 111/61 -- -- 112 23 96 % -- --   09/21/24 0854 111/61 -- -- 105 23 100 % -- --   09/21/24 0834 -- -- -- 115 20 98 % -- --   09/21/24 0830 -- -- -- 117 24 96 % -- --   09/21/24 0801 -- -- -- (!) 125 30 97 % -- --   09/21/24 0756 -- -- -- 120 23 100 % -- --   09/21/24 0730 123/75 -- -- (!) 127 22 96 % -- --   09/21/24 0722 (!) 150/92 97.3  F (36.3  C) Temporal (!) 133 30 94 % 1.702 m (5' 7\") 90.7 kg (200 lb)       PHYSICAL EXAM    GENERAL: Awake, alert.  In no acute distress.   HEENT: Normocephalic, atraumatic.  Pupils equal, round and reactive.  Conjunctiva normal.  EOMI.  NECK: No stridor or apparent deformity.  PULMONARY: Symmetrical breath sounds without distress. Lungs clear to auscultation bilaterally without wheezes, rhonchi or rales. Diffuse breathing with good movement of air. No retractions or tachynpnea. Speaking in full sentences without limitations.  CARDIO: Regular rate and rhythm.  No significant murmur, rub or gallop.  Radial pulses strong and symmetrical.  ABDOMINAL: Abdomen soft, non-distended and non-tender to palpation.  No CVAT, no palpable hepatosplenomegaly.  EXTREMITIES: No lower extremity swelling or edema.    NEURO: Alert and oriented to person, " place and time.  Cranial nerves grossly intact.  No focal motor deficit.  PSYCH: Normal mood and affect  SKIN: No rashes      LAB:  All pertinent labs reviewed and interpreted.  Results for orders placed or performed during the hospital encounter of 09/21/24   XR Chest Port 1 View    Impression    IMPRESSION: Negative chest. No interval change   HCG qualitative Blood   Result Value Ref Range    hCG Serum Qualitative Negative Negative   Basic metabolic panel   Result Value Ref Range    Sodium 137 135 - 145 mmol/L    Potassium 3.8 3.4 - 5.3 mmol/L    Chloride 104 98 - 107 mmol/L    Carbon Dioxide (CO2) 23 22 - 29 mmol/L    Anion Gap 10 7 - 15 mmol/L    Urea Nitrogen 11.3 6.0 - 20.0 mg/dL    Creatinine 1.04 (H) 0.51 - 0.95 mg/dL    GFR Estimate 72 >60 mL/min/1.73m2    Calcium 8.9 8.8 - 10.4 mg/dL    Glucose 96 70 - 99 mg/dL   Result Value Ref Range    Magnesium 2.1 1.7 - 2.3 mg/dL   CBC with platelets and differential   Result Value Ref Range    WBC Count 9.4 4.0 - 11.0 10e3/uL    RBC Count 5.30 (H) 3.80 - 5.20 10e6/uL    Hemoglobin 14.9 11.7 - 15.7 g/dL    Hematocrit 43.7 35.0 - 47.0 %    MCV 83 78 - 100 fL    MCH 28.1 26.5 - 33.0 pg    MCHC 34.1 31.5 - 36.5 g/dL    RDW 13.8 10.0 - 15.0 %    Platelet Count 287 150 - 450 10e3/uL    % Neutrophils 49 %    % Lymphocytes 35 %    % Monocytes 7 %    % Eosinophils 8 %    % Basophils 1 %    % Immature Granulocytes 0 %    NRBCs per 100 WBC 0 <1 /100    Absolute Neutrophils 4.6 1.6 - 8.3 10e3/uL    Absolute Lymphocytes 3.3 0.8 - 5.3 10e3/uL    Absolute Monocytes 0.7 0.0 - 1.3 10e3/uL    Absolute Eosinophils 0.8 (H) 0.0 - 0.7 10e3/uL    Absolute Basophils 0.1 0.0 - 0.2 10e3/uL    Absolute Immature Granulocytes 0.0 <=0.4 10e3/uL    Absolute NRBCs 0.0 10e3/uL       RADIOLOGY:  Reviewed all pertinent imaging. Please see official radiology report.  XR Chest Port 1 View   Final Result   IMPRESSION: Negative chest. No interval change            EKG:    Reviewed and interpreted as: 123  BPM, sinus tachycardia. When compare with ECG of 03/25/2024, no significant change was found.       I have independently reviewed and interpreted the EKG(s) documented above.        I, Lolita Covarrubias, am serving as a scribe to document services personally performed by Dr. Dina Mcdonald based on my observation and the provider's statements to me. I, Dina Mcdonald MD attest that Lolita Covarrubias is acting in a scribe capacity, has observed my performance of the services and has documented them in accordance with my direction.       Dina Mcdonald MD  09/21/24 1016

## 2024-09-21 NOTE — PROGRESS NOTES
PT on room air with an SpO2 of 95%.  Insp and exp wheezes. Given Duoneb and albuterol immediately then an additional x2 albuterol nebs approx 1 hour later.  Increased aeration post neb. No shortness of breath noted.    Dustin Rogers, RT  9/21/2024

## 2024-09-21 NOTE — ED TRIAGE NOTES
Pt presents with wheezing and tachypnea. Pt has hx of asthma and symptoms began last night, increased by this am. Used home nebs and MDIs without relief. Pt is tripod positioning self.     Triage Assessment (Adult)       Row Name 09/21/24 0725          Triage Assessment    Airway WDL WDL        Respiratory WDL    Respiratory WDL rhythm/pattern;X     Rhythm/Pattern, Respiratory shortness of breath;tachypneic;dyspnea upon exertion        Skin Circulation/Temperature WDL    Skin Circulation/Temperature WDL WDL        Cardiac WDL    Cardiac WDL rhythm     Pulse Rate & Regularity tachycardic        Peripheral/Neurovascular WDL    Peripheral Neurovascular WDL WDL     Capillary Refill, General less than/equal to 3 secs        Arturo Coma Scale    Best Eye Response 4-->(E4) spontaneous     Best Motor Response 6-->(M6) obeys commands     Best Verbal Response 5-->(V5) oriented     Arturo Coma Scale Score 15

## 2024-09-30 LAB
ATRIAL RATE - MUSE: 123 BPM
DIASTOLIC BLOOD PRESSURE - MUSE: 75 MMHG
INTERPRETATION ECG - MUSE: NORMAL
P AXIS - MUSE: 72 DEGREES
PR INTERVAL - MUSE: 136 MS
QRS DURATION - MUSE: 76 MS
QT - MUSE: 320 MS
QTC - MUSE: 458 MS
R AXIS - MUSE: 85 DEGREES
SYSTOLIC BLOOD PRESSURE - MUSE: 123 MMHG
T AXIS - MUSE: 24 DEGREES
VENTRICULAR RATE- MUSE: 123 BPM

## 2024-10-06 ENCOUNTER — APPOINTMENT (OUTPATIENT)
Dept: RADIOLOGY | Facility: HOSPITAL | Age: 34
End: 2024-10-06
Attending: EMERGENCY MEDICINE
Payer: COMMERCIAL

## 2024-10-06 ENCOUNTER — HOSPITAL ENCOUNTER (INPATIENT)
Facility: HOSPITAL | Age: 34
LOS: 1 days | Discharge: HOME | End: 2024-10-07
Attending: EMERGENCY MEDICINE | Admitting: INTERNAL MEDICINE
Payer: COMMERCIAL

## 2024-10-06 ENCOUNTER — APPOINTMENT (OUTPATIENT)
Dept: CT IMAGING | Facility: HOSPITAL | Age: 34
End: 2024-10-06
Attending: EMERGENCY MEDICINE
Payer: COMMERCIAL

## 2024-10-06 DIAGNOSIS — J45.51 SEVERE PERSISTENT ASTHMA WITH EXACERBATION (H): ICD-10-CM

## 2024-10-06 DIAGNOSIS — J45.901 SEVERE ASTHMA WITH ACUTE EXACERBATION, UNSPECIFIED WHETHER PERSISTENT: ICD-10-CM

## 2024-10-06 DIAGNOSIS — J96.01 ACUTE RESPIRATORY FAILURE WITH HYPOXIA AND HYPERCAPNIA (H): ICD-10-CM

## 2024-10-06 DIAGNOSIS — J96.02 ACUTE RESPIRATORY FAILURE WITH HYPOXIA AND HYPERCAPNIA (H): ICD-10-CM

## 2024-10-06 LAB
ANION GAP SERPL CALCULATED.3IONS-SCNC: 11 MMOL/L (ref 7–15)
BASE EXCESS BLDV CALC-SCNC: -1.2 MMOL/L (ref -3–3)
BASE EXCESS BLDV CALC-SCNC: -1.2 MMOL/L (ref -3–3)
BASE EXCESS BLDV CALC-SCNC: -3.5 MMOL/L (ref -3–3)
BASOPHILS # BLD AUTO: 0.1 10E3/UL (ref 0–0.2)
BASOPHILS NFR BLD AUTO: 1 %
BUN SERPL-MCNC: 7.8 MG/DL (ref 6–20)
CALCIUM SERPL-MCNC: 8.7 MG/DL (ref 8.8–10.4)
CHLORIDE SERPL-SCNC: 106 MMOL/L (ref 98–107)
CREAT SERPL-MCNC: 1 MG/DL (ref 0.51–0.95)
CRP SERPL-MCNC: 3.7 MG/L
D DIMER PPP FEU-MCNC: 0.72 UG/ML FEU (ref 0–0.5)
EGFRCR SERPLBLD CKD-EPI 2021: 76 ML/MIN/1.73M2
EOSINOPHIL # BLD AUTO: 0.6 10E3/UL (ref 0–0.7)
EOSINOPHIL NFR BLD AUTO: 5 %
ERYTHROCYTE [DISTWIDTH] IN BLOOD BY AUTOMATED COUNT: 13.8 % (ref 10–15)
FLUAV RNA SPEC QL NAA+PROBE: NEGATIVE
FLUBV RNA RESP QL NAA+PROBE: NEGATIVE
GLUCOSE SERPL-MCNC: 116 MG/DL (ref 70–99)
HCG SERPL QL: NEGATIVE
HCO3 BLDV-SCNC: 22 MMOL/L (ref 21–28)
HCO3 BLDV-SCNC: 26 MMOL/L (ref 21–28)
HCO3 BLDV-SCNC: 28 MMOL/L (ref 21–28)
HCO3 SERPL-SCNC: 24 MMOL/L (ref 22–29)
HCT VFR BLD AUTO: 45.5 % (ref 35–47)
HGB BLD-MCNC: 15 G/DL (ref 11.7–15.7)
HOLD SPECIMEN: NORMAL
IMM GRANULOCYTES # BLD: 0 10E3/UL
IMM GRANULOCYTES NFR BLD: 0 %
LYMPHOCYTES # BLD AUTO: 3.8 10E3/UL (ref 0.8–5.3)
LYMPHOCYTES NFR BLD AUTO: 33 %
MCH RBC QN AUTO: 27.7 PG (ref 26.5–33)
MCHC RBC AUTO-ENTMCNC: 33 G/DL (ref 31.5–36.5)
MCV RBC AUTO: 84 FL (ref 78–100)
MONOCYTES # BLD AUTO: 0.6 10E3/UL (ref 0–1.3)
MONOCYTES NFR BLD AUTO: 5 %
NEUTROPHILS # BLD AUTO: 6.5 10E3/UL (ref 1.6–8.3)
NEUTROPHILS NFR BLD AUTO: 56 %
NRBC # BLD AUTO: 0 10E3/UL
NRBC BLD AUTO-RTO: 0 /100
NT-PROBNP SERPL-MCNC: <36 PG/ML (ref 0–450)
O2/TOTAL GAS SETTING VFR VENT: 21 %
O2/TOTAL GAS SETTING VFR VENT: 24 %
O2/TOTAL GAS SETTING VFR VENT: 35 %
OXYHGB MFR BLDV: 46 % (ref 70–75)
OXYHGB MFR BLDV: 48 % (ref 70–75)
OXYHGB MFR BLDV: 81 % (ref 70–75)
PCO2 BLDV: 42 MM HG (ref 40–50)
PCO2 BLDV: 52 MM HG (ref 40–50)
PCO2 BLDV: 62 MM HG (ref 40–50)
PH BLDV: 7.26 [PH] (ref 7.32–7.43)
PH BLDV: 7.31 [PH] (ref 7.32–7.43)
PH BLDV: 7.34 [PH] (ref 7.32–7.43)
PLATELET # BLD AUTO: 296 10E3/UL (ref 150–450)
PO2 BLDV: 30 MM HG (ref 25–47)
PO2 BLDV: 31 MM HG (ref 25–47)
PO2 BLDV: 48 MM HG (ref 25–47)
POTASSIUM SERPL-SCNC: 4.3 MMOL/L (ref 3.4–5.3)
RBC # BLD AUTO: 5.41 10E6/UL (ref 3.8–5.2)
RSV RNA SPEC NAA+PROBE: NEGATIVE
SAO2 % BLDV: 46.2 % (ref 70–75)
SAO2 % BLDV: 48.9 % (ref 70–75)
SAO2 % BLDV: 82.5 % (ref 70–75)
SARS-COV-2 RNA RESP QL NAA+PROBE: NEGATIVE
SODIUM SERPL-SCNC: 141 MMOL/L (ref 135–145)
TROPONIN T SERPL HS-MCNC: 8 NG/L
TROPONIN T SERPL HS-MCNC: 9 NG/L
WBC # BLD AUTO: 11.6 10E3/UL (ref 4–11)

## 2024-10-06 PROCEDURE — 250N000009 HC RX 250

## 2024-10-06 PROCEDURE — 36415 COLL VENOUS BLD VENIPUNCTURE: CPT | Performed by: EMERGENCY MEDICINE

## 2024-10-06 PROCEDURE — 71045 X-RAY EXAM CHEST 1 VIEW: CPT

## 2024-10-06 PROCEDURE — 5A09357 ASSISTANCE WITH RESPIRATORY VENTILATION, LESS THAN 24 CONSECUTIVE HOURS, CONTINUOUS POSITIVE AIRWAY PRESSURE: ICD-10-PCS | Performed by: INTERNAL MEDICINE

## 2024-10-06 PROCEDURE — 85025 COMPLETE CBC W/AUTO DIFF WBC: CPT | Performed by: EMERGENCY MEDICINE

## 2024-10-06 PROCEDURE — 250N000011 HC RX IP 250 OP 636: Performed by: EMERGENCY MEDICINE

## 2024-10-06 PROCEDURE — 99223 1ST HOSP IP/OBS HIGH 75: CPT | Performed by: INTERNAL MEDICINE

## 2024-10-06 PROCEDURE — 999N000185 HC STATISTIC TRANSPORT TIME EA 15 MIN

## 2024-10-06 PROCEDURE — 250N000011 HC RX IP 250 OP 636: Performed by: INTERNAL MEDICINE

## 2024-10-06 PROCEDURE — 94640 AIRWAY INHALATION TREATMENT: CPT

## 2024-10-06 PROCEDURE — 120N000001 HC R&B MED SURG/OB

## 2024-10-06 PROCEDURE — 82805 BLOOD GASES W/O2 SATURATION: CPT | Performed by: EMERGENCY MEDICINE

## 2024-10-06 PROCEDURE — 71275 CT ANGIOGRAPHY CHEST: CPT

## 2024-10-06 PROCEDURE — 93005 ELECTROCARDIOGRAM TRACING: CPT | Performed by: EMERGENCY MEDICINE

## 2024-10-06 PROCEDURE — 84703 CHORIONIC GONADOTROPIN ASSAY: CPT | Performed by: EMERGENCY MEDICINE

## 2024-10-06 PROCEDURE — 99285 EMERGENCY DEPT VISIT HI MDM: CPT | Mod: 25

## 2024-10-06 PROCEDURE — 250N000009 HC RX 250: Performed by: EMERGENCY MEDICINE

## 2024-10-06 PROCEDURE — 96374 THER/PROPH/DIAG INJ IV PUSH: CPT

## 2024-10-06 PROCEDURE — 84484 ASSAY OF TROPONIN QUANT: CPT | Performed by: EMERGENCY MEDICINE

## 2024-10-06 PROCEDURE — 96365 THER/PROPH/DIAG IV INF INIT: CPT

## 2024-10-06 PROCEDURE — 250N000009 HC RX 250: Performed by: INTERNAL MEDICINE

## 2024-10-06 PROCEDURE — 85379 FIBRIN DEGRADATION QUANT: CPT | Performed by: EMERGENCY MEDICINE

## 2024-10-06 PROCEDURE — 86140 C-REACTIVE PROTEIN: CPT | Performed by: INTERNAL MEDICINE

## 2024-10-06 PROCEDURE — 94660 CPAP INITIATION&MGMT: CPT

## 2024-10-06 PROCEDURE — 999N000157 HC STATISTIC RCP TIME EA 10 MIN

## 2024-10-06 PROCEDURE — 250N000013 HC RX MED GY IP 250 OP 250 PS 637: Performed by: INTERNAL MEDICINE

## 2024-10-06 PROCEDURE — 80048 BASIC METABOLIC PNL TOTAL CA: CPT | Performed by: EMERGENCY MEDICINE

## 2024-10-06 PROCEDURE — 94640 AIRWAY INHALATION TREATMENT: CPT | Mod: 76

## 2024-10-06 PROCEDURE — 87637 SARSCOV2&INF A&B&RSV AMP PRB: CPT | Performed by: EMERGENCY MEDICINE

## 2024-10-06 PROCEDURE — 83880 ASSAY OF NATRIURETIC PEPTIDE: CPT | Performed by: EMERGENCY MEDICINE

## 2024-10-06 RX ORDER — IPRATROPIUM BROMIDE AND ALBUTEROL SULFATE 2.5; .5 MG/3ML; MG/3ML
SOLUTION RESPIRATORY (INHALATION)
Status: COMPLETED
Start: 2024-10-06 | End: 2024-10-06

## 2024-10-06 RX ORDER — IPRATROPIUM BROMIDE AND ALBUTEROL SULFATE 2.5; .5 MG/3ML; MG/3ML
3 SOLUTION RESPIRATORY (INHALATION) ONCE
Status: COMPLETED | OUTPATIENT
Start: 2024-10-06 | End: 2024-10-06

## 2024-10-06 RX ORDER — ENOXAPARIN SODIUM 100 MG/ML
40 INJECTION SUBCUTANEOUS EVERY 24 HOURS
Status: DISCONTINUED | OUTPATIENT
Start: 2024-10-06 | End: 2024-10-07 | Stop reason: HOSPADM

## 2024-10-06 RX ORDER — ONDANSETRON 2 MG/ML
4 INJECTION INTRAMUSCULAR; INTRAVENOUS EVERY 6 HOURS PRN
Status: DISCONTINUED | OUTPATIENT
Start: 2024-10-06 | End: 2024-10-07 | Stop reason: HOSPADM

## 2024-10-06 RX ORDER — ONDANSETRON 4 MG/1
4 TABLET, ORALLY DISINTEGRATING ORAL EVERY 6 HOURS PRN
Status: DISCONTINUED | OUTPATIENT
Start: 2024-10-06 | End: 2024-10-07 | Stop reason: HOSPADM

## 2024-10-06 RX ORDER — ALBUTEROL SULFATE 0.83 MG/ML
2.5 SOLUTION RESPIRATORY (INHALATION) EVERY 6 HOURS PRN
Status: DISCONTINUED | OUTPATIENT
Start: 2024-10-06 | End: 2024-10-07 | Stop reason: HOSPADM

## 2024-10-06 RX ORDER — AZITHROMYCIN 250 MG/1
500 TABLET, FILM COATED ORAL ONCE
Status: COMPLETED | OUTPATIENT
Start: 2024-10-06 | End: 2024-10-06

## 2024-10-06 RX ORDER — MAGNESIUM SULFATE HEPTAHYDRATE 40 MG/ML
2 INJECTION, SOLUTION INTRAVENOUS ONCE
Status: COMPLETED | OUTPATIENT
Start: 2024-10-06 | End: 2024-10-06

## 2024-10-06 RX ORDER — FLUTICASONE FUROATE AND VILANTEROL 200; 25 UG/1; UG/1
1 POWDER RESPIRATORY (INHALATION) DAILY
Status: DISCONTINUED | OUTPATIENT
Start: 2024-10-07 | End: 2024-10-07 | Stop reason: HOSPADM

## 2024-10-06 RX ORDER — ACETAMINOPHEN 650 MG/1
650 SUPPOSITORY RECTAL EVERY 4 HOURS PRN
Status: DISCONTINUED | OUTPATIENT
Start: 2024-10-06 | End: 2024-10-07 | Stop reason: HOSPADM

## 2024-10-06 RX ORDER — ALBUTEROL SULFATE 90 UG/1
2 INHALANT RESPIRATORY (INHALATION) EVERY 6 HOURS PRN
Status: DISCONTINUED | OUTPATIENT
Start: 2024-10-06 | End: 2024-10-07 | Stop reason: HOSPADM

## 2024-10-06 RX ORDER — MONTELUKAST SODIUM 10 MG/1
10 TABLET ORAL EVERY MORNING
COMMUNITY

## 2024-10-06 RX ORDER — MONTELUKAST SODIUM 10 MG/1
10 TABLET ORAL EVERY MORNING
Status: DISCONTINUED | OUTPATIENT
Start: 2024-10-07 | End: 2024-10-07 | Stop reason: HOSPADM

## 2024-10-06 RX ORDER — METHYLPREDNISOLONE SODIUM SUCCINATE 125 MG/2ML
125 INJECTION INTRAMUSCULAR; INTRAVENOUS ONCE
Status: COMPLETED | OUTPATIENT
Start: 2024-10-06 | End: 2024-10-06

## 2024-10-06 RX ORDER — IPRATROPIUM BROMIDE AND ALBUTEROL SULFATE 2.5; .5 MG/3ML; MG/3ML
1 SOLUTION RESPIRATORY (INHALATION) EVERY 6 HOURS PRN
Status: ON HOLD | COMMUNITY
End: 2024-10-07

## 2024-10-06 RX ORDER — IOPAMIDOL 755 MG/ML
90 INJECTION, SOLUTION INTRAVASCULAR ONCE
Status: COMPLETED | OUTPATIENT
Start: 2024-10-06 | End: 2024-10-06

## 2024-10-06 RX ORDER — METHYLPREDNISOLONE SODIUM SUCCINATE 40 MG/ML
40 INJECTION INTRAMUSCULAR; INTRAVENOUS EVERY 6 HOURS
Status: DISCONTINUED | OUTPATIENT
Start: 2024-10-06 | End: 2024-10-07 | Stop reason: HOSPADM

## 2024-10-06 RX ORDER — AZITHROMYCIN 250 MG/1
250 TABLET, FILM COATED ORAL DAILY
Status: DISCONTINUED | OUTPATIENT
Start: 2024-10-07 | End: 2024-10-07 | Stop reason: HOSPADM

## 2024-10-06 RX ORDER — CALCIUM CARBONATE 500 MG/1
1000 TABLET, CHEWABLE ORAL 4 TIMES DAILY PRN
Status: DISCONTINUED | OUTPATIENT
Start: 2024-10-06 | End: 2024-10-07 | Stop reason: HOSPADM

## 2024-10-06 RX ORDER — LIDOCAINE 40 MG/G
CREAM TOPICAL
Status: DISCONTINUED | OUTPATIENT
Start: 2024-10-06 | End: 2024-10-07 | Stop reason: HOSPADM

## 2024-10-06 RX ORDER — IPRATROPIUM BROMIDE AND ALBUTEROL SULFATE 2.5; .5 MG/3ML; MG/3ML
1 SOLUTION RESPIRATORY (INHALATION)
Status: DISCONTINUED | OUTPATIENT
Start: 2024-10-06 | End: 2024-10-07 | Stop reason: HOSPADM

## 2024-10-06 RX ORDER — AMOXICILLIN 250 MG
1 CAPSULE ORAL 2 TIMES DAILY PRN
Status: DISCONTINUED | OUTPATIENT
Start: 2024-10-06 | End: 2024-10-07 | Stop reason: HOSPADM

## 2024-10-06 RX ORDER — AMOXICILLIN 250 MG
2 CAPSULE ORAL 2 TIMES DAILY PRN
Status: DISCONTINUED | OUTPATIENT
Start: 2024-10-06 | End: 2024-10-07 | Stop reason: HOSPADM

## 2024-10-06 RX ORDER — ACETAMINOPHEN 325 MG/1
650 TABLET ORAL EVERY 4 HOURS PRN
Status: DISCONTINUED | OUTPATIENT
Start: 2024-10-06 | End: 2024-10-07 | Stop reason: HOSPADM

## 2024-10-06 RX ADMIN — METHYLPREDNISOLONE SODIUM SUCCINATE 40 MG: 40 INJECTION, POWDER, FOR SOLUTION INTRAMUSCULAR; INTRAVENOUS at 21:53

## 2024-10-06 RX ADMIN — IPRATROPIUM BROMIDE AND ALBUTEROL SULFATE 6 ML: .5; 3 SOLUTION RESPIRATORY (INHALATION) at 17:03

## 2024-10-06 RX ADMIN — MAGNESIUM SULFATE HEPTAHYDRATE 2 G: 40 INJECTION, SOLUTION INTRAVENOUS at 17:24

## 2024-10-06 RX ADMIN — METHYLPREDNISOLONE SODIUM SUCCINATE 125 MG: 125 INJECTION, POWDER, FOR SOLUTION INTRAMUSCULAR; INTRAVENOUS at 17:20

## 2024-10-06 RX ADMIN — PANTOPRAZOLE SODIUM 40 MG: 40 INJECTION, POWDER, FOR SOLUTION INTRAVENOUS at 21:55

## 2024-10-06 RX ADMIN — IPRATROPIUM BROMIDE AND ALBUTEROL SULFATE 3 ML: .5; 3 SOLUTION RESPIRATORY (INHALATION) at 21:03

## 2024-10-06 RX ADMIN — IOPAMIDOL 90 ML: 755 INJECTION, SOLUTION INTRAVENOUS at 19:18

## 2024-10-06 RX ADMIN — IPRATROPIUM BROMIDE AND ALBUTEROL SULFATE 6 ML: 2.5; .5 SOLUTION RESPIRATORY (INHALATION) at 17:03

## 2024-10-06 RX ADMIN — ENOXAPARIN SODIUM 40 MG: 40 INJECTION SUBCUTANEOUS at 21:56

## 2024-10-06 RX ADMIN — IPRATROPIUM BROMIDE AND ALBUTEROL SULFATE 3 ML: .5; 3 SOLUTION RESPIRATORY (INHALATION) at 22:30

## 2024-10-06 RX ADMIN — AZITHROMYCIN DIHYDRATE 500 MG: 250 TABLET, FILM COATED ORAL at 21:52

## 2024-10-06 RX ADMIN — IPRATROPIUM BROMIDE AND ALBUTEROL SULFATE 3 ML: .5; 3 SOLUTION RESPIRATORY (INHALATION) at 18:03

## 2024-10-06 ASSESSMENT — ACTIVITIES OF DAILY LIVING (ADL)
ADLS_ACUITY_SCORE: 35

## 2024-10-06 NOTE — ED TRIAGE NOTES
Pt coming from home via EMS. Medics gave 2 duonebs and 3 albuterol nebs (total of 1mg atrovent and 7.5 of albuterol per medics). Pt is tripoding, resp at 28 bpm, hr 150's and still has inspiratory/expiratory wheezing, but states feeling a little better     Triage Assessment (Adult)       Row Name 10/06/24 4472          Triage Assessment    Airway WDL WDL     Additional Documentation Breath Sounds (Group)        Respiratory WDL    Respiratory WDL X  wheezing despite multiple nebs per medics        Breath Sounds    Breath Sounds All Fields     All Lung Fields Breath Sounds wheezes, expiratory;wheezes, inspiratory        Skin Circulation/Temperature WDL    Skin Circulation/Temperature WDL WDL        Cardiac WDL    Cardiac WDL X  tachycardia with multiple nebs     Cardiac Rhythm ST        Peripheral/Neurovascular WDL    Peripheral Neurovascular WDL WDL        Cognitive/Neuro/Behavioral WDL    Cognitive/Neuro/Behavioral WDL WDL

## 2024-10-06 NOTE — ED NOTES
Bed: JNED-10  Expected date: 10/6/24  Expected time:   Means of arrival:   Comments:  [address]  33 female  Asthma attack  RR 40's  Duoneb

## 2024-10-06 NOTE — PROGRESS NOTES
RESPIRATORY CARE NOTE     Patient arrived via ems and given 2 duonebs and 3 albuterol nebs enroute. X2 more duonebs given as patient had audible inspiratory and expiratory wheezing, upper airway assessed and negative for stridor.   Increased aeration throughout with continued expiratory wheezing. RR 27, minimal retractions, patient able to talk in full sentences. HR in the 150s.     She states that she is unsure of the trigger, continues to take  her dulera and has albuterol as needed.     Placed on bipap 10/4 R12 due to WOB, 35% Fi02. Tolerating well.     1808 Duoneb given, patient remains on Bipap, Fi02 28%, current respiratory rate is 23.      Vani Quintana, RT

## 2024-10-06 NOTE — ED PROVIDER NOTES
EMERGENCY DEPARTMENT ENCOUNTER      NAME: Soren Freitas  AGE: 33 year old female  YOB: 1990  MRN: [id number]  EVALUATION DATE & TIME: 10/6/2024  4:52 PM    PCP: [facility], [facility]    ED PROVIDER: Molina Randolph MD      Chief Complaint   Patient presents with    Asthma Exacerbation         FINAL IMPRESSION:  1. Severe persistent asthma with exacerbation (H)    2. Acute respiratory failure with hypoxia and hypercapnia (H)          ED COURSE & MEDICAL DECISION MAKING:    Pertinent Labs & Imaging studies reviewed. (See chart for details)  33 year old female presents to the Emergency Department for evaluation of wheezing, difficulty breathing, moderate respiratory distress    ED Course as of 10/06/24 2108   Sun Oct 06, 2024   1722 Patient presents in moderate respiratory distress.  Had 3 DuoNebs by medics.  RT gave 2 nebs upon arrival.   1723 Patient has had previous hospitalizations requiring BiPAP as most recently as this summer.  It appears she was recently in the ER the last 2 weeks   1723 Reportedly has been compliant with Dulera, Singulair, but does have to use rescue inhaler commonly.  States she has a pulmonologist.  States triggers for her include temperature changes which she thinks is a trigger for her.  Denies URI symptoms.   1723 Patient symptoms started abruptly 2 hours ago.  She does not smoke or vape   1723 Patient has moderate respiratory distress, is not altered, is not anxious, is using some accessory muscles, is speaking in broken sentences, and on pulse oximetry has heart rate 116 oxygenation 96%   1724 I asked RT to initiate BiPAP, give magnesium, give Solu-Medrol, will obtain portable chest x-ray, dyspnea labs to look for heart failure, PE, ACS, pneumonia   1812 D-Dimer Quantitative(!): 0.72  Will obtain CTA PE   1813 Troponin T, High Sensitivity: 9  Normal   1813 PCO2 Venous(!): 62   1813 Ph Venous(!): 7.26  Is on BiPAP currently.  Already got magnesium, 5 doses of  DuoNebs, Solu-Medrol, will repeat   1813 I independently reviewed chest x-ray did not note any pneumothorax but do note flattening of the diaphragms consistent with air trapping   1933 VBG shows improvement   1954 Notifed by nursing that patient was transitioned off of BiPAP by respiratory therapy roughly 705.  Will repeat VBG at 805 to make sure patient is still improving.  Currently 1 L of oxygen   2056 Patient is now off of oxygen.  Off of BiPAP.  Still has some wheezing and I feel patient would benefit from admission which patient agrees with since she has had multiple hospital admissions for asthma   2106 Marked improvement in her symptoms.  Off of BiPAP.  Off of oxygen.  Resolution of hypercapnic respiratory failure.  Does have some ongoing wheezing and increased work of breathing we will give additional DuoNebs and admit to the hospital   2107 SARS CoV2 PCR: Negative   2107 Resp Syncytial Virus: Negative   2107 Influenza B: Negative   2107 Influenza A: Negative   2107 Troponin T, High Sensitivity: 8   2107 HCG Qualitative Serum: Negative   2107 D-Dimer Quantitative(!): 0.72  CTA PE ordered   CT without pulmonary emboli but does show fibrosis    5:12 PM I met with the patient and performed my initial physical exam. I spoke with them about the workup going forward.  9:05 PM I spoke with Dr. Quinn, hospitlaist, about the patient and the plan going forward.     Medical Decision Making  Obtained supplemental history:Supplemental history obtained?: No  Reviewed external records: External records reviewed?: Documented in chart  Care impacted by chronic illness:Other: Asthma  Care significantly affected by social determinants of health:N/A  Did you consider but not order tests?: Work up considered but not performed and documented in chart, if applicable  Did you interpret images independently?: Independent interpretation of ECG and images noted in documentation, when applicable.  Consultation discussion with other  provider:Did you involve another provider (consultant, , pharmacy, etc.)?: I discussed the care with another health care provider, see documentation for details.  Admit.        MIPS: CT Pulmonary Angiogram:The patient had an abnormal d-dimer.     At the conclusion of the encounter I discussed the results of all of the tests and the disposition. The questions were answered. The patient or family acknowledged understanding and was agreeable with the care plan.     40 minutes of critical care time     MEDICATIONS GIVEN IN THE EMERGENCY:  Medications   magnesium sulfate 2 g in 50 mL sterile water intermittent infusion (0 g Intravenous Stopped 10/6/24 1830)   methylPREDNISolone Na Suc (solu-MEDROL) injection 125 mg (125 mg Intravenous $Given 10/6/24 1720)   ipratropium - albuterol 0.5 mg/2.5 mg/3 mL (DUONEB) neb solution 3 mL (6 mLs Nebulization $Given 10/6/24 1703)   ipratropium - albuterol 0.5 mg/2.5 mg/3 mL (DUONEB) neb solution 3 mL (3 mLs Nebulization $Given 10/6/24 1803)   iopamidol (ISOVUE-370) solution 90 mL (90 mLs Intravenous $Given 10/6/24 1918)   ipratropium - albuterol 0.5 mg/2.5 mg/3 mL (DUONEB) neb solution 3 mL (3 mLs Nebulization $Given 10/6/24 2103)       NEW PRESCRIPTIONS STARTED AT TODAY'S ER VISIT  New Prescriptions    No medications on file          =================================================================    HPI    Patient information was obtained from: Patient    Use of : N/A        Soren GRAY Fortino is a 33 year old female with a pertinent history of acute respiratory failure with hypoxia, severe asthma with exasperation, community-acquired bilateral lower lobe pneumonia, and obesity who presents to this ED via EMS for evaluation of asthma exacerbation.    Per patient, she states a few hours ago she was doing completely fine.  She states that she was at home with her children when she suddenly had an onset of some wheezing and shortness of breath.  She states that she knew  this was an exacerbation of her asthma, so she took a nebulizer and used her emergency inhaler.  However, this did not resolve her issues and the symptoms worsen.  Therefore, she called 911.  She states that medics gave patient 2 treatments of DuoNeb and 3 albuterol treatments.  Denies getting steroids.  Once here, patient was also given 2 DuoNeb treatments via respiratory therapist.    Patient states that she does have a history of this and states that normally this happens following a change in weather.  Patient shares that she does use her rescue inhaler more than usual (more than 1 time weekly).  She is also taking Dulera 2 times daily for the past few years.  Also takes taking Singulair for her chronic asthma.    Denies any rhinorrhea, fever, sore throat.  Denies smoking or vaping.  Denies any chemical exposure transparency.    Otherwise in normal state of health. No further concerns at this time.       REVIEW OF SYSTEMS   As per HPI.    PAST MEDICAL HISTORY:  Past Medical History:   Diagnosis Date    Obesity     Severe persistent asthma with acute exacerbation (H) 1/1/2015    Formatting of this note might be different from the original. 12/2014 Admit for exacerbation: Spirometry FEV1/FVC 68%, D/C w/dulera, albuterol, Spiriva.  Started singulair 10mg QD, Given NRT.  Dx as infant, has been on albuterol lifelong.    Last Assessment & Plan:  Formatting of this note might be different from the original. Stable on Singulair, cetirizine, albuterol, and dulera. Discussed COVID    Uncomplicated asthma        PAST SURGICAL HISTORY:  No past surgical history on file.        CURRENT MEDICATIONS:    albuterol (PROAIR HFA/PROVENTIL HFA/VENTOLIN HFA) 108 (90 Base) MCG/ACT inhaler  albuterol (PROVENTIL) (2.5 MG/3ML) 0.083% neb solution  ipratropium - albuterol 0.5 mg/2.5 mg/3 mL (DUONEB) 0.5-2.5 (3) MG/3ML neb solution  mometasone-formoterol (DULERA) 200-5 MCG/ACT inhaler  montelukast (SINGULAIR) 10 MG  "tablet        ALLERGIES:  Allergies   Allergen Reactions    Cashew Nut Oil Anaphylaxis and Angioedema    Fish Allergy Itching     Sea Food causes Throat swelling    Peanut-Containing Drug Products Angioedema and Swelling     Throat swelling.      Shellfish-Derived Products Angioedema       FAMILY HISTORY:  No family history on file.    SOCIAL HISTORY:   Social History     Socioeconomic History    Marital status: Single   Tobacco Use    Smoking status: Former     Social Determinants of Health     Financial Resource Strain: Low Risk  (8/9/2024)    Received from [facility]    Financial Resource Strain     Difficulty of Paying Living Expenses: 3   Food Insecurity: No Food Insecurity (8/9/2024)    Received from [facility]    Food Insecurity     Worried About Running Out of Food in the Last Year: 1   Transportation Needs: No Transportation Needs (8/9/2024)    Received from [facility]    Transportation Needs     Lack of Transportation (Medical): 1   Social Connections: Socially Integrated (8/9/2024)    Received from [facility]    Social Connections     Frequency of Communication with Friends and Family: 0   Housing Stability: Low Risk  (8/9/2024)    Received from [facility]    Housing Stability     Unable to Pay for Housing in the Last Year: 1       VITALS:  BP (!) 149/82   Pulse (!) 121   Temp 99.9  F (37.7  C) (Temporal)   Resp 23   Ht 1.702 m (5' 7\")   Wt 90.7 kg (200 lb)   SpO2 99%   BMI 31.32 kg/m      PHYSICAL EXAM      Vitals: BP (!) 149/82   Pulse (!) 121   Temp 99.9  F (37.7  C) (Temporal)   Resp 23   Ht 1.702 m (5' 7\")   Wt 90.7 kg (200 lb)   SpO2 99%   BMI 31.32 kg/m    General: Anxious, moderate respiratory distress  Eyes: Conjunctivae non-injected. Sclera anicteric.  HENT: Atraumatic.  Neck: Supple.  Respiratory/Chest: Moderate " respiratory distress.  Speaking in incomplete sentences.  Use of accessory muscles.  Wheezing  Cardio: Tachycardic.  Abdomen: non distended  Musculoskeletal: Normal extremities. No edema or erythema.  Skin: Normal color. No rash or diaphoresis.  Neurologic: Face symmetric, moves all extremities spontaneously. Speech clear.  Psychiatric: Oriented to person, place, and time. Affect appropriate.    LAB:  All pertinent labs reviewed and interpreted.  Results for orders placed or performed during the hospital encounter of 10/06/24   XR Chest Port 1 View    Impression    IMPRESSION: Negative chest.   CT Chest Pulmonary Embolism w Contrast    Impression    IMPRESSION:  1.  Negative for pulmonary emboli and negative for dissections.    2.  Chronic mild patchy groundglass infiltrates in both lungs going back several years likely some nonspecific developing fibrosis from prior infection or inflammatory change.    3.  No significant new findings.   Extra Blue Top Tube   Result Value Ref Range    Hold Specimen JIC    Extra Red Top Tube   Result Value Ref Range    Hold Specimen JIC    Extra Green Top (Lithium Heparin) Tube   Result Value Ref Range    Hold Specimen JIC    Extra Purple Top Tube   Result Value Ref Range    Hold Specimen JIC    Extra Purple Top Tube   Result Value Ref Range    Hold Specimen JIC    Extra Heparinized Syringe   Result Value Ref Range    Hold Specimen JIC    Extra Green Top (Lithium Heparin) ON ICE   Result Value Ref Range    Hold Specimen JIC    Result Value Ref Range    Troponin T, High Sensitivity 9 <=14 ng/L   Basic metabolic panel   Result Value Ref Range    Sodium 141 135 - 145 mmol/L    Potassium 4.3 3.4 - 5.3 mmol/L    Chloride 106 98 - 107 mmol/L    Carbon Dioxide (CO2) 24 22 - 29 mmol/L    Anion Gap 11 7 - 15 mmol/L    Urea Nitrogen 7.8 6.0 - 20.0 mg/dL    Creatinine 1.00 (H) 0.51 - 0.95 mg/dL    GFR Estimate 76 >60 mL/min/1.73m2    Calcium 8.7 (L) 8.8 - 10.4 mg/dL    Glucose 116 (H) 70 - 99  mg/dL   D dimer quantitative   Result Value Ref Range    D-Dimer Quantitative 0.72 (H) 0.00 - 0.50 ug/mL FEU   HCG QUALitative pregnancy (blood)   Result Value Ref Range    hCG Serum Qualitative Negative Negative   Nt probnp inpatient   Result Value Ref Range    N terminal Pro BNP Inpatient <36 0 - 450 pg/mL   Symptomatic Influenza A/B, RSV, & SARS-CoV2 PCR (COVID-19) Nasopharyngeal    Specimen: Nasopharyngeal; Swab   Result Value Ref Range    Influenza A PCR Negative Negative    Influenza B PCR Negative Negative    RSV PCR Negative Negative    SARS CoV2 PCR Negative Negative   Blood gas venous   Result Value Ref Range    pH Venous 7.26 (L) 7.32 - 7.43    pCO2 Venous 62 (H) 40 - 50 mm Hg    pO2 Venous 31 25 - 47 mm Hg    Bicarbonate Venous 28 21 - 28 mmol/L    Base Excess/Deficit Venous -1.2 -3.0 - 3.0 mmol/L    FIO2 21     Oxyhemoglobin Venous 46 (L) 70 - 75 %    O2 Sat, Venous 46.2 (L) 70.0 - 75.0 %   CBC with platelets and differential   Result Value Ref Range    WBC Count 11.6 (H) 4.0 - 11.0 10e3/uL    RBC Count 5.41 (H) 3.80 - 5.20 10e6/uL    Hemoglobin 15.0 11.7 - 15.7 g/dL    Hematocrit 45.5 35.0 - 47.0 %    MCV 84 78 - 100 fL    MCH 27.7 26.5 - 33.0 pg    MCHC 33.0 31.5 - 36.5 g/dL    RDW 13.8 10.0 - 15.0 %    Platelet Count 296 150 - 450 10e3/uL    % Neutrophils 56 %    % Lymphocytes 33 %    % Monocytes 5 %    % Eosinophils 5 %    % Basophils 1 %    % Immature Granulocytes 0 %    NRBCs per 100 WBC 0 <1 /100    Absolute Neutrophils 6.5 1.6 - 8.3 10e3/uL    Absolute Lymphocytes 3.8 0.8 - 5.3 10e3/uL    Absolute Monocytes 0.6 0.0 - 1.3 10e3/uL    Absolute Eosinophils 0.6 0.0 - 0.7 10e3/uL    Absolute Basophils 0.1 0.0 - 0.2 10e3/uL    Absolute Immature Granulocytes 0.0 <=0.4 10e3/uL    Absolute NRBCs 0.0 10e3/uL   Result Value Ref Range    Troponin T, High Sensitivity 8 <=14 ng/L   Blood gas venous   Result Value Ref Range    pH Venous 7.31 (L) 7.32 - 7.43    pCO2 Venous 52 (H) 40 - 50 mm Hg    pO2 Venous 30  25 - 47 mm Hg    Bicarbonate Venous 26 21 - 28 mmol/L    Base Excess/Deficit Venous -1.2 -3.0 - 3.0 mmol/L    FIO2 35     Oxyhemoglobin Venous 48 (L) 70 - 75 %    O2 Sat, Venous 48.9 (L) 70.0 - 75.0 %   Blood gas venous   Result Value Ref Range    pH Venous 7.34 7.32 - 7.43    pCO2 Venous 42 40 - 50 mm Hg    pO2 Venous 48 (H) 25 - 47 mm Hg    Bicarbonate Venous 22 21 - 28 mmol/L    Base Excess/Deficit Venous -3.5 (L) -3.0 - 3.0 mmol/L    FIO2 24     Oxyhemoglobin Venous 81 (H) 70 - 75 %    O2 Sat, Venous 82.5 (H) 70.0 - 75.0 %       RADIOLOGY:  Reviewed all pertinent imaging. Please see official radiology report.  CT Chest Pulmonary Embolism w Contrast   Final Result   IMPRESSION:   1.  Negative for pulmonary emboli and negative for dissections.      2.  Chronic mild patchy groundglass infiltrates in both lungs going back several years likely some nonspecific developing fibrosis from prior infection or inflammatory change.      3.  No significant new findings.      XR Chest Port 1 View   Final Result   IMPRESSION: Negative chest.          EKG:    Performed at: 1720    Impression: Sinus tachycardia, marked artifact  Rightward axis  Marked ST abnormality, possible inferior subendocardial injury  Abnormal ECG    Rate: 157 bpm  Rhythm: Sinus tachycardia  Axis: 91  GA Interval: 118 MS  QRS Interval: 68 MS  QTc Interval: 410 MS  ST Changes: ST now depressed in anterior leads  Comparison: When compared with ECG of 9/21/2024, ST now depressed in anterior leads  T wave inversion now evident in inferior leads  Nonspecific T wave abnormality now evident in lateral leads.    I have independently reviewed and interpreted the EKG(s) documented above.          I, Batsheva Bourgeois, am serving as a scribe to document services personally performed by Molina Randolph MD based on my observation and the provider's statements to me. I, Molina Randolph MD, attest that Batsheva Bourgeois is acting in a scribe capacity, has observed my performance of the  services and has documented them in accordance with my direction.    Molina Randolph MD  [facility] EMERGENCY DEPARTMENT  [facility] [facility] [address] [phone]        Molina Randolph MD  10/06/24 3499

## 2024-10-07 ENCOUNTER — APPOINTMENT (OUTPATIENT)
Dept: CARDIOLOGY | Facility: HOSPITAL | Age: 34
End: 2024-10-07
Attending: INTERNAL MEDICINE
Payer: COMMERCIAL

## 2024-10-07 VITALS
RESPIRATION RATE: 20 BRPM | WEIGHT: 200 LBS | DIASTOLIC BLOOD PRESSURE: 79 MMHG | HEART RATE: 93 BPM | HEIGHT: 67 IN | BODY MASS INDEX: 31.39 KG/M2 | TEMPERATURE: 97.8 F | SYSTOLIC BLOOD PRESSURE: 135 MMHG | OXYGEN SATURATION: 98 %

## 2024-10-07 LAB
ALBUMIN SERPL BCG-MCNC: 4.2 G/DL (ref 3.5–5.2)
ALP SERPL-CCNC: 78 U/L (ref 40–150)
ALT SERPL W P-5'-P-CCNC: 20 U/L (ref 0–50)
ANION GAP SERPL CALCULATED.3IONS-SCNC: 13 MMOL/L (ref 7–15)
AST SERPL W P-5'-P-CCNC: 22 U/L (ref 0–45)
BASOPHILS # BLD AUTO: 0 10E3/UL (ref 0–0.2)
BASOPHILS NFR BLD AUTO: 0 %
BILIRUB SERPL-MCNC: 0.6 MG/DL
BUN SERPL-MCNC: 7.6 MG/DL (ref 6–20)
CALCIUM SERPL-MCNC: 9 MG/DL (ref 8.8–10.4)
CHLORIDE SERPL-SCNC: 105 MMOL/L (ref 98–107)
CREAT SERPL-MCNC: 0.91 MG/DL (ref 0.51–0.95)
EGFRCR SERPLBLD CKD-EPI 2021: 85 ML/MIN/1.73M2
EOSINOPHIL # BLD AUTO: 0 10E3/UL (ref 0–0.7)
EOSINOPHIL NFR BLD AUTO: 0 %
ERYTHROCYTE [DISTWIDTH] IN BLOOD BY AUTOMATED COUNT: 13.9 % (ref 10–15)
GLUCOSE SERPL-MCNC: 209 MG/DL (ref 70–99)
HCO3 SERPL-SCNC: 19 MMOL/L (ref 22–29)
HCT VFR BLD AUTO: 43.7 % (ref 35–47)
HGB BLD-MCNC: 14.5 G/DL (ref 11.7–15.7)
IMM GRANULOCYTES # BLD: 0.1 10E3/UL
IMM GRANULOCYTES NFR BLD: 1 %
LVEF ECHO: NORMAL
LYMPHOCYTES # BLD AUTO: 0.9 10E3/UL (ref 0.8–5.3)
LYMPHOCYTES NFR BLD AUTO: 11 %
MCH RBC QN AUTO: 27.5 PG (ref 26.5–33)
MCHC RBC AUTO-ENTMCNC: 33.2 G/DL (ref 31.5–36.5)
MCV RBC AUTO: 83 FL (ref 78–100)
MONOCYTES # BLD AUTO: 0.1 10E3/UL (ref 0–1.3)
MONOCYTES NFR BLD AUTO: 2 %
NEUTROPHILS # BLD AUTO: 7.5 10E3/UL (ref 1.6–8.3)
NEUTROPHILS NFR BLD AUTO: 87 %
NRBC # BLD AUTO: 0 10E3/UL
NRBC BLD AUTO-RTO: 0 /100
PLATELET # BLD AUTO: 286 10E3/UL (ref 150–450)
POTASSIUM SERPL-SCNC: 4.5 MMOL/L (ref 3.4–5.3)
PROCALCITONIN SERPL IA-MCNC: 0.05 NG/ML
PROT SERPL-MCNC: 7.7 G/DL (ref 6.4–8.3)
RBC # BLD AUTO: 5.27 10E6/UL (ref 3.8–5.2)
SODIUM SERPL-SCNC: 137 MMOL/L (ref 135–145)
TROPONIN T SERPL HS-MCNC: <6 NG/L
TSH SERPL DL<=0.005 MIU/L-ACNC: 2.8 UIU/ML (ref 0.3–4.2)
WBC # BLD AUTO: 8.7 10E3/UL (ref 4–11)

## 2024-10-07 PROCEDURE — 250N000011 HC RX IP 250 OP 636: Performed by: INTERNAL MEDICINE

## 2024-10-07 PROCEDURE — 36415 COLL VENOUS BLD VENIPUNCTURE: CPT | Performed by: INTERNAL MEDICINE

## 2024-10-07 PROCEDURE — 94640 AIRWAY INHALATION TREATMENT: CPT | Mod: 76

## 2024-10-07 PROCEDURE — 85025 COMPLETE CBC W/AUTO DIFF WBC: CPT | Performed by: INTERNAL MEDICINE

## 2024-10-07 PROCEDURE — 93306 TTE W/DOPPLER COMPLETE: CPT | Mod: 26 | Performed by: GENERAL ACUTE CARE HOSPITAL

## 2024-10-07 PROCEDURE — 84145 PROCALCITONIN (PCT): CPT | Performed by: INTERNAL MEDICINE

## 2024-10-07 PROCEDURE — 99239 HOSP IP/OBS DSCHRG MGMT >30: CPT | Performed by: INTERNAL MEDICINE

## 2024-10-07 PROCEDURE — 94640 AIRWAY INHALATION TREATMENT: CPT

## 2024-10-07 PROCEDURE — 999N000157 HC STATISTIC RCP TIME EA 10 MIN

## 2024-10-07 PROCEDURE — 93306 TTE W/DOPPLER COMPLETE: CPT

## 2024-10-07 PROCEDURE — 250N000009 HC RX 250: Performed by: INTERNAL MEDICINE

## 2024-10-07 PROCEDURE — 84484 ASSAY OF TROPONIN QUANT: CPT | Performed by: INTERNAL MEDICINE

## 2024-10-07 PROCEDURE — 80053 COMPREHEN METABOLIC PANEL: CPT | Performed by: INTERNAL MEDICINE

## 2024-10-07 PROCEDURE — 250N000013 HC RX MED GY IP 250 OP 250 PS 637: Performed by: INTERNAL MEDICINE

## 2024-10-07 PROCEDURE — 84443 ASSAY THYROID STIM HORMONE: CPT | Performed by: INTERNAL MEDICINE

## 2024-10-07 RX ORDER — PREDNISONE 10 MG/1
TABLET ORAL
Qty: 30 TABLET | Refills: 0 | Status: SHIPPED | OUTPATIENT
Start: 2024-10-07

## 2024-10-07 RX ORDER — IPRATROPIUM BROMIDE AND ALBUTEROL SULFATE 2.5; .5 MG/3ML; MG/3ML
SOLUTION RESPIRATORY (INHALATION)
COMMUNITY
Start: 2024-10-07

## 2024-10-07 RX ORDER — AZITHROMYCIN 500 MG/1
500 TABLET, FILM COATED ORAL DAILY
Qty: 2 TABLET | Refills: 0 | Status: SHIPPED | OUTPATIENT
Start: 2024-10-07 | End: 2024-10-09

## 2024-10-07 RX ADMIN — IPRATROPIUM BROMIDE AND ALBUTEROL SULFATE 3 ML: .5; 3 SOLUTION RESPIRATORY (INHALATION) at 08:38

## 2024-10-07 RX ADMIN — FLUTICASONE FUROATE AND VILANTEROL TRIFENATATE 1 PUFF: 200; 25 POWDER RESPIRATORY (INHALATION) at 09:13

## 2024-10-07 RX ADMIN — METHYLPREDNISOLONE SODIUM SUCCINATE 40 MG: 40 INJECTION, POWDER, FOR SOLUTION INTRAMUSCULAR; INTRAVENOUS at 03:09

## 2024-10-07 RX ADMIN — PANTOPRAZOLE SODIUM 40 MG: 40 INJECTION, POWDER, FOR SOLUTION INTRAVENOUS at 09:13

## 2024-10-07 RX ADMIN — IPRATROPIUM BROMIDE AND ALBUTEROL SULFATE 3 ML: .5; 3 SOLUTION RESPIRATORY (INHALATION) at 11:29

## 2024-10-07 RX ADMIN — ALBUTEROL SULFATE 2.5 MG: 2.5 SOLUTION RESPIRATORY (INHALATION) at 03:21

## 2024-10-07 RX ADMIN — MONTELUKAST 10 MG: 10 TABLET, FILM COATED ORAL at 09:14

## 2024-10-07 RX ADMIN — IPRATROPIUM BROMIDE AND ALBUTEROL SULFATE 3 ML: .5; 3 SOLUTION RESPIRATORY (INHALATION) at 15:25

## 2024-10-07 RX ADMIN — METHYLPREDNISOLONE SODIUM SUCCINATE 40 MG: 40 INJECTION, POWDER, FOR SOLUTION INTRAMUSCULAR; INTRAVENOUS at 09:13

## 2024-10-07 ASSESSMENT — ACTIVITIES OF DAILY LIVING (ADL)
ADLS_ACUITY_SCORE: 18

## 2024-10-07 NOTE — PLAN OF CARE
Goal Outcome Evaluation:      Problem: Adult Inpatient Plan of Care  Goal: Optimal Comfort and Wellbeing  10/7/2024 1459 by Analy Preciado RN  Outcome: Adequate for Care Transition  10/7/2024 0932 by Analy Preciado RN  Outcome: Progressing     Problem: Adult Inpatient Plan of Care  Goal: Readiness for Transition of Care  10/7/2024 1459 by Analy Preciado RN  Outcome: Adequate for Care Transition  10/7/2024 0932 by Analy Preciado RN  Outcome: Progressing  Flowsheets (Taken 10/7/2024 0915)  Transportation Anticipated: family or friend will provide  Intervention: Mutually Develop Transition Plan  Recent Flowsheet Documentation  Taken 10/7/2024 0915 by Analy Preciado RN  Transportation Anticipated: family or friend will provide  Patient/Family Anticipated Services at Transition: none  Patient/Family Anticipates Transition to: home with family

## 2024-10-07 NOTE — ED NOTES
"[facility] ED Handoff Report    ED Chief Complaint: Shortness of breath      ED Diagnosis:  (J45.51) Severe persistent asthma with exacerbation (H)  Comment:   Plan:     (J96.01,  J96.02) Acute respiratory failure with hypoxia and hypercapnia (H)  Comment:   Plan:        PMH:    Past Medical History:   Diagnosis Date    Obesity     Severe persistent asthma with acute exacerbation (H) 1/1/2015    Formatting of this note might be different from the original. 12/2014 Admit for exacerbation: Spirometry FEV1/FVC 68%, D/C w/dulera, albuterol, Spiriva.  Started singulair 10mg QD, Given NRT.  Dx as infant, has been on albuterol lifelong.    Last Assessment & Plan:  Formatting of this note might be different from the original. Stable on Singulair, cetirizine, albuterol, and dulera. Discussed COVID    Uncomplicated asthma         Code Status:  Full Code     Falls Risk: No Band: Not applicable    Current Living Situation/Residence: lives alone     Elimination Status: Continent: Yes     Activity Level: Independent    Patients Preferred Language:  English     Needed: No    Vital Signs:  BP (!) 149/82   Pulse (!) 121   Temp 99.9  F (37.7  C) (Temporal)   Resp 23   Ht 1.702 m (5' 7\")   Wt 90.7 kg (200 lb)   SpO2 99%   BMI 31.32 kg/m       Cardiac Rhythm: NST    Pain Score: 0/10    Is the Patient Confused:  No    Last Food or Drink: 10/06/24 at 2200, water with pills.    Focused Assessment:  Pt is A&O x4. Pt denies pain. Pt is tachycardic. Pt has inspiratory wheezes, expiratory wheezes, and rhonchi throughout. Pt is tachypneic but says she feels better than when she came in. Pt does look like she is still working to breathe. She is on 2L of O2 via nasal cannula.     Tests Performed: Done: Labs and Imaging    Treatments Provided:  IV solumedrol, azithromycin, and nebulizer treatments.     Family Dynamics/Concerns: No    Family Updated On Visitor Policy: Yes    Plan of Care Communicated to Family: " Yes    Who Was Updated about Plan of Care: Mom      Medications sent with patient: Inhaler    Covid: symptomatic, negative    Additional Information: Pt is cooperative and pleasant.     RN: Luz Posadas RN   10/6/2024 10:13 PM

## 2024-10-07 NOTE — ED NOTES
Pt on Stand-By for Bi-Pap at 1 L NC per RT and doing well (per RT) he is giving her a sip of water

## 2024-10-07 NOTE — PROGRESS NOTES
Patient arrived to the unit from ED at 2351 in a wheelchair. Stand by assist from wheelchair to bed.

## 2024-10-07 NOTE — PROGRESS NOTES
Patient discharged home with boyfriend.  Discharge instructions reviewed, all questions answered and belongings returned.

## 2024-10-07 NOTE — PLAN OF CARE
Problem: Adult Inpatient Plan of Care  Goal: Plan of Care Review  Description: The Plan of Care Review/Shift note should be completed every shift.  The Outcome Evaluation is a brief statement about your assessment that the patient is improving, declining, or no change.  This information will be displayed automatically on your shift  note.  10/7/2024 0603 by Bert Brown RN  Outcome: Progressing  Flowsheets (Taken 10/7/2024 0603)  Plan of Care Reviewed With: patient  Overall Patient Progress: improving   Goal Outcome Evaluation:      Plan of Care Reviewed With: patient    Overall Patient Progress: improvingOverall Patient Progress: improving

## 2024-10-07 NOTE — PROGRESS NOTES
Patient is alert and oriented. Flat. Angry on a phone conversation. Minimal response to questions.

## 2024-10-07 NOTE — PLAN OF CARE
Goal Outcome Evaluation:      Problem: Adult Inpatient Plan of Care  Goal: Optimal Comfort and Wellbeing  Outcome: Progressing     Problem: Comorbidity Management  Goal: Maintenance of Asthma Control  Outcome: Progressing  Intervention: Maintain Asthma Symptom Control  Recent Flowsheet Documentation  Taken 10/7/2024 0915 by Analy Preciado RN  Medication Review/Management: medications reviewed     Problem: Gas Exchange Impaired  Goal: Optimal Gas Exchange  Outcome: Progressing  Intervention: Optimize Oxygenation and Ventilation  Recent Flowsheet Documentation  Taken 10/7/2024 0915 by Analy Preciado RN  Head of Bed (HOB) Positioning: HOB at 20-30 degrees     Patent is cooperative, alert and oriented x 4.  Denies pain.  Up independently in the room.  Denies SOB.  Inspiratory and expiratory wheezes.  On room air.  Oxygen sats in mid 90's.  Continues on Nebs, inhalers, and IV solumedrol.  No concerns reported.  Continue with plan of care.

## 2024-10-07 NOTE — ED NOTES
Pt got up and walked to bathroom with some wheezing, spoke with  in the hallway and is apparently at baseline

## 2024-10-07 NOTE — MEDICATION SCRIBE - ADMISSION MEDICATION HISTORY
Medication Scribe Admission Medication History    Admission medication history is complete. The information provided in this note is only as accurate as the sources available at the time of the update.    Information Source(s): Patient via in-person    Pertinent Information: patient reports self management of medications.     Patient reports no longer taking: Tylenol, Prednisone     Patient reports using both Duoneb and Albuterol via nebulizer    Changes made to PTA medication list:  Added: Montelukast, DuoNeb  Deleted: Tylenol, Prednisone, duplicate Ventolin inh  Changed: None    Allergies reviewed with patient and updates made in EHR: yes    Medication History Completed By: Lars Bliss 10/6/2024 8:44 PM    PTA Med List   Medication Sig Last Dose    albuterol (PROAIR HFA/PROVENTIL HFA/VENTOLIN HFA) 108 (90 Base) MCG/ACT inhaler Inhale 2 puffs into the lungs every 6 hours as needed for shortness of breath, wheezing or cough Past Week at prn    albuterol (PROVENTIL) (2.5 MG/3ML) 0.083% neb solution Take 1 vial (2.5 mg) by nebulization every 6 hours as needed for shortness of breath / dyspnea or wheezing 10/6/2024 at 1400    ipratropium - albuterol 0.5 mg/2.5 mg/3 mL (DUONEB) 0.5-2.5 (3) MG/3ML neb solution Take 1 vial by nebulization every 6 hours as needed for shortness of breath, wheezing or cough. 10/6/2024 at 1600    mometasone-formoterol (DULERA) 200-5 MCG/ACT inhaler Inhale 2 puffs into the lungs 2 times daily 10/6/2024 at am    montelukast (SINGULAIR) 10 MG tablet Take 10 mg by mouth every morning. 10/6/2024 at am

## 2024-10-07 NOTE — H&P
"[facility]    History and Physical - Hospitalist Service       Date of Admission:  10/6/2024    Assessment & Plan      33 year old female with a pertinent history of acute respiratory failure with hypoxia, severe asthma with exasperation, community-acquired bilateral lower lobe pneumonia, and obesity who presents to this ED via EMS for evaluation of shortness of breath and found to be on asthma exacerbation.    Acute respiratory failure with hypoxia  - Secondary to asthma exacerbation   - Chest x-ray is negative  - Negative influenza, RSV and COVID  - Started on BiPAP in the ER.  - Received multiple nebs in the ER with some improvement  - Currently off BiPAP but still hypoxic.  - Continue with IV steroid  - Scheduled nebs and as needed  - Consider pulmonology consult if no improvement.    Elevated D-dimer  - Negative CT chest for PE but shows chronic mild patchy groundglass infiltrate.  - Start Zithromax empirically  - Check procalcitonin    Tachycardia  - Probably reactive   - Negative troponin  - EKG shows sinus tachycardia  -Check echo and TSH     Obesity  -BMI of 31.3        Diet: Regular Diet Adult  DVT Prophylaxis: Pneumatic Compression Devices  Hare Catheter: Not present  Lines: None     Cardiac Monitoring: ACTIVE order. Indication: Respiratory failure  Code Status: Full Code      Clinically Significant Risk Factors Present on Admission                          # Obesity: Estimated body mass index is 31.32 kg/m  as calculated from the following:    Height as of this encounter: 1.702 m (5' 7\").    Weight as of this encounter: 90.7 kg (200 lb).       # Asthma: noted on problem list        Disposition Plan     Medically Ready for Discharge: Anticipated in 2-4 Days           Stephon Quinn MD  Hospitalist Service  [facility]  Securely message with [employer] (more info)  Text page via [employer] Paging/Directory "     ______________________________________________________________________    Chief Complaint   Shortness of breath and wheezing    History is obtained from the patient    History of Present Illness   Soren Freitas is a 33 year old female with a pertinent history of acute respiratory failure with hypoxia, severe asthma with exasperation, community-acquired bilateral lower lobe pneumonia, and obesity who presents to this ED via EMS for evaluation of shortness of breath and found to be on asthma exacerbation.  Basically the patient states a few hours ago she was doing completely fine.  She states that she was at home with her children when she suddenly had an onset of some wheezing and shortness of breath.  She states that she knew this was an exacerbation of her asthma, so she took a nebulizer and used her emergency inhaler.  However, this did not resolve her issues and the symptoms worsen.  Therefore, she called 911.  She states that medics gave patient 2 treatments of DuoNeb and 3 albuterol treatments.  Denies getting steroids.  Once here, patient was also given 2 DuoNeb treatments via respiratory therapist.  Patient states that she does have a history of this and states that normally this happens following a change in weather.  Patient shares that she does use her rescue inhaler more than usual (more than 1 time weekly).  She is also taking Dulera 2 times daily for the past few years.  Also takes taking Singulair for her chronic asthma.  Denies any rhinorrhea, fever, sore throat.  Denies smoking or vaping.  Denies any chemical exposure transparency.      Past Medical History    Past Medical History:   Diagnosis Date    Obesity     Severe persistent asthma with acute exacerbation (H) 1/1/2015    Formatting of this note might be different from the original. 12/2014 Admit for exacerbation: Spirometry FEV1/FVC 68%, D/C w/dulera, albuterol, Spiriva.  Started singulair 10mg QD, Given NRT.  Dx as infant, has been  on albuterol lifelong.    Last Assessment & Plan:  Formatting of this note might be different from the original. Stable on Singulair, cetirizine, albuterol, and dulera. Discussed COVID    Uncomplicated asthma        Past Surgical History   No past surgical history on file.    Prior to Admission Medications   Prior to Admission Medications   Prescriptions Last Dose Informant Patient Reported? Taking?   albuterol (PROAIR HFA/PROVENTIL HFA/VENTOLIN HFA) 108 (90 Base) MCG/ACT inhaler Past Week at prn  No Yes   Sig: Inhale 2 puffs into the lungs every 6 hours as needed for shortness of breath, wheezing or cough   albuterol (PROVENTIL) (2.5 MG/3ML) 0.083% neb solution 10/6/2024 at 1400 Self No Yes   Sig: Take 1 vial (2.5 mg) by nebulization every 6 hours as needed for shortness of breath / dyspnea or wheezing   ipratropium - albuterol 0.5 mg/2.5 mg/3 mL (DUONEB) 0.5-2.5 (3) MG/3ML neb solution 10/6/2024 at 1600  Yes Yes   Sig: Take 1 vial by nebulization every 6 hours as needed for shortness of breath, wheezing or cough.   mometasone-formoterol (DULERA) 200-5 MCG/ACT inhaler 10/6/2024 at am Self No Yes   Sig: Inhale 2 puffs into the lungs 2 times daily   montelukast (SINGULAIR) 10 MG tablet 10/6/2024 at am  Yes Yes   Sig: Take 10 mg by mouth every morning.      Facility-Administered Medications: None        Review of Systems    The 10 point Review of Systems is negative other than noted in the HPI or here.     Social History   I have reviewed this patient's social history and updated it with pertinent information if needed.  Social History     Tobacco Use    Smoking status: Former         Family History     Positive for coronary disease and diabetes.      Allergies   Allergies   Allergen Reactions    Cashew Nut Oil Anaphylaxis and Angioedema    Fish Allergy Itching     Sea Food causes Throat swelling    Peanut-Containing Drug Products Angioedema and Swelling     Throat swelling.      Shellfish-Derived Products Angioedema         Physical Exam   Vital Signs: Temp: 98.1  F (36.7  C) Temp src: Oral BP: 124/82 Pulse: 105   Resp: 22 SpO2: 95 % O2 Device: Nasal cannula Oxygen Delivery: 1 LPM  Weight: 200 lbs 0 oz    General Appearance: Sleeping in bed in mild respiratory distress.  Respiratory: Decreased breath sounds on lung bases with expiratory wheezing and scattered rhonchi.  Cardiovascular: Tachycardia, normal heart sound.  No murmur.  GI: Soft, obese, normal bowel sounds.  Skin: Dry, warm, no rash.  Other: Grossly intact, no focal deficit.    Medical Decision Making       75 MINUTES SPENT BY ME on the date of service doing chart review, history, exam, documentation & further activities per the note.      Data     I have personally reviewed the following data over the past 24 hrs:    11.6 (H)  \   15.0   / 296     141 106 7.8 /  116 (H)   4.3 24 1.00 (H) \     Trop: 8 BNP: <36     Procal: N/A CRP: 3.70 Lactic Acid: N/A       INR:  N/A PTT:  N/A   D-dimer:  0.72 (H) Fibrinogen:  N/A       Imaging results reviewed over the past 24 hrs:   Recent Results (from the past 24 hour(s))   XR Chest Port 1 View    Narrative    EXAM: XR CHEST PORT 1 VIEW  LOCATION: [facility]  DATE: 10/6/2024    INDICATION: resp distress, moderate  COMPARISON: 9/21/2024      Impression    IMPRESSION: Negative chest.   CT Chest Pulmonary Embolism w Contrast    Narrative    EXAM: CT CHEST PULMONARY EMBOLISM W CONTRAST  LOCATION: [facility]  DATE: 10/6/2024    INDICATION: resp distress, elevated d d dimer  COMPARISON: 10/6/2024 chest x-ray. CT chest 3/19/2024  TECHNIQUE: CT chest pulmonary angiogram during arterial phase injection of IV contrast. Multiplanar reformats and MIP reconstructions were performed. Dose reduction techniques were used.   CONTRAST: [id number] 90ml    FINDINGS:  ANGIOGRAM CHEST: Pulmonary arteries are normal caliber and negative for pulmonary emboli. Thoracic aorta is negative for dissection. No  CT evidence of right heart strain.    LUNGS AND PLEURA: Stable mild patchy groundglass opacities in both lungs since 3/19/2024 and 5/25/2022. Likely chronic developed fibrotic change at site of previous inflammation.    No significant new findings.    MEDIASTINUM/AXILLAE: Normal.    CORONARY ARTERY CALCIFICATION: None.    UPPER ABDOMEN: Normal.    MUSCULOSKELETAL: Normal.      Impression    IMPRESSION:  1.  Negative for pulmonary emboli and negative for dissections.    2.  Chronic mild patchy groundglass infiltrates in both lungs going back several years likely some nonspecific developing fibrosis from prior infection or inflammatory change.    3.  No significant new findings.

## 2024-10-07 NOTE — ED NOTES
Patient was transported to CT and back, on bipap.  Patient tolerated well.  Transport was done with no complications.  Was placed on 1LNC, bipap stand by        Kenneth H. Kjellberg

## 2024-10-09 ENCOUNTER — PATIENT OUTREACH (OUTPATIENT)
Dept: CARE COORDINATION | Facility: CLINIC | Age: 34
End: 2024-10-09
Payer: COMMERCIAL

## 2024-10-09 NOTE — PROGRESS NOTES
"Clinic Care Coordination Contact  Transitions of Care Outreach  Chief Complaint   Patient presents with    Clinic Care Coordination - Post Hospital       Most Recent Admission Date: 10/6/2024   Most Recent Admission Diagnosis: Severe persistent asthma with exacerbation (H) - J45.51  Acute respiratory failure with hypoxia and hypercapnia (H) - J96.01, J96.02     Most Recent Discharge Date: 10/7/2024   Most Recent Discharge Diagnosis: Severe persistent asthma with exacerbation (H) - J45.51  Acute respiratory failure with hypoxia and hypercapnia (H) - J96.01, J96.02  Severe asthma with acute exacerbation, unspecified whether persistent - J45.901     Transitions of Care Assessment    Discharge Assessment  How are you doing now that you are home?: \" Im doing okay \"  How are your symptoms? (Red Flag symptoms escalate to triage hotline per guidelines): Improved  Do you know how to contact your clinic care team if you have future questions or changes to your health status? : Yes  Does the patient have their discharge instructions? : Yes  Does the patient have questions regarding their discharge instructions? : No  Were you started on any new medications or were there changes to any of your previous medications? : Yes  Does the patient have all of their medications?: Yes  Do you have questions regarding any of your medications? : No  Do you have all of your needed medical supplies or equipment (DME)?  (i.e. oxygen tank, CPAP, cane, etc.): Yes    Post-op ([facility] CT[facility] Only)  If the patient had a surgery or procedure, do they have any questions for a nurse?: No             Follow up Plan     Discharge Follow-Up  Discharge follow up appointment scheduled in alignment with recommended follow up timeframe or Transitions of Risk Category? (Low = within 30 days; Moderate= within 14 days; High= within 7 days): No    No future appointments.    Outpatient Plan as outlined on AVS reviewed with patient.    For any urgent concerns, please " contact our 24 hour nurse triage line: [phone])       Lili Haji MA

## 2024-10-17 NOTE — DISCHARGE SUMMARY
"[facility]  Hospitalist Discharge Summary      Date of Admission:  10/6/2024  Date of Discharge:  10/7/2024  4:16 PM  Discharging Provider: Alireza Saez MD  Discharge Service: Hospitalist Service    Discharge Diagnoses       33 year old female with a pertinent history of acute respiratory failure with hypoxia, severe asthma with exasperation, community-acquired bilateral lower lobe pneumonia, and obesity who presents to this ED via EMS for evaluation of shortness of breath and found to be on asthma exacerbation.      10/7 :     Medically stable  Discharge home today      A/p :      Acute respiratory failure with hypoxia  - Secondary to asthma exacerbation   - Chest x-ray is negative  - Negative influenza, RSV and COVID  - Started on BiPAP in the ER.  - Received multiple nebs in the ER with some improvement  - Currently off BiPAP but still hypoxic.  - Continue with IV steroid  - Scheduled nebs and as needed  - Consider pulmonology consult if no improvement.    Clinically improved  Discharge home today : on prednisone taper, nebs, azithro - for asthma flare up and bronchitis       Elevated D-dimer  - Negative CT chest for PE but shows chronic mild patchy groundglass infiltrate.  - Start Zithromax empirically  - Check procalcitonin : normal     Tachycardia  - Probably reactive   - Negative troponin  - EKG shows sinus tachycardia  -Check echo and TSH      Obesity  -BMI of 31.3    Clinically Significant Risk Factors     # Obesity: Estimated body mass index is 31.32 kg/m  as calculated from the following:    Height as of this encounter: 1.702 m (5' 7\").    Weight as of this encounter: 90.7 kg (200 lb).       Follow-ups Needed After Discharge   Follow-up Appointments     Follow-up and recommended labs and tests       Follow up with primary care provider, [facility], within   7 days for hospital follow- up.  No follow up labs or test are needed.        {Additional follow-up " instructions/to-do's for PCP    : none    Unresulted Labs Ordered in the Past 30 Days of this Admission       No orders found from 9/6/2024 to 10/7/2024.        These results will be followed up by pcp    Discharge Disposition   Discharged to home  Condition at discharge: Stable      Consultations This Hospital Stay   None    Code Status   Prior    Time Spent on this Encounter   IAlireza MD, personally saw the patient today and spent greater than 30 minutes discharging this patient.       Alireza Saez MD  [address] [address]  Phone: [phone]  Fax: [phone]  ______________________________________________________________________    Physical Exam   Vital Signs:                    Weight: 200 lbs 0 oz       GENERAL: The patient is not in any acute distressed. Awake and alert.  HEENT: Nonicteric sclerae, PERRLA, EOMI. Oropharynx clear. Moist mucous membranes. Conjunctivae appear well perfused.  HEART: Regular rate and rhythm without murmurs.  LUNGS: Clear to auscultation bilaterally. No wheezing or crackles.  ABDOMEN: Soft, positive bowel sounds, nontender.  SKIN: No rash, no excessive bruising, petechiae, or purpura.  EXTREMITIES : no rashes, no swelling in legs.  NEUROLOGIC: conscious and oriented, follows commands, no obvious focal deficits.  ROS: All other systems negative          Primary Care Physician   [facility]    Discharge Orders      Reason for your hospital stay    sob     Follow-up and recommended labs and tests     Follow up with primary care provider, [facility], within 7 days for hospital follow- up.  No follow up labs or test are needed.     Activity    Your activity upon discharge: activity as tolerated     Diet    Follow this diet upon discharge: Current Diet:Orders Placed This Encounter      Regular Diet Adult       Significant Results and Procedures   Most Recent 3 CBC's:  Recent Labs    Lab Test 10/07/24  0544 10/06/24  1656 09/21/24  0747   WBC 8.7 11.6* 9.4   HGB 14.5 15.0 14.9   MCV 83 84 83    296 287     Most Recent 3 BMP's:  Recent Labs   Lab Test 10/07/24  0544 10/06/24  1656 09/21/24  0747    141 137   POTASSIUM 4.5 4.3 3.8   CHLORIDE 105 106 104   CO2 19* 24 23   BUN 7.6 7.8 11.3   CR 0.91 1.00* 1.04*   ANIONGAP 13 11 10   CARYN 9.0 8.7* 8.9   * 116* 96     Most Recent 2 LFT's:  Recent Labs   Lab Test 10/07/24  0544 06/30/22  2143   AST 22 28   ALT 20 28   ALKPHOS 78 175*   BILITOTAL 0.6 0.8     Most Recent 3 INR's:No lab results found.    Discharge Medications   Discharge Medication List as of 10/7/2024  3:01 PM        START taking these medications    Details   azithromycin (ZITHROMAX) 500 MG tablet Take 1 tablet (500 mg) by mouth daily for 2 days., Disp-2 tablet, R-0, E-Prescribe      predniSONE (DELTASONE) 10 MG tablet Take prednisone, 10 mg tab - 4 tabs daily for 3 days, then 3 tabs daily for 3 days, then 2 tabs  daily for 3 days, then 1 tab daily for 3 days, then stop., Disp-30 tablet, R-0, E-Prescribe           CONTINUE these medications which have CHANGED    Details   ipratropium - albuterol 0.5 mg/2.5 mg/3 mL (DUONEB) 0.5-2.5 (3) MG/3ML neb solution Nebulize tid for 3 days and then tid prn for sob, Historical           CONTINUE these medications which have NOT CHANGED    Details   albuterol (PROAIR HFA/PROVENTIL HFA/VENTOLIN HFA) 108 (90 Base) MCG/ACT inhaler Inhale 2 puffs into the lungs every 6 hours as needed for shortness of breath, wheezing or cough, Disp-18 g, R-0, E-PrescribePharmacy may dispense brand covered by insurance (Proair, or proventil or ventolin or generic albuterol inhaler)      albuterol (PROVENTIL) (2.5 MG/3ML) 0.083% neb solution Take 1 vial (2.5 mg) by nebulization every 6 hours as needed for shortness of breath / dyspnea or wheezing, Disp-12 mL, R-0, No Print Out      mometasone-formoterol (DULERA) 200-5 MCG/ACT inhaler Inhale 2 puffs  into the lungs 2 times daily, Disp-13 g, R-0, E-PrescribeOk to sub advair at equivalent dose if covered by insurance      montelukast (SINGULAIR) 10 MG tablet Take 10 mg by mouth every morning., Historical           Allergies   Allergies   Allergen Reactions    Cashew Nut Oil Anaphylaxis and Angioedema    Fish Allergy Itching     Sea Food causes Throat swelling    Peanut-Containing Drug Products Angioedema and Swelling     Throat swelling.      Shellfish-Derived Products Angioedema

## 2024-10-22 LAB
ATRIAL RATE - MUSE: 157 BPM
DIASTOLIC BLOOD PRESSURE - MUSE: 83 MMHG
INTERPRETATION ECG - MUSE: NORMAL
P AXIS - MUSE: 78 DEGREES
PR INTERVAL - MUSE: 118 MS
QRS DURATION - MUSE: 68 MS
QT - MUSE: 254 MS
QTC - MUSE: 410 MS
R AXIS - MUSE: 91 DEGREES
SYSTOLIC BLOOD PRESSURE - MUSE: 130 MMHG
T AXIS - MUSE: 181 DEGREES
VENTRICULAR RATE- MUSE: 157 BPM

## 2024-12-06 ENCOUNTER — HOSPITAL ENCOUNTER (EMERGENCY)
Facility: HOSPITAL | Age: 34
Discharge: HOME | End: 2024-12-06
Attending: EMERGENCY MEDICINE
Payer: COMMERCIAL

## 2024-12-06 VITALS
HEIGHT: 67 IN | TEMPERATURE: 97.9 F | OXYGEN SATURATION: 98 % | BODY MASS INDEX: 31.39 KG/M2 | WEIGHT: 200 LBS | RESPIRATION RATE: 11 BRPM | HEART RATE: 83 BPM | SYSTOLIC BLOOD PRESSURE: 114 MMHG | DIASTOLIC BLOOD PRESSURE: 68 MMHG

## 2024-12-06 DIAGNOSIS — J45.909 ASTHMA, UNSPECIFIED ASTHMA SEVERITY, UNSPECIFIED WHETHER COMPLICATED, UNSPECIFIED WHETHER PERSISTENT: ICD-10-CM

## 2024-12-06 DIAGNOSIS — J45.901 SEVERE ASTHMA WITH ACUTE EXACERBATION, UNSPECIFIED WHETHER PERSISTENT: ICD-10-CM

## 2024-12-06 PROCEDURE — 250N000009 HC RX 250: Performed by: EMERGENCY MEDICINE

## 2024-12-06 PROCEDURE — 99284 EMERGENCY DEPT VISIT MOD MDM: CPT | Mod: 25

## 2024-12-06 PROCEDURE — 93005 ELECTROCARDIOGRAM TRACING: CPT | Performed by: STUDENT IN AN ORGANIZED HEALTH CARE EDUCATION/TRAINING PROGRAM

## 2024-12-06 PROCEDURE — 250N000011 HC RX IP 250 OP 636: Performed by: EMERGENCY MEDICINE

## 2024-12-06 PROCEDURE — 94640 AIRWAY INHALATION TREATMENT: CPT

## 2024-12-06 PROCEDURE — 96374 THER/PROPH/DIAG INJ IV PUSH: CPT

## 2024-12-06 RX ORDER — IPRATROPIUM BROMIDE AND ALBUTEROL SULFATE 2.5; .5 MG/3ML; MG/3ML
3 SOLUTION RESPIRATORY (INHALATION) ONCE
Status: COMPLETED | OUTPATIENT
Start: 2024-12-06 | End: 2024-12-06

## 2024-12-06 RX ORDER — METHYLPREDNISOLONE SODIUM SUCCINATE 125 MG/2ML
125 INJECTION INTRAMUSCULAR; INTRAVENOUS ONCE
Status: COMPLETED | OUTPATIENT
Start: 2024-12-06 | End: 2024-12-06

## 2024-12-06 RX ORDER — PREDNISONE 10 MG/1
TABLET ORAL
Qty: 30 TABLET | Refills: 0 | Status: SHIPPED | OUTPATIENT
Start: 2024-12-06 | End: 2024-12-11

## 2024-12-06 RX ADMIN — IPRATROPIUM BROMIDE AND ALBUTEROL SULFATE 3 ML: .5; 3 SOLUTION RESPIRATORY (INHALATION) at 18:54

## 2024-12-06 RX ADMIN — METHYLPREDNISOLONE SODIUM SUCCINATE 125 MG: 125 INJECTION, POWDER, FOR SOLUTION INTRAMUSCULAR; INTRAVENOUS at 19:00

## 2024-12-06 ASSESSMENT — ACTIVITIES OF DAILY LIVING (ADL)
ADLS_ACUITY_SCORE: 56

## 2024-12-06 NOTE — ED TRIAGE NOTES
Pt comes from home via ambulance. Per EMS pt has history of asthma. Takes albuterol and duoneb. Took duoneb 30 minute prior to arrival and reports did not help. Pt run out of albuterol yesterday. Denies any pain. Reports mild tightness in the left chest and mild shortness of breath. Alert and oriented times 4.      Triage Assessment (Adult)       Row Name 12/06/24 4210          Triage Assessment    Airway WDL WDL        Respiratory WDL    Respiratory WDL X;cough  Shortness of breathe        Cardiac WDL    Cardiac WDL WDL  tightness in the chest on the left side        Peripheral/Neurovascular WDL    Peripheral Neurovascular WDL WDL        Cognitive/Neuro/Behavioral WDL    Cognitive/Neuro/Behavioral WDL WDL

## 2024-12-06 NOTE — ED NOTES
Bed: JNED-09  Expected date:   Expected time:   Means of arrival:   Comments:  Hold for 34 year old female asthma

## 2024-12-07 LAB
ATRIAL RATE - MUSE: 84 BPM
DIASTOLIC BLOOD PRESSURE - MUSE: 58 MMHG
INTERPRETATION ECG - MUSE: NORMAL
P AXIS - MUSE: 43 DEGREES
PR INTERVAL - MUSE: 138 MS
QRS DURATION - MUSE: 78 MS
QT - MUSE: 372 MS
QTC - MUSE: 439 MS
R AXIS - MUSE: 31 DEGREES
SYSTOLIC BLOOD PRESSURE - MUSE: 124 MMHG
T AXIS - MUSE: 8 DEGREES
VENTRICULAR RATE- MUSE: 84 BPM

## 2024-12-07 NOTE — ED PROVIDER NOTES
"EMERGENCY DEPARTMENT NOTE     Name: Soren Freitas    Age/Sex: 34 year old female   MRN: [id number]   Evaluation Date & Time:  12/6/2024  5:48 PM    PCP:    Leyda, [facility]   ED Provider: Bk Doe D.O.       CHIEF COMPLAINT    Shortness of Breath     HISTORY OF PRESENT ILLNESS   Soren Freitas is a 34 year old year old female with a relevant past history of hypertension, severe persistent asthma with exacerbation, acute respiratory failure, and obesity, who presents to the ED for evaluation of shortness of breath.    Patient reports an onset of shortness of breath ~1 hour prior to arrival at the emergency department this evening; she attempted to use a duoneb ~30 minutes prior to arrival without relief. Patient has an albuterol inhaler for episodes such as this, however, ran out of this yesterday. She states that she has a history of severe asthma with previous hospitalization upon exacerbation, choosing to present to the emergency department to \"catch it before it got out of control\". Patient is unsure what might have triggered her episode of shortness of breath this evening.     Patient notes that she has implanted birth control in place.     Patient denies cough, fever, chest pain, abdominal pain, vomiting, diarrhea, or any other symptoms at this time. She does not smoke.       DIAGNOSIS & DISPOSITION/MEDICAL DECISION MAKING   No diagnosis found.    EMERGENCY DEPARTMENT COURSE   6:14 PM I met with the patient to gather history and to perform my initial exam.  We discussed treatment options and the plan for care while in the Emergency Department.  Triage vital signs: /63 temp 97.9 pulse 96 respiratory 22 SpO2 RA 98%  Differential diagnosis considered included but not limited to: ACS, PE, pneumothorax, asthma exacerbation    MDM: Patient presents this evening due to exacerbation of severe asthma with shortness of breath after duoneb did not resolve her symptoms prior to arrival. " "  Patient initial exam of bilateral expiratory wheezing.  Patient received DuoNeb with IV Solu-Medrol improvement of symptoms.  Clear with minimal to no end expiratory wheezing.  Low suspicion for pulmonary embolism and further evaluation with D-dimer and CTA not pursued with the patient pain tachycardic hypoxemia and recent admission with negative CTA.  Patient reports symptoms to suggest pneumonia at and has equal breath sounds bilaterally without continue respiratory distress and further evaluation with chest x-ray not pursued.  EKG showed sinus rhythm without ischemic changes and low suspicion for ACS and further evaluation troponin not pursued.  Patient will be discharged, tapering course of steroids, close follow-up with primary care physician, continuation of home medications for asthma including albuterol MDI, nebulizer and steroid inhaler.  Patient does have peak flow meter and will monitor reports baseline 200.  If symptomatic and peak flows decreased less than 200 despite home treatment will return to the emergency department.     Discharge Vital Signs:/68   Pulse 83   Temp 97.9  F (36.6  C) (Oral)   Resp 11   Ht 1.702 m (5' 7\")   Wt 90.7 kg (200 lb)   SpO2 98%   BMI 31.32 kg/m     PROCEDURES:   None  Diagnostic studies:  No orders to display     Labs Ordered and Resulted from Time of ED Arrival to Time of ED Departure - No data to display  ED INTERVENTIONS     Medications   ipratropium - albuterol 0.5 mg/2.5 mg/3 mL (DUONEB) neb solution 3 mL (3 mLs Nebulization $Given 12/6/24 4254)   methylPREDNISolone Na Suc (solu-MEDROL) injection 125 mg (125 mg Intravenous $Given 12/6/24 1900)     TOTAL CRITICAL CARE TIME (EXCLUDING PROCEDURES): Not applicable      DISCHARGE MEDICATIONS        Review of your medicines        UNREVIEWED medicines. Ask your doctor about these medicines        Dose / Directions   * albuterol (2.5 MG/3ML) 0.083% neb solution  Commonly known as: PROVENTIL  Used for: Severe " asthma with acute exacerbation, unspecified whether persistent      Dose: 2.5 mg  Take 1 vial (2.5 mg) by nebulization every 6 hours as needed for shortness of breath / dyspnea or wheezing  Quantity: 12 mL  Refills: 0     * albuterol 108 (90 Base) MCG/ACT inhaler  Commonly known as: PROAIR HFA/PROVENTIL HFA/VENTOLIN HFA      Dose: 2 puff  Inhale 2 puffs into the lungs every 6 hours as needed for shortness of breath, wheezing or cough  Quantity: 18 g  Refills: 0     ipratropium - albuterol 0.5 mg/2.5 mg/3 mL 0.5-2.5 (3) MG/3ML neb solution  Commonly known as: DUONEB      Nebulize tid for 3 days and then tid prn for sob  Refills: 0     mometasone-formoterol 200-5 MCG/ACT inhaler  Commonly known as: DULERA  Used for: Moderate persistent asthma, unspecified whether complicated      Dose: 2 puff  Inhale 2 puffs into the lungs 2 times daily  Quantity: 13 g  Refills: 0     montelukast 10 MG tablet  Commonly known as: SINGULAIR      Dose: 10 mg  Take 10 mg by mouth every morning.  Refills: 0     predniSONE 10 MG tablet  Commonly known as: DELTASONE  Used for: Severe asthma with acute exacerbation, unspecified whether persistent      Take prednisone, 10 mg tab - 4 tabs daily for 3 days, then 3 tabs daily for 3 days, then 2 tabs  daily for 3 days, then 1 tab daily for 3 days, then stop.  Quantity: 30 tablet  Refills: 0           * This list has 2 medication(s) that are the same as other medications prescribed for you. Read the directions carefully, and ask your doctor or other care provider to review them with you.                DISPOSITION: Home    Medical Decision Making    At the time of my evaluation, I do not feel the patient s symptoms are caused by sepsis      I obtained additional history from these independent historians:  NA  I reviewed these outside records:  Discharge summary from October 7, 2024 where she was admitted with severe asthma exacerbation with acute respiratory failure with hypoxemia treated for  pneumonia.  CTA of the chest negative for pulmonary embolism  I noted these abnormal vital signs / labs:  /63 normalizes without intervention to 114/68    Monitor Strip Interpretation:  Sinus rhythm  12-Lead ECG Interpretation:  Sinus rhythm with nonspecific ST-T wave changes  I independently reviewed the following diagnostic studies:    I spoke to the following clinicians regard the patients care:  NA  My disposition decision is based on the following reasons:  Discharge: I considered admission but discharged the patient after current workup and patient's clinical course in the emergency department.  Prescription medications prescribed included prednisone        At the conclusion of the encounter I discussed the results of all of the tests and the disposition. The questions were answered. The patient or family acknowledged understanding and was agreeable with the care plan.            INFORMATION SOURCE AND LIMITATIONS    History/Exam limitations: None  Patient information was obtained from: Patient  Use of : N/A        REVIEW OF SYSTEMS:   All other systems reviewed and are negative except as noted above in HPI.    PATIENT HISTORY     Past Medical History:   Diagnosis Date    Obesity     Severe persistent asthma with acute exacerbation (H) 1/1/2015    Uncomplicated asthma      Patient Active Problem List   Diagnosis    Acute respiratory failure with hypoxia (H)    Severe asthma with exacerbation, unspecified whether persistent    Severe persistent asthma with acute exacerbation (H)    Bone lesion    Suspected COVID-19 virus infection    Acute respiratory failure with hypoxia and hypercapnia (H)    Severe asthma with acute exacerbation, unspecified whether persistent    Moderate persistent asthma, unspecified whether complicated    Community acquired bilateral lower lobe pneumonia    Acute asthma exacerbation    Severe persistent asthma with status asthmaticus (H)    Community acquired bacterial  "pneumonia    Sinus tachycardia     contractions    Yeast infection of the vagina    Hypoalbuminemia    Obesity    Pneumonia of both lungs due to infectious organism, unspecified part of lung    Severe persistent asthma with exacerbation (H)    SOB (shortness of breath)    Exacerbation of asthma, unspecified asthma severity, unspecified whether persistent     No past surgical history on file.    Allergies   Allergen Reactions    Cashew Nut Oil Anaphylaxis and Angioedema    Fish Allergy Itching     Sea Food causes Throat swelling    Peanut-Containing Drug Products Angioedema and Swelling     Throat swelling.      Shellfish-Derived Products Angioedema       OUTPATIENT MEDICATIONS     New Prescriptions    No medications on file      Vitals:    24 1749 24 1800   BP: (!) 153/63 124/58   Pulse: 96 88   Resp: 22    Temp: 97.9  F (36.6  C)    TempSrc: Oral    SpO2: 98% 97%   Weight: 90.7 kg (200 lb)    Height: 1.702 m (5' 7\")        Physical Exam   Constitutional: Oriented to person, place, and time.  Able to speak in complete sentences, no acute respiratory distress HEENT:   Cardiovascular: Normal rate, regular rhythm and normal heart sounds.    Pulmonary/Chest: Bilateral expiratory wheezing  Abdominal: Soft. Bowel sounds are normal.   Musculoskeletal: Normal range of motion.   Neurological: Alert and oriented to person, place, and time. Normal strength. Skin: Skin is warm and dry.   Psychiatric: Normal mood and affect. Behavior is normal. Thought content normal.     DIAGNOSTICS    LABORATORY FINDINGS (REVIEWED AND INTERPRETED):  Labs Ordered and Resulted from Time of ED Arrival to Time of ED Departure - No data to display      IMAGING (REVIEWED AND INTERPRETED):  No orders to display         ECG (REVIEWED AND INTERPRETED):   ECG #1:   Performed at: 2024 17:20  HR:  157 BPM  Rhythm: Sinus tachycardia  Axis: 91  QRS duration: 68 ms  QTC: 254/410 ms  ST changes: ST now depressed in Anterior leads, " nonspecific T wave inversion now evident in Inferior leads, nonspecific T wave abnormality now evident in lateral leads  Interpretation: Rightward axis, marked ST abnormality possible inferior subendocardial injury, abnormal ECG  Compared to most recent ECG from: 9/21/2024 07:59  ST now depressed in Anterior leads  T wave inversion now evident in Inferior leads  Nonspecific T wave abnormality now evident in lateral leads    I have reviewed the patient's ECG, with comments made as listed above. Please see scanned image for full interpretation.       ECG #2:   Performed at: 12/6/2024 18:20  HR:  84 BPM  Rhythm: Sinus   Axis: 31  QRS duration: 78 ms  QTC:439 ms  Interpretation: Sinus rhythm nonspecific ST-T wave changes interpretation:     Compared to most recent ECG from: 12/6/2024 17:20  Vent. Rate has decreased by 73 BPM  Nonspecific ST-T wave changes    I have reviewed the patient's ECG, with comments made as listed above. Please see scanned image for full interpretation.       Bk Doe D.O.  EMERGENCY MEDICINE   12/06/24  [facility] EMERGENCY DEPARTMENT  [facility] [facility] [address]  [phone]  Dept: [phone]      Bk Doe DO  12/08/24 1952

## 2024-12-07 NOTE — DISCHARGE INSTRUCTIONS
Start prednisone daily as prescribed.  See your pulmonologist in follow-up next week as scheduled.  Monitor your peak flows.  If your peak flows are less than 200 despite the prednisone and use of your nebulizers and inhalers or you feel shortness of breath with wheezing that is persisting return to the emergency department.

## 2024-12-10 ENCOUNTER — HOSPITAL ENCOUNTER (EMERGENCY)
Facility: HOSPITAL | Age: 34
Discharge: LEFT BEFORE DISCHARGE | End: 2024-12-10
Attending: EMERGENCY MEDICINE | Admitting: EMERGENCY MEDICINE
Payer: COMMERCIAL

## 2024-12-10 VITALS
RESPIRATION RATE: 24 BRPM | SYSTOLIC BLOOD PRESSURE: 118 MMHG | HEART RATE: 131 BPM | WEIGHT: 200 LBS | OXYGEN SATURATION: 97 % | HEIGHT: 67 IN | DIASTOLIC BLOOD PRESSURE: 69 MMHG | BODY MASS INDEX: 31.39 KG/M2

## 2024-12-10 DIAGNOSIS — R06.02 SHORTNESS OF BREATH: ICD-10-CM

## 2024-12-10 PROCEDURE — 94640 AIRWAY INHALATION TREATMENT: CPT

## 2024-12-10 PROCEDURE — 250N000009 HC RX 250: Performed by: EMERGENCY MEDICINE

## 2024-12-10 PROCEDURE — 99283 EMERGENCY DEPT VISIT LOW MDM: CPT | Mod: 25

## 2024-12-10 RX ORDER — ALBUTEROL SULFATE 5 MG/ML
2.5 SOLUTION RESPIRATORY (INHALATION) ONCE
Status: COMPLETED | OUTPATIENT
Start: 2024-12-10 | End: 2024-12-10

## 2024-12-10 RX ADMIN — ALBUTEROL SULFATE 2.5 MG: 2.5 SOLUTION RESPIRATORY (INHALATION) at 22:36

## 2024-12-10 ASSESSMENT — ACTIVITIES OF DAILY LIVING (ADL)
ADLS_ACUITY_SCORE: 56
ADLS_ACUITY_SCORE: 56

## 2024-12-11 ENCOUNTER — HOSPITAL ENCOUNTER (EMERGENCY)
Facility: HOSPITAL | Age: 34
Discharge: HOME | End: 2024-12-11
Attending: EMERGENCY MEDICINE
Payer: COMMERCIAL

## 2024-12-11 ENCOUNTER — APPOINTMENT (OUTPATIENT)
Dept: RADIOLOGY | Facility: HOSPITAL | Age: 34
End: 2024-12-11
Attending: EMERGENCY MEDICINE
Payer: COMMERCIAL

## 2024-12-11 VITALS
DIASTOLIC BLOOD PRESSURE: 64 MMHG | TEMPERATURE: 98.2 F | OXYGEN SATURATION: 95 % | HEART RATE: 107 BPM | RESPIRATION RATE: 18 BRPM | SYSTOLIC BLOOD PRESSURE: 127 MMHG

## 2024-12-11 DIAGNOSIS — J45.901 ASTHMA WITH ACUTE EXACERBATION, UNSPECIFIED ASTHMA SEVERITY, UNSPECIFIED WHETHER PERSISTENT: ICD-10-CM

## 2024-12-11 DIAGNOSIS — J45.901 SEVERE ASTHMA WITH ACUTE EXACERBATION, UNSPECIFIED WHETHER PERSISTENT: ICD-10-CM

## 2024-12-11 LAB
ANION GAP SERPL CALCULATED.3IONS-SCNC: 13 MMOL/L (ref 7–15)
BASOPHILS # BLD AUTO: 0.1 10E3/UL (ref 0–0.2)
BASOPHILS NFR BLD AUTO: 0 %
BUN SERPL-MCNC: 9.2 MG/DL (ref 6–20)
CALCIUM SERPL-MCNC: 8.9 MG/DL (ref 8.8–10.4)
CHLORIDE SERPL-SCNC: 105 MMOL/L (ref 98–107)
CREAT SERPL-MCNC: 1.08 MG/DL (ref 0.51–0.95)
EGFRCR SERPLBLD CKD-EPI 2021: 69 ML/MIN/1.73M2
EOSINOPHIL # BLD AUTO: 0.4 10E3/UL (ref 0–0.7)
EOSINOPHIL NFR BLD AUTO: 3 %
ERYTHROCYTE [DISTWIDTH] IN BLOOD BY AUTOMATED COUNT: 13 % (ref 10–15)
GLUCOSE SERPL-MCNC: 118 MG/DL (ref 70–99)
HCG SERPL QL: NEGATIVE
HCO3 SERPL-SCNC: 22 MMOL/L (ref 22–29)
HCT VFR BLD AUTO: 41.5 % (ref 35–47)
HGB BLD-MCNC: 13.6 G/DL (ref 11.7–15.7)
HOLD SPECIMEN: NORMAL
IMM GRANULOCYTES # BLD: 0 10E3/UL
IMM GRANULOCYTES NFR BLD: 0 %
LYMPHOCYTES # BLD AUTO: 3.3 10E3/UL (ref 0.8–5.3)
LYMPHOCYTES NFR BLD AUTO: 29 %
MCH RBC QN AUTO: 27.3 PG (ref 26.5–33)
MCHC RBC AUTO-ENTMCNC: 32.8 G/DL (ref 31.5–36.5)
MCV RBC AUTO: 83 FL (ref 78–100)
MONOCYTES # BLD AUTO: 0.8 10E3/UL (ref 0–1.3)
MONOCYTES NFR BLD AUTO: 7 %
NEUTROPHILS # BLD AUTO: 7 10E3/UL (ref 1.6–8.3)
NEUTROPHILS NFR BLD AUTO: 60 %
NRBC # BLD AUTO: 0 10E3/UL
NRBC BLD AUTO-RTO: 0 /100
PLATELET # BLD AUTO: 292 10E3/UL (ref 150–450)
POTASSIUM SERPL-SCNC: 4.2 MMOL/L (ref 3.4–5.3)
RBC # BLD AUTO: 4.98 10E6/UL (ref 3.8–5.2)
SODIUM SERPL-SCNC: 140 MMOL/L (ref 135–145)
WBC # BLD AUTO: 11.5 10E3/UL (ref 4–11)

## 2024-12-11 PROCEDURE — 250N000009 HC RX 250: Performed by: EMERGENCY MEDICINE

## 2024-12-11 PROCEDURE — 36415 COLL VENOUS BLD VENIPUNCTURE: CPT | Performed by: EMERGENCY MEDICINE

## 2024-12-11 PROCEDURE — 84703 CHORIONIC GONADOTROPIN ASSAY: CPT | Performed by: EMERGENCY MEDICINE

## 2024-12-11 PROCEDURE — 82565 ASSAY OF CREATININE: CPT | Performed by: EMERGENCY MEDICINE

## 2024-12-11 PROCEDURE — 94640 AIRWAY INHALATION TREATMENT: CPT

## 2024-12-11 PROCEDURE — 99285 EMERGENCY DEPT VISIT HI MDM: CPT | Mod: 25 | Performed by: EMERGENCY MEDICINE

## 2024-12-11 PROCEDURE — 999N000157 HC STATISTIC RCP TIME EA 10 MIN

## 2024-12-11 PROCEDURE — 80048 BASIC METABOLIC PNL TOTAL CA: CPT | Performed by: EMERGENCY MEDICINE

## 2024-12-11 PROCEDURE — 85004 AUTOMATED DIFF WBC COUNT: CPT | Performed by: EMERGENCY MEDICINE

## 2024-12-11 PROCEDURE — 96375 TX/PRO/DX INJ NEW DRUG ADDON: CPT | Performed by: EMERGENCY MEDICINE

## 2024-12-11 PROCEDURE — 71045 X-RAY EXAM CHEST 1 VIEW: CPT

## 2024-12-11 PROCEDURE — 96365 THER/PROPH/DIAG IV INF INIT: CPT | Performed by: EMERGENCY MEDICINE

## 2024-12-11 PROCEDURE — 82374 ASSAY BLOOD CARBON DIOXIDE: CPT | Performed by: EMERGENCY MEDICINE

## 2024-12-11 PROCEDURE — 250N000011 HC RX IP 250 OP 636: Performed by: EMERGENCY MEDICINE

## 2024-12-11 RX ORDER — MAGNESIUM SULFATE HEPTAHYDRATE 40 MG/ML
2 INJECTION, SOLUTION INTRAVENOUS ONCE
Status: COMPLETED | OUTPATIENT
Start: 2024-12-11 | End: 2024-12-11

## 2024-12-11 RX ORDER — PREDNISONE 10 MG/1
TABLET ORAL
Qty: 30 TABLET | Refills: 0 | Status: SHIPPED | OUTPATIENT
Start: 2024-12-11

## 2024-12-11 RX ORDER — IPRATROPIUM BROMIDE AND ALBUTEROL SULFATE 2.5; .5 MG/3ML; MG/3ML
3 SOLUTION RESPIRATORY (INHALATION) ONCE
Status: COMPLETED | OUTPATIENT
Start: 2024-12-11 | End: 2024-12-11

## 2024-12-11 RX ORDER — ALBUTEROL SULFATE 0.83 MG/ML
5 SOLUTION RESPIRATORY (INHALATION) ONCE
Status: COMPLETED | OUTPATIENT
Start: 2024-12-11 | End: 2024-12-11

## 2024-12-11 RX ORDER — METHYLPREDNISOLONE SODIUM SUCCINATE 125 MG/2ML
125 INJECTION INTRAMUSCULAR; INTRAVENOUS ONCE
Status: COMPLETED | OUTPATIENT
Start: 2024-12-11 | End: 2024-12-11

## 2024-12-11 RX ADMIN — ALBUTEROL SULFATE 5 MG: 2.5 SOLUTION RESPIRATORY (INHALATION) at 09:14

## 2024-12-11 RX ADMIN — METHYLPREDNISOLONE SODIUM SUCCINATE 125 MG: 125 INJECTION, POWDER, FOR SOLUTION INTRAMUSCULAR; INTRAVENOUS at 06:17

## 2024-12-11 RX ADMIN — ALBUTEROL SULFATE 5 MG: 2.5 SOLUTION RESPIRATORY (INHALATION) at 08:08

## 2024-12-11 RX ADMIN — IPRATROPIUM BROMIDE AND ALBUTEROL SULFATE 3 ML: .5; 3 SOLUTION RESPIRATORY (INHALATION) at 06:16

## 2024-12-11 RX ADMIN — MAGNESIUM SULFATE HEPTAHYDRATE 2 G: 40 INJECTION, SOLUTION INTRAVENOUS at 08:16

## 2024-12-11 ASSESSMENT — ACTIVITIES OF DAILY LIVING (ADL)
ADLS_ACUITY_SCORE: 56

## 2024-12-11 NOTE — PROGRESS NOTES
RESPIRATORY CARE NOTE     Patient was on room air, Sp02 97%. X2 albuterol given. Expiratory wheezing scattered throughout pre treatment and post treatment. Increased in aeration.     X2 more albuterol given at 0914.      Vani Castelan, RT

## 2024-12-11 NOTE — ED PROVIDER NOTES
[facility] DEPARTMENT ENCOUNTER      NAME: Soren Freitas  AGE: 34 year old female  YOB: 1990  MRN: [id number]  EVALUATION DATE & TIME: 12/11/2024  6:02 AM    PCP: Leyda, [facility]    ED PROVIDER: Jillian Nieto DO      Chief Complaint   Patient presents with    Shortness of Breath         FINAL IMPRESSION:  1. Asthma with acute exacerbation, unspecified asthma severity, unspecified whether persistent    2. Severe asthma with acute exacerbation, unspecified whether persistent          ED COURSE & MEDICAL DECISION MAKING:    Pertinent Labs & Imaging studies reviewed. (See chart for details)  6:09 AM I met the patient and performed my initial interview and exam.  7:14 AM Rechecked and updated patient. Patient reported being prescribed Prednisone on 12/6, but did not refill the order and has not been taking it.   8:05 AM HR now 106 bpm, /86, saturating 97% on room air.  Patient much more comfortable.  9:00 AM Patient reassessed.  Resting. Persistent wheezing however improved.  10:09 AM Rechecked and updated patient. Patient is feeling better and assures me that she has refill orders for her nebulizer. She is also out of her inhaler, but has plans to pick it up today. We discussed plans for discharge including supportive cares, symptomatic treatment, outpatient follow up, and reasons to return to the emergency department.    34 year old female presents to the Emergency Department for evaluation of shortness of breath, wheezing.  Well-documented history of asthma.  Notes symptoms started yesterday.  At home treatment has not been helpful.  Patient did arrive at this hospital last evening but got frustrated with her care and left without any treatment.  She is wheezing on exam, tachycardic.  Reports this feels like her normal asthma.  No fevers.  Denies any significant chest pain.  I feel this is less likely ACS, PE.  Will evaluate for signs of pneumonia.  She was given DuoNeb  therapy and Solu-Medrol here.  Labs with mild leukocytosis.  Xray without evidence of consolidation.  Tachycardia improving with symptom management.  Repeat exam with persistent wheezing.  Patient confirms she did not fill prednisone prescription from her visit on 12/06.  Will give additional nebulizer, magnesium for symptoms.  Symptoms greatly improved here and tachycardia resolved.  I did discuss with patient.  She reports she has refills of her nebulizer and albuterol inhaler, she has been out of her inhaler.  Plans to pick it up today.  I will resend her prednisone taper and she agrees to pick this up as well.  Discussed other symptomatic strategies and return precautions.    At the conclusion of the encounter I discussed the results of all of the tests and the disposition. The questions were answered. The patient or family acknowledged understanding and was agreeable with the care plan.     Medical Decision Making  Obtained supplemental history:Supplemental history obtained?: Documented in chart  Reviewed external records: External records reviewed?: Documented in chart  Care impacted by chronic illness:Chronic Lung Disease  Did you consider but not order tests?: Work up considered but not performed and documented in chart, if applicable  Did you interpret images independently?: Independent interpretation of ECG and images noted in documentation, when applicable.  Consultation discussion with other provider:Did you involve another provider (consultant, MH, pharmacy, etc.)?: No  Discharge. I prescribed additional prescription strength medication(s) as charted. I considered admission, but discharged patient after significant clinical improvement.    MIPS: Not Applicable        MEDICATIONS GIVEN IN THE EMERGENCY:  Medications   ipratropium - albuterol 0.5 mg/2.5 mg/3 mL (DUONEB) neb solution 3 mL (3 mLs Nebulization $Given 12/11/24 0680)   methylPREDNISolone Na Suc (solu-MEDROL) injection 125 mg (125 mg  Intravenous $Given 12/11/24 0617)   magnesium sulfate 2 g in 50 mL sterile water intermittent infusion (0 g Intravenous Stopped 12/11/24 0916)   albuterol (PROVENTIL) neb solution 5 mg (5 mg Nebulization $Given 12/11/24 0808)   albuterol (PROVENTIL) neb solution 5 mg (5 mg Nebulization $Given 12/11/24 0914)       NEW PRESCRIPTIONS STARTED AT TODAY'S ER VISIT  Discharge Medication List as of 12/11/2024 10:14 AM             =================================================================    HPI    Patient information was obtained from: patient    Use of : N/A       Soren GRAY Fortino is a 34 year old female with a pertinent history of asthma who presents to this ED for evaluation of shortness of breath and asthma exacerbation.  Patient is presenting with shortness of breath and wheezing since last night. Denies chills, fever, cough, vomiting, and chest pain. Reports her medications are not working. Patient is on albuterol, nebulizer, and Dulera.     She was in the emergency department last night and went home unsatisfied with her care. She could not sleep and called 911, and returned to the emergency department this morning due to her persisting symptoms.     Per Chart Review, the patient was seen on 12/10/24 at [facility] Emergency Department for shortness of breath and asthma exacerbation. Patient received nebulizer therapy, but accused the nursing staff of incorrectly administering the therapy correctly and lying about its administration. She refused an x-ray, nasal swabs, and all other emergency department diagnostics and interventions. Patient did have some audibly wheezing with a significant asthma history, but ox levels were >90%. Clinically did not have anything to offer, aside from refused emergency department interventions. Discharged against medical advice.     REVIEW OF SYSTEMS   Per HPI    PAST MEDICAL HISTORY:  Past Medical History:   Diagnosis Date    Obesity      Severe persistent asthma with acute exacerbation (H) 1/1/2015    Formatting of this note might be different from the original. 12/2014 Admit for exacerbation: Spirometry FEV1/FVC 68%, D/C w/dulera, albuterol, Spiriva.  Started singulair 10mg QD, Given NRT.  Dx as infant, has been on albuterol lifelong.    Last Assessment & Plan:  Formatting of this note might be different from the original. Stable on Singulair, cetirizine, albuterol, and dulera. Discussed COVID    Uncomplicated asthma        PAST SURGICAL HISTORY:  No past surgical history on file.        CURRENT MEDICATIONS:    predniSONE (DELTASONE) 10 MG tablet  albuterol (PROAIR HFA/PROVENTIL HFA/VENTOLIN HFA) 108 (90 Base) MCG/ACT inhaler  albuterol (PROVENTIL) (2.5 MG/3ML) 0.083% neb solution  ipratropium - albuterol 0.5 mg/2.5 mg/3 mL (DUONEB) 0.5-2.5 (3) MG/3ML neb solution  mometasone-formoterol (DULERA) 200-5 MCG/ACT inhaler  montelukast (SINGULAIR) 10 MG tablet         ALLERGIES:  Allergies   Allergen Reactions    Cashew Nut Oil Anaphylaxis and Angioedema    Fish Allergy Itching     Sea Food causes Throat swelling    Peanut-Containing Drug Products Angioedema and Swelling     Throat swelling.      Shellfish-Derived Products Angioedema       FAMILY HISTORY:  No family history on file.    SOCIAL HISTORY:   Social History     Socioeconomic History    Marital status: Single   Tobacco Use    Smoking status: Former     Social Drivers of Health     Financial Resource Strain: Low Risk  (10/7/2024)    Financial Resource Strain     Within the past 12 months, have you or your family members you live with been unable to get utilities (heat, electricity) when it was really needed?: No   Food Insecurity: Low Risk  (10/7/2024)    Food Insecurity     Within the past 12 months, did you worry that your food would run out before you got money to buy more?: No     Within the past 12 months, did the food you bought just not last and you didn t have money to get more?: No    Transportation Needs: Low Risk  (10/7/2024)    Transportation Needs     Within the past 12 months, has lack of transportation kept you from medical appointments, getting your medicines, non-medical meetings or appointments, work, or from getting things that you need?: No   Social Connections: Socially Integrated (8/9/2024)    [facility] from [facility]    Social Connections     Do you often feel lonely or isolated from those around you?: 0   Interpersonal Safety: Low Risk  (10/6/2024)    Interpersonal Safety     Do you feel physically and emotionally safe where you currently live?: Yes     Within the past 12 months, have you been hit, slapped, kicked or otherwise physically hurt by someone?: No     Within the past 12 months, have you been humiliated or emotionally abused in other ways by your partner or ex-partner?: No   Housing Stability: High Risk (10/7/2024)    Housing Stability     Do you have housing? : No     Are you worried about losing your housing?: No       VITALS:  /64   Pulse 107   Temp 98.2  F (36.8  C)   Resp 18   SpO2 95%     PHYSICAL EXAM    Physical Exam  Constitutional:       General: She is not in acute distress.  HENT:      Head: Normocephalic and atraumatic.      Mouth/Throat:      Pharynx: Oropharynx is clear.   Eyes:      Pupils: Pupils are equal, round, and reactive to light.   Cardiovascular:      Rate and Rhythm: Normal rate and regular rhythm.      Pulses: Normal pulses.      Heart sounds: Normal heart sounds.   Pulmonary:      Effort: Pulmonary effort is normal. Tachypnea present.      Breath sounds: Wheezing present.   Abdominal:      General: Abdomen is flat. Bowel sounds are normal.      Palpations: Abdomen is soft.      Tenderness: There is no abdominal tenderness.   Musculoskeletal:         General: Normal range of motion.   Skin:     General: Skin is warm and dry.      Capillary Refill: Capillary refill takes less than 2 seconds.    Neurological:      General: No focal deficit present.      Mental Status: She is alert and oriented to person, place, and time.          LAB:  All pertinent labs reviewed and interpreted.  Labs Ordered and Resulted from Time of ED Arrival to Time of ED Departure   BASIC METABOLIC PANEL - Abnormal       Result Value    Sodium 140      Potassium 4.2      Chloride 105      Carbon Dioxide (CO2) 22      Anion Gap 13      Urea Nitrogen 9.2      Creatinine 1.08 (*)     GFR Estimate 69      Calcium 8.9      Glucose 118 (*)    CBC WITH PLATELETS AND DIFFERENTIAL - Abnormal    WBC Count 11.5 (*)     RBC Count 4.98      Hemoglobin 13.6      Hematocrit 41.5      MCV 83      MCH 27.3      MCHC 32.8      RDW 13.0      Platelet Count 292      % Neutrophils 60      % Lymphocytes 29      % Monocytes 7      % Eosinophils 3      % Basophils 0      % Immature Granulocytes 0      NRBCs per 100 WBC 0      Absolute Neutrophils 7.0      Absolute Lymphocytes 3.3      Absolute Monocytes 0.8      Absolute Eosinophils 0.4      Absolute Basophils 0.1      Absolute Immature Granulocytes 0.0      Absolute NRBCs 0.0     HCG QUALITATIVE PREGNANCY - Normal    hCG Serum Qualitative Negative         RADIOLOGY:  Reviewed all pertinent imaging. Please see official radiology report.  XR Chest Port 1 View   Final Result   IMPRESSION: No focal airspace consolidation. No pleural effusion or pneumothorax.      Heart size is normal.          I, Timo Acosta, am serving as a scribe to document services personally performed by Dr. Jillian Nieto based on my observation and the provider's statements to me. I, Jillian Nieto, DO attest that Timo Acosta is acting in a scribe capacity, has observed my performance of the services and has documented them in accordance with my direction.    Jillian Nieto DO  Emergency Medicine  [facility] EMERGENCY DEPARTMENT  [address] [place] [id number] [phone]  Dept: [phone]        [place], Jillian RIVERA,   12/11/24 1402

## 2024-12-11 NOTE — ED NOTES
Bed: JNED-04  Expected date: 12/11/24  Expected time:   Means of arrival:   Comments:  [facility]   34 female  SOB, asthma inhaler no relief. Neb given by EMS

## 2024-12-11 NOTE — ED NOTES
Bed: JNFT17  Expected date: 12/10/24  Expected time:   Means of arrival:   Comments:  SP; 35F, ASTHMA, NEB GIVEN, VSS

## 2024-12-11 NOTE — ED NOTES
When administering the ordered neb, pt was arguing with writer about the setup of the nebulizer. Another RN came in the room to confirm the setup. Pt continued to argue that she did not receive any medication. Screaming at writer and other RN. Provider updated.

## 2024-12-11 NOTE — ED NOTES
"Pt left prior to receiving her discharge paperwork. As pt walked out to the lobby she screamed \"fuck you bitch\" to the writer.   "

## 2024-12-11 NOTE — DISCHARGE INSTRUCTIONS
As we discussed, we offered treatment tonight for your trouble breathing which you declined and decided to leave AGAINST MEDICAL ADVICE.  Come back if symptoms worsen or you change your mind for reevaluation as additional recommended testing was suggested tonight that you refused after several conversations.  You were given nebulizer treatments with EMS and here in the ED.  Keep taking the steroid taper that was prescribed the other day.

## 2024-12-11 NOTE — DISCHARGE INSTRUCTIONS
No signs of pneumonia here today  Continue your inhaler and nebulizers at home  Start prednisone taper tomorrow  Return if any worsening symptoms

## 2024-12-11 NOTE — ED PROVIDER NOTES
EMERGENCY DEPARTMENT ENCOUNTER     NAME: Soren Freitas   AGE: 34 year old female   YOB: 1990   MRN: [id number]   EVALUATION DATE & TIME: 12/10/2024  9:57 PM   PCP: Leyda, [facility]     Chief Complaint   Patient presents with    Asthma Exacerbation   :    FINAL IMPRESSION       1. Shortness of breath           ED COURSE & MEDICAL DECISION MAKING      Pertinent Labs & Imaging studies reviewed. (See chart for details)   34 year old female  presents to the Emergency Department for evaluation of shortness of breath stating that her asthma is acting up. Initial Vitals Reviewed. Initial exam notable for patient who has a pulse ox of 97%, does have some slight audible wheezing but no increased work of breathing.  She clearly does have a significant asthma history and a recent visit for exacerbation, but immediately upon arrival patient became irate that she was placed in a fast-track room that has a reclining chair and is demanding an ED stretcher.  Because of the need for nebulizer therapy and a lack of other available treatment spaces with significant boarding in the emergency department, I explained that she was brought back to a private room and we can administer nebulizer therapy another treatment here but we do not have a bed available for her.  At this time she refused all treatment and decided to leave A.  Notably the blood pressure we got was quite low, but when we rechecked it it was normal.    Update: After talking with a family member on the phone, patient did decide to stay for ED evaluation and care.  I initially ordered an albuterol nebulizer treatment sometimes stating the lab work, and broaden the workup to include an x-ray as it looks like she did have a pneumonia that was complicating her asthma back in October, as well as a viral panel to ensure that does not something like COVID or influenza this time causing exacerbation.  After receiving the nebulizer therapy, I was  asked to reevaluate the patient by nursing staff as she became agitated, was screaming at the nurse and accusing her of not administering the nebulizer therapy and lying about it.  She is quite tremulous, has a heart rate of around 140 really and they just recommend doing an MRI to see if there is a stroke or not has a pulse ox of 96% speaking in full sentences with no increased work of breathing.  I tried to calmly explain to her that with a repeat visit after she is already on a prednisone taper and has just done the higher doses for the last few days he has not had every we would want to broaden the workup, and the patient became extremely agitated and continues to accuse ED staff of not administering nebulizers, is not willing to go for the x-ray or have the nasal swab.  Or if they were I spent a good deal of time in the room, but ultimately she is otherwise refusing all ED diagnostics, and I do not think I have anything additional to offer her as clinically right now I do not feel that her vital signs including the tremors and tachycardia from albuterol therapy can support additional nebulizer treatment safely, and she does not have increased work of breathing or signs of respiratory failure, no hypoxia.  Clearly she does have significant asthma history and she was probably and in the past has needed BiPAP, admission, has been hypoxic but none of those things are currently present.  I did try discussed going for x-ray, waiting a period of time to see her clinical status acute but with bronchodilator side effects improved, and then giving an additional nebulizer treatment, but she is not amenable to this either.  Because she is otherwise refusing all ED interventions even after attempts by myself and nursing staff since she will be discharged AGAINST MEDICAL ADVICE.        Medical Decision Making    History:  Supplemental history from: EMS  External Record(s) reviewed: Outpatient Record: Last ED visit  12/6/2024    Work Up:  Chart documentation includes differential considered and any EKGs or imaging independently interpreted by provider, where specified.  In additional to work up documented, I considered the following work up: Documented in chart, if applicable.    External consultation:  Discussion of management with another provider: Documented in chart, if applicable    Complicating factors:  Care impacted by chronic illness: Chronic Lung Disease  Care affected by social determinants of health: Access to Medical Care    Disposition considerations:  AMA    Asthma or COPD Exacerbation:Smoking Cessation Counseling: Patient is NOT a current smoker.             MEDICATIONS GIVEN IN THE EMERGENCY:   Medications - No data to display   NEW PRESCRIPTIONS STARTED AT TODAY'S ER VISIT   New Prescriptions    No medications on file     ================================================================   HISTORY OF PRESENT ILLNESS       Patient information was obtained from: Patient and EMS  Use of Intrepreter: N/A   Soren GRAY Fortino is a 34 year old female with history of asthma who presents for evaluation of difficulty breathing.  Patient is not willing to give much history but EMS notes they were called due to difficulty breathing.  On chart review, she was seen in the ED for asthma 4 days ago.  EMS did give a nebulizer treatment prior to arrival.    ================================================================        PAST HISTORY     PAST MEDICAL HISTORY:   Past Medical History:   Diagnosis Date    Obesity     Severe persistent asthma with acute exacerbation (H) 1/1/2015    Formatting of this note might be different from the original. 12/2014 Admit for exacerbation: Spirometry FEV1/FVC 68%, D/C w/dulera, albuterol, Spiriva.  Started singulair 10mg QD, Given NRT.  Dx as infant, has been on albuterol lifelong.    Last Assessment & Plan:  Formatting of this note might be different from the original. Stable on Singulair,  cetirizine, albuterol, and dulera. Discussed COVID    Uncomplicated asthma       PAST SURGICAL HISTORY:   No past surgical history on file.   CURRENT MEDICATIONS:   albuterol (PROAIR HFA/PROVENTIL HFA/VENTOLIN HFA) 108 (90 Base) MCG/ACT inhaler  albuterol (PROVENTIL) (2.5 MG/3ML) 0.083% neb solution  ipratropium - albuterol 0.5 mg/2.5 mg/3 mL (DUONEB) 0.5-2.5 (3) MG/3ML neb solution  mometasone-formoterol (DULERA) 200-5 MCG/ACT inhaler  montelukast (SINGULAIR) 10 MG tablet  predniSONE (DELTASONE) 10 MG tablet      ALLERGIES:   Allergies   Allergen Reactions    Cashew Nut Oil Anaphylaxis and Angioedema    Fish Allergy Itching     Sea Food causes Throat swelling    Peanut-Containing Drug Products Angioedema and Swelling     Throat swelling.      Shellfish-Derived Products Angioedema      FAMILY HISTORY:   No family history on file.   SOCIAL HISTORY:   Social History     Socioeconomic History    Marital status: Single   Tobacco Use    Smoking status: Former     Social Drivers of Health     Financial Resource Strain: Low Risk  (10/7/2024)    Financial Resource Strain     Within the past 12 months, have you or your family members you live with been unable to get utilities (heat, electricity) when it was really needed?: No   Food Insecurity: Low Risk  (10/7/2024)    Food Insecurity     Within the past 12 months, did you worry that your food would run out before you got money to buy more?: No     Within the past 12 months, did the food you bought just not last and you didn t have money to get more?: No   Transportation Needs: Low Risk  (10/7/2024)    Transportation Needs     Within the past 12 months, has lack of transportation kept you from medical appointments, getting your medicines, non-medical meetings or appointments, work, or from getting things that you need?: No   Social Connections: Socially Integrated (8/9/2024)    [facility]    Social Connections     Do you often  "feel lonely or isolated from those around you?: 0   Interpersonal Safety: Low Risk  (10/6/2024)    Interpersonal Safety     Do you feel physically and emotionally safe where you currently live?: Yes     Within the past 12 months, have you been hit, slapped, kicked or otherwise physically hurt by someone?: No     Within the past 12 months, have you been humiliated or emotionally abused in other ways by your partner or ex-partner?: No   Housing Stability: High Risk (10/7/2024)    Housing Stability     Do you have housing? : No     Are you worried about losing your housing?: No        VITALS  Patient Vitals for the past 24 hrs:   BP Pulse Resp SpO2 Height Weight   12/10/24 2300 -- (!) 131 -- 97 % -- --   12/10/24 2245 -- 117 -- 97 % -- --   12/10/24 2218 -- -- -- -- 1.702 m (5' 7\") 90.7 kg (200 lb)   12/10/24 2205 118/69 107 24 97 % -- --        ================================================================    PHYSICAL EXAM     VITAL SIGNS: /69   Pulse (!) 131   Resp 24   Ht 1.702 m (5' 7\")   Wt 90.7 kg (200 lb)   SpO2 97%   BMI 31.32 kg/m     Constitutional:  Awake, agitated  HENT:  Atraumatic, oropharynx without exudate or erythema, membranes moist  Lymph:  No adenopathy  Eyes: EOM intact, PERRL, no injection  Neck: Supple  Respiratory: No increased work of breathing or significant tachypnea.  Patient is screaming at staff, speaking in full sentences, and does have some slight audible wheezing but with no hypoxia or increased work of breathing  Cardiovascular:  tachycardic, Regular  rhythm, single S1 and S2   GI:  Soft, nontender, nondistended, no rebound or guarding   Musculoskeletal:  Moves all extremities, no lower extremity edema, no deformities    Skin:  Warm, dry  Neurologic:  Alert and oriented x3, no focal deficits noted       ================================================================  LAB       All pertinent labs reviewed and interpreted.   Labs Ordered and Resulted from Time of ED " Arrival to Time of ED Departure - No data to display     ===============================================================  RADIOLOGY       Reviewed all pertinent imaging. Please see official radiology report.   Chest XR,  PA & LAT    (Results Pending)         ================================================================  EKG         I have independently reviewed and interpreted the EKG(s) documented above.     ================================================================  PROCEDURES           Dina Crook M.D.   Emergency Medicine   [facility] EMERGENCY DEPARTMENT  [facility] [facility] [address]  [phone]  Dept: [phone]        Dina Crook MD  12/10/24 4672

## 2024-12-11 NOTE — ED TRIAGE NOTES
Patient arrives from home via [facility].  Patient reports asthma exacerbation that started approximately seven hours ago.   Patient reports one duo neb, two albuterol inhalers, and using rescue inhaler x2.     Medics administered one duo neb en route to ED.    Patient tachypneic and wheezing throughout triage.

## 2024-12-11 NOTE — ED TRIAGE NOTES
Pt arrives via [facility] from home for an asthma exacerbation. Pt took two nebs at home and EMS gave a duoneb with minimal relief. Upon arrival, pt does not appear to be in any respiratory distress with O2 sats at 97% on RA. Pt extremely frustrated with being placed in a room with a recliner, as opposed to a bed. Initially, she told the provider that she would like to leave AMA but then changed her mind.      Triage Assessment (Adult)       Row Name 12/10/24 8257          Triage Assessment    Airway WDL WDL        Respiratory WDL    Respiratory WDL X;rhythm/pattern     Rhythm/Pattern, Respiratory dyspnea upon exertion;shortness of breath        Skin Circulation/Temperature WDL    Skin Circulation/Temperature WDL WDL        Cardiac WDL    Cardiac WDL X;rhythm     Pulse Rate & Regularity tachycardic        Peripheral/Neurovascular WDL    Peripheral Neurovascular WDL WDL        Cognitive/Neuro/Behavioral WDL    Cognitive/Neuro/Behavioral WDL WDL

## 2024-12-11 NOTE — ED NOTES
Provider in to speak with pt. Pt refusing to go to XR at this point prior to getting another neb. Provider explained to pt that she is not able to receive another neb at this time d/t her vitals. Pt continuing to argue with and screaming at the provider.

## 2025-01-01 PROCEDURE — 99285 EMERGENCY DEPT VISIT HI MDM: CPT | Mod: 25

## 2025-01-02 ENCOUNTER — HOSPITAL ENCOUNTER (EMERGENCY)
Facility: HOSPITAL | Age: 35
Discharge: HOME | End: 2025-01-02
Attending: EMERGENCY MEDICINE | Admitting: EMERGENCY MEDICINE
Payer: COMMERCIAL

## 2025-01-02 VITALS
HEART RATE: 116 BPM | WEIGHT: 200 LBS | DIASTOLIC BLOOD PRESSURE: 71 MMHG | BODY MASS INDEX: 31.32 KG/M2 | OXYGEN SATURATION: 98 % | TEMPERATURE: 97.5 F | RESPIRATION RATE: 16 BRPM | SYSTOLIC BLOOD PRESSURE: 115 MMHG

## 2025-01-02 DIAGNOSIS — R06.02 SHORTNESS OF BREATH: ICD-10-CM

## 2025-01-02 DIAGNOSIS — J45.21 EXACERBATION OF INTERMITTENT ASTHMA, UNSPECIFIED ASTHMA SEVERITY: ICD-10-CM

## 2025-01-02 PROCEDURE — 96365 THER/PROPH/DIAG IV INF INIT: CPT

## 2025-01-02 PROCEDURE — 250N000009 HC RX 250: Performed by: EMERGENCY MEDICINE

## 2025-01-02 PROCEDURE — 96375 TX/PRO/DX INJ NEW DRUG ADDON: CPT

## 2025-01-02 PROCEDURE — 999N000157 HC STATISTIC RCP TIME EA 10 MIN

## 2025-01-02 PROCEDURE — 94640 AIRWAY INHALATION TREATMENT: CPT

## 2025-01-02 PROCEDURE — 250N000011 HC RX IP 250 OP 636: Performed by: EMERGENCY MEDICINE

## 2025-01-02 RX ORDER — ALBUTEROL SULFATE 0.83 MG/ML
5 SOLUTION RESPIRATORY (INHALATION) ONCE
Status: COMPLETED | OUTPATIENT
Start: 2025-01-02 | End: 2025-01-02

## 2025-01-02 RX ORDER — ALBUTEROL SULFATE 5 MG/ML
2.5 SOLUTION RESPIRATORY (INHALATION) EVERY 6 HOURS PRN
Status: DISCONTINUED | OUTPATIENT
Start: 2025-01-02 | End: 2025-01-02 | Stop reason: HOSPADM

## 2025-01-02 RX ORDER — ALBUTEROL SULFATE 90 UG/1
2 INHALANT RESPIRATORY (INHALATION) EVERY 4 HOURS PRN
Qty: 18 G | Refills: 0 | Status: SHIPPED | OUTPATIENT
Start: 2025-01-02 | End: 2025-02-01

## 2025-01-02 RX ORDER — METHYLPREDNISOLONE SODIUM SUCCINATE 125 MG/2ML
125 INJECTION INTRAMUSCULAR; INTRAVENOUS ONCE
Status: COMPLETED | OUTPATIENT
Start: 2025-01-02 | End: 2025-01-02

## 2025-01-02 RX ORDER — MAGNESIUM SULFATE HEPTAHYDRATE 40 MG/ML
2 INJECTION, SOLUTION INTRAVENOUS ONCE
Status: COMPLETED | OUTPATIENT
Start: 2025-01-02 | End: 2025-01-02

## 2025-01-02 RX ORDER — PREDNISONE 20 MG/1
40 TABLET ORAL DAILY
Qty: 10 TABLET | Refills: 0 | Status: SHIPPED | OUTPATIENT
Start: 2025-01-02 | End: 2025-01-07

## 2025-01-02 RX ORDER — IPRATROPIUM BROMIDE AND ALBUTEROL SULFATE 2.5; .5 MG/3ML; MG/3ML
3 SOLUTION RESPIRATORY (INHALATION) ONCE
Status: COMPLETED | OUTPATIENT
Start: 2025-01-02 | End: 2025-01-02

## 2025-01-02 RX ADMIN — IPRATROPIUM BROMIDE AND ALBUTEROL SULFATE 3 ML: .5; 3 SOLUTION RESPIRATORY (INHALATION) at 00:17

## 2025-01-02 RX ADMIN — ALBUTEROL SULFATE 5 MG: 2.5 SOLUTION RESPIRATORY (INHALATION) at 00:17

## 2025-01-02 RX ADMIN — ALBUTEROL SULFATE 2.5 MG: 2.5 SOLUTION RESPIRATORY (INHALATION) at 01:31

## 2025-01-02 RX ADMIN — METHYLPREDNISOLONE SODIUM SUCCINATE 125 MG: 125 INJECTION, POWDER, FOR SOLUTION INTRAMUSCULAR; INTRAVENOUS at 00:31

## 2025-01-02 RX ADMIN — MAGNESIUM SULFATE HEPTAHYDRATE 2 G: 40 INJECTION, SOLUTION INTRAVENOUS at 00:32

## 2025-01-02 ASSESSMENT — ACTIVITIES OF DAILY LIVING (ADL)
ADLS_ACUITY_SCORE: 56
ADLS_ACUITY_SCORE: 56

## 2025-01-02 NOTE — ED NOTES
Patient requested assistance with [employer]. Called taxi company and was not given an ETA. Patient updated.

## 2025-01-02 NOTE — DISCHARGE INSTRUCTIONS
You were seen in the Emergency Department today for shortness of breath.  I think your symptoms are most likely related to an asthma exacerbation.    You were treated here with steroids and nebulizers.  I am sending with a prescription for a short course of oral steroids and also a refill of your inhaler.  You should continue to take all of your other medications as prescribed.      Please return to the ER if you experience more shortness of breath, chest pain, fever not better with Tylenol/motrin, and/or for any other new or concerning symptoms, otherwise please follow up with your primary doctor in 2-3 days for recheck.     Below is some information you might find useful.     Thank you for choosing [facility]. It was a pleasure taking care of you today!  - Dr. Emerald Mir

## 2025-01-02 NOTE — ED NOTES
Bed: JNFT17  Expected date: 1/1/25  Expected time:   Means of arrival:   Comments:  SP 9  35 female SOB  Home neb no relief. Tachy
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
## 2025-04-11 ENCOUNTER — HOSPITAL ENCOUNTER (EMERGENCY)
Facility: HOSPITAL | Age: 35
Discharge: HOME | End: 2025-04-11
Attending: EMERGENCY MEDICINE | Admitting: EMERGENCY MEDICINE
Payer: COMMERCIAL

## 2025-04-11 VITALS
DIASTOLIC BLOOD PRESSURE: 57 MMHG | HEIGHT: 67 IN | HEART RATE: 107 BPM | TEMPERATURE: 98.4 F | RESPIRATION RATE: 18 BRPM | WEIGHT: 200 LBS | OXYGEN SATURATION: 97 % | BODY MASS INDEX: 31.39 KG/M2 | SYSTOLIC BLOOD PRESSURE: 106 MMHG

## 2025-04-11 DIAGNOSIS — J45.901 ASTHMA WITH ACUTE EXACERBATION, UNSPECIFIED ASTHMA SEVERITY, UNSPECIFIED WHETHER PERSISTENT: ICD-10-CM

## 2025-04-11 LAB
ALBUMIN SERPL BCG-MCNC: 4.3 G/DL (ref 3.5–5.2)
ALP SERPL-CCNC: 73 U/L (ref 40–150)
ALT SERPL W P-5'-P-CCNC: 18 U/L (ref 0–50)
ANION GAP SERPL CALCULATED.3IONS-SCNC: 11 MMOL/L (ref 7–15)
AST SERPL W P-5'-P-CCNC: 20 U/L (ref 0–45)
BASOPHILS # BLD AUTO: 0 10E3/UL (ref 0–0.2)
BASOPHILS NFR BLD AUTO: 0 %
BILIRUB SERPL-MCNC: 0.8 MG/DL
BUN SERPL-MCNC: 9.1 MG/DL (ref 6–20)
CALCIUM SERPL-MCNC: 9 MG/DL (ref 8.8–10.4)
CHLORIDE SERPL-SCNC: 105 MMOL/L (ref 98–107)
CREAT SERPL-MCNC: 0.99 MG/DL (ref 0.51–0.95)
EGFRCR SERPLBLD CKD-EPI 2021: 76 ML/MIN/1.73M2
EOSINOPHIL # BLD AUTO: 0.4 10E3/UL (ref 0–0.7)
EOSINOPHIL NFR BLD AUTO: 6 %
ERYTHROCYTE [DISTWIDTH] IN BLOOD BY AUTOMATED COUNT: 13 % (ref 10–15)
GLUCOSE SERPL-MCNC: 97 MG/DL (ref 70–99)
HCG SERPL QL: NEGATIVE
HCO3 SERPL-SCNC: 23 MMOL/L (ref 22–29)
HCT VFR BLD AUTO: 43.5 % (ref 35–47)
HGB BLD-MCNC: 14.6 G/DL (ref 11.7–15.7)
IMM GRANULOCYTES # BLD: 0 10E3/UL
IMM GRANULOCYTES NFR BLD: 0 %
LYMPHOCYTES # BLD AUTO: 3.4 10E3/UL (ref 0.8–5.3)
LYMPHOCYTES NFR BLD AUTO: 48 %
MCH RBC QN AUTO: 27.6 PG (ref 26.5–33)
MCHC RBC AUTO-ENTMCNC: 33.6 G/DL (ref 31.5–36.5)
MCV RBC AUTO: 82 FL (ref 78–100)
MONOCYTES # BLD AUTO: 0.5 10E3/UL (ref 0–1.3)
MONOCYTES NFR BLD AUTO: 7 %
NEUTROPHILS # BLD AUTO: 2.8 10E3/UL (ref 1.6–8.3)
NEUTROPHILS NFR BLD AUTO: 40 %
NRBC # BLD AUTO: 0 10E3/UL
NRBC BLD AUTO-RTO: 0 /100
PLATELET # BLD AUTO: 282 10E3/UL (ref 150–450)
POTASSIUM SERPL-SCNC: 3.6 MMOL/L (ref 3.4–5.3)
PROT SERPL-MCNC: 7.6 G/DL (ref 6.4–8.3)
RBC # BLD AUTO: 5.29 10E6/UL (ref 3.8–5.2)
SODIUM SERPL-SCNC: 139 MMOL/L (ref 135–145)
WBC # BLD AUTO: 7.2 10E3/UL (ref 4–11)

## 2025-04-11 PROCEDURE — 94640 AIRWAY INHALATION TREATMENT: CPT

## 2025-04-11 PROCEDURE — 250N000011 HC RX IP 250 OP 636: Mod: JZ | Performed by: EMERGENCY MEDICINE

## 2025-04-11 PROCEDURE — 96366 THER/PROPH/DIAG IV INF ADDON: CPT

## 2025-04-11 PROCEDURE — 84703 CHORIONIC GONADOTROPIN ASSAY: CPT | Performed by: EMERGENCY MEDICINE

## 2025-04-11 PROCEDURE — 85041 AUTOMATED RBC COUNT: CPT | Performed by: EMERGENCY MEDICINE

## 2025-04-11 PROCEDURE — 250N000009 HC RX 250: Performed by: EMERGENCY MEDICINE

## 2025-04-11 PROCEDURE — 96365 THER/PROPH/DIAG IV INF INIT: CPT

## 2025-04-11 PROCEDURE — 99285 EMERGENCY DEPT VISIT HI MDM: CPT | Mod: 25

## 2025-04-11 PROCEDURE — 36415 COLL VENOUS BLD VENIPUNCTURE: CPT | Performed by: EMERGENCY MEDICINE

## 2025-04-11 PROCEDURE — 85004 AUTOMATED DIFF WBC COUNT: CPT | Performed by: EMERGENCY MEDICINE

## 2025-04-11 PROCEDURE — 999N000157 HC STATISTIC RCP TIME EA 10 MIN

## 2025-04-11 PROCEDURE — 82947 ASSAY GLUCOSE BLOOD QUANT: CPT | Performed by: EMERGENCY MEDICINE

## 2025-04-11 PROCEDURE — 96375 TX/PRO/DX INJ NEW DRUG ADDON: CPT

## 2025-04-11 RX ORDER — IPRATROPIUM BROMIDE AND ALBUTEROL SULFATE 2.5; .5 MG/3ML; MG/3ML
1 SOLUTION RESPIRATORY (INHALATION) 3 TIMES DAILY PRN
Qty: 90 ML | Refills: 0 | Status: SHIPPED | OUTPATIENT
Start: 2025-04-11

## 2025-04-11 RX ORDER — MAGNESIUM SULFATE HEPTAHYDRATE 40 MG/ML
2 INJECTION, SOLUTION INTRAVENOUS ONCE
Status: COMPLETED | OUTPATIENT
Start: 2025-04-11 | End: 2025-04-11

## 2025-04-11 RX ORDER — PREDNISONE 50 MG/1
50 TABLET ORAL DAILY
Qty: 4 TABLET | Refills: 0 | Status: SHIPPED | OUTPATIENT
Start: 2025-04-11 | End: 2025-04-15

## 2025-04-11 RX ORDER — IPRATROPIUM BROMIDE AND ALBUTEROL SULFATE 2.5; .5 MG/3ML; MG/3ML
3 SOLUTION RESPIRATORY (INHALATION) ONCE
Status: COMPLETED | OUTPATIENT
Start: 2025-04-11 | End: 2025-04-11

## 2025-04-11 RX ORDER — ALBUTEROL SULFATE 90 UG/1
2 INHALANT RESPIRATORY (INHALATION) EVERY 4 HOURS PRN
Qty: 18 G | Refills: 0 | Status: SHIPPED | OUTPATIENT
Start: 2025-04-11

## 2025-04-11 RX ORDER — ALBUTEROL SULFATE 0.83 MG/ML
2.5 SOLUTION RESPIRATORY (INHALATION) ONCE
Status: COMPLETED | OUTPATIENT
Start: 2025-04-11 | End: 2025-04-11

## 2025-04-11 RX ORDER — METHYLPREDNISOLONE SODIUM SUCCINATE 125 MG/2ML
125 INJECTION INTRAMUSCULAR; INTRAVENOUS ONCE
Status: COMPLETED | OUTPATIENT
Start: 2025-04-11 | End: 2025-04-11

## 2025-04-11 RX ADMIN — IPRATROPIUM BROMIDE AND ALBUTEROL SULFATE 3 ML: .5; 3 SOLUTION RESPIRATORY (INHALATION) at 15:24

## 2025-04-11 RX ADMIN — MAGNESIUM SULFATE HEPTAHYDRATE 2 G: 2 INJECTION, SOLUTION INTRAVENOUS at 14:01

## 2025-04-11 RX ADMIN — IPRATROPIUM BROMIDE AND ALBUTEROL SULFATE 3 ML: .5; 3 SOLUTION RESPIRATORY (INHALATION) at 13:05

## 2025-04-11 RX ADMIN — METHYLPREDNISOLONE SODIUM SUCCINATE 125 MG: 125 INJECTION, POWDER, FOR SOLUTION INTRAMUSCULAR; INTRAVENOUS at 13:58

## 2025-04-11 RX ADMIN — ALBUTEROL SULFATE 2.5 MG: 2.5 SOLUTION RESPIRATORY (INHALATION) at 13:04

## 2025-04-11 ASSESSMENT — ACTIVITIES OF DAILY LIVING (ADL)
ADLS_ACUITY_SCORE: 56

## 2025-04-11 ASSESSMENT — COLUMBIA-SUICIDE SEVERITY RATING SCALE - C-SSRS
2. HAVE YOU ACTUALLY HAD ANY THOUGHTS OF KILLING YOURSELF IN THE PAST MONTH?: NO
1. IN THE PAST MONTH, HAVE YOU WISHED YOU WERE DEAD OR WISHED YOU COULD GO TO SLEEP AND NOT WAKE UP?: NO
6. HAVE YOU EVER DONE ANYTHING, STARTED TO DO ANYTHING, OR PREPARED TO DO ANYTHING TO END YOUR LIFE?: NO

## 2025-04-11 NOTE — ED NOTES
Bed: JNED-19  Expected date: 4/11/25  Expected time:   Means of arrival:   Comments:  SP 7: 34F, SOB/asthma, wheezing, duoneb
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
## 2025-04-11 NOTE — DISCHARGE INSTRUCTIONS
Your blood work was stable from the ER. We gave you treatment for asthma exacerbation. Come back if symptoms worsen. Otherwise, see your pcp for follow up within 1 week if possible.
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
## 2025-04-11 NOTE — Clinical Note
Soren Freitas was seen and treated in our emergency department on 4/11/2025.  She may return to work on 04/14/2025.       If you have any questions or concerns, please don't hesitate to call.      Emerald Millan MD

## 2025-04-23 ENCOUNTER — APPOINTMENT (OUTPATIENT)
Dept: RADIOLOGY | Facility: HOSPITAL | Age: 35
End: 2025-04-23
Attending: EMERGENCY MEDICINE
Payer: COMMERCIAL

## 2025-04-23 ENCOUNTER — HOSPITAL ENCOUNTER (INPATIENT)
Facility: HOSPITAL | Age: 35
End: 2025-04-23
Attending: EMERGENCY MEDICINE | Admitting: STUDENT IN AN ORGANIZED HEALTH CARE EDUCATION/TRAINING PROGRAM
Payer: COMMERCIAL

## 2025-04-23 DIAGNOSIS — J45.901 SEVERE ASTHMA WITH ACUTE EXACERBATION, UNSPECIFIED WHETHER PERSISTENT: ICD-10-CM

## 2025-04-23 DIAGNOSIS — J96.02 ACUTE RESPIRATORY FAILURE WITH HYPOXIA AND HYPERCAPNIA (H): Primary | ICD-10-CM

## 2025-04-23 DIAGNOSIS — J18.9 COMMUNITY ACQUIRED PNEUMONIA OF RIGHT LOWER LOBE OF LUNG: ICD-10-CM

## 2025-04-23 DIAGNOSIS — J96.01 ACUTE RESPIRATORY FAILURE WITH HYPOXIA AND HYPERCAPNIA (H): Primary | ICD-10-CM

## 2025-04-23 DIAGNOSIS — J96.01 ACUTE RESPIRATORY FAILURE WITH HYPOXIA (H): ICD-10-CM

## 2025-04-23 PROBLEM — J96.00 ACUTE RESPIRATORY FAILURE (H): Status: ACTIVE | Noted: 2025-04-23

## 2025-04-23 PROBLEM — Z77.098 EXPOSURE TO CHEMICAL IRRITANT: Status: ACTIVE | Noted: 2025-04-23

## 2025-04-23 LAB
ALBUMIN SERPL BCG-MCNC: 4.4 G/DL (ref 3.5–5.2)
ALP SERPL-CCNC: 74 U/L (ref 40–150)
ALT SERPL W P-5'-P-CCNC: 24 U/L (ref 0–50)
ANION GAP SERPL CALCULATED.3IONS-SCNC: 6 MMOL/L (ref 7–15)
AST SERPL W P-5'-P-CCNC: 29 U/L (ref 0–45)
BASE EXCESS BLDV CALC-SCNC: -1.6 MMOL/L (ref -3–3)
BASE EXCESS BLDV CALC-SCNC: -7.8 MMOL/L (ref -3–3)
BASOPHILS # BLD AUTO: 0 10E3/UL (ref 0–0.2)
BASOPHILS NFR BLD AUTO: 0 %
BILIRUB DIRECT SERPL-MCNC: 0.13 MG/DL (ref 0–0.3)
BILIRUB SERPL-MCNC: 0.5 MG/DL
BUN SERPL-MCNC: 7 MG/DL (ref 6–20)
CALCIUM SERPL-MCNC: 9 MG/DL (ref 8.8–10.4)
CHLORIDE SERPL-SCNC: 107 MMOL/L (ref 98–107)
CREAT SERPL-MCNC: 0.93 MG/DL (ref 0.51–0.95)
EGFRCR SERPLBLD CKD-EPI 2021: 82 ML/MIN/1.73M2
EOSINOPHIL # BLD AUTO: 0.5 10E3/UL (ref 0–0.7)
EOSINOPHIL NFR BLD AUTO: 5 %
ERYTHROCYTE [DISTWIDTH] IN BLOOD BY AUTOMATED COUNT: 13.4 % (ref 10–15)
FLUAV RNA SPEC QL NAA+PROBE: NEGATIVE
FLUBV RNA RESP QL NAA+PROBE: NEGATIVE
GLUCOSE BLDC GLUCOMTR-MCNC: 155 MG/DL (ref 70–99)
GLUCOSE BLDC GLUCOMTR-MCNC: 183 MG/DL (ref 70–99)
GLUCOSE SERPL-MCNC: 131 MG/DL (ref 70–99)
HCG SERPL QL: NEGATIVE
HCO3 BLDV-SCNC: 20 MMOL/L (ref 21–28)
HCO3 BLDV-SCNC: 26 MMOL/L (ref 21–28)
HCO3 SERPL-SCNC: 27 MMOL/L (ref 22–29)
HCT VFR BLD AUTO: 44.3 % (ref 35–47)
HGB BLD-MCNC: 14.8 G/DL (ref 11.7–15.7)
IMM GRANULOCYTES # BLD: 0 10E3/UL
IMM GRANULOCYTES NFR BLD: 0 %
LACTATE SERPL-SCNC: 2.4 MMOL/L (ref 0.7–2)
LACTATE SERPL-SCNC: 4.9 MMOL/L (ref 0.7–2)
LYMPHOCYTES # BLD AUTO: 3.1 10E3/UL (ref 0.8–5.3)
LYMPHOCYTES NFR BLD AUTO: 27 %
MAGNESIUM SERPL-MCNC: 2.6 MG/DL (ref 1.7–2.3)
MCH RBC QN AUTO: 27.8 PG (ref 26.5–33)
MCHC RBC AUTO-ENTMCNC: 33.4 G/DL (ref 31.5–36.5)
MCV RBC AUTO: 83 FL (ref 78–100)
MONOCYTES # BLD AUTO: 0.7 10E3/UL (ref 0–1.3)
MONOCYTES NFR BLD AUTO: 6 %
NEUTROPHILS # BLD AUTO: 7 10E3/UL (ref 1.6–8.3)
NEUTROPHILS NFR BLD AUTO: 62 %
NRBC # BLD AUTO: 0 10E3/UL
NRBC BLD AUTO-RTO: 0 /100
NT-PROBNP SERPL-MCNC: <36 PG/ML (ref 0–450)
O2/TOTAL GAS SETTING VFR VENT: 30 %
O2/TOTAL GAS SETTING VFR VENT: 35 %
OXYHGB MFR BLDV: 45 % (ref 70–75)
OXYHGB MFR BLDV: 88 % (ref 70–75)
PCO2 BLDV: 49 MM HG (ref 40–50)
PCO2 BLDV: 55 MM HG (ref 40–50)
PH BLDV: 7.22 [PH] (ref 7.32–7.43)
PH BLDV: 7.28 [PH] (ref 7.32–7.43)
PLATELET # BLD AUTO: 276 10E3/UL (ref 150–450)
PO2 BLDV: 26 MM HG (ref 25–47)
PO2 BLDV: 62 MM HG (ref 25–47)
POTASSIUM SERPL-SCNC: 4.3 MMOL/L (ref 3.4–5.3)
PROCALCITONIN SERPL IA-MCNC: 0.06 NG/ML
PROT SERPL-MCNC: 7.8 G/DL (ref 6.4–8.3)
RBC # BLD AUTO: 5.33 10E6/UL (ref 3.8–5.2)
RSV RNA SPEC NAA+PROBE: NEGATIVE
SAO2 % BLDV: 45.2 % (ref 70–75)
SAO2 % BLDV: 89.2 % (ref 70–75)
SARS-COV-2 RNA RESP QL NAA+PROBE: NEGATIVE
SODIUM SERPL-SCNC: 140 MMOL/L (ref 135–145)
WBC # BLD AUTO: 11.4 10E3/UL (ref 4–11)

## 2025-04-23 PROCEDURE — 96372 THER/PROPH/DIAG INJ SC/IM: CPT | Performed by: EMERGENCY MEDICINE

## 2025-04-23 PROCEDURE — 83880 ASSAY OF NATRIURETIC PEPTIDE: CPT | Performed by: EMERGENCY MEDICINE

## 2025-04-23 PROCEDURE — 94644 CONT INHLJ TX 1ST HOUR: CPT

## 2025-04-23 PROCEDURE — 94645 CONT INHLJ TX EACH ADDL HOUR: CPT

## 2025-04-23 PROCEDURE — 96365 THER/PROPH/DIAG IV INF INIT: CPT

## 2025-04-23 PROCEDURE — 99291 CRITICAL CARE FIRST HOUR: CPT | Mod: 25

## 2025-04-23 PROCEDURE — 84145 PROCALCITONIN (PCT): CPT | Performed by: EMERGENCY MEDICINE

## 2025-04-23 PROCEDURE — 85025 COMPLETE CBC W/AUTO DIFF WBC: CPT | Performed by: EMERGENCY MEDICINE

## 2025-04-23 PROCEDURE — 999N000157 HC STATISTIC RCP TIME EA 10 MIN

## 2025-04-23 PROCEDURE — 250N000011 HC RX IP 250 OP 636: Performed by: STUDENT IN AN ORGANIZED HEALTH CARE EDUCATION/TRAINING PROGRAM

## 2025-04-23 PROCEDURE — 250N000009 HC RX 250: Performed by: EMERGENCY MEDICINE

## 2025-04-23 PROCEDURE — 80048 BASIC METABOLIC PNL TOTAL CA: CPT | Performed by: EMERGENCY MEDICINE

## 2025-04-23 PROCEDURE — 71045 X-RAY EXAM CHEST 1 VIEW: CPT

## 2025-04-23 PROCEDURE — 250N000009 HC RX 250: Performed by: STUDENT IN AN ORGANIZED HEALTH CARE EDUCATION/TRAINING PROGRAM

## 2025-04-23 PROCEDURE — 87637 SARSCOV2&INF A&B&RSV AMP PRB: CPT | Performed by: EMERGENCY MEDICINE

## 2025-04-23 PROCEDURE — 83605 ASSAY OF LACTIC ACID: CPT | Performed by: EMERGENCY MEDICINE

## 2025-04-23 PROCEDURE — 36415 COLL VENOUS BLD VENIPUNCTURE: CPT | Performed by: EMERGENCY MEDICINE

## 2025-04-23 PROCEDURE — 87040 BLOOD CULTURE FOR BACTERIA: CPT | Performed by: EMERGENCY MEDICINE

## 2025-04-23 PROCEDURE — 96375 TX/PRO/DX INJ NEW DRUG ADDON: CPT

## 2025-04-23 PROCEDURE — 82248 BILIRUBIN DIRECT: CPT | Performed by: STUDENT IN AN ORGANIZED HEALTH CARE EDUCATION/TRAINING PROGRAM

## 2025-04-23 PROCEDURE — 82805 BLOOD GASES W/O2 SATURATION: CPT | Performed by: EMERGENCY MEDICINE

## 2025-04-23 PROCEDURE — 93005 ELECTROCARDIOGRAM TRACING: CPT | Performed by: EMERGENCY MEDICINE

## 2025-04-23 PROCEDURE — 84703 CHORIONIC GONADOTROPIN ASSAY: CPT | Performed by: EMERGENCY MEDICINE

## 2025-04-23 PROCEDURE — 5A09357 ASSISTANCE WITH RESPIRATORY VENTILATION, LESS THAN 24 CONSECUTIVE HOURS, CONTINUOUS POSITIVE AIRWAY PRESSURE: ICD-10-PCS | Performed by: EMERGENCY MEDICINE

## 2025-04-23 PROCEDURE — 250N000011 HC RX IP 250 OP 636: Mod: JZ | Performed by: EMERGENCY MEDICINE

## 2025-04-23 PROCEDURE — 83735 ASSAY OF MAGNESIUM: CPT | Performed by: EMERGENCY MEDICINE

## 2025-04-23 PROCEDURE — 200N000001 HC R&B ICU

## 2025-04-23 PROCEDURE — 999N000185 HC STATISTIC TRANSPORT TIME EA 15 MIN

## 2025-04-23 PROCEDURE — 94660 CPAP INITIATION&MGMT: CPT

## 2025-04-23 PROCEDURE — 258N000003 HC RX IP 258 OP 636: Performed by: EMERGENCY MEDICINE

## 2025-04-23 RX ORDER — AZITHROMYCIN 250 MG/1
TABLET, FILM COATED ORAL
Qty: 6 TABLET | Refills: 0 | Status: SHIPPED | OUTPATIENT
Start: 2025-04-23 | End: 2025-04-25

## 2025-04-23 RX ORDER — ENOXAPARIN SODIUM 100 MG/ML
40 INJECTION SUBCUTANEOUS EVERY 24 HOURS
Status: DISCONTINUED | OUTPATIENT
Start: 2025-04-23 | End: 2025-04-25 | Stop reason: HOSPADM

## 2025-04-23 RX ORDER — CEFTRIAXONE 2 G/1
2 INJECTION, POWDER, FOR SOLUTION INTRAMUSCULAR; INTRAVENOUS ONCE
Status: COMPLETED | OUTPATIENT
Start: 2025-04-23 | End: 2025-04-23

## 2025-04-23 RX ORDER — ALBUTEROL SULFATE 0.83 MG/ML
2.5 SOLUTION RESPIRATORY (INHALATION) EVERY 6 HOURS PRN
Qty: 75 ML | Refills: 0 | Status: SHIPPED | OUTPATIENT
Start: 2025-04-23 | End: 2025-04-25

## 2025-04-23 RX ORDER — METHYLPREDNISOLONE SODIUM SUCCINATE 40 MG/ML
40 INJECTION INTRAMUSCULAR; INTRAVENOUS EVERY 6 HOURS
Status: DISCONTINUED | OUTPATIENT
Start: 2025-04-23 | End: 2025-04-24

## 2025-04-23 RX ORDER — ALBUTEROL SULFATE 0.83 MG/ML
2.5 SOLUTION RESPIRATORY (INHALATION)
Status: DISCONTINUED | OUTPATIENT
Start: 2025-04-23 | End: 2025-04-25 | Stop reason: HOSPADM

## 2025-04-23 RX ORDER — ALBUTEROL SULFATE 90 UG/1
2 INHALANT RESPIRATORY (INHALATION) EVERY 6 HOURS PRN
Qty: 18 G | Refills: 0 | Status: SHIPPED | OUTPATIENT
Start: 2025-04-23 | End: 2025-04-25

## 2025-04-23 RX ORDER — AZITHROMYCIN 250 MG/1
500 TABLET, FILM COATED ORAL ONCE
Status: DISCONTINUED | OUTPATIENT
Start: 2025-04-23 | End: 2025-04-23

## 2025-04-23 RX ORDER — PREDNISONE 20 MG/1
TABLET ORAL
Qty: 10 TABLET | Refills: 0 | Status: SHIPPED | OUTPATIENT
Start: 2025-04-23

## 2025-04-23 RX ORDER — DEXTROSE MONOHYDRATE 25 G/50ML
25-50 INJECTION, SOLUTION INTRAVENOUS
Status: DISCONTINUED | OUTPATIENT
Start: 2025-04-23 | End: 2025-04-25 | Stop reason: HOSPADM

## 2025-04-23 RX ORDER — METHYLPREDNISOLONE SODIUM SUCCINATE 125 MG/2ML
125 INJECTION INTRAMUSCULAR; INTRAVENOUS ONCE
Status: COMPLETED | OUTPATIENT
Start: 2025-04-23 | End: 2025-04-23

## 2025-04-23 RX ORDER — ALBUTEROL SULFATE 0.83 MG/ML
2.5 SOLUTION RESPIRATORY (INHALATION) EVERY 6 HOURS PRN
COMMUNITY

## 2025-04-23 RX ORDER — NICOTINE POLACRILEX 4 MG
15-30 LOZENGE BUCCAL
Status: DISCONTINUED | OUTPATIENT
Start: 2025-04-23 | End: 2025-04-25 | Stop reason: HOSPADM

## 2025-04-23 RX ORDER — MONTELUKAST SODIUM 10 MG/1
10 TABLET ORAL EVERY MORNING
Status: DISCONTINUED | OUTPATIENT
Start: 2025-04-24 | End: 2025-04-25 | Stop reason: HOSPADM

## 2025-04-23 RX ORDER — IPRATROPIUM BROMIDE AND ALBUTEROL SULFATE 2.5; .5 MG/3ML; MG/3ML
3 SOLUTION RESPIRATORY (INHALATION)
Status: DISCONTINUED | OUTPATIENT
Start: 2025-04-23 | End: 2025-04-24

## 2025-04-23 RX ADMIN — SODIUM CHLORIDE 1848 ML: 0.9 INJECTION, SOLUTION INTRAVENOUS at 17:13

## 2025-04-23 RX ADMIN — METHYLPREDNISOLONE SODIUM SUCCINATE 40 MG: 40 INJECTION INTRAMUSCULAR; INTRAVENOUS at 21:30

## 2025-04-23 RX ADMIN — Medication 10 MG/HR: at 16:32

## 2025-04-23 RX ADMIN — IPRATROPIUM BROMIDE AND ALBUTEROL SULFATE 3 ML: .5; 3 SOLUTION RESPIRATORY (INHALATION) at 21:08

## 2025-04-23 RX ADMIN — EPINEPHRINE 0.3 MG: 1 INJECTION, SOLUTION, CONCENTRATE INTRAVENOUS at 17:53

## 2025-04-23 RX ADMIN — CEFTRIAXONE SODIUM 2 G: 2 INJECTION, POWDER, FOR SOLUTION INTRAMUSCULAR; INTRAVENOUS at 17:14

## 2025-04-23 RX ADMIN — AZITHROMYCIN MONOHYDRATE 500 MG: 500 INJECTION, POWDER, LYOPHILIZED, FOR SOLUTION INTRAVENOUS at 17:50

## 2025-04-23 RX ADMIN — ENOXAPARIN SODIUM 40 MG: 40 INJECTION SUBCUTANEOUS at 21:30

## 2025-04-23 RX ADMIN — METHYLPREDNISOLONE SODIUM SUCCINATE 125 MG: 125 INJECTION, POWDER, FOR SOLUTION INTRAMUSCULAR; INTRAVENOUS at 16:04

## 2025-04-23 ASSESSMENT — ACTIVITIES OF DAILY LIVING (ADL)
ADLS_ACUITY_SCORE: 36
ADLS_ACUITY_SCORE: 56
ADLS_ACUITY_SCORE: 36
ADLS_ACUITY_SCORE: 62
ADLS_ACUITY_SCORE: 36
ADLS_ACUITY_SCORE: 56

## 2025-04-23 NOTE — ED NOTES
Bed: JNED-27  Expected date: 4/23/25  Expected time: 3:22 PM  Means of arrival:   Comments:  SPF   34F  90% RA, Wheezy  99% after duoneb
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
## 2025-04-23 NOTE — ED NOTES
Spoke with Dr. Mcdonald and Dr. Bar regarding order for epinephrine due to patient being tachycardic at 155-170 bpm. Both providers confirm order and want epinephrine given now.

## 2025-04-23 NOTE — ED PROVIDER NOTES
"EMERGENCY DEPARTMENT ENCOUNTER      NAME: Soren Freitas  AGE: 34 year old female  YOB: 1990  MRN: [id number]  EVALUATION DATE & TIME: 4/23/2025  3:39 PM    PCP: Leyda, [facility]    ED PROVIDER: Dina Mcdonald M.D.      Chief Complaint   Patient presents with    Asthma Exacerbation         FINAL IMPRESSION:  1. Severe asthma with acute exacerbation, unspecified whether persistent    2. Acute respiratory failure with hypoxia (H)    3. Community acquired pneumonia of right lower lobe of lung          ED COURSE & MEDICAL DECISION MAKING:    ED Course as of 04/23/25 1730 Wed Apr 23, 2025   1552 Patient with history of asthma (severe) with recurrent exacerbations, prior bipap requirement and once intubated, here with one hour severe SOB despite duonebs by EMS, given magnesium by EMS but no steroids, tight wheezing with tachypnea with retractions, started on bipap with continuous nebs and IV methylprednisolone, pending hcg and BNP and CXR with viral PCR and serial reassessment, patient ok with plan.   1557 Patient reassessed and tolerating BiPap well, on phone/texting/kristina and comfortable appearing, will again serially reassess, RN updated   1636 Venous pH 7.28 with 1h asthma exacerbation underway and hcg negative, CBC WNL   1636 CXR with possible atypical infeciton, begun on ceftriaxone with azithromycin oral antibiotic therapy with 30cc/kg IVF bolus with PNA with sepsis as possible comorbid  for asthma exacerbation in conjunction with environmental trigger   1643 Patient refused viral PCR testing   1649 Pt again reassessed and notes she wants to come off of bipap and absolutely refuses to stay in the hospital for CAP with asthma exacerbation with respriatory failure as she feels she is improving and wants to be discharged home, refuses to be admitted and \"I got no one\" to stay with [place] children \"[place]ta [place]\", EKG with sinus tachycardia, patient ok with antibiotic therapy " "first and plans to trial off bipap and nebs, willing to  prescription antibiotics at nearby [facility] for prednisone, albuterol PRN, nebulizer refills and antibiotic therapy, luisvne first dose here and RN at bedside NB and RT updated, RT removing her from bipap for trial now.    1653 Pt now declined to come off of bipap to RT and wants further nebs   1705 BNP undetectable. Trialed off bipap and with very tight lungs, immediate deterioration, placed back on bipap, IM epi trial underway, patient again vehemently refuses admission, states she has no friends and no family, no one she trusts to care for her children and therefore fully completely refuses admission, demands \"another doctor\" refuses blood cultures, only willing to trial antibiotic therapy and then wean off bipap this evening, wants to leave AMA and aware I am worried she will die at home. She acknowledges my worries, yells at me, states I have \"worked [her] up\" and \"don't tell me what to do!\" And she refuses to come off bipap right now as she cannot come off bipap right now, still in respiratory failure, refusing admission, refusing cultures and viral PCR testing, only willing to stay on bipap for longer to try to alleviate symtpoms before leaving AMA. Patient started on the IV antibiotic therapy now, will again reassess.   1719 HR remains elevated on continuous nebs which patient reports she still feels she needs (with bipap) and objectively does appear to need. Patient notes she refuses to talk with me again \"because you setting me off\" and signed out to PM ED MD Dr Bar for serial reassessment and to trial / wean from bipap and for serial reassessment, plan to AMA if she still refuses admission.       Pertinent Labs & Imaging studies reviewed. (See chart for details)    Medical Decision Making      Admission considered. Patient was signed out to the oncoming physician, disposition pending.    MIPS (CTPE, Dental pain, Hare, Sinusitis, " Asthma/COPD, Head Trauma): Asthma or COPD Exacerbation:Smoking Cessation Counseling: Patient is NOT a current smoker.    SEPSIS: None    Critical Care time was 55 minutes for this patient excluding procedures.          At the conclusion of the encounter I discussed the results of all of the tests and the disposition. The questions were answered. The patient or family acknowledged understanding and was agreeable with the care plan.     MEDICATIONS GIVEN IN THE EMERGENCY:  Medications   albuterol 5 mg/mL (PROVENTIL) continuous nebulization (10 mg/hr Nebulization $New Bag 4/23/25 1632)   cefTRIAXone (ROCEPHIN) 2 g vial to attach to  ml bag for ADULTS or NS 50 ml bag for PEDS (2 g Intravenous $New Bag 4/23/25 1714)   azithromycin (ZITHROMAX) 500 mg in sodium chloride 0.9 % 250 mL intermittent infusion (has no administration in time range)   EPINEPHrine (ADRENALIN) kit 0.3 mg (has no administration in time range)   methylPREDNISolone Na Suc (solu-MEDROL) injection 125 mg (125 mg Intravenous $Given 4/23/25 1604)   sodium chloride 0.9% BOLUS 1,848 mL (1,848 mLs Intravenous $New Bag 4/23/25 1713)       NEW PRESCRIPTIONS STARTED AT TODAY'S ER VISIT  New Prescriptions    ALBUTEROL (PROAIR HFA/PROVENTIL HFA/VENTOLIN HFA) 108 (90 BASE) MCG/ACT INHALER    Inhale 2 puffs into the lungs every 6 hours as needed for shortness of breath, wheezing or cough.    ALBUTEROL (PROVENTIL) (2.5 MG/3ML) 0.083% NEB SOLUTION    Take 1 vial (2.5 mg) by nebulization every 6 hours as needed for shortness of breath or wheezing.    AMOXICILLIN-CLAVULANATE (AUGMENTIN) 875-125 MG TABLET    Take 1 tablet by mouth 2 times daily for 10 days.    AZITHROMYCIN (ZITHROMAX Z-GIO) 250 MG TABLET    Two tablets on the first day, then one tablet daily for the next 4 days    PREDNISONE (DELTASONE) 20 MG TABLET    Take two tablets (= 40mg) each day for 5 (five) days          =================================================================    HPI      Quaswana  ISAAC Freitas is a 34 year old female with PMHx of severe asthma with acute exacerbation, acute respiratory failure with hypoxia, hypertension who presents to the ED today via EMS with shortness of breath.     Patient's son sprayed Lysol in their home around 2:30 PM with onset significant shortness of breath, consistent with previous asthma exacerbations. She received one duoneb, 1g magnesium and continuous albuterol en route. Endorses her asthma has required CPAP and intubation in the past. Denies chance of pregnancy or recent antibiotic use. Otherwise denies any cough and fever.     REVIEW OF SYSTEMS   All other systems reviewed and are negative except as noted above in HPI.    PAST MEDICAL HISTORY:  Past Medical History:   Diagnosis Date    Obesity     Severe persistent asthma with acute exacerbation (H) 1/1/2015    Formatting of this note might be different from the original. 12/2014 Admit for exacerbation: Spirometry FEV1/FVC 68%, D/C w/dulera, albuterol, Spiriva.  Started singulair 10mg QD, Given NRT.  Dx as infant, has been on albuterol lifelong.    Last Assessment & Plan:  Formatting of this note might be different from the original. Stable on Singulair, cetirizine, albuterol, and dulera. Discussed COVID    Uncomplicated asthma        PAST SURGICAL HISTORY:  No past surgical history on file.    CURRENT MEDICATIONS:    albuterol (PROAIR HFA/PROVENTIL HFA/VENTOLIN HFA) 108 (90 Base) MCG/ACT inhaler  albuterol (PROAIR HFA/PROVENTIL HFA/VENTOLIN HFA) 108 (90 Base) MCG/ACT inhaler  albuterol (PROVENTIL) (2.5 MG/3ML) 0.083% neb solution  albuterol (PROVENTIL) (2.5 MG/3ML) 0.083% neb solution  amoxicillin-clavulanate (AUGMENTIN) 875-125 MG tablet  azithromycin (ZITHROMAX Z-GIO) 250 MG tablet  ipratropium - albuterol 0.5 mg/2.5 mg/3 mL (DUONEB) 0.5-2.5 (3) MG/3ML neb solution  mometasone-formoterol (DULERA) 200-5 MCG/ACT inhaler  montelukast (SINGULAIR) 10 MG tablet  predniSONE (DELTASONE) 20 MG  tablet        ALLERGIES:  Allergies   Allergen Reactions    Cashew Nut Oil Anaphylaxis and Angioedema    Fish Allergy Itching     Sea Food causes Throat swelling    Peanut-Containing Drug Products Angioedema and Swelling     Throat swelling.      Shellfish-Derived Products Angioedema       FAMILY HISTORY:  No family history on file.    SOCIAL HISTORY:   Social History     Socioeconomic History    Marital status: Single   Tobacco Use    Smoking status: Former     Social Drivers of Health     Financial Resource Strain: Low Risk  (10/7/2024)    Financial Resource Strain     Within the past 12 months, have you or your family members you live with been unable to get utilities (heat, electricity) when it was really needed?: No   Food Insecurity: Low Risk  (10/7/2024)    Food Insecurity     Within the past 12 months, did you worry that your food would run out before you got money to buy more?: No     Within the past 12 months, did the food you bought just not last and you didn t have money to get more?: No   Transportation Needs: Low Risk  (10/7/2024)    Transportation Needs     Within the past 12 months, has lack of transportation kept you from medical appointments, getting your medicines, non-medical meetings or appointments, work, or from getting things that you need?: No   Social Connections: Socially Integrated (8/9/2024)    [facility]    Social Connections     Do you often feel lonely or isolated from those around you?: 0   Interpersonal Safety: Low Risk  (10/6/2024)    Interpersonal Safety     Do you feel physically and emotionally safe where you currently live?: Yes     Within the past 12 months, have you been hit, slapped, kicked or otherwise physically hurt by someone?: No     Within the past 12 months, have you been humiliated or emotionally abused in other ways by your partner or ex-partner?: No   Housing Stability: High Risk (10/7/2024)    Housing Stability      Do you have housing? : No     Are you worried about losing your housing?: No       VITALS:  Patient Vitals for the past 24 hrs:   BP Temp Temp src Pulse Resp SpO2   04/23/25 1715 -- -- -- (!) 157 27 96 %   04/23/25 1700 -- -- -- (!) 171 (!) 55 93 %   04/23/25 1645 -- -- -- (!) 156 (!) 43 99 %   04/23/25 1637 -- -- -- (!) 140 27 99 %   04/23/25 1630 -- 98.3  F (36.8  C) Axillary (!) 140 27 100 %   04/23/25 1615 -- -- -- (!) 141 29 97 %   04/23/25 1600 -- -- -- (!) 146 -- 100 %   04/23/25 1545 -- -- -- (!) 151 24 100 %   04/23/25 1538 (!) 158/72 -- -- (!) 145 30 100 %       PHYSICAL EXAM    GENERAL: Awake, alert.  In no acute distress.   HEENT: Normocephalic, atraumatic.  Pupils equal, round and reactive.  Conjunctiva normal.  EOMI.  NECK: No stridor or apparent deformity.  PULMONARY: Tight wheezing with scaling intercostal and abdominal retractions. Tachypnea.   CARDIO: Regular rate and rhythm.  No significant murmur, rub or gallop.  Radial pulses strong and symmetrical.  ABDOMINAL: Abdomen soft, non-distended and non-tender to palpation.  No CVAT, no palpable hepatosplenomegaly.  EXTREMITIES: No lower extremity swelling or edema.    NEURO: Alert and oriented to person, place and time.  Cranial nerves grossly intact.  No focal motor deficit.  PSYCH: Normal mood and affect  SKIN: No rashes      LAB:  All pertinent labs reviewed and interpreted.  Results for orders placed or performed during the hospital encounter of 04/23/25   XR Chest Port 1 View    Impression    IMPRESSION: No pleural fluid or pneumothorax. Subtle lower lung opacities are favored to represent atypical infection or inflammation. Normal size of the heart.   Basic metabolic panel   Result Value Ref Range    Sodium 140 135 - 145 mmol/L    Potassium 4.3 3.4 - 5.3 mmol/L    Chloride 107 98 - 107 mmol/L    Carbon Dioxide (CO2) 27 22 - 29 mmol/L    Anion Gap 6 (L) 7 - 15 mmol/L    Urea Nitrogen 7.0 6.0 - 20.0 mg/dL    Creatinine 0.93 0.51 - 0.95 mg/dL     GFR Estimate 82 >60 mL/min/1.73m2    Calcium 9.0 8.8 - 10.4 mg/dL    Glucose 131 (H) 70 - 99 mg/dL   HCG qualitative Blood   Result Value Ref Range    hCG Serum Qualitative Negative Negative   Result Value Ref Range    Magnesium 2.6 (H) 1.7 - 2.3 mg/dL   Nt probnp inpatient (BNP)   Result Value Ref Range    N terminal Pro BNP Inpatient <36 0 - 450 pg/mL   Blood gas venous   Result Value Ref Range    pH Venous 7.28 (L) 7.32 - 7.43    pCO2 Venous 55 (H) 40 - 50 mm Hg    pO2 Venous 26 25 - 47 mm Hg    Bicarbonate Venous 26 21 - 28 mmol/L    Base Excess/Deficit Venous -1.6 -3.0 - 3.0 mmol/L    FIO2 30     Oxyhemoglobin Venous 45 (L) 70 - 75 %    O2 Sat, Venous 45.2 (L) 70.0 - 75.0 %   CBC with platelets and differential   Result Value Ref Range    WBC Count 11.4 (H) 4.0 - 11.0 10e3/uL    RBC Count 5.33 (H) 3.80 - 5.20 10e6/uL    Hemoglobin 14.8 11.7 - 15.7 g/dL    Hematocrit 44.3 35.0 - 47.0 %    MCV 83 78 - 100 fL    MCH 27.8 26.5 - 33.0 pg    MCHC 33.4 31.5 - 36.5 g/dL    RDW 13.4 10.0 - 15.0 %    Platelet Count 276 150 - 450 10e3/uL    % Neutrophils 62 %    % Lymphocytes 27 %    % Monocytes 6 %    % Eosinophils 5 %    % Basophils 0 %    % Immature Granulocytes 0 %    NRBCs per 100 WBC 0 <1 /100    Absolute Neutrophils 7.0 1.6 - 8.3 10e3/uL    Absolute Lymphocytes 3.1 0.8 - 5.3 10e3/uL    Absolute Monocytes 0.7 0.0 - 1.3 10e3/uL    Absolute Eosinophils 0.5 0.0 - 0.7 10e3/uL    Absolute Basophils 0.0 0.0 - 0.2 10e3/uL    Absolute Immature Granulocytes 0.0 <=0.4 10e3/uL    Absolute NRBCs 0.0 10e3/uL       RADIOLOGY:  Reviewed all pertinent imaging. Please see official radiology report.  XR Chest Port 1 View   Final Result   IMPRESSION: No pleural fluid or pneumothorax. Subtle lower lung opacities are favored to represent atypical infection or inflammation. Normal size of the heart.            EKG:    Performed 4/23/2025 at: 16:44:44  Vent. Rate: 152 bpm  Reviewed and interpreted as: Sinus tachycardia, Morphologically  similar appearing when compared with EKG from 12/06/2024      I have independently reviewed and interpreted the EKG(s) documented above.        I, Brian Pisano, am serving as a scribe to document services personally performed by Dr. Dina Mcdonald based on my observation and the provider's statements to me. I, Dina Mcdonald MD attest that Brian Pisano is acting in a scribe capacity, has observed my performance of the services and has documented them in accordance with my direction.       Dina Mcdonald MD  04/23/25 [id number]

## 2025-04-23 NOTE — MEDICATION SCRIBE - ADMISSION MEDICATION HISTORY
Medication Scribe Admission Medication History    Admission medication history is complete. The information provided in this note is only as accurate as the sources available at the time of the update.    Information Source(s): Patient via in-person    Pertinent Information: patient reports self management of medications.     Changes made to PTA medication list:  Added: Albuterol soln   Deleted: None  Changed: None    Allergies reviewed with patient and updates made in EHR: yes    Medication History Completed By: Lars Bliss 4/23/2025 4:58 PM    PTA Med List   Medication Sig Last Dose/Taking    albuterol (PROAIR HFA/PROVENTIL HFA/VENTOLIN HFA) 108 (90 Base) MCG/ACT inhaler Inhale 2 puffs into the lungs every 6 hours as needed for shortness of breath, wheezing or cough. Taking As Needed    albuterol (PROAIR HFA/PROVENTIL HFA/VENTOLIN HFA) 108 (90 Base) MCG/ACT inhaler Inhale 2 puffs into the lungs every 4 hours as needed for shortness of breath, wheezing or cough. Taking As Needed    albuterol (PROVENTIL) (2.5 MG/3ML) 0.083% neb solution Take 1 vial (2.5 mg) by nebulization every 6 hours as needed for shortness of breath or wheezing. Taking As Needed                      ipratropium - albuterol 0.5 mg/2.5 mg/3 mL (DUONEB) 0.5-2.5 (3) MG/3ML neb solution Take 1 vial (3 mLs) by nebulization 3 times daily as needed for shortness of breath, wheezing or cough. Nebulize tid for 3 days and then tid prn for sob Taking As Needed    mometasone-formoterol (DULERA) 200-5 MCG/ACT inhaler Inhale 2 puffs into the lungs 2 times daily Taking    montelukast (SINGULAIR) 10 MG tablet Take 10 mg by mouth every morning. 4/23/2025 Morning

## 2025-04-23 NOTE — ED NOTES
EMERGENCY DEPARTMENT ENCOUNTER      NAME: Soren Freitas  AGE: 34 year old female  YOB: 1990  MRN: [id number]  EVALUATION DATE & TIME: 2025  3:39 PM    PCP: [facility], [facility]    ED PROVIDER: Vani Bar M.D.        Chief Complaint   Patient presents with    Asthma Exacerbation         FINAL IMPRESSION:    1. Severe asthma with acute exacerbation, unspecified whether persistent    2. Acute respiratory failure with hypoxia (H)    3. Community acquired pneumonia of right lower lobe of lung          Patient was signed out to me at change of shift by Dina Mcdonald at 5:31 PM. Please see their note for full HPI, exam and plan.    MEDICAL DECISION MAKIN year old female history of asthma presents emergency department with increased work of breathing after being around some sprayed Lysol.  Arrived hypoxic and increased work of breathing.    Initially cared for by my partner and placed on BiPAP with continuous nebs, steroids, IM epinephrine, and IV antibiotics for possible pneumonia seen on portable chest x-ray.  Admission to the hospital is recommended by my partner but patient declined at this time.  She was hopeful that she would have some improvement on the BiPAP and ultimately decision was made that she would likely leave AMA from the ED after a few hours.    Patient at this time feels that she is not improving enough and is asking for admission to the hospital which is the best course for her.  I do feel that she needs to stay in the hospital, in fact I feel she is best served in the ICU at this time on continuous BiPAP.  She has been accepted to the ICU by the intensivist.      ED COURSE:  5:31 PM  Patient was signed out to me at change of shift by Dina Mcdonald. Please see their note for full HPI, exam and plan.    6:29 PM  Patient does not feel like she is improving enough to go home.  She will make arrangements for her children.  She has received epinephrine,  magnesium, continuous nebs, Solu-Medrol, and IV antibiotics.  She is on 35% FiO2 on BiPAP but still has increased work of breathing.  Still quite tachycardic but likely due to increased work of breathing as well as the nebulizers and epinephrine.  Sinus rhythm.  Plan at this time is admission to the hospital.  This time I feel she is best served starting in the ICU and certainly can downgrade her as she improves.  Awaiting callback from intensivist.  I did ask the patient if she can think of any other therapies that have been helpful to her in the past with prior ED visits or hospitalizations and at this time she feels like the usual things have been done.    6:36 PM  Patient has been accepted to the hospital by the intensivist will go to the ICU.  Intensivist does not feel that the hospitalist needs to be involved at this time.  They agree with ICU placement at this time.    I considered rib fractures, allergic reaction, CHF, myocarditis, pericarditis, endocarditis, ACS, PE, ruptured AAA, pneumothorax, aortic dissection, bowel obstruction, and other such etiologies at this time.      At the conclusion of the encounter I discussed the results of all of the tests and the disposition. Their questions were answered. The patient (and any family present) acknowledged understanding and were agreeable with the care plan.      CONSULTANTS:  Intensivist - Dr. Dennis    MEDICATIONS GIVEN IN THE EMERGENCY:  Medications   albuterol 5 mg/mL (PROVENTIL) continuous nebulization (10 mg/hr Nebulization $New Bag 4/23/25 1632)   methylPREDNISolone Na Suc (solu-MEDROL) injection 125 mg (125 mg Intravenous $Given 4/23/25 1604)   cefTRIAXone (ROCEPHIN) 2 g vial to attach to  ml bag for ADULTS or NS 50 ml bag for PEDS (0 g Intravenous Stopped 4/23/25 1745)   azithromycin (ZITHROMAX) 500 mg in sodium chloride 0.9 % 250 mL intermittent infusion (0 mg Intravenous Stopped 4/23/25 1900)   sodium chloride 0.9% BOLUS 1,848 mL (1,848 mLs  Intravenous $New Bag 4/23/25 8119)   EPINEPHrine (ADRENALIN) kit 0.3 mg (0.3 mg Intramuscular $Given 4/23/25 3296)             NEW PRESCRIPTIONS STARTED AT TODAY'S ER VISIT  none      CONDITION:  critical        DISPOSITION:  ICU as accepted by Dr. Dennis, intensivist         =================================================================  =================================================================      Patient was signed out to me at change of shift by Dina Mcdonald at 5:31 PM. Please see their note for full HPI, exam and plan.        HPI    Soren Freitas is a 34 year old female with history of asthma who presents to the ER with complaints of SOB and hypoxia.    H/o asthma with h/o intubation and BIPAP for this. Arrived by EMS today with SOB. Hypoxic on arrival. BIPAP with continuous nebs initiated and improved. CXR shows PNA and started on IV meds. Mag by EMS, epi in ED.    Pt is refusing infections tests. Labs so far look good except VBG with pH 7.28.    Pt refusing admission due to young children at home.    Dr. Mcdonald tried to wean BIPAP but pt did not tolerate. Pt wants a few more hours of BIPAP and then try to wean to go home. Likely will sign out AMA.      PAST MEDICAL HISTORY:  Past Medical History:   Diagnosis Date    Obesity     Severe persistent asthma with acute exacerbation (H) 1/1/2015    Formatting of this note might be different from the original. 12/2014 Admit for exacerbation: Spirometry FEV1/FVC 68%, D/C w/dulera, albuterol, Spiriva.  Started singulair 10mg QD, Given NRT.  Dx as infant, has been on albuterol lifelong.    Last Assessment & Plan:  Formatting of this note might be different from the original. Stable on Singulair, cetirizine, albuterol, and dulera. Discussed COVID    Uncomplicated asthma          PAST SURGICAL HISTORY:  No past surgical history on file.      CURRENT MEDICATIONS:    Prior to Admission medications    Medication Sig Start Date End Date Taking? Authorizing  Provider   albuterol (PROAIR HFA/PROVENTIL HFA/VENTOLIN HFA) 108 (90 Base) MCG/ACT inhaler Inhale 2 puffs into the lungs every 6 hours as needed for shortness of breath, wheezing or cough. 4/23/25  Yes Dina Mcdonald MD   albuterol (PROAIR HFA/PROVENTIL HFA/VENTOLIN HFA) 108 (90 Base) MCG/ACT inhaler Inhale 2 puffs into the lungs every 4 hours as needed for shortness of breath, wheezing or cough. 4/11/25  Yes Emerald Millan MD   albuterol (PROVENTIL) (2.5 MG/3ML) 0.083% neb solution Take 1 vial (2.5 mg) by nebulization every 6 hours as needed for shortness of breath or wheezing. 4/23/25  Yes Dina Mcdonald MD   albuterol (PROVENTIL) (2.5 MG/3ML) 0.083% neb solution Take 2.5 mg by nebulization every 6 hours as needed for shortness of breath, wheezing or cough.   Yes Reported, Patient   amoxicillin-clavulanate (AUGMENTIN) 875-125 MG tablet Take 1 tablet by mouth 2 times daily for 10 days. 4/23/25 5/3/25 Yes Dina Mcdonald MD   azithromycin (ZITHROMAX Z-GIO) 250 MG tablet Two tablets on the first day, then one tablet daily for the next 4 days 4/23/25 4/28/25 Yes Dina Mcdonald MD   ipratropium - albuterol 0.5 mg/2.5 mg/3 mL (DUONEB) 0.5-2.5 (3) MG/3ML neb solution Take 1 vial (3 mLs) by nebulization 3 times daily as needed for shortness of breath, wheezing or cough. Nebulize tid for 3 days and then tid prn for sob 4/11/25  Yes Emerald Millan MD   mometasone-formoterol (DULERA) 200-5 MCG/ACT inhaler Inhale 2 puffs into the lungs 2 times daily 2/6/23  Yes Sally Avendano MD   montelukast (SINGULAIR) 10 MG tablet Take 10 mg by mouth every morning.   Yes Reported, Patient   predniSONE (DELTASONE) 20 MG tablet Take two tablets (= 40mg) each day for 5 (five) days 4/23/25  Yes Dina Mcdonald MD         ALLERGIES:  Allergies   Allergen Reactions    Cashew Nut Oil Anaphylaxis and Angioedema    Fish Allergy Itching     Sea Food causes Throat swelling    Peanut-Containing Drug Products Angioedema and Swelling     Throat  swelling.      Shellfish-Derived Products Angioedema         FAMILY HISTORY:  No family history on file.      SOCIAL HISTORY:  Social History     Socioeconomic History    Marital status: Single   Tobacco Use    Smoking status: Former     Social Drivers of Health     Financial Resource Strain: Low Risk  (10/7/2024)    Financial Resource Strain     Within the past 12 months, have you or your family members you live with been unable to get utilities (heat, electricity) when it was really needed?: No   Food Insecurity: Low Risk  (10/7/2024)    Food Insecurity     Within the past 12 months, did you worry that your food would run out before you got money to buy more?: No     Within the past 12 months, did the food you bought just not last and you didn t have money to get more?: No   Transportation Needs: Low Risk  (10/7/2024)    Transportation Needs     Within the past 12 months, has lack of transportation kept you from medical appointments, getting your medicines, non-medical meetings or appointments, work, or from getting things that you need?: No   Social Connections: Socially Integrated (8/9/2024)    Received from [facility]    Social Connections     Do you often feel lonely or isolated from those around you?: 0   Interpersonal Safety: Low Risk  (10/6/2024)    Interpersonal Safety     Do you feel physically and emotionally safe where you currently live?: Yes     Within the past 12 months, have you been hit, slapped, kicked or otherwise physically hurt by someone?: No     Within the past 12 months, have you been humiliated or emotionally abused in other ways by your partner or ex-partner?: No   Housing Stability: High Risk (10/7/2024)    Housing Stability     Do you have housing? : No     Are you worried about losing your housing?: No         VITALS:  Patient Vitals for the past 24 hrs:   BP Temp Temp src Pulse Resp SpO2   04/23/25 1931 (!) 143/64 -- -- (!) 145 (!) 32 100 %   04/23/25  1909 (!) 168/123 -- -- (!) 150 (!) 34 100 %   04/23/25 1901 -- -- -- (!) 152 29 99 %   04/23/25 1828 -- -- -- (!) 153 (!) 34 100 %   04/23/25 1820 (!) 164/77 -- -- (!) 163 (!) 36 98 %   04/23/25 1810 (!) 157/73 -- -- (!) 166 (!) 36 92 %   04/23/25 1801 (!) 166/78 -- -- (!) 163 28 93 %   04/23/25 1745 -- -- -- (!) 153 (!) 34 96 %   04/23/25 1725 (!) 146/69 -- -- (!) 153 28 97 %   04/23/25 1715 -- -- -- (!) 157 27 96 %   04/23/25 1700 -- -- -- (!) 171 (!) 55 93 %   04/23/25 1645 -- -- -- (!) 156 (!) 43 99 %   04/23/25 1637 -- -- -- (!) 140 27 99 %   04/23/25 1630 -- 98.3  F (36.8  C) Axillary (!) 140 27 100 %   04/23/25 1615 -- -- -- (!) 141 29 97 %   04/23/25 1600 -- -- -- (!) 146 -- 100 %   04/23/25 1545 -- -- -- (!) 151 24 100 %   04/23/25 1538 (!) 158/72 -- -- (!) 145 30 100 %       Wt Readings from Last 3 Encounters:   04/11/25 90.7 kg (200 lb)   01/02/25 90.7 kg (200 lb)   12/10/24 90.7 kg (200 lb)       Estimated Creatinine Clearance: 98.5 mL/min (based on SCr of 0.93 mg/dL).    PHYSICAL EXAM    Please see initial providers note for detailed physical exam        LAB:  All pertinent labs reviewed and interpreted.  Recent Results (from the past 24 hours)   Basic metabolic panel    Collection Time: 04/23/25  4:08 PM   Result Value Ref Range    Sodium 140 135 - 145 mmol/L    Potassium 4.3 3.4 - 5.3 mmol/L    Chloride 107 98 - 107 mmol/L    Carbon Dioxide (CO2) 27 22 - 29 mmol/L    Anion Gap 6 (L) 7 - 15 mmol/L    Urea Nitrogen 7.0 6.0 - 20.0 mg/dL    Creatinine 0.93 0.51 - 0.95 mg/dL    GFR Estimate 82 >60 mL/min/1.73m2    Calcium 9.0 8.8 - 10.4 mg/dL    Glucose 131 (H) 70 - 99 mg/dL   HCG qualitative Blood    Collection Time: 04/23/25  4:08 PM   Result Value Ref Range    hCG Serum Qualitative Negative Negative   Magnesium    Collection Time: 04/23/25  4:08 PM   Result Value Ref Range    Magnesium 2.6 (H) 1.7 - 2.3 mg/dL   Nt probnp inpatient (BNP)    Collection Time: 04/23/25  4:08 PM   Result Value Ref Range    N  terminal Pro BNP Inpatient <36 0 - 450 pg/mL   Blood gas venous    Collection Time: 04/23/25  4:08 PM   Result Value Ref Range    pH Venous 7.28 (L) 7.32 - 7.43    pCO2 Venous 55 (H) 40 - 50 mm Hg    pO2 Venous 26 25 - 47 mm Hg    Bicarbonate Venous 26 21 - 28 mmol/L    Base Excess/Deficit Venous -1.6 -3.0 - 3.0 mmol/L    FIO2 30     Oxyhemoglobin Venous 45 (L) 70 - 75 %    O2 Sat, Venous 45.2 (L) 70.0 - 75.0 %   CBC with platelets and differential    Collection Time: 04/23/25  4:08 PM   Result Value Ref Range    WBC Count 11.4 (H) 4.0 - 11.0 10e3/uL    RBC Count 5.33 (H) 3.80 - 5.20 10e6/uL    Hemoglobin 14.8 11.7 - 15.7 g/dL    Hematocrit 44.3 35.0 - 47.0 %    MCV 83 78 - 100 fL    MCH 27.8 26.5 - 33.0 pg    MCHC 33.4 31.5 - 36.5 g/dL    RDW 13.4 10.0 - 15.0 %    Platelet Count 276 150 - 450 10e3/uL    % Neutrophils 62 %    % Lymphocytes 27 %    % Monocytes 6 %    % Eosinophils 5 %    % Basophils 0 %    % Immature Granulocytes 0 %    NRBCs per 100 WBC 0 <1 /100    Absolute Neutrophils 7.0 1.6 - 8.3 10e3/uL    Absolute Lymphocytes 3.1 0.8 - 5.3 10e3/uL    Absolute Monocytes 0.7 0.0 - 1.3 10e3/uL    Absolute Eosinophils 0.5 0.0 - 0.7 10e3/uL    Absolute Basophils 0.0 0.0 - 0.2 10e3/uL    Absolute Immature Granulocytes 0.0 <=0.4 10e3/uL    Absolute NRBCs 0.0 10e3/uL   Procalcitonin    Collection Time: 04/23/25  4:08 PM   Result Value Ref Range    Procalcitonin 0.06 <0.50 ng/mL   Lactic acid whole blood with 1x repeat in 2 hr when >2    Collection Time: 04/23/25  7:11 PM   Result Value Ref Range    Lactic Acid, Initial 2.4 (H) 0.7 - 2.0 mmol/L   Blood gas venous    Collection Time: 04/23/25  7:11 PM   Result Value Ref Range    pH Venous 7.22 (L) 7.32 - 7.43    pCO2 Venous 49 40 - 50 mm Hg    pO2 Venous 62 (H) 25 - 47 mm Hg    Bicarbonate Venous 20 (L) 21 - 28 mmol/L    Base Excess/Deficit Venous -7.8 (L) -3.0 - 3.0 mmol/L    FIO2 35     Oxyhemoglobin Venous 88 (H) 70 - 75 %    O2 Sat, Venous 89.2 (H) 70.0 - 75.0 %  "      No results found for: \"ABORH\"      RADIOLOGY:  Reviewed all pertinent imaging. Please see official radiology report.    XR Chest Port 1 View   Final Result   IMPRESSION: No pleural fluid or pneumothorax. Subtle lower lung opacities are favored to represent atypical infection or inflammation. Normal size of the heart.            EKG:    none        PROCEDURES:  none    Medical Decision Making  I discussed the care with another health care provider: intensivist  Admit.    MIPS (CTPE, Dental pain, Hare, Sinusitis, Asthma/COPD, Head Trauma): Not Applicable    SEPSIS: None      Vani Bar M.D. [facility]  Emergency Medicine and Medical Toxicology  Formerly [facility] EMERGENCY DEPARTMENT  [facility] [facility] [id number] [phone]  Dept: [phone]         Vani Bar MD  04/24/25 0010    "

## 2025-04-23 NOTE — ED NOTES
Patient refused cov/flu/rsv swab, stating she has not been exposed to or had any symptoms of viral respiratory illness. Dr. Mcdonald updated.

## 2025-04-23 NOTE — ED NOTES
"Pt refused blood cultures at this time. \"You dont understand I dont have anyone to take care of my kids.\"   "

## 2025-04-23 NOTE — ED NOTES
Patient refused placement of 2nd IV line and straight stick for collection of blood cultures and lactic acid. She is amenable to administration of IV antibiotics. Dr. Mcdonald at bedside and gives verbal order to proceed with antibiotic administration at this time, prior to collection of blood cultures.

## 2025-04-24 VITALS
WEIGHT: 220.68 LBS | HEIGHT: 67 IN | HEART RATE: 95 BPM | RESPIRATION RATE: 20 BRPM | OXYGEN SATURATION: 96 % | TEMPERATURE: 97.8 F | SYSTOLIC BLOOD PRESSURE: 127 MMHG | DIASTOLIC BLOOD PRESSURE: 72 MMHG | BODY MASS INDEX: 34.64 KG/M2

## 2025-04-24 LAB
ANION GAP SERPL CALCULATED.3IONS-SCNC: 16 MMOL/L (ref 7–15)
BACTERIA BLD CULT: NORMAL
BACTERIA BLD CULT: NORMAL
BUN SERPL-MCNC: 7.4 MG/DL (ref 6–20)
CALCIUM SERPL-MCNC: 9.3 MG/DL (ref 8.8–10.4)
CHLORIDE SERPL-SCNC: 106 MMOL/L (ref 98–107)
CREAT SERPL-MCNC: 0.82 MG/DL (ref 0.51–0.95)
EGFRCR SERPLBLD CKD-EPI 2021: >90 ML/MIN/1.73M2
ERYTHROCYTE [DISTWIDTH] IN BLOOD BY AUTOMATED COUNT: 13.4 % (ref 10–15)
GLUCOSE BLDC GLUCOMTR-MCNC: 150 MG/DL (ref 70–99)
GLUCOSE BLDC GLUCOMTR-MCNC: 165 MG/DL (ref 70–99)
GLUCOSE BLDC GLUCOMTR-MCNC: 203 MG/DL (ref 70–99)
GLUCOSE SERPL-MCNC: 207 MG/DL (ref 70–99)
HCO3 SERPL-SCNC: 15 MMOL/L (ref 22–29)
HCT VFR BLD AUTO: 41.2 % (ref 35–47)
HGB BLD-MCNC: 13.6 G/DL (ref 11.7–15.7)
LACTATE SERPL-SCNC: 3.7 MMOL/L (ref 0.7–2)
MCH RBC QN AUTO: 27 PG (ref 26.5–33)
MCHC RBC AUTO-ENTMCNC: 33 G/DL (ref 31.5–36.5)
MCV RBC AUTO: 82 FL (ref 78–100)
PLATELET # BLD AUTO: 278 10E3/UL (ref 150–450)
POTASSIUM SERPL-SCNC: 4.4 MMOL/L (ref 3.4–5.3)
RBC # BLD AUTO: 5.03 10E6/UL (ref 3.8–5.2)
SODIUM SERPL-SCNC: 137 MMOL/L (ref 135–145)
WBC # BLD AUTO: 15.1 10E3/UL (ref 4–11)

## 2025-04-24 PROCEDURE — 250N000012 HC RX MED GY IP 250 OP 636 PS 637

## 2025-04-24 PROCEDURE — 80048 BASIC METABOLIC PNL TOTAL CA: CPT | Performed by: STUDENT IN AN ORGANIZED HEALTH CARE EDUCATION/TRAINING PROGRAM

## 2025-04-24 PROCEDURE — 99207 PR APP CREDIT; MD BILLING SHARED VISIT: CPT

## 2025-04-24 PROCEDURE — 99418 PROLNG IP/OBS E/M EA 15 MIN: CPT | Mod: FS | Performed by: STUDENT IN AN ORGANIZED HEALTH CARE EDUCATION/TRAINING PROGRAM

## 2025-04-24 PROCEDURE — 120N000001 HC R&B MED SURG/OB

## 2025-04-24 PROCEDURE — 83605 ASSAY OF LACTIC ACID: CPT | Performed by: NURSE PRACTITIONER

## 2025-04-24 PROCEDURE — 99223 1ST HOSP IP/OBS HIGH 75: CPT | Mod: FS | Performed by: STUDENT IN AN ORGANIZED HEALTH CARE EDUCATION/TRAINING PROGRAM

## 2025-04-24 PROCEDURE — 250N000013 HC RX MED GY IP 250 OP 250 PS 637: Performed by: NURSE PRACTITIONER

## 2025-04-24 PROCEDURE — 250N000011 HC RX IP 250 OP 636: Performed by: INTERNAL MEDICINE

## 2025-04-24 PROCEDURE — 250N000009 HC RX 250: Performed by: NURSE PRACTITIONER

## 2025-04-24 PROCEDURE — 250N000011 HC RX IP 250 OP 636: Mod: JZ | Performed by: STUDENT IN AN ORGANIZED HEALTH CARE EDUCATION/TRAINING PROGRAM

## 2025-04-24 PROCEDURE — 85018 HEMOGLOBIN: CPT | Performed by: STUDENT IN AN ORGANIZED HEALTH CARE EDUCATION/TRAINING PROGRAM

## 2025-04-24 PROCEDURE — 99291 CRITICAL CARE FIRST HOUR: CPT | Performed by: INTERNAL MEDICINE

## 2025-04-24 PROCEDURE — 250N000009 HC RX 250: Performed by: STUDENT IN AN ORGANIZED HEALTH CARE EDUCATION/TRAINING PROGRAM

## 2025-04-24 PROCEDURE — 36415 COLL VENOUS BLD VENIPUNCTURE: CPT | Performed by: NURSE PRACTITIONER

## 2025-04-24 PROCEDURE — 250N000011 HC RX IP 250 OP 636: Mod: JZ | Performed by: NURSE PRACTITIONER

## 2025-04-24 PROCEDURE — 36415 COLL VENOUS BLD VENIPUNCTURE: CPT | Performed by: STUDENT IN AN ORGANIZED HEALTH CARE EDUCATION/TRAINING PROGRAM

## 2025-04-24 PROCEDURE — 999N000157 HC STATISTIC RCP TIME EA 10 MIN

## 2025-04-24 PROCEDURE — 94640 AIRWAY INHALATION TREATMENT: CPT | Mod: 76

## 2025-04-24 PROCEDURE — 258N000003 HC RX IP 258 OP 636: Performed by: INTERNAL MEDICINE

## 2025-04-24 RX ORDER — PREDNISONE 20 MG/1
40 TABLET ORAL DAILY
Status: DISCONTINUED | OUTPATIENT
Start: 2025-04-24 | End: 2025-04-25 | Stop reason: HOSPADM

## 2025-04-24 RX ORDER — SODIUM CHLORIDE 9 MG/ML
INJECTION, SOLUTION INTRAVENOUS CONTINUOUS
Status: DISCONTINUED | OUTPATIENT
Start: 2025-04-24 | End: 2025-04-25 | Stop reason: HOSPADM

## 2025-04-24 RX ORDER — CEFTRIAXONE 2 G/1
2 INJECTION, POWDER, FOR SOLUTION INTRAMUSCULAR; INTRAVENOUS ONCE
Status: COMPLETED | OUTPATIENT
Start: 2025-04-24 | End: 2025-04-24

## 2025-04-24 RX ORDER — METHYLPREDNISOLONE SODIUM SUCCINATE 40 MG/ML
40 INJECTION INTRAMUSCULAR; INTRAVENOUS EVERY 8 HOURS
Status: DISCONTINUED | OUTPATIENT
Start: 2025-04-24 | End: 2025-04-24

## 2025-04-24 RX ORDER — ONDANSETRON 2 MG/ML
4 INJECTION INTRAMUSCULAR; INTRAVENOUS EVERY 6 HOURS PRN
Status: DISCONTINUED | OUTPATIENT
Start: 2025-04-24 | End: 2025-04-25 | Stop reason: HOSPADM

## 2025-04-24 RX ORDER — IPRATROPIUM BROMIDE AND ALBUTEROL SULFATE 2.5; .5 MG/3ML; MG/3ML
3 SOLUTION RESPIRATORY (INHALATION)
Status: DISCONTINUED | OUTPATIENT
Start: 2025-04-24 | End: 2025-04-25 | Stop reason: HOSPADM

## 2025-04-24 RX ADMIN — IPRATROPIUM BROMIDE AND ALBUTEROL SULFATE 3 ML: .5; 3 SOLUTION RESPIRATORY (INHALATION) at 01:20

## 2025-04-24 RX ADMIN — MONTELUKAST 10 MG: 10 TABLET, FILM COATED ORAL at 09:40

## 2025-04-24 RX ADMIN — IPRATROPIUM BROMIDE AND ALBUTEROL SULFATE 3 ML: .5; 3 SOLUTION RESPIRATORY (INHALATION) at 11:29

## 2025-04-24 RX ADMIN — IPRATROPIUM BROMIDE AND ALBUTEROL SULFATE 3 ML: .5; 3 SOLUTION RESPIRATORY (INHALATION) at 20:05

## 2025-04-24 RX ADMIN — IPRATROPIUM BROMIDE AND ALBUTEROL SULFATE 3 ML: .5; 3 SOLUTION RESPIRATORY (INHALATION) at 08:19

## 2025-04-24 RX ADMIN — IPRATROPIUM BROMIDE AND ALBUTEROL SULFATE 3 ML: .5; 3 SOLUTION RESPIRATORY (INHALATION) at 15:04

## 2025-04-24 RX ADMIN — METHYLPREDNISOLONE SODIUM SUCCINATE 40 MG: 40 INJECTION INTRAMUSCULAR; INTRAVENOUS at 04:31

## 2025-04-24 RX ADMIN — IPRATROPIUM BROMIDE AND ALBUTEROL SULFATE 3 ML: .5; 3 SOLUTION RESPIRATORY (INHALATION) at 05:42

## 2025-04-24 RX ADMIN — METHYLPREDNISOLONE SODIUM SUCCINATE 40 MG: 40 INJECTION INTRAMUSCULAR; INTRAVENOUS at 09:40

## 2025-04-24 RX ADMIN — CEFTRIAXONE SODIUM 2 G: 2 INJECTION, POWDER, FOR SOLUTION INTRAMUSCULAR; INTRAVENOUS at 20:49

## 2025-04-24 RX ADMIN — ONDANSETRON 4 MG: 2 INJECTION, SOLUTION INTRAMUSCULAR; INTRAVENOUS at 15:12

## 2025-04-24 RX ADMIN — PREDNISONE 40 MG: 20 TABLET ORAL at 18:47

## 2025-04-24 RX ADMIN — AZITHROMYCIN MONOHYDRATE 250 MG: 500 INJECTION, POWDER, LYOPHILIZED, FOR SOLUTION INTRAVENOUS at 20:53

## 2025-04-24 ASSESSMENT — ACTIVITIES OF DAILY LIVING (ADL)
ADLS_ACUITY_SCORE: 36

## 2025-04-24 NOTE — PLAN OF CARE
"Goal Outcome Evaluation:      Plan of Care Reviewed With: patient (declined updating anyone.)    Overall Patient Progress: improvingOverall Patient Progress: improving    Outcome Evaluation: On RA, sating well. Less SOB wheezing this am. Able to walk to and from the bathroom and talk without increasing work of breathing. Discussed staying for asthma treatments of nebs and IV steroids. Pt states she \"will try to stay\" but is concerned about caring for her kids. Listened to concerns, discussed alternatives to allow for staying to treat this asthma flare. Offered Social work to come visit, patient declined stating \"my kids aren't going to some crisis nursery.\" pt is currently willing to stay.      "

## 2025-04-24 NOTE — PROGRESS NOTES
Patient is stable and is using accessory muscle. There is difficulty in using complete sentences. BS wheezing pre/post treatment. Maintains saturations with 4 lpm by oxymask.     Jay Jay Chavez, RT

## 2025-04-24 NOTE — PLAN OF CARE
"  Problem: Nausea and Vomiting  Goal: Nausea and Vomiting Relief  Outcome: Progressing   Goal Outcome Evaluation:         Pt complains of nausea this afternoon. Texted MD to update and get order for anti-emetic.  Patient states \"all these meds are messing with my stomach.\" Zofran given per order.                 "

## 2025-04-24 NOTE — PLAN OF CARE
Problem: Asthma Exacerbation  Goal: Asthma Symptom Relief  Outcome: Progressing  Intervention: Support Asthma Symptom Control  Recent Flowsheet Documentation  Taken 4/24/2025 0400 by Merlene Chavira RN  Airway/Ventilation Management: calming measures promoted  Medication Review/Management: medications reviewed  Taken 4/24/2025 0000 by Merlene Chavira, RN  Airway/Ventilation Management: calming measures promoted  Medication Review/Management: medications reviewed  [facility] - ICU    RN Progress Note:            Pertinent Assessments:      Please refer to flowsheet rows for full assessment     Uneventful, slept much of night.           Key Events - This Shift:   No SOB noted. VSS, less tachycardic. Oxygen at 1L NC.   Upper lungs sound less wheezy , better air exchange per patient         RN Managed Protocols Ordered:  NO                  Barriers to Discharge / Downgrade:   None.

## 2025-04-24 NOTE — PROGRESS NOTES
Critical Care Note     04/24/2025     Name: Soren Freitas MRN#: [id number]   Age: 34 year old YOB: 1990        Summary     Past medical history of severe persistent asthma with an exacerbation requiring intubation, environmental allergies    Presented 4/23/25 via EMS for acute onset dyspnea, wheezing, & increased work of breathing after son sprayed Lysol. Found to have acute hypoxemic-hypercapnic respiratory failure requiring NIPPV due to asthma exacerbation presumed due to chemical irritant        Assessment & Plan      Goals of Care:  Life prolonging without limits      Neurology, Psychiatry, Sedation, Analgesia:  Alert & oriented     Cardiovascular:  Hemodynamically stable     Respiratory, Airway:  Acute hypoxemic-hypercapnic respiratory failure, Acute exacerbation of asthma   Apparent trigger was chemical irritant (Lysol spray) & she recently dusted her house which is a known trigger for her. No coryza, cough, sore throat, fevers-chills, evidence of systemic inflammation or radiographic evidence of airspace disease.    - hold antimicrobials   - methylprednisolone 40 mg q6h - will decrease to q8  - duonebs q4h, prn albuterol q1h     History of severe persistent asthma  Severe exacerbations in the past, required intubation in 2014, symptoms often slower in onset over 1-2 hours, others have been rapid in onset. For the previous few weeks, the patient's symptoms have been well controlled on dulera 200 bid, doesn't use spacer, & montelukast with prn albuterol & duonebs  - hold pta dulera 200-5 bid   - continue pta montelukast 10 mg qam   - she has albuterol & duonebs at home which she uses PRN infrequently      Gastrointestinal:  Normal hepatobiliary indices     Renal, Acid-base, Electrolytes, Volume:  Normal renal indices     Infectious Disease:  Systemic inflammation vs sepsis   Evinced by tachycardia & mild leukocytosis with normal differential. Afebrile. Procal 0.06. No coryza, sore throat,  fevers, chills, or cough; no sick contacts. Radiograph does not demonstrate convincing evidence of airspace disease   - hold antimicrobials   - viral testing if she is agreeable      Hematology, Oncology:  Leukocytosis secondary to physiologic stress vs sepsis     Endocrine:  Hyperglycemia due to corticosteroids       Checklist:  FEN: NPO  VTE ppx: enoxaparin   GI ppx: pantoprazole   Bowel regimen: prn PEG  Lines/tube-size: maintain two PIV  Skin: no lesions    PT/OT/SLP, early mobility: not indicated   Code Status: full       History of the Present Illness      Presented 4/23/25 via EMS for acute onset dyspnea, wheezing, & increased work of breathing after son sprayed Lysol. Found to have acute hypoxemic-hypercapnic respiratory failure requiring NIPPV due to asthma exacerbation     Initial VBG 7.28/55 -> 7.22/49. Afebrile. WBC 11.4, normal differential. Procal 0.06. Unremarkable BMP. Radiograph does not demonstrate convincing evidence of airspace disease     In the ED she was given continuous albuterol nebs, ceftriaxone, azithromycin, 0.3 mg epinephrine IM, 125 mg methylpred, Mg & ~2 L NS    Her symptoms are improved.        Allergies      Allergies   Allergen Reactions    Cashew Nut Oil Anaphylaxis and Angioedema    Fish Allergy Itching     Sea Food causes Throat swelling    Peanut-Containing Drug Products Angioedema and Swelling     Throat swelling.      Shellfish-Derived Products Angioedema           Medications      Prior to Admission Medications  Medications Prior to Admission   Medication Sig Dispense Refill Last Dose/Taking    albuterol (PROAIR HFA/PROVENTIL HFA/VENTOLIN HFA) 108 (90 Base) MCG/ACT inhaler Inhale 2 puffs into the lungs every 4 hours as needed for shortness of breath, wheezing or cough. 18 g 0 Taking As Needed    albuterol (PROVENTIL) (2.5 MG/3ML) 0.083% neb solution Take 2.5 mg by nebulization every 6 hours as needed for shortness of breath, wheezing or cough.   Taking As Needed     ipratropium - albuterol 0.5 mg/2.5 mg/3 mL (DUONEB) 0.5-2.5 (3) MG/3ML neb solution Take 1 vial (3 mLs) by nebulization 3 times daily as needed for shortness of breath, wheezing or cough. Nebulize tid for 3 days and then tid prn for sob 90 mL 0 Taking As Needed    mometasone-formoterol (DULERA) 200-5 MCG/ACT inhaler Inhale 2 puffs into the lungs 2 times daily 13 g 0 Taking    montelukast (SINGULAIR) 10 MG tablet Take 10 mg by mouth every morning.   4/23/2025 Morning         Physical Exam      Neurologic: Alert & oriented.   HEENT: on room air  Respiratory: CTA scott, no wheezing  Cardiovascular: Regular rhythm   Gastrointestinal: Soft. Obese.   Musculoskeletal: Skeletal configuration normal and muscle mass normal for age. Joint appearance overall normal.  Skin, Hair, & Nails: Skin color normal. No visible skin lesions.  Hair & nails normal.  Extremities: No peripheral edema. Warm      All pertinent vital signs, ventilator settings, I&Os, laboratory, microbiology, ECGs, & imaging data has been personally reviewed. Total Critical Care time, excluding procedures, was 47 minutes

## 2025-04-24 NOTE — ED NOTES
"Got patient up to bedside commode due to her refusal to use bedpan and need to have BM. With this, patient's HR increased to 170 bpm and her sats dropped to 85-87%. She reported feeling unwell and said \"I can't go home, I need to stay.\" Writer updated Dr. Bar. Patient states she is making arrangements for the father of her younger children to stay with them.  "

## 2025-04-24 NOTE — PLAN OF CARE
Goal Outcome Evaluation:      Plan of Care Reviewed With: patient    Overall Patient Progress: improvingOverall Patient Progress: improving    Outcome Evaluation: Osygen sats maintained >92% on 35% fio2  BIPAP.
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
## 2025-04-24 NOTE — PLAN OF CARE
Goal Outcome Evaluation:      Plan of Care Reviewed With: patient    Overall Patient Progress: improvingOverall Patient Progress: improving    Outcome Evaluation: Osygen sats maintained >92% on 35% fio2  BIPAP.      [facility] - ICU    RN Progress Note:            Pertinent Assessments:      Please refer to flowsheet rows for full assessment     New admit from ER with asthma exacerbation. BIPAP on arrival but now on 4L oxymask. Lungs sounds tight with wheezing throughout. Feels much bettter than when first arrived to ED. Tachypneic with rr in high 20s, continuous albuterol treatment changed to schedule q4 with scheduled iv steroids. HR tachy on monitor but improving since stopping cont albuterol. Refusing to use bedpan, up to bathroom. Patient was very hesitant about being admitted and was refusing initially in ER out of concern for who would watch her young children tonight, patient is highly anticipating leaving tomorrow to get home to her children.        RN Managed Protocols Ordered:  No  Protocols:Potassium  PRN'S:iCal  Protocols Status: N/A                Barriers to Discharge / Downgrade:     New admit.         Point of Contact Update: YES-OR-NO:no  If No, reason:   Name:  Phone Number:  Summary of Conversation:
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
## 2025-04-25 VITALS
RESPIRATION RATE: 20 BRPM | TEMPERATURE: 97.8 F | DIASTOLIC BLOOD PRESSURE: 74 MMHG | HEART RATE: 87 BPM | WEIGHT: 222.5 LBS | BODY MASS INDEX: 34.92 KG/M2 | OXYGEN SATURATION: 94 % | HEIGHT: 67 IN | SYSTOLIC BLOOD PRESSURE: 120 MMHG

## 2025-04-25 PROBLEM — J18.9 COMMUNITY ACQUIRED PNEUMONIA OF RIGHT LOWER LOBE OF LUNG: Status: RESOLVED | Noted: 2025-04-23 | Resolved: 2025-04-25

## 2025-04-25 PROBLEM — J45.901 SEVERE ASTHMA WITH ACUTE EXACERBATION, UNSPECIFIED WHETHER PERSISTENT: Status: RESOLVED | Noted: 2022-01-23 | Resolved: 2025-04-25

## 2025-04-25 PROBLEM — J96.00 ACUTE RESPIRATORY FAILURE (H): Status: RESOLVED | Noted: 2025-04-23 | Resolved: 2025-04-25

## 2025-04-25 PROBLEM — Z77.098 EXPOSURE TO CHEMICAL IRRITANT: Status: RESOLVED | Noted: 2025-04-23 | Resolved: 2025-04-25

## 2025-04-25 PROBLEM — J96.01 ACUTE RESPIRATORY FAILURE WITH HYPOXIA (H): Status: RESOLVED | Noted: 2021-08-22 | Resolved: 2025-04-25

## 2025-04-25 PROCEDURE — 99239 HOSP IP/OBS DSCHRG MGMT >30: CPT | Performed by: HOSPITALIST

## 2025-04-25 PROCEDURE — 250N000009 HC RX 250: Performed by: NURSE PRACTITIONER

## 2025-04-25 PROCEDURE — 94640 AIRWAY INHALATION TREATMENT: CPT | Mod: 76

## 2025-04-25 PROCEDURE — 94640 AIRWAY INHALATION TREATMENT: CPT

## 2025-04-25 PROCEDURE — 999N000157 HC STATISTIC RCP TIME EA 10 MIN

## 2025-04-25 PROCEDURE — 250N000012 HC RX MED GY IP 250 OP 636 PS 637

## 2025-04-25 PROCEDURE — 250N000013 HC RX MED GY IP 250 OP 250 PS 637: Performed by: NURSE PRACTITIONER

## 2025-04-25 RX ORDER — AZITHROMYCIN 250 MG/1
250 TABLET, FILM COATED ORAL DAILY
Qty: 3 TABLET | Refills: 0 | Status: SHIPPED | OUTPATIENT
Start: 2025-04-26

## 2025-04-25 RX ADMIN — PREDNISONE 40 MG: 20 TABLET ORAL at 09:36

## 2025-04-25 RX ADMIN — MONTELUKAST 10 MG: 10 TABLET, FILM COATED ORAL at 09:36

## 2025-04-25 RX ADMIN — IPRATROPIUM BROMIDE AND ALBUTEROL SULFATE 3 ML: .5; 3 SOLUTION RESPIRATORY (INHALATION) at 04:04

## 2025-04-25 RX ADMIN — IPRATROPIUM BROMIDE AND ALBUTEROL SULFATE 3 ML: .5; 3 SOLUTION RESPIRATORY (INHALATION) at 07:29

## 2025-04-25 RX ADMIN — IPRATROPIUM BROMIDE AND ALBUTEROL SULFATE 3 ML: .5; 3 SOLUTION RESPIRATORY (INHALATION) at 00:04

## 2025-04-25 ASSESSMENT — ACTIVITIES OF DAILY LIVING (ADL)
ADLS_ACUITY_SCORE: 36

## 2025-04-25 NOTE — DISCHARGE SUMMARY
"[facility]  Hospitalist Discharge Summary      Date of Admission:  4/23/2025  Date of Discharge:  4/25/2025 11:05 AM  Discharging Provider: Lai Moy MD  Discharge Service: Hospitalist Service    Discharge Diagnoses   Acute Evoxac respiratory failure  Acute exacerbation of chronic asthma    Clinically Significant Risk Factors     # Obesity: Estimated body mass index is 34.85 kg/m  as calculated from the following:    Height as of this encounter: 1.702 m (5' 7\").    Weight as of this encounter: 100.9 kg (222 lb 8 oz).       Follow-ups Needed After Discharge   Follow-up Appointments       Follow Up      Follow up with pulmonology as instructed.        Hospital Follow-up with Existing Primary Care Provider (PCP)          Schedule Primary Care visit within: 30 Days                 Unresulted Labs Ordered in the Past 30 Days of this Admission       Date and Time Order Name Status Description    4/23/2025  4:38 PM Blood Culture Peripheral Blood Preliminary     4/23/2025  4:38 PM Blood Culture Peripheral Blood Preliminary         These results will be followed up by hospital medicine    Discharge Disposition   Discharged to home  Condition at discharge: Stable    Hospital Course   Patient was admitted for respiratory distress due to exacerbated asthma.  She was initially admitted to the ICU for continuous BiPAP needs.  She was initially hypoxic as well.  With supportive measures, steroids and bronchodilators patient improved.  Upon discharge she was on room air with minimal symptoms.  She will continue several more days of antibiotics and prednisone upon discharge.    Consultations This Hospital Stay   HOSPITALIST IP CONSULT    Code Status   Prior    Time Spent on this Encounter   I, Lai Moy MD, personally saw the patient today and spent greater than 30 minutes discharging this patient.       Lai Moy MD  [facility]  [address] " [place] [address]  Phone: [phone]  Fax: [phone]  ______________________________________________________________________    Physical Exam   Vital Signs: Temp: 97.8  F (36.6  C) Temp src: Oral BP: 120/74 Pulse: 87   Resp: 20 SpO2: 94 % O2 Device: None (Room air)    Weight: 222 lbs 8 oz  Tired, no distress, heart rate regular, fleeting wheezes, breathing nonlabored, abdomen soft, no leg edema       Primary Care Physician   [facility]    Discharge Orders      Reason for your hospital stay    Asthma exacerbation and pneumonia.     Activity    Your activity upon discharge: activity as tolerated     Follow Up    Follow up with pulmonology as instructed.     Diet    Follow this diet upon discharge: Current Diet:Orders Placed This Encounter      Regular Diet Adult     Hospital Follow-up with Existing Primary Care Provider (PCP)            Significant Results and Procedures   Most Recent 3 CBC's:  Recent Labs   Lab Test 04/24/25  0449 04/23/25  1608 04/11/25  1404   WBC 15.1* 11.4* 7.2   HGB 13.6 14.8 14.6   MCV 82 83 82    276 282     Most Recent 3 BMP's:  Recent Labs   Lab Test 04/24/25  1229 04/24/25  0833 04/24/25  0449 04/23/25  2003 04/23/25  1608 04/11/25  1404   NA  --   --  137  --  140 139   POTASSIUM  --   --  4.4  --  4.3 3.6   CHLORIDE  --   --  106  --  107 105   CO2  --   --  15*  --  27 23   BUN  --   --  7.4  --  7.0 9.1   CR  --   --  0.82  --  0.93 0.99*   ANIONGAP  --   --  16*  --  6* 11   CARYN  --   --  9.3  --  9.0 9.0   * 150* 207*   < > 131* 97    < > = values in this interval not displayed.     Most Recent 2 LFT's:  Recent Labs   Lab Test 04/23/25  1608 04/11/25  1404   AST 29 20   ALT 24 18   ALKPHOS 74 73   BILITOTAL 0.5 0.8   ,   Results for orders placed or performed during the hospital encounter of 04/23/25   XR Chest Port 1 View    Narrative    EXAM: XR CHEST PORT 1 VIEW  LOCATION: [facility]  DATE: 4/23/2025    INDICATION: acute  respiratory failure  COMPARISON: 12/11/2024      Impression    IMPRESSION: No pleural fluid or pneumothorax. Subtle lower lung opacities are favored to represent atypical infection or inflammation. Normal size of the heart.       Discharge Medications   Discharge Medication List as of 4/25/2025 10:25 AM        START taking these medications    Details   amoxicillin-clavulanate (AUGMENTIN) 875-125 MG tablet Take 1 tablet by mouth 2 times daily for 10 days., Disp-20 tablet, R-0, E-Prescribe      predniSONE (DELTASONE) 20 MG tablet Take two tablets (= 40mg) each day for 5 (five) days, Disp-10 tablet, R-0, E-Prescribe           CONTINUE these medications which have CHANGED    Details   azithromycin (ZITHROMAX Z-GIO) 250 MG tablet Take 1 tablet (250 mg) by mouth daily. Two tablets on the first day, then one tablet daily for the next 4 days, Disp-3 tablet, R-0, E-Prescribe           CONTINUE these medications which have NOT CHANGED    Details   albuterol (PROAIR HFA/PROVENTIL HFA/VENTOLIN HFA) 108 (90 Base) MCG/ACT inhaler Inhale 2 puffs into the lungs every 4 hours as needed for shortness of breath, wheezing or cough., Disp-18 g, R-0, E-PrescribePharmacy may dispense brand covered by insurance (Proair, or proventil or ventolin or generic albuterol inhaler)      albuterol (PROVENTIL) (2.5 MG/3ML) 0.083% neb solution Take 2.5 mg by nebulization every 6 hours as needed for shortness of breath, wheezing or cough., Historical      ipratropium - albuterol 0.5 mg/2.5 mg/3 mL (DUONEB) 0.5-2.5 (3) MG/3ML neb solution Take 1 vial (3 mLs) by nebulization 3 times daily as needed for shortness of breath, wheezing or cough. Nebulize tid for 3 days and then tid prn for sob, Disp-90 mL, R-0, E-Prescribe      mometasone-formoterol (DULERA) 200-5 MCG/ACT inhaler Inhale 2 puffs into the lungs 2 times daily, Disp-13 g, R-0, E-PrescribeOk to sub advair at equivalent dose if covered by insurance      montelukast (SINGULAIR) 10 MG tablet Take  10 mg by mouth every morning., Historical           Allergies   Allergies   Allergen Reactions    Cashew Nut Oil Anaphylaxis and Angioedema    Fish Allergy Itching     Sea Food causes Throat swelling    Peanut-Containing Drug Products Angioedema and Swelling     Throat swelling.      Shellfish-Derived Products Angioedema

## 2025-04-25 NOTE — PLAN OF CARE
Goal Outcome Evaluation:       Soren reported breathing has improved. Discharge instructions reviewed with her. Belongings sent home.

## 2025-04-25 NOTE — PLAN OF CARE
"  Problem: Adult Inpatient Plan of Care  Goal: Plan of Care Review  Description: The Plan of Care Review/Shift note should be completed every shift.  The Outcome Evaluation is a brief statement about your assessment that the patient is improving, declining, or no change.  This information will be displayed automatically on your shiftnote.  Outcome: Progressing   Goal Outcome Evaluation:    Patient is alert and oriented x4, vitally stable on RA. O2 sats >90%. Schedule nebs done by RT.     Patient refused IV antibiotics azithromycin and ceftriaxone for evening shift RN. Provider later came to speak to patient, patient stated \"I didn't refuse antibiotics, I only refused IV steroids.\" IV antibiotics re-timed by pharmacy. Antibiotics administered.     Patient also refused chest CT, continuous IV fluids and lactic acid blood draw. Dr. Gondal notified.       "

## 2025-04-27 ENCOUNTER — PATIENT OUTREACH (OUTPATIENT)
Dept: CARE COORDINATION | Facility: CLINIC | Age: 35
End: 2025-04-27
Payer: COMMERCIAL

## 2025-04-27 NOTE — PROGRESS NOTES
Connected Care Resource Center:   [facility] Resource Center Contact  [facility]/Voicemail     Clinical Data: Post-Discharge Outreach     Outreach attempted x 2.  Left message on patient's voicemail, providing [facility]'s central phone number of [phone] ([phone]) for questions/concerns and/or to schedule an appt with an [facility] provider, if they do not have a PCP.      Plan:  [facility] will do no further outreaches at this time.       Clara Good MA  Connected Care Resource Center, [facility]    *Connected Care Resource Team does NOT follow patient ongoing. Referrals are identified based on internal discharge reports and the outreach is to ensure patient has an understanding of their discharge instructions.

## 2025-04-28 LAB
BACTERIA BLD CULT: NO GROWTH
BACTERIA BLD CULT: NO GROWTH

## 2025-05-01 LAB
ATRIAL RATE - MUSE: 152 BPM
DIASTOLIC BLOOD PRESSURE - MUSE: 72 MMHG
INTERPRETATION ECG - MUSE: NORMAL
P AXIS - MUSE: 76 DEGREES
PR INTERVAL - MUSE: 116 MS
QRS DURATION - MUSE: 72 MS
QT - MUSE: 326 MS
QTC - MUSE: 518 MS
R AXIS - MUSE: 88 DEGREES
SYSTOLIC BLOOD PRESSURE - MUSE: 158 MMHG
T AXIS - MUSE: 47 DEGREES
VENTRICULAR RATE- MUSE: 152 BPM

## 2025-05-31 ENCOUNTER — HOSPITAL ENCOUNTER (EMERGENCY)
Facility: HOSPITAL | Age: 35
Discharge: HOME | End: 2025-05-31
Attending: STUDENT IN AN ORGANIZED HEALTH CARE EDUCATION/TRAINING PROGRAM | Admitting: STUDENT IN AN ORGANIZED HEALTH CARE EDUCATION/TRAINING PROGRAM
Payer: COMMERCIAL

## 2025-05-31 VITALS
BODY MASS INDEX: 34.77 KG/M2 | DIASTOLIC BLOOD PRESSURE: 60 MMHG | OXYGEN SATURATION: 94 % | RESPIRATION RATE: 24 BRPM | SYSTOLIC BLOOD PRESSURE: 126 MMHG | WEIGHT: 222 LBS | HEART RATE: 126 BPM

## 2025-05-31 DIAGNOSIS — J45.909 MILD ASTHMA WITHOUT COMPLICATION, UNSPECIFIED WHETHER PERSISTENT: ICD-10-CM

## 2025-05-31 PROCEDURE — 99285 EMERGENCY DEPT VISIT HI MDM: CPT | Mod: 25

## 2025-05-31 PROCEDURE — 94640 AIRWAY INHALATION TREATMENT: CPT

## 2025-05-31 PROCEDURE — 96374 THER/PROPH/DIAG INJ IV PUSH: CPT

## 2025-05-31 PROCEDURE — 999N000157 HC STATISTIC RCP TIME EA 10 MIN

## 2025-05-31 PROCEDURE — 250N000011 HC RX IP 250 OP 636: Mod: JZ | Performed by: STUDENT IN AN ORGANIZED HEALTH CARE EDUCATION/TRAINING PROGRAM

## 2025-05-31 PROCEDURE — 250N000009 HC RX 250: Performed by: STUDENT IN AN ORGANIZED HEALTH CARE EDUCATION/TRAINING PROGRAM

## 2025-05-31 RX ORDER — PREDNISONE 20 MG/1
60 TABLET ORAL ONCE
Status: DISCONTINUED | OUTPATIENT
Start: 2025-05-31 | End: 2025-05-31

## 2025-05-31 RX ORDER — METHYLPREDNISOLONE SODIUM SUCCINATE 125 MG/2ML
125 INJECTION INTRAMUSCULAR; INTRAVENOUS ONCE
Status: COMPLETED | OUTPATIENT
Start: 2025-05-31 | End: 2025-05-31

## 2025-05-31 RX ORDER — PREDNISONE 20 MG/1
TABLET ORAL
Qty: 10 TABLET | Refills: 0 | Status: SHIPPED | OUTPATIENT
Start: 2025-05-31

## 2025-05-31 RX ORDER — ALBUTEROL SULFATE 0.83 MG/ML
5 SOLUTION RESPIRATORY (INHALATION) ONCE
Status: COMPLETED | OUTPATIENT
Start: 2025-05-31 | End: 2025-05-31

## 2025-05-31 RX ORDER — IPRATROPIUM BROMIDE AND ALBUTEROL SULFATE 2.5; .5 MG/3ML; MG/3ML
3 SOLUTION RESPIRATORY (INHALATION) ONCE
Status: COMPLETED | OUTPATIENT
Start: 2025-05-31 | End: 2025-05-31

## 2025-05-31 RX ADMIN — METHYLPREDNISOLONE SODIUM SUCCINATE 125 MG: 125 INJECTION, POWDER, FOR SOLUTION INTRAMUSCULAR; INTRAVENOUS at 00:42

## 2025-05-31 RX ADMIN — IPRATROPIUM BROMIDE AND ALBUTEROL SULFATE 3 ML: .5; 3 SOLUTION RESPIRATORY (INHALATION) at 00:33

## 2025-05-31 RX ADMIN — ALBUTEROL SULFATE 5 MG: 2.5 SOLUTION RESPIRATORY (INHALATION) at 01:20

## 2025-05-31 ASSESSMENT — ACTIVITIES OF DAILY LIVING (ADL)
ADLS_ACUITY_SCORE: 56
ADLS_ACUITY_SCORE: 56

## 2025-05-31 ASSESSMENT — COLUMBIA-SUICIDE SEVERITY RATING SCALE - C-SSRS
6. HAVE YOU EVER DONE ANYTHING, STARTED TO DO ANYTHING, OR PREPARED TO DO ANYTHING TO END YOUR LIFE?: NO
1. IN THE PAST MONTH, HAVE YOU WISHED YOU WERE DEAD OR WISHED YOU COULD GO TO SLEEP AND NOT WAKE UP?: NO
2. HAVE YOU ACTUALLY HAD ANY THOUGHTS OF KILLING YOURSELF IN THE PAST MONTH?: NO

## 2025-05-31 NOTE — ED TRIAGE NOTES
Pt arrives after 1 hour of SOB.  Pt tried inhaler and neb at home without relief.  Pt was given a duoneb en route.  92% on room air at home.       Triage Assessment (Adult)       Row Name 05/31/25 0023          Triage Assessment    Airway WDL WDL        Respiratory WDL    Respiratory WDL rhythm/pattern     Rhythm/Pattern, Respiratory tachypneic;deep

## 2025-05-31 NOTE — ED PROVIDER NOTES
"  Emergency Department Encounter         FINAL IMPRESSION:  Asthma exacerbation          ED COURSE AND MEDICAL DECISION MAKING       ED Course as of 05/31/25 0243   Sat May 31, 2025   0059 Patient is an obese 34 with a longstanding history of asthma here with asthma exacerbation last couple hours.  Patient reports this feels \"better than usual\" and denies any fevers chills nausea vomiting.  No abdominal pain.  No chest pain.  No hemoptysis or leg swelling.  No history of PE.  Arrival she was ambulatory from the ambulance bay on a small mount of oxygen and DuoNeb.  On examination she still is wheezing.  She has no obvious conversational dyspnea.  Will give another nebulizer treatment here and reevaluate.  Steroids given   0241 Reassessed and updated patient with findings.   Patient feeling better.  Plan for discharge home with prednisone    Medical Decision Making      Discharge. I prescribed additional prescription strength medication(s) as charted. See documentation for any additional details.    MIPS (CTPE, Dental pain, Hare, Sinusitis, Asthma/COPD, Head Trauma): Not Applicable    SEPSIS: None      At the conclusion of the encounter I discussed the results of all the tests and the disposition. The questions were answered. The patient or family acknowledged understanding and was agreeable with the care plan.        MEDICATIONS GIVEN IN THE EMERGENCY DEPARTMENT:  Medications   methylPREDNISolone Na Suc (solu-MEDROL) injection 125 mg (has no administration in time range)   ipratropium - albuterol 0.5 mg/2.5 mg/3 mL (DUONEB) neb solution 3 mL (3 mLs Nebulization $Given 5/31/25 0033)       NEW PRESCRIPTIONS STARTED AT TODAY'S ED VISIT:  New Prescriptions    No medications on file       HPI     Patient information obtained from: Patient    Use of : N/A    Soren GRAY Fortino is a 34 year old female with a pertinent history of adjustment disorder, asthma, and acute asthma exacerbation who presents to this ED " via ambulance for evaluation of asthma exacerbation.    Patient reports 2 hours of shortness of breath. She tried her inhaler and neb at home with no relief. She was given steroids and a duoneb by EMS with mild relief. Patient states this feels like her usual asthma exacerbation. She endorses phlegm when she coughs. The last time she coughed up phlegm was when she had pneumonia with associated chest irritation, patient confirms this doesn't feel similar to that. Patient denies fever, leg swelling, hemoptysis, and history of blood clots.      MEDICAL HISTORY     Past Medical History:   Diagnosis Date    Obesity     Severe persistent asthma with acute exacerbation (H) 1/1/2015    Uncomplicated asthma        No past surgical history on file.    Social History     Tobacco Use    Smoking status: Former       albuterol (PROAIR HFA/PROVENTIL HFA/VENTOLIN HFA) 108 (90 Base) MCG/ACT inhaler  albuterol (PROVENTIL) (2.5 MG/3ML) 0.083% neb solution  azithromycin (ZITHROMAX Z-GIO) 250 MG tablet  ipratropium - albuterol 0.5 mg/2.5 mg/3 mL (DUONEB) 0.5-2.5 (3) MG/3ML neb solution  mometasone-formoterol (DULERA) 200-5 MCG/ACT inhaler  montelukast (SINGULAIR) 10 MG tablet  predniSONE (DELTASONE) 20 MG tablet            PHYSICAL EXAM     BP (!) 143/74   Pulse (!) 130   Resp 20   Wt 100.7 kg (222 lb)   SpO2 97%   BMI 34.77 kg/m        PHYSICAL EXAM:     General: Patient appears well, nontoxic, comfortable  HEENT: Moist mucous membranes,  No head trauma.    Cardiovascular: Normal rate, normal rhythm, no extremity edema.  No appreciable murmur.  Respiratory: Patient with wheezing bilaterally.  No conversational dyspnea  Abdominal: Soft, nontender, nondistended, no palpable masses, no guarding, no rebound  Musculoskeletal: Full range of motion of joints, no deformities appreciated.  Neurological: Alert and oriented, grossly neurologically intact.  Psychological: Normal affect and mood.  Integument: No rashes  appreciated          RESULTS       Labs Ordered and Resulted from Time of ED Arrival to Time of ED Departure - No data to display    No orders to display         PROCEDURES:  Procedures:  Procedures       I, Vinny Aldana am serving as a scribe to document services personally performed by Myke Nieto DO, based on my observations and the provider's statements to me.  I, Myke Nieto DO, attest that Vinny Aldana is acting in a scribe capacity, has observed my performance of the services and has documented them in accordance with my direction.    Myke Nieto DO  Emergency Medicine  [facility] EMERGENCY DEPARTMENT     Myke Nieto DO  05/31/25 5264

## 2025-07-14 NOTE — PROGRESS NOTES
Continuous albuterol ordered for pt along with BiPAP.  Placed pt on BiPAP of 10/5, she did not tolerate complained of being hot.  She is also having pregnant and having contractions.  She is tachypneic at 28 and tachycardic at 124.  Notified MD of this, switching from BiPAP to High Flow.  She is currently on 35L and 21%. Continuous neb set up inline with aerogen, set to run at 1mL/hr.   Subjective     Patient ID: Mann is a 11 year old male who is accompanied by mother.    Chief Complaint   Patient presents with    Well Child    School Physical    Sports Physical    Follow-up     Peds Cardiology recent follow up     Other     Psc Y =8  Psc= 7       HPI  Patient is here for well child exam.  Parental concerns: No  12yo for well child visit and he routinely follows with cardiology  AAP [facility] parent questionnaire (see scanned), and psc were reviewed with guardian, and discussed concerns. (assessment requiring an independent historian).     Mom does not want him to have vaccine. Goes to school in [place].    Saw cardiology last in jan 2025. Was diagnosed in 2021 with aortic root dilation.  Well Child Assessment:  History was provided by the mother. Mann lives with his mother, grandfather and grandmother. Interval problems do not include caregiver depression, caregiver stress, chronic stress at home, lack of social support, marital discord, recent illness or recent injury.   Nutrition  Types of intake include cereals, fruits, juices, junk food, meats, non-nutritional and vegetables. Junk food includes candy, chips, desserts, fast food and sugary drinks.   Dental  The patient has a dental home. The patient brushes teeth regularly. The patient does not floss regularly. Last dental exam was less than 6 months ago.   Elimination  Elimination problems do not include constipation, diarrhea or urinary symptoms. There is no bed wetting.   Behavioral  Behavioral issues do not include biting, hitting, lying frequently, misbehaving with peers, misbehaving with siblings or performing poorly at school. Disciplinary methods include consistency among caregivers, ignoring tantrums, praising good behavior, taking away privileges and time outs.   Sleep  Average sleep duration is 10 hours. The patient does not snore. There are no sleep problems.   Safety  There is no smoking in the home. Home has working smoke  alarms? yes. Home has working carbon monoxide alarms? yes. There is a gun in home (Locked in safe).   School  Current grade level is 6th. There are no signs of learning disabilities. Child is doing well in school.   Social  The caregiver enjoys the child. After school, the child is at home with a parent or home with an adult. The child spends 1 hour in front of a screen (tv or computer) per day.     Water intake is 32-48 ounces per 24 hours.   Juice intake is 0-8 ounces per 24 hours.   Soda intake is 0 ounces per 24 hours.   Milk is 8 ounces per 24 hours.  - milk type is [employer] Milk and non dairy cheeses   The child is taking the following portions of solids foods per day:     Vegetables 2-3    Protein (meat and plant based protein) 3     Starch (cereals/ rice/bread) 4-5     Fruit 0-1   The child is is taking vitamins.   Signs of puberty:Yes  The chid gets 30 Minutes per day of physical activity.    DEVELOPMENT:      11 years old    Child does things that help them have a healthy lifestyle (eating healthy, being physically active, keeping self safe? Yes    Child has at least one adult in their life who cares about them and knows where they can go for help? Yes    Child has at least on friend or a group of friends who they feel safe with? Yes    Child helps other by themself or by working with a group in school, community or karyn based setting? Yes    Child can bounce back when things do not go the child's way? Yes    Child feels/appears hopeful and self confident? Yes    Child is becoming more independent and making more decisions on their own as they get older? Yes      I reviewed the following portions of the patient's chart in this encounter and updated as appropriate: past medical, surgical, family, medication and allergy histories.    Current Outpatient Medications   Medication Sig Dispense Refill    cetirizine (ZyrTEC) 5 MG tablet Take 1 tablet by mouth daily. 30 tablet 0    albuterol 108 (90 Base) MCG/ACT  inhaler Inhale 2 puffs into the lungs every 4 hours as needed for Shortness of Breath, Wheezing or Other (cough). 1 each 0    EPINEPHrine (EpiPen 2-Richard) 0.3 MG/0.3ML auto-injector Inject 0.3 mLs into the muscle 1 time as needed for Anaphylaxis. 2 each 1    Pediatric Multivit-Minerals-C (MULTIVITAMIN CHILDRENS GUMMIES PO)        No current facility-administered medications for this visit.       Allergies   Allergen Reactions    Banana   (Food And Med) Other (See Comments)     Nasal congestion    Egg White   (Food Or Med) DIARRHEA    Proanthocyanidin HIVES     Grapes    Benzoin HIVES and RASH    Ethanol RASH    Fluoride Preparations SWELLING     Grape dental fluoride varnish    Alcohol Prep With RASH    Grape   (Food) HIVES    Latex RASH    Milk   (Food Or Med) DIARRHEA    Cefdinir Other (See Comments)     vomits       Immunization History   Administered Date(s) Administered    DTaP, 5 Pertussis Antigens 06/04/2015, 07/26/2019    DTaP/HIB/IPV 04/04/2014    DTaP/Hep B/IPV 2013    HIB (HbOC) 12/02/2014    Hep B, adolescent or pediatric 2013, 12/02/2014    Hib (PRP-T) 2013, 04/04/2014, 12/02/2014    IPV 07/26/2019    MMR 05/28/2015    Measles Mumps Rubella Varicella 02/06/2019    Pneumococcal conjugate PCV 13 2013, 2013, 04/04/2014, 12/02/2014    Rotavirus - pentavalent 2013, 2013, 04/04/2014    Varicella 12/02/2014       Past Medical History:   Diagnosis Date    Allergic reaction 10/10/2021    Allergy     Bad breath 09/11/2020    Behavior causing concern in biological child 09/11/2020    BMI (body mass index), pediatric, 5% to less than 85% for age 09/11/2020    Chest pain on exertion 11/23/2021    Closed head injury 2018    admitted for observation for possible fracture; neg eval    Duplex kidney 09/15/2023    Dysfunctional voiding of urine 09/15/2023    Ear popping, right 03/01/2021    Encounter for screening for dental disorders 05/15/2019    Family history of polycystic  kidney 09/09/2022    Hepatitis A vaccination declined 10/10/2021    Preseptal cellulitis 07/16/2019    RAD (reactive airway disease) (CMD)     Right ear pain 03/01/2021    Slow transit constipation 09/11/2020    Speech delay, expressive 05/28/2015    Staring spell 01/14/2016    Tonsillitis 05/15/2019    Tripping over things 02/07/2019       Past Surgical History:   Procedure Laterality Date    CIRCUMCISION, PRIMARY         Family History   Problem Relation Age of Onset    Asthma Mother     Heart disease Mother         MVP    Other Mother         penicillin allergy and sulfa allergy    Arthritis Mother     Patient is unaware of any medical problems Father     Heart disease Sister     Heart disease Brother     Elbert-Parkinson-White Maternal Aunt     Other Maternal Uncle         abnormal heart rhythm    Heart disease Maternal Grandmother     Cancer Maternal Grandfather 74        Pancreatic and Prostate Cancer    Hyperlipidemia Maternal Grandfather     Heart disease Maternal Grandfather        Social History     Social History Narrative    Lives w/ mom; pet dog; no tobacco, 2nd grade       Review of Systems   Constitutional:  Negative for activity change, appetite change, fatigue, fever, irritability and unexpected weight change.   HENT:  Negative for congestion, dental problem, ear discharge, ear pain, mouth sores, postnasal drip, rhinorrhea, sinus pain, sore throat, trouble swallowing and voice change.    Eyes:  Negative for photophobia, pain, discharge, redness, itching and visual disturbance.   Respiratory:  Negative for snoring, cough, choking, shortness of breath, wheezing and stridor.    Cardiovascular:  Negative for chest pain and palpitations.        Aortic root dilation followed by Dr Ordaz in peds cardiology   Gastrointestinal:  Negative for abdominal distention, abdominal pain, blood in stool, constipation, diarrhea, nausea and vomiting.   Genitourinary:  Negative for decreased urine volume, dysuria,  enuresis, frequency, genital sores and urgency.   Musculoskeletal:  Negative for arthralgias, back pain, gait problem, joint swelling, myalgias, neck pain and neck stiffness.   Skin:  Negative for color change, rash and wound.   Allergic/Immunologic: Negative for environmental allergies and food allergies.   Neurological:  Negative for dizziness, seizures, syncope, weakness, light-headedness and headaches.   Hematological:  Negative for adenopathy. Does not bruise/bleed easily.   Psychiatric/Behavioral:  Negative for agitation, behavioral problems, confusion, decreased concentration, dysphoric mood, self-injury and sleep disturbance. The patient is not hyperactive.        Objective     Vitals:    07/14/25 1610   BP: 96/62   BP Location: RUE - Right upper extremity   Patient Position: Sitting   Pulse: 72   Resp: 22   Temp: 98 °F (36.7 °C)   TempSrc: Temporal   SpO2: 97%   Weight: 50.4 kg (111 lb 3.6 oz)   Height: 5' 6.73\" (1.695 m)     Body mass index is 17.56 kg/m².  46.7 %ile (Z= -0.08) based on CDC (Boys, 2-20 Years) BMI-for-age based on BMI available on 7/14/2025.  Growth parameters are noted and are appropriate for age.   Physical Exam  Vitals and nursing note reviewed.   Constitutional:       General: He is active. He is not in acute distress.     Appearance: Normal appearance. He is well-developed and normal weight. He is not diaphoretic.   HENT:      Head: Normocephalic. No signs of injury.      Right Ear: Tympanic membrane normal. Tympanic membrane is not erythematous or bulging.      Left Ear: Tympanic membrane normal. Tympanic membrane is not erythematous or bulging.      Nose: Nose normal. No rhinorrhea.      Mouth/Throat:      Mouth: Mucous membranes are moist.      Dentition: No dental caries.      Pharynx: Oropharynx is clear. No posterior oropharyngeal erythema.      Tonsils: No tonsillar exudate.      Neck: Normal range of motion. No rigidity.   Eyes:      General:         Right eye: No discharge.          Left eye: No discharge.      Conjunctiva/sclera: Conjunctivae normal.      Pupils: Pupils are equal, round, and reactive to light.   Cardiovascular:      Rate and Rhythm: Normal rate and regular rhythm.      Pulses: Pulses are strong.      Heart sounds: S1 normal and S2 normal. No murmur heard.  Pulmonary:      Effort: Pulmonary effort is normal. No respiratory distress or retractions.      Breath sounds: Normal breath sounds and air entry. No stridor or decreased air movement. No wheezing, rhonchi or rales.   Abdominal:      General: Bowel sounds are normal. There is no distension.      Palpations: Abdomen is soft. There is no mass.      Tenderness: There is no abdominal tenderness. There is no guarding or rebound.      Hernia: No hernia is present.   Musculoskeletal:         General: No tenderness, deformity or signs of injury. Normal range of motion.   Lymphadenopathy:      Cervical: No cervical adenopathy.   Skin:     General: Skin is warm.      Capillary Refill: Capillary refill takes less than 2 seconds.      Coloration: Skin is not jaundiced or pale.      Findings: No petechiae or rash. Rash is not purpuric.   Neurological:      General: No focal deficit present.      Mental Status: He is alert.      Cranial Nerves: No cranial nerve deficit.      Sensory: No sensory deficit.      Motor: No weakness or abnormal muscle tone.      Coordination: Coordination normal.      Gait: Gait normal.      Deep Tendon Reflexes: Reflexes normal.   Psychiatric:         Mood and Affect: Mood normal.         Behavior: Behavior normal.         Thought Content: Thought content normal.         Judgment: Judgment normal.       Assessment and Plan:  Problem List Items Addressed This Visit          Allergies and Adverse Reactions    Multiple allergies    Relevant Medications    cetirizine (ZyrTEC) 5 MG tablet       Cardiac and Vasculature    Aortic root dilatation (CMD)       Endocrine and Metabolic    BMI (body mass index),  pediatric, 5% to less than 85% for age    Exercise counseling    Dietary counseling       Infectious Diseases    Immunization not carried out because of caregiver refusal    Tetanus, diphtheria, and acellular pertussis (Tdap) vaccination declined    Meningococcal vaccination declined    Human papilloma virus (HPV) vaccination declined     Other Visit Diagnoses         Encounter for routine child health examination with abnormal findings    -  Primary      Need for vaccination              Parent verbalized understanding and agreement with plan of care.  Child is to follow-up at next well child visit in one year or sooner as needed.    Screening tests  Pediatric symptom checklist  (PSC) (see scanned): result:   psc (youth = 8) psc parent= 7    Counseling  Weight management:  The patient was counseled regarding nutrition and physical activity  Referral for follow up cardiology    Immunizations today: No. Mother declined      Forms:  School form was provided at today's visit.  Sports form was provided at today's visit.    Anticipatory guidance was provided and handouts from [employer] and [employer] were also provided.  Alondra Prieto md

## 2025-07-16 ENCOUNTER — HOSPITAL ENCOUNTER (EMERGENCY)
Facility: HOSPITAL | Age: 35
Discharge: HOME | End: 2025-07-17
Attending: EMERGENCY MEDICINE | Admitting: EMERGENCY MEDICINE
Payer: COMMERCIAL

## 2025-07-16 ENCOUNTER — HOSPITAL ENCOUNTER (EMERGENCY)
Facility: HOSPITAL | Age: 35
Discharge: HOME | End: 2025-07-16
Attending: EMERGENCY MEDICINE
Payer: COMMERCIAL

## 2025-07-16 VITALS
TEMPERATURE: 98.6 F | DIASTOLIC BLOOD PRESSURE: 52 MMHG | WEIGHT: 222 LBS | BODY MASS INDEX: 34.77 KG/M2 | SYSTOLIC BLOOD PRESSURE: 100 MMHG | RESPIRATION RATE: 30 BRPM | HEART RATE: 102 BPM | OXYGEN SATURATION: 95 %

## 2025-07-16 DIAGNOSIS — J45.21 INTERMITTENT ASTHMA WITH ACUTE EXACERBATION, UNSPECIFIED ASTHMA SEVERITY: ICD-10-CM

## 2025-07-16 DIAGNOSIS — R06.00 DYSPNEA, UNSPECIFIED TYPE: ICD-10-CM

## 2025-07-16 DIAGNOSIS — R06.02 SHORTNESS OF BREATH: ICD-10-CM

## 2025-07-16 PROCEDURE — 250N000009 HC RX 250: Performed by: EMERGENCY MEDICINE

## 2025-07-16 PROCEDURE — 96365 THER/PROPH/DIAG IV INF INIT: CPT

## 2025-07-16 PROCEDURE — 250N000013 HC RX MED GY IP 250 OP 250 PS 637: Performed by: EMERGENCY MEDICINE

## 2025-07-16 PROCEDURE — 99285 EMERGENCY DEPT VISIT HI MDM: CPT | Mod: 25 | Performed by: EMERGENCY MEDICINE

## 2025-07-16 PROCEDURE — 96375 TX/PRO/DX INJ NEW DRUG ADDON: CPT

## 2025-07-16 PROCEDURE — 250N000011 HC RX IP 250 OP 636: Performed by: EMERGENCY MEDICINE

## 2025-07-16 RX ORDER — IPRATROPIUM BROMIDE AND ALBUTEROL SULFATE 2.5; .5 MG/3ML; MG/3ML
3 SOLUTION RESPIRATORY (INHALATION) ONCE
Status: COMPLETED | OUTPATIENT
Start: 2025-07-16 | End: 2025-07-16

## 2025-07-16 RX ORDER — METHYLPREDNISOLONE SODIUM SUCCINATE 125 MG/2ML
125 INJECTION INTRAMUSCULAR; INTRAVENOUS ONCE
Status: COMPLETED | OUTPATIENT
Start: 2025-07-16 | End: 2025-07-16

## 2025-07-16 RX ORDER — ACETAMINOPHEN 325 MG/1
975 TABLET ORAL ONCE
Status: COMPLETED | OUTPATIENT
Start: 2025-07-16 | End: 2025-07-16

## 2025-07-16 RX ORDER — PREDNISONE 20 MG/1
40 TABLET ORAL DAILY
Qty: 10 TABLET | Refills: 0 | Status: SHIPPED | OUTPATIENT
Start: 2025-07-16 | End: 2025-07-21

## 2025-07-16 RX ORDER — MAGNESIUM SULFATE HEPTAHYDRATE 40 MG/ML
2 INJECTION, SOLUTION INTRAVENOUS ONCE
Status: COMPLETED | OUTPATIENT
Start: 2025-07-16 | End: 2025-07-17

## 2025-07-16 RX ORDER — IPRATROPIUM BROMIDE AND ALBUTEROL SULFATE 2.5; .5 MG/3ML; MG/3ML
1 SOLUTION RESPIRATORY (INHALATION) EVERY 6 HOURS PRN
Qty: 180 ML | Refills: 0 | Status: SHIPPED | OUTPATIENT
Start: 2025-07-16 | End: 2025-07-26

## 2025-07-16 RX ORDER — MAGNESIUM SULFATE HEPTAHYDRATE 40 MG/ML
2 INJECTION, SOLUTION INTRAVENOUS ONCE
Status: COMPLETED | OUTPATIENT
Start: 2025-07-16 | End: 2025-07-16

## 2025-07-16 RX ADMIN — IPRATROPIUM BROMIDE AND ALBUTEROL SULFATE 3 ML: .5; 3 SOLUTION RESPIRATORY (INHALATION) at 01:44

## 2025-07-16 RX ADMIN — METHYLPREDNISOLONE SODIUM SUCCINATE 125 MG: 125 INJECTION, POWDER, LYOPHILIZED, FOR SOLUTION INTRAMUSCULAR; INTRAVENOUS at 23:15

## 2025-07-16 RX ADMIN — ACETAMINOPHEN 975 MG: 325 TABLET ORAL at 23:14

## 2025-07-16 RX ADMIN — IPRATROPIUM BROMIDE AND ALBUTEROL SULFATE 3 ML: .5; 3 SOLUTION RESPIRATORY (INHALATION) at 02:17

## 2025-07-16 RX ADMIN — IPRATROPIUM BROMIDE AND ALBUTEROL SULFATE 3 ML: .5; 3 SOLUTION RESPIRATORY (INHALATION) at 23:24

## 2025-07-16 RX ADMIN — IPRATROPIUM BROMIDE AND ALBUTEROL SULFATE 3 ML: .5; 3 SOLUTION RESPIRATORY (INHALATION) at 23:25

## 2025-07-16 RX ADMIN — METHYLPREDNISOLONE SODIUM SUCCINATE 125 MG: 125 INJECTION, POWDER, LYOPHILIZED, FOR SOLUTION INTRAMUSCULAR; INTRAVENOUS at 01:48

## 2025-07-16 RX ADMIN — MAGNESIUM SULFATE HEPTAHYDRATE 2 G: 2 INJECTION, SOLUTION INTRAVENOUS at 01:52

## 2025-07-16 RX ADMIN — MAGNESIUM SULFATE HEPTAHYDRATE 2 G: 2 INJECTION, SOLUTION INTRAVENOUS at 23:22

## 2025-07-16 ASSESSMENT — COLUMBIA-SUICIDE SEVERITY RATING SCALE - C-SSRS
6. HAVE YOU EVER DONE ANYTHING, STARTED TO DO ANYTHING, OR PREPARED TO DO ANYTHING TO END YOUR LIFE?: NO
1. IN THE PAST MONTH, HAVE YOU WISHED YOU WERE DEAD OR WISHED YOU COULD GO TO SLEEP AND NOT WAKE UP?: NO
1. IN THE PAST MONTH, HAVE YOU WISHED YOU WERE DEAD OR WISHED YOU COULD GO TO SLEEP AND NOT WAKE UP?: NO
2. HAVE YOU ACTUALLY HAD ANY THOUGHTS OF KILLING YOURSELF IN THE PAST MONTH?: NO
2. HAVE YOU ACTUALLY HAD ANY THOUGHTS OF KILLING YOURSELF IN THE PAST MONTH?: NO
6. HAVE YOU EVER DONE ANYTHING, STARTED TO DO ANYTHING, OR PREPARED TO DO ANYTHING TO END YOUR LIFE?: NO

## 2025-07-16 ASSESSMENT — ACTIVITIES OF DAILY LIVING (ADL)
ADLS_ACUITY_SCORE: 56

## 2025-07-16 NOTE — ED PROVIDER NOTES
EMERGENCY DEPARTMENT ENCOUNTER      NAME: Soren Freitas  AGE: 34 year old female  YOB: 1990  EVALUATION DATE & TIME: 7/16/2025  1:35 AM    ED PROVIDER: Emerald Mir MD    Chief Complaint   Patient presents with    Asthma Exacerbation       FINAL IMPRESSION  1. Dyspnea, unspecified type    2. Intermittent asthma with acute exacerbation, unspecified asthma severity        MEDICAL DECISION MAKING   Soren Freitas is a 34 year old female who presents via EMS for evaluation of an asthma exacerbation. Records reviewed.  Patient has a long and well-documented history of asthma with repeat ED visits and admissions for related complaints.  Most recently seen here in the ED on 5/21/2025.  Most recently admitted 4/23/2025 at which time she was in the ICU with continuous BiPAP.  She presents today with recurrent dyspnea and concern for asthma exacerbation.  She believes that her symptoms might been flared up due to the weather and excessive heat with poor air quality.. She denies associated chest pain, fever, cough.  She states that her symptoms do feel like previous asthma exacerbations.  She tried using her home breathing treatments without relief.  When medics arrived, she was noted to have diffuse wheezing bilaterally.  She was given a DuoNeb and albuterol nebulizer prior to arrival.  No hypoxia.  At time of my initial assessment, patient did have diffuse inspiratory and expiratory wheezes bilaterally with diminished breath sounds.      I considered a broad differential including but not limited to asthma exacerbation, COPD exacerbation, viral URI, pneumonia, bronchitis, pulmonary edema. Given the patient's history and exam findings, symptoms seem most likely secondary to exacerbation of reactive airway disease, possibly precipitated by weather changes. Patient agrees and reports symptoms are consistent with past exacerbations. Symptoms were not sudden in onset and there is no pleuritic CP,  hypoxia, tachypnea, or back pain to suggest PE or dissection.  It would be very atypical for ACS and I do not feel further workup of this would be indicated.  No evidence for CHF.  I have relatively low suspicion for acute infectious process, PTX, edema, or other etiology requiring imaging so will hold on CXR. Orders placed for duoneb x2, steroids, and magnesium.      I rechecked the patient multiple times.  After DuoNeb x 2, she did report feeling a bit better but on reexamination, continued to have inspiratory and expiratory wheezes bilaterally.  I did offer an additional nebulizer which she was interested in.  After a third, she reported feeling much better.  Given the number of nebulizers and intervention she needed to control her symptoms, I did recommend admission but patient declined and stated that she wanted to go home today.  She is not at all interested in staying in the hospital.  I recommended close follow-up with PCP.  Will discharge with a prescription for steroids and a refill of her nebulizer solution.    At the end of the encounter, we reviewed the results, potential diagnoses, as well as return precautions and recommendations for follow up. I instructed Ms. Freitas to return to the emergency department immediately if she develops any new or worsening symptoms and provided additional verbal discharge instructions. Ms. Freitas expressed understanding and agreement with this plan of care, her questions were answered, and she was discharged in stable condition.      Additional Considerations in MDM  History:  Supplemental history from: EMS  External Record(s) reviewed: 05/31/2025 - ED visit for mild asthma.    Work Up:  Chart documentation includes differential diagnoses considered and any EKGs or imaging independently interpreted as specified above.   In additional to work up documented, I considered additional advanced imaging and laboratory workup but deferred after shared decision making  conversation with patient/family    External Consultation(s):  Discussion of management with another provider as documented above and in ED course     Chronic Illness(es):  Care impacted by chronic illness(es): asthma    Disposition considerations: Discharge. I prescribed additional prescription strength medication(s) as charted. I recommended admission, but the patient declined.    MIPS: Not Applicable       Sepsis/STEMI/Stroke: None    Critical care: 75 minutes excluding separately billable procedures.  Includes bedside management, time reviewing test results, review of records, discussing the case with staff, documenting the medical record and time spent with family members (or surrogate decision makers) discussing specific treatment issues.      ED COURSE  1:35 AM I met with the patient to obtain patient history and performed a physical exam. Discussed plan for ED work up including potential diagnostic studies and interventions.  2:13 AM Reassessed and updated patient with findings.  2:41 AM Rechecked patient.  3:28 AM Rechecked patient.      MEDICATIONS GIVEN IN THE ED  Medications   ipratropium - albuterol 0.5 mg/2.5 mg (3mg)/3 mL (DUONEB) neb solution 3 mL (3 mLs Nebulization $Given 7/16/25 0144)   ipratropium - albuterol 0.5 mg/2.5 mg (3mg)/3 mL (DUONEB) neb solution 3 mL (3 mLs Nebulization $Given 7/16/25 0144)   magnesium sulfate 2 g in 50 mL sterile water intermittent infusion (0 g Intravenous Stopped 7/16/25 0254)   methylPREDNISolone Na Suc (solu-MEDROL) injection 125 mg (125 mg Intravenous $Given 7/16/25 0148)   ipratropium - albuterol 0.5 mg/2.5 mg (3mg)/3 mL (DUONEB) neb solution 3 mL (3 mLs Nebulization $Given 7/16/25 0217)       NEW PRESCRIPTIONS STARTED AT TODAY'S VISIT  Discharge Medication List as of 7/16/2025  3:32 AM             =================================================================    HPI:    Use of : N/A     Soren GRAY Fortino is a 34 year old female who presents with  asthma exacerbation. Per EMS, patient started to endorses an asthma exacerbation triggered by the weather about 45 minutes ago. She used her inhaler with no relief. Per EMS, patient was wheezy bilaterally. Patient was given albuterol and a duo neb with mild relief. EMS reports her sats were initially 99% then dropped to 95% when changing her for a duo neb. Patient confirms chest tightness. Patient denies cough, chest pain, and sick contact.      RELEVANT HISTORY, MEDICATIONS, & ALLERGIES   Past medical history, surgical history, family history, medications, and allergies reviewed and pertinent noted in HPI.    REVIEW OF SYSTEMS:  A complete review of systems was performed with pertinent positives and negatives noted in the HPI.     PHYSICAL EXAM:    Vitals: /52   Pulse 102   Temp 98.6  F (37  C) (Oral)   Resp 30   Wt 100.7 kg (222 lb)   SpO2 95%   BMI 34.77 kg/m     General: Alert and interactive, comfortable appearing.  HENT: Atraumatic. Full AROM of neck. MMM.  Cardiovascular: Tachycardic, regular.  Chest/Pulmonary: Increased work of breathing.  Tachypneic.  Diffuse inspiratory and expiratory wheezes all lung fields with diminished breath sounds at bases.  Abdomen: Soft, nondistended. Nontender without guarding or rebound.  Extremities: Normal AROM of all major joints.  Skin: Warm and dry. Normal skin color.   Neuro: Speech clear. CNs grossly intact. Moves all extremities spontaneously.   Psych: Normal affect/mood, cooperative, memory appropriate.          I, Vinny Aldana, am serving as a scribe to document services personally performed by Dr. Emerald Mir based on my observation and the provider's statements to me. I, Emerald Mir MD attest that Vinny Aldana is acting in a scribe capacity, has observed my performance of the services and has documented them in accordance with my direction.    Emerald Mir M.D.  Emergency Medicine  [facility]  EMERGENCY DEPARTMENT  [address] [place] [id number]  [phone]  Dept: [phone]     Emerald Mir MD  07/16/25 0726

## 2025-07-16 NOTE — DISCHARGE INSTRUCTIONS
You were seen in the Emergency Department today for shortness of breath.  I think your symptoms are most likely related to an asthma exacerbation.    You were treated here with steroids and nebulizers.  I am sending with a prescription for a short course of oral steroids and also a refill of your duoneb solution.  You should continue to take all of your other medications as prescribed.      Please return to the ER if you experience more shortness of breath, chest pain, fever not better with Tylenol/motrin, and/or for any other new or concerning symptoms, otherwise please follow up with your primary doctor in 2-3 days for recheck.     Below is some information you might find useful.     Thank you for choosing [facility]. It was a pleasure taking care of you today!  - Dr. Emerald Mir

## 2025-07-16 NOTE — ED TRIAGE NOTES
Pt arrives from home via [facility] ems with an asthma exacerbation that started 45 minutes ago. Received albuterol neb and duoneb from ems. Patient continues to have tightness in chest as well as inspiratory and expiratory wheezing. Alert and oriented, vss     Triage Assessment (Adult)       Row Name 07/16/25 0139          Triage Assessment    Airway WDL airway symptoms     Airway Symptoms stridor     Additional Documentation Breath Sounds (Group)        Respiratory WDL    Respiratory WDL rhythm/pattern     Rhythm/Pattern, Respiratory shortness of breath        Breath Sounds    Breath Sounds All Fields     All Lung Fields Breath Sounds wheezes, expiratory;wheezes, inspiratory        Skin Circulation/Temperature WDL    Skin Circulation/Temperature WDL WDL        Cardiac WDL    Cardiac WDL X;rhythm     Pulse Rate & Regularity tachycardic        Peripheral/Neurovascular WDL    Peripheral Neurovascular WDL WDL        Cognitive/Neuro/Behavioral WDL    Cognitive/Neuro/Behavioral WDL WDL

## 2025-07-17 VITALS
SYSTOLIC BLOOD PRESSURE: 129 MMHG | BODY MASS INDEX: 34.84 KG/M2 | RESPIRATION RATE: 20 BRPM | DIASTOLIC BLOOD PRESSURE: 61 MMHG | HEIGHT: 67 IN | HEART RATE: 95 BPM | TEMPERATURE: 97.6 F | OXYGEN SATURATION: 95 % | WEIGHT: 222 LBS

## 2025-07-17 PROCEDURE — 250N000009 HC RX 250: Performed by: EMERGENCY MEDICINE

## 2025-07-17 RX ORDER — ALBUTEROL SULFATE 5 MG/ML
2.5 SOLUTION RESPIRATORY (INHALATION) EVERY 6 HOURS PRN
Status: DISCONTINUED | OUTPATIENT
Start: 2025-07-17 | End: 2025-07-17 | Stop reason: HOSPADM

## 2025-07-17 RX ADMIN — ALBUTEROL SULFATE 2.5 MG: 2.5 SOLUTION RESPIRATORY (INHALATION) at 00:34

## 2025-07-17 ASSESSMENT — ACTIVITIES OF DAILY LIVING (ADL)
ADLS_ACUITY_SCORE: 56
ADLS_ACUITY_SCORE: 56
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
## 2025-07-17 NOTE — ED TRIAGE NOTES
Patient has a hx of athma and increased wheezing. Given duo neb by ems and then an albuterol     Htn 170/100 puls in the 1teens    BS     18LAC   83% when medic arrived.      Triage Assessment (Adult)       Row Name 07/16/25 2300          Triage Assessment    Airway WDL WDL        Respiratory WDL    Respiratory WDL X        Skin Circulation/Temperature WDL    Skin Circulation/Temperature WDL WDL        Cardiac WDL    Cardiac WDL WDL        Peripheral/Neurovascular WDL    Peripheral Neurovascular WDL WDL        Cognitive/Neuro/Behavioral WDL    Cognitive/Neuro/Behavioral WDL WDL

## 2025-07-17 NOTE — DISCHARGE INSTRUCTIONS
You were seen in the Emergency Department today for shortness of breath.  I think your symptoms are most likely related to another asthma exacerbation.    You can use the prescriptions that I sent you home with yesterday.  Try to avoid exposure to smoke because I do not think it takes much for your asthma to flareup.    Please return to the ER if you experience more shortness of breath, chest pain, fever not better with Tylenol/motrin, and/or for any other new or concerning symptoms, otherwise please follow up with your primary doctor in 2-3 days for recheck.     Below is some information you might find useful.     Thank you for choosing [facility]. It was a pleasure taking care of you today!  - Dr. Emerald Mir

## 2025-07-17 NOTE — ED NOTES
Bed: JNED-04  Expected date: 7/16/25  Expected time: 10:48 PM  Means of arrival: Ambulance  Comments:  SPF  33 yo F  Asthma exac

## 2025-08-10 ENCOUNTER — APPOINTMENT (OUTPATIENT)
Dept: RADIOLOGY | Facility: HOSPITAL | Age: 35
End: 2025-08-10
Attending: EMERGENCY MEDICINE
Payer: COMMERCIAL

## 2025-08-10 ENCOUNTER — HOSPITAL ENCOUNTER (EMERGENCY)
Facility: HOSPITAL | Age: 35
Discharge: HOME | End: 2025-08-10
Attending: EMERGENCY MEDICINE
Payer: COMMERCIAL

## 2025-08-10 VITALS
OXYGEN SATURATION: 95 % | HEART RATE: 96 BPM | RESPIRATION RATE: 24 BRPM | TEMPERATURE: 98 F | SYSTOLIC BLOOD PRESSURE: 122 MMHG | DIASTOLIC BLOOD PRESSURE: 88 MMHG

## 2025-08-10 DIAGNOSIS — J45.901 EXACERBATION OF ASTHMA, UNSPECIFIED ASTHMA SEVERITY, UNSPECIFIED WHETHER PERSISTENT: Primary | ICD-10-CM

## 2025-08-10 LAB
ANION GAP SERPL CALCULATED.3IONS-SCNC: 12 MMOL/L (ref 7–15)
BASOPHILS # BLD AUTO: 0 10E3/UL (ref 0–0.2)
BASOPHILS NFR BLD AUTO: 1 %
BUN SERPL-MCNC: 9.5 MG/DL (ref 6–20)
CALCIUM SERPL-MCNC: 9.2 MG/DL (ref 8.8–10.4)
CHLORIDE SERPL-SCNC: 106 MMOL/L (ref 98–107)
CREAT SERPL-MCNC: 0.91 MG/DL (ref 0.51–0.95)
EGFRCR SERPLBLD CKD-EPI 2021: 84 ML/MIN/1.73M2
EOSINOPHIL # BLD AUTO: 0.6 10E3/UL (ref 0–0.7)
EOSINOPHIL NFR BLD AUTO: 9 %
ERYTHROCYTE [DISTWIDTH] IN BLOOD BY AUTOMATED COUNT: 12.9 % (ref 10–15)
GLUCOSE SERPL-MCNC: 123 MG/DL (ref 70–99)
HCO3 SERPL-SCNC: 20 MMOL/L (ref 22–29)
HCT VFR BLD AUTO: 43 % (ref 35–47)
HGB BLD-MCNC: 14.3 G/DL (ref 11.7–15.7)
IMM GRANULOCYTES # BLD: 0 10E3/UL
IMM GRANULOCYTES NFR BLD: 0 %
LYMPHOCYTES # BLD AUTO: 2.8 10E3/UL (ref 0.8–5.3)
LYMPHOCYTES NFR BLD AUTO: 39 %
MCH RBC QN AUTO: 27.6 PG (ref 26.5–33)
MCHC RBC AUTO-ENTMCNC: 33.3 G/DL (ref 31.5–36.5)
MCV RBC AUTO: 83 FL (ref 78–100)
MONOCYTES # BLD AUTO: 0.5 10E3/UL (ref 0–1.3)
MONOCYTES NFR BLD AUTO: 7 %
NEUTROPHILS # BLD AUTO: 3.3 10E3/UL (ref 1.6–8.3)
NEUTROPHILS NFR BLD AUTO: 45 %
NRBC # BLD AUTO: 0 10E3/UL
NRBC BLD AUTO-RTO: 0 /100
PLATELET # BLD AUTO: 263 10E3/UL (ref 150–450)
POTASSIUM SERPL-SCNC: 3.6 MMOL/L (ref 3.4–5.3)
RBC # BLD AUTO: 5.19 10E6/UL (ref 3.8–5.2)
SODIUM SERPL-SCNC: 138 MMOL/L (ref 135–145)
WBC # BLD AUTO: 7.3 10E3/UL (ref 4–11)

## 2025-08-10 PROCEDURE — 71045 X-RAY EXAM CHEST 1 VIEW: CPT

## 2025-08-10 PROCEDURE — 96365 THER/PROPH/DIAG IV INF INIT: CPT

## 2025-08-10 PROCEDURE — 250N000011 HC RX IP 250 OP 636: Performed by: EMERGENCY MEDICINE

## 2025-08-10 PROCEDURE — 80048 BASIC METABOLIC PNL TOTAL CA: CPT | Performed by: EMERGENCY MEDICINE

## 2025-08-10 PROCEDURE — 258N000003 HC RX IP 258 OP 636: Performed by: EMERGENCY MEDICINE

## 2025-08-10 PROCEDURE — 250N000009 HC RX 250: Performed by: EMERGENCY MEDICINE

## 2025-08-10 PROCEDURE — 36415 COLL VENOUS BLD VENIPUNCTURE: CPT | Performed by: EMERGENCY MEDICINE

## 2025-08-10 PROCEDURE — 999N000156 HC STATISTIC RCP CONSULT EA 30 MIN

## 2025-08-10 PROCEDURE — 250N000009 HC RX 250: Performed by: INTERNAL MEDICINE

## 2025-08-10 PROCEDURE — 85004 AUTOMATED DIFF WBC COUNT: CPT | Performed by: EMERGENCY MEDICINE

## 2025-08-10 PROCEDURE — 94640 AIRWAY INHALATION TREATMENT: CPT | Mod: 76

## 2025-08-10 PROCEDURE — 99285 EMERGENCY DEPT VISIT HI MDM: CPT | Mod: 25 | Performed by: EMERGENCY MEDICINE

## 2025-08-10 PROCEDURE — 999N000157 HC STATISTIC RCP TIME EA 10 MIN

## 2025-08-10 PROCEDURE — 96375 TX/PRO/DX INJ NEW DRUG ADDON: CPT

## 2025-08-10 PROCEDURE — 96361 HYDRATE IV INFUSION ADD-ON: CPT

## 2025-08-10 RX ORDER — MAGNESIUM SULFATE HEPTAHYDRATE 40 MG/ML
2 INJECTION, SOLUTION INTRAVENOUS ONCE
Status: COMPLETED | OUTPATIENT
Start: 2025-08-10 | End: 2025-08-10

## 2025-08-10 RX ORDER — ALBUTEROL SULFATE 0.83 MG/ML
2.5 SOLUTION RESPIRATORY (INHALATION)
Status: DISCONTINUED | OUTPATIENT
Start: 2025-08-10 | End: 2025-08-10 | Stop reason: HOSPADM

## 2025-08-10 RX ORDER — ALBUTEROL SULFATE 0.83 MG/ML
2.5 SOLUTION RESPIRATORY (INHALATION) EVERY 4 HOURS PRN
Qty: 90 ML | Refills: 0 | Status: SHIPPED | OUTPATIENT
Start: 2025-08-10 | End: 2025-09-09

## 2025-08-10 RX ORDER — METHYLPREDNISOLONE SODIUM SUCCINATE 125 MG/2ML
125 INJECTION INTRAMUSCULAR; INTRAVENOUS ONCE
Status: COMPLETED | OUTPATIENT
Start: 2025-08-10 | End: 2025-08-10

## 2025-08-10 RX ORDER — PREDNISONE 20 MG/1
TABLET ORAL
Qty: 9 TABLET | Refills: 0 | Status: SHIPPED | OUTPATIENT
Start: 2025-08-10

## 2025-08-10 RX ORDER — IPRATROPIUM BROMIDE AND ALBUTEROL SULFATE 2.5; .5 MG/3ML; MG/3ML
1 SOLUTION RESPIRATORY (INHALATION) EVERY 6 HOURS PRN
Qty: 30 ML | Refills: 0 | Status: SHIPPED | OUTPATIENT
Start: 2025-08-10 | End: 2025-08-14

## 2025-08-10 RX ORDER — ALBUTEROL SULFATE 90 UG/1
2 INHALANT RESPIRATORY (INHALATION) EVERY 6 HOURS PRN
Qty: 8 G | Refills: 0 | Status: SHIPPED | OUTPATIENT
Start: 2025-08-10

## 2025-08-10 RX ORDER — IPRATROPIUM BROMIDE AND ALBUTEROL SULFATE 2.5; .5 MG/3ML; MG/3ML
3 SOLUTION RESPIRATORY (INHALATION)
Status: DISCONTINUED | OUTPATIENT
Start: 2025-08-10 | End: 2025-08-10 | Stop reason: HOSPADM

## 2025-08-10 RX ORDER — IPRATROPIUM BROMIDE AND ALBUTEROL SULFATE 2.5; .5 MG/3ML; MG/3ML
3 SOLUTION RESPIRATORY (INHALATION) ONCE
Status: COMPLETED | OUTPATIENT
Start: 2025-08-10 | End: 2025-08-10

## 2025-08-10 RX ADMIN — METHYLPREDNISOLONE SODIUM SUCCINATE 125 MG: 125 INJECTION, POWDER, FOR SOLUTION INTRAMUSCULAR; INTRAVENOUS at 08:07

## 2025-08-10 RX ADMIN — IPRATROPIUM BROMIDE AND ALBUTEROL SULFATE 3 ML: .5; 3 SOLUTION RESPIRATORY (INHALATION) at 09:30

## 2025-08-10 RX ADMIN — IPRATROPIUM BROMIDE AND ALBUTEROL SULFATE 3 ML: .5; 3 SOLUTION RESPIRATORY (INHALATION) at 12:11

## 2025-08-10 RX ADMIN — IPRATROPIUM BROMIDE AND ALBUTEROL SULFATE 3 ML: .5; 3 SOLUTION RESPIRATORY (INHALATION) at 08:07

## 2025-08-10 RX ADMIN — SODIUM CHLORIDE 1000 ML: 0.9 INJECTION, SOLUTION INTRAVENOUS at 08:02

## 2025-08-10 RX ADMIN — MAGNESIUM SULFATE HEPTAHYDRATE 2 G: 2 INJECTION, SOLUTION INTRAVENOUS at 08:07

## 2025-08-10 ASSESSMENT — ACTIVITIES OF DAILY LIVING (ADL)
ADLS_ACUITY_SCORE: 56
